# Patient Record
Sex: MALE | Race: WHITE | NOT HISPANIC OR LATINO | Employment: OTHER | URBAN - METROPOLITAN AREA
[De-identification: names, ages, dates, MRNs, and addresses within clinical notes are randomized per-mention and may not be internally consistent; named-entity substitution may affect disease eponyms.]

---

## 2017-01-21 DIAGNOSIS — E11.9 TYPE 2 DIABETES MELLITUS WITHOUT COMPLICATION: ICD-10-CM

## 2017-01-23 RX ORDER — METFORMIN HYDROCHLORIDE 500 MG/1
TABLET, EXTENDED RELEASE ORAL
Qty: 120 TABLET | Refills: 10 | Status: SHIPPED | OUTPATIENT
Start: 2017-01-23 | End: 2017-05-16

## 2017-01-23 RX ORDER — PRAVASTATIN SODIUM 40 MG/1
TABLET ORAL
Qty: 30 TABLET | Refills: 8 | Status: SHIPPED | OUTPATIENT
Start: 2017-01-23 | End: 2018-02-17 | Stop reason: SDUPTHER

## 2017-04-02 RX ORDER — LISINOPRIL AND HYDROCHLOROTHIAZIDE 20; 25 MG/1; MG/1
TABLET ORAL
Qty: 30 TABLET | Refills: 10 | Status: SHIPPED | OUTPATIENT
Start: 2017-04-02 | End: 2017-08-18 | Stop reason: SDUPTHER

## 2017-05-04 ENCOUNTER — TELEPHONE (OUTPATIENT)
Dept: FAMILY MEDICINE | Facility: CLINIC | Age: 67
End: 2017-05-04

## 2017-05-04 RX ORDER — SITAGLIPTIN 100 MG/1
TABLET, FILM COATED ORAL
Qty: 30 TABLET | Refills: 10 | Status: SHIPPED | OUTPATIENT
Start: 2017-05-04 | End: 2018-06-10 | Stop reason: SDUPTHER

## 2017-05-16 ENCOUNTER — OFFICE VISIT (OUTPATIENT)
Dept: FAMILY MEDICINE | Facility: CLINIC | Age: 67
End: 2017-05-16
Payer: MEDICARE

## 2017-05-16 VITALS
SYSTOLIC BLOOD PRESSURE: 143 MMHG | BODY MASS INDEX: 41.44 KG/M2 | TEMPERATURE: 98 F | RESPIRATION RATE: 16 BRPM | HEIGHT: 67 IN | HEART RATE: 113 BPM | WEIGHT: 264 LBS | DIASTOLIC BLOOD PRESSURE: 73 MMHG

## 2017-05-16 DIAGNOSIS — R29.898 RIGHT LEG WEAKNESS: ICD-10-CM

## 2017-05-16 DIAGNOSIS — R29.818 NEUROGENIC CLAUDICATION: ICD-10-CM

## 2017-05-16 DIAGNOSIS — M79.604 RIGHT LEG PAIN: Primary | ICD-10-CM

## 2017-05-16 PROCEDURE — 3078F DIAST BP <80 MM HG: CPT | Mod: S$GLB,,, | Performed by: FAMILY MEDICINE

## 2017-05-16 PROCEDURE — 99999 PR PBB SHADOW E&M-EST. PATIENT-LVL III: CPT | Mod: PBBFAC,,, | Performed by: FAMILY MEDICINE

## 2017-05-16 PROCEDURE — 1125F AMNT PAIN NOTED PAIN PRSNT: CPT | Mod: S$GLB,,, | Performed by: FAMILY MEDICINE

## 2017-05-16 PROCEDURE — 99499 UNLISTED E&M SERVICE: CPT | Mod: S$GLB,,, | Performed by: FAMILY MEDICINE

## 2017-05-16 PROCEDURE — 99214 OFFICE O/P EST MOD 30 MIN: CPT | Mod: S$GLB,,, | Performed by: FAMILY MEDICINE

## 2017-05-16 PROCEDURE — 1159F MED LIST DOCD IN RCRD: CPT | Mod: S$GLB,,, | Performed by: FAMILY MEDICINE

## 2017-05-16 PROCEDURE — 3077F SYST BP >= 140 MM HG: CPT | Mod: S$GLB,,, | Performed by: FAMILY MEDICINE

## 2017-05-16 RX ORDER — GABAPENTIN 300 MG/1
300 CAPSULE ORAL NIGHTLY
Qty: 30 CAPSULE | Refills: 1 | Status: SHIPPED | OUTPATIENT
Start: 2017-05-16 | End: 2018-05-26 | Stop reason: SDUPTHER

## 2017-05-16 RX ORDER — CYCLOBENZAPRINE HCL 5 MG
5 TABLET ORAL 3 TIMES DAILY PRN
Qty: 30 TABLET | Refills: 1 | Status: SHIPPED | OUTPATIENT
Start: 2017-05-16 | End: 2017-05-26

## 2017-05-16 NOTE — PROGRESS NOTES
(Portions of this note were dictated using voice recognition software and may contain dictation related errors in spelling/grammar/syntax not found on text review)    CC:   Chief Complaint   Patient presents with    Back Pain    Hip Pain     right       HPI: 67 y.o. male chronic history below, last visit over a ago.  Presents with back pain and hip pain.  Reviewed last medical record entry, had endorsed some neuropathy symptoms in his leg which seem related to neurogenic claudication, worse on standing and better on leaning over.      Neurogenic claudication issues are fairly stable over the last several years.  However, he noticed that 4 days ago at night he started noticing right lower back pain.  He woke up the next day and the right leg was completely numb for couple of hours with the numbness got better throughout the day and he has been okay as long as he has been sitting in a chair.  However, at bedtime he gets recurrence of significant right low back pain and burning sensation in his.X and hip area.  He has been taking naproxen twice a day for the last few days and has noticed some slight improvement but not much.  2 nights ago he took a Percocet from his daughter has not taken anything else since.  He does have diabetes, started on Januvia at last visit last year in addition to his Amaryl 4 mg twice a day and metformin 1000 g twice a day.  He is overdue for lab work but will return for this.  He states that his sugars have been around 110-125 on average but this morning they are 165 as he had a candy bar last night and he was up all night because he could not sleep.  He denies bowel or bladder dysfunction.  Denies any numbness in the groin although does state he gets his neurogenic claudication symptoms, he will get numbness starting from his feet and going up the entire leg bilaterally including the pelvic area        Past Medical History:   Diagnosis Date    Allergy     DDD (degenerative disc  disease), lumbar     Hyperlipidemia     Hypertension     Nicotine abuse     Obesity     Scrotal mass     Type II diabetes mellitus with renal manifestations, uncontrolled     microalbuminuria    Type II or unspecified type diabetes mellitus without mention of complication, not stated as uncontrolled     Ulcer        Past Surgical History:   Procedure Laterality Date    CARPAL TUNNEL RELEASE      KNEE SURGERY Left     removal of cartilage with no implant    ULNAR NERVE TRANSPOSITION         Family History   Problem Relation Age of Onset    Cancer Mother     Cataracts Mother     Cancer Father     Diabetes Paternal Grandmother     Cancer Paternal Grandfather        Social History     Social History    Marital status:      Spouse name: N/A    Number of children: N/A    Years of education: N/A     Occupational History    Not on file.     Social History Main Topics    Smoking status: Current Every Day Smoker     Packs/day: 0.50     Years: 45.00     Types: Cigarettes    Smokeless tobacco: Never Used    Alcohol use 2.5 oz/week     5 drink(s) per week      Comment: beers weekly    Drug use: Not on file    Sexual activity: Not on file     Other Topics Concern    Not on file     Social History Narrative     Lab Results   Component Value Date    WBC 10.07 11/18/2015    HGB 14.6 11/18/2015    HCT 43.0 11/18/2015     11/18/2015    CHOL 140 11/18/2015    TRIG 225 (H) 11/18/2015    HDL 40 11/18/2015    ALT 42 03/30/2016    AST 34 03/30/2016     03/30/2016    K 4.2 03/30/2016     03/30/2016    CREATININE 1.2 03/30/2016    BUN 20 03/30/2016    CO2 31 (H) 03/30/2016    TSH 2.071 09/05/2014    PSA 2.8 03/30/2016    HGBA1C 8.6 (H) 03/30/2016    LDLCALC 55.0 (L) 11/18/2015     (H) 03/30/2016     Prior imaging on file includes lumbar MRI from June 2013 showing multilevel degenerative lumbar spondylosis worse at L4-L5 with  severe spinal cord narrowing related to diffuse disc bulge  and severe bilateral facet arthropathy      ROS:  GENERAL: No fever, chills, fatigability or weight loss.  SKIN: No rashes, no itching.  HEAD: No headaches.  EYES: No visual changes  EARS: No ear pain or changes in hearing.  NOSE: No congestion or rhinorrhea.  MOUTH & THROAT: No hoarseness, change in voice, or sore throat.  NODES: Denies swollen glands.  CHEST: Denies HASTINGS, cyanosis, wheezing, cough and sputum production.  CARDIOVASCULAR: Denies chest pain, PND, orthopnea.  ABDOMEN: No nausea, vomiting, or changes in bowel function.  URINARY: No flank pain, dysuria or hematuria.  PERIPHERAL VASCULAR: No claudication or cyanosis.  MUSCULOSKELETAL:Above.  NEUROLOGIC:Above.Also notices sensation of right leg weakness and difficulty with walking    Vital signs reviewed  PE:   APPEARANCE: Well nourished, well developed, in no acute distress.    HEAD: Normocephalic, atraumatic.  EYES:   Conjunctivae noninjected.  MSK/NEURO: Mild midline lumbar tenderness lower lumbar spine.  No paralumbar tenderness.  No sacroiliac tenderness.  Negative straight leg raise bilaterally.  Absent patellar and ankle reflexes bilaterally.  Strength testing demonstrates 5/5 hip flexor and quadriceps strength bilaterally.  4/5 hamstring strength bilaterally.  5/5 foot dorsi flexor/plantar flexor strength bilaterally    IMPRESSION    1. Right leg pain    2. Right leg weakness    3. Neurogenic claudication          PLAN  Orders Placed This Encounter   Procedures    MRI Lumbar Spine Without Contrast     Continue naproxen 500 twice a day for the next week  Add Flexeril 5 mg 3 times a day as needed in case of muscle strain pathology  Start gabapentin 300 mg at night for the next 3-4 weeks for acute neurogenic pathology  Update MRI of the lumbar spine without contrast given his history of neurogenic claudication and continued baseline symptoms of numbness of bilateral extremities with progressive walking, better with leaning over    He will return in  the next month or 2 for recheck of his chronic health issues including diabetes

## 2017-05-16 NOTE — MR AVS SNAPSHOT
[address]  [place] [address]  Phone: [phone]  Fax: [phone]                  Francisco Coppola   2017 8:40 AM   Office Visit    Description:  Male : 1950   Provider:  Laureano Hebert MD   Department:  [facility]           Reason for Visit     Back Pain     Hip Pain           Diagnoses this Visit        Comments    Right leg pain    -  Primary     Right leg weakness         Neurogenic claudication                To Do List           Future Appointments        Provider Department Dept Phone    2017 6:00 AM [facility] MRI[facility] [phone]      Goals (5 Years of Data)     None       These Medications        Disp Refills Start End    gabapentin (NEURONTIN) 300 MG capsule 30 capsule 1 2017     Take 1 capsule (300 mg total) by mouth every evening. - Oral    Pharmacy: [facility] [address] [facility] Ph #: [phone]       cyclobenzaprine (FLEXERIL) 5 MG tablet 30 tablet 1 2017    Take 1 tablet (5 mg total) by mouth 3 (three) times daily as needed for Muscle spasms. - Oral    Pharmacy: [facility] - [place] [id number] [facility] Ph #: [phone]         [facility]     [facility] Nurse Care Line -  Assistance  Unless otherwise directed by your provider, please contact [facility], our nurse care line that is available for  assistance.     Registered nurses in the [facility] provide: appointment scheduling, clinical advisement, health education, and other advisory services.  Call: [phone] (toll free)               Medications           Message regarding Medications     Verify the changes and/or additions to your medication regime listed below are the same as discussed with your clinician today.  If any of these changes or additions are incorrect, please notify your healthcare provider.        START  taking these NEW medications        Refills    gabapentin (NEURONTIN) 300 MG capsule 1    Sig: Take 1 capsule (300 mg total) by mouth every evening.    Class: Normal    Route: Oral    cyclobenzaprine (FLEXERIL) 5 MG tablet 1    Sig: Take 1 tablet (5 mg total) by mouth 3 (three) times daily as needed for Muscle spasms.    Class: Normal    Route: Oral      STOP taking these medications     furosemide (LASIX) 40 MG tablet Take 1 tablet (40 mg total) by mouth once daily.    polyethylene glycol (GLYCOLAX) 17 gram/dose powder Take 17 g by mouth once daily.    sulfamethoxazole-trimethoprim 800-160mg (BACTRIM DS) 800-160 mg Tab Take 1 tablet by mouth 2 (two) times daily.    metformin (GLUCOPHAGE-XR) 500 MG 24 hr tablet TAKE 2 TABLETS (1,000 MG TOTAL) BY MOUTH 2 (TWO) TIMES DAILY WITH MEAL S.           Verify that the below list of medications is an accurate representation of the medications you are currently taking.  If none reported, the list may be blank. If incorrect, please contact your healthcare provider. Carry this list with you in case of emergency.           Current Medications     blood sugar diagnostic Strp 1 each by Misc.(Non-Drug; Combo Route) route 2 (two) times daily.    glimepiride (AMARYL) 4 MG tablet TAKE 1 TABLET (4 MG TOTAL) BY MOUTH 2 (TWO) TIMES DAILY  WITH MEALS.    JANUVIA 100 mg Tab TAKE 1 TABLET (100 MG TOTAL)  BY MOUTH ONCE DAILY.    lancets (ONETOUCH ULTRASOFT LANCETS) Misc 1 Units by Misc.(Non-Drug; Combo Route) route 2 (two) times daily.    lisinopril-hydrochlorothiazide (PRINZIDE,ZESTORETIC) 20-25 mg Tab TAKE ONE TABLET BY MOUTH EVERY DAY    mupirocin (BACTROBAN) 2 % ointment Apply topically 3 (three) times daily.    naproxen (NAPROSYN) 250 MG tablet Take 500 mg by mouth 2 (two) times daily with meals.    pravastatin (PRAVACHOL) 40 MG tablet TAKE 1 TABLET (40 MG TOTAL) BY MOUTH NIGHTLY.    cyclobenzaprine (FLEXERIL) 5 MG tablet Take 1 tablet (5 mg total) by mouth 3 (three) times daily as  "needed for Muscle spasms.    gabapentin (NEURONTIN) 300 MG capsule Take 1 capsule (300 mg total) by mouth every evening.           Clinical Reference Information           Your Vitals Were     BP Pulse Temp Resp Height Weight    143/73 113 97.9 °F (36.6 °C) (Oral) 16 5' 7" (1.702 m) 119.7 kg (264 lb)    BMI                41.35 kg/m2          Blood Pressure          Most Recent Value    BP  (!)  143/73      Allergies as of 5/16/2017     Augmentin [Amoxicillin-pot Clavulanate]    Tramadol      Immunizations Administered on Date of Encounter - 5/16/2017     None      Orders Placed During Today's Visit     Future Labs/Procedures Expected by Expires    MRI Lumbar Spine Without Contrast  5/16/2017 5/16/2018      Smoking Cessation     If you would like to quit smoking:   You may be eligible for free services if you are a [place] resident and started smoking cigarettes before September 1, 1988.  Call the Smoking Cessation Trust ([facility]) toll free at (819) 734-3798 or (159) 195-1009.   Call 1-800-QUIT-NOW if you do not meet the above criteria.   Contact us via email: [email or web address]   View our website for more information: [email or web address]        Language Assistance Services     ATTENTION: Language assistance services are available, free of charge. Please call [phone].      ATENCIÓN: Si habla español, tiene a lewis disposición servicios gratuitos de asistencia lingüística. Llame al [phone].     CHÚ Ý: N?u b?n nói Ti?ng Vi?t, có các d?ch v? h? tr? ngôn ng? mi?n phí dành cho b?n. G?i s? [phone].         [facility] complies with applicable Federal civil rights laws and does not discriminate on the basis of race, color, national origin, age, disability, or sex.        "

## 2017-05-16 NOTE — PROGRESS NOTES
Patient, Francisco Coppola (MRN #[id number]), presented with a recorded BMI of 41.35 kg/m^2 consistent with the definition of morbid obesity (ICD-10 E66.01). The patient's morbid obesity was monitored, evaluated, addressed and/or treated. This addendum to the medical record is made on 05/16/2017.

## 2017-05-19 ENCOUNTER — PATIENT MESSAGE (OUTPATIENT)
Dept: FAMILY MEDICINE | Facility: CLINIC | Age: 67
End: 2017-05-19

## 2017-05-19 DIAGNOSIS — E11.9 TYPE 2 DIABETES MELLITUS WITHOUT COMPLICATION: ICD-10-CM

## 2017-05-22 ENCOUNTER — PATIENT MESSAGE (OUTPATIENT)
Dept: FAMILY MEDICINE | Facility: CLINIC | Age: 67
End: 2017-05-22

## 2017-05-24 ENCOUNTER — TELEPHONE (OUTPATIENT)
Dept: FAMILY MEDICINE | Facility: CLINIC | Age: 67
End: 2017-05-24

## 2017-05-24 ENCOUNTER — HOSPITAL ENCOUNTER (OUTPATIENT)
Dept: RADIOLOGY | Facility: HOSPITAL | Age: 67
Discharge: HOME | End: 2017-05-24
Attending: FAMILY MEDICINE
Payer: MEDICARE

## 2017-05-24 DIAGNOSIS — M79.604 RIGHT LEG PAIN: ICD-10-CM

## 2017-05-24 DIAGNOSIS — R29.898 RIGHT LEG WEAKNESS: ICD-10-CM

## 2017-05-24 DIAGNOSIS — R29.818 NEUROGENIC CLAUDICATION: ICD-10-CM

## 2017-05-24 NOTE — TELEPHONE ENCOUNTER
----- Message from Kimi Sims sent at 5/24/2017  3:56 PM CDT -----  Contact: Self [phone]  Patient is calling to talk to nurse concerning he wanted orders for a stand up MRI or Open MRI. Please advice

## 2017-05-26 ENCOUNTER — TELEPHONE (OUTPATIENT)
Dept: FAMILY MEDICINE | Facility: CLINIC | Age: 67
End: 2017-05-26

## 2017-05-26 DIAGNOSIS — R29.818 NEUROGENIC CLAUDICATION: Primary | ICD-10-CM

## 2017-05-26 NOTE — TELEPHONE ENCOUNTER
BYRON-  Pt called stating that he could not do his MRI.  He stated that after laying there for 5 mins he had to get up bc of severe back pain.

## 2017-05-26 NOTE — TELEPHONE ENCOUNTER
----- Message from Sybil Dugan sent at 5/26/2017  4:09 PM CDT -----  Contact: self/[phone]  Patient is calling to discuss results from recent test.  Please advise.

## 2017-05-29 RX ORDER — HYDROCODONE BITARTRATE AND ACETAMINOPHEN 5; 325 MG/1; MG/1
1 TABLET ORAL 2 TIMES DAILY PRN
Qty: 16 TABLET | Refills: 0 | Status: SHIPPED | OUTPATIENT
Start: 2017-05-29 | End: 2017-06-06 | Stop reason: SDUPTHER

## 2017-05-29 NOTE — TELEPHONE ENCOUNTER
Have reordered MRI.  Given a prescription of hydrocodone that he can take prior to the procedure for severe pain so he can sit through the procedure

## 2017-05-30 RX ORDER — GLIMEPIRIDE 4 MG/1
TABLET ORAL
Qty: 60 TABLET | Refills: 5 | Status: SHIPPED | OUTPATIENT
Start: 2017-05-30 | End: 2018-01-02 | Stop reason: SDUPTHER

## 2017-05-30 NOTE — TELEPHONE ENCOUNTER
----- Message from Kalani Richardson sent at 5/30/2017  9:14 AM CDT -----  Contact: [phone]/ self   Pt wants to know if he needs to  his NORCO or if it was sent to [facility] . Please advise

## 2017-06-03 ENCOUNTER — HOSPITAL ENCOUNTER (EMERGENCY)
Facility: HOSPITAL | Age: 67
Discharge: HOME | End: 2017-06-03
Attending: EMERGENCY MEDICINE
Payer: MEDICARE

## 2017-06-03 VITALS
DIASTOLIC BLOOD PRESSURE: 81 MMHG | SYSTOLIC BLOOD PRESSURE: 142 MMHG | HEART RATE: 95 BPM | OXYGEN SATURATION: 95 % | HEIGHT: 67 IN | BODY MASS INDEX: 41.59 KG/M2 | RESPIRATION RATE: 18 BRPM | TEMPERATURE: 98 F | WEIGHT: 265 LBS

## 2017-06-03 DIAGNOSIS — R60.0 PEDAL EDEMA: Primary | ICD-10-CM

## 2017-06-03 DIAGNOSIS — L03.119 CELLULITIS OF LOWER EXTREMITY, UNSPECIFIED LATERALITY: ICD-10-CM

## 2017-06-03 DIAGNOSIS — R60.9 SWELLING: ICD-10-CM

## 2017-06-03 PROCEDURE — 82962 GLUCOSE BLOOD TEST: CPT

## 2017-06-03 PROCEDURE — 99284 EMERGENCY DEPT VISIT MOD MDM: CPT | Mod: 25

## 2017-06-03 RX ORDER — FUROSEMIDE 40 MG/1
40 TABLET ORAL DAILY
Qty: 6 TABLET | Refills: 0 | Status: SHIPPED | OUTPATIENT
Start: 2017-06-03 | End: 2017-06-14 | Stop reason: SDUPTHER

## 2017-06-03 RX ORDER — SULFAMETHOXAZOLE AND TRIMETHOPRIM 800; 160 MG/1; MG/1
2 TABLET ORAL 2 TIMES DAILY
Qty: 40 TABLET | Refills: 0 | Status: SHIPPED | OUTPATIENT
Start: 2017-06-03 | End: 2017-06-14

## 2017-06-03 NOTE — ED PROVIDER NOTES
Encounter Date: 6/3/2017       History     Chief Complaint   Patient presents with    Leg Swelling     bilateral lower leg swelling x 2 days     Review of patient's allergies indicates:   Allergen Reactions    Augmentin [amoxicillin-pot clavulanate] Hives and Rash    Tramadol Nausea Only     Francisco Coppola, a 67 y.o. male, complains of swelling of the lower legs with oozing a clear liquid.  He states he's had a chronic problem with the left leg and for the past 2 weeks with the right leg.  He denies any fever or chills.  He has a history of diabetes but says his glucose has been fairly well controlled.  He denies any deep calf tenderness but complains of tightness around the ankles particularly of the right ankle.  He has been treating the lesions with mupirocin ointment prescribed by his physician for the left lower extremity.   Pain location: Bilateral foot and ankle pain  Pain Severity: Mild-to-moderate    Pain timing: Chronic problem with the left lower extremity increased over the past 2 weeks with right  Pain character: Tight sensation    Associated with or Modified by: (see above)              Past Medical History:   Diagnosis Date    Allergy     DDD (degenerative disc disease), lumbar     Hyperlipidemia     Hypertension     Nicotine abuse     Obesity     Scrotal mass     Type II diabetes mellitus with renal manifestations, uncontrolled     microalbuminuria    Type II or unspecified type diabetes mellitus without mention of complication, not stated as uncontrolled     Ulcer      Past Surgical History:   Procedure Laterality Date    CARPAL TUNNEL RELEASE      KNEE SURGERY Left     removal of cartilage with no implant    ULNAR NERVE TRANSPOSITION       Family History   Problem Relation Age of Onset    Cancer Mother     Cataracts Mother     Cancer Father     Cancer Paternal Grandfather     Diabetes Paternal Grandmother      Social History   Substance Use Topics    Smoking status:  Current Every Day Smoker     Packs/day: 0.50     Years: 45.00     Types: Cigarettes    Smokeless tobacco: Never Used    Alcohol use 2.5 oz/week     5 Standard drinks or equivalent per week      Comment: beers weekly     Review of Systems   Constitutional: Negative for chills and fever.   Cardiovascular: Positive for leg swelling.   Musculoskeletal:        Swelling of both lower extremities with oozing from the skin bilaterally.   All other systems reviewed and are negative.      Physical Exam     Initial Vitals [06/03/17 1414]   BP Pulse Resp Temp SpO2   (!) 160/74 100 18 98.6 °F (37 °C) 96 %     Physical Exam    Nursing note and vitals reviewed.  Constitutional: He appears well-developed and well-nourished. No distress.   Musculoskeletal:   Right lower extremity: 2+ pretibial edema with mild erythema with edema ankle and  foot; no deep calf tenderness or popliteal tenderness.  Small vesicles over the pretibial area with mild erythema and serous fluid noted.  Negative Homans.    Left lower extremity: 2+ pretibial edema with mild erythema; edema ankle and foot; dorsalis pedis pulse intact;Small vesicles over the pretibial area with mild erythema and serous fluid noted.  Negative Homans.    Dorsalis pedis pulses intact.     Neurological: He is alert and oriented to person, place, and time. He has normal strength.   Skin: Skin is warm and dry.   See above lower extremity examination.   Psychiatric: He has a normal mood and affect. His behavior is normal. Thought content normal.         ED Course   Procedures  Labs Reviewed   POCT GLUCOSE MONITORING CONTINUOUS             Medical Decision Making:   Initial Assessment:   67 y.o. male, complains of swelling of the lower legs with oozing a clear liquid.  He states he's had a chronic problem with the left leg and for the past 2 weeks with the right leg.  He denies any fever or chills.  He has a history of diabetes but says his glucose has been fairly well controlled.  He  denies any deep calf tenderness but complains of tightness around the ankles particularly of the right ankle.  He has been treating the lesions with mupirocin ointment prescribed by his physician for the left lower extremity.   Pain location: Bilateral foot and ankle pain  PE: No acute distress; afebrile; bilateral dependent edema with early cellulitis and weeping of serous fluid.  Differential Diagnosis:   DVT, dependent edema chronic, cellulitis  Clinical Tests:   Medical Tests: Ordered and Reviewed  ED Management:  No evidence of DVT on venous ultrasound of both lower extremities.  The patient will be placed on Lasix for the next week and Bactrim.  He will continue to use mupirocin ointment on his legs and he will follow-up with his primary care physician this next week for further evaluation.                   ED Course     Clinical Impression:   The primary encounter diagnosis was Pedal edema. Diagnoses of Swelling and Cellulitis of lower extremity, unspecified laterality were also pertinent to this visit.          Ray Rocha Jr., MD  06/03/17 0577

## 2017-06-03 NOTE — ED NOTES
"Patient presents to ED secondary to leg swelling. Patient states left leg swelling is chronic but has had an acute onset of right leg swelling approx 10 days ago. Accompanied by tightness to right calf. Also noted to have "blisters" to bilateral legs. +3 pitting edema noted to BLE. PMH includes DM.  Pt denies cp, sob, URI symptoms, fever, chills, abd pain, n/v/d, and urinary symptoms. NAD noted. Will continue to monitor closely.     APPEARANCE: Alert, oriented and in no acute distress.  CARDIAC: Normal rate    PERIPHERAL VASCULAR: peripheral pulses present. Sluggish cap refill. Warm to touch.    RESPIRATORY:Normal rate and effort, breath sounds clear bilaterally throughout chest. Respirations are equal and unlabored no obvious signs of distress.  GASTRO: soft, bowel sounds normal, no tenderness, no abdominal distention. Obese.  MUSC: Full ROM. No bony tenderness or soft tissue tenderness. No obvious deformity. Ambulated to ED room with steady gait.  SKIN: Skin is warm and dry, normal skin turgor, mucous membranes moist. "few weeping blisters" noted to BLE. Scattered redness noted to BLE. Patient states redness to LLE is chronic.     "

## 2017-06-04 LAB — POCT GLUCOSE: 129 MG/DL (ref 70–110)

## 2017-06-06 ENCOUNTER — PATIENT MESSAGE (OUTPATIENT)
Dept: FAMILY MEDICINE | Facility: CLINIC | Age: 67
End: 2017-06-06

## 2017-06-06 RX ORDER — HYDROCODONE BITARTRATE AND ACETAMINOPHEN 5; 325 MG/1; MG/1
1 TABLET ORAL 2 TIMES DAILY PRN
Qty: 16 TABLET | Refills: 0 | Status: SHIPPED | OUTPATIENT
Start: 2017-06-06 | End: 2017-06-14

## 2017-06-10 RX ORDER — METFORMIN HYDROCHLORIDE 500 MG/1
TABLET ORAL
COMMUNITY
Start: 2017-03-06 | End: 2017-06-14

## 2017-06-10 RX ORDER — METFORMIN HYDROCHLORIDE 500 MG/1
TABLET ORAL
COMMUNITY
Start: 2017-04-17 | End: 2017-06-14

## 2017-06-12 ENCOUNTER — HOSPITAL ENCOUNTER (OUTPATIENT)
Dept: RADIOLOGY | Facility: HOSPITAL | Age: 67
Discharge: HOME | End: 2017-06-12
Attending: FAMILY MEDICINE
Payer: MEDICARE

## 2017-06-12 DIAGNOSIS — R29.818 NEUROGENIC CLAUDICATION: ICD-10-CM

## 2017-06-12 PROCEDURE — 72148 MRI LUMBAR SPINE W/O DYE: CPT | Mod: 26,,, | Performed by: RADIOLOGY

## 2017-06-12 PROCEDURE — 72148 MRI LUMBAR SPINE W/O DYE: CPT | Mod: TC

## 2017-06-13 ENCOUNTER — TELEPHONE (OUTPATIENT)
Dept: FAMILY MEDICINE | Facility: CLINIC | Age: 67
End: 2017-06-13

## 2017-06-13 DIAGNOSIS — M48.00 SPINAL STENOSIS, UNSPECIFIED SPINAL REGION: ICD-10-CM

## 2017-06-13 DIAGNOSIS — M54.16 RIGHT LUMBAR RADICULOPATHY: Primary | ICD-10-CM

## 2017-06-13 NOTE — TELEPHONE ENCOUNTER
Called patient and advised that MRI results showed significant disc herniation to the right which may be the cause of the pain and swelling in his right leg.  Let him know that Dr Hebert has put in an order for neurosurgery to see if surgical intervention is needed, if not, a referral for pain management can be submitted.  Also verified his appt with Dr Hebert tomorrow.  Patient verbalized understanding.

## 2017-06-13 NOTE — TELEPHONE ENCOUNTER
----- Message from Kelly Bangura sent at 6/13/2017  1:42 PM CDT -----  Contact: [phone]  Patient is returning your call

## 2017-06-13 NOTE — TELEPHONE ENCOUNTER
pls advise MRI does show significant disc herniation off to the right   compressing on the nerve root that is going to the right leg, likely causing a lot of the problems he has had with the right leg.  I am putting a consult initially to neurosurgery to see if it is necessary for him to have any surgical intervention for this.  If this can be delayed in any way, there may be referral to pain management for some injection treatments first.  I will let neurosurgery make that determination based on the review of his MRI test

## 2017-06-14 ENCOUNTER — OFFICE VISIT (OUTPATIENT)
Dept: FAMILY MEDICINE | Facility: CLINIC | Age: 67
End: 2017-06-14
Payer: MEDICARE

## 2017-06-14 VITALS
WEIGHT: 262.56 LBS | SYSTOLIC BLOOD PRESSURE: 142 MMHG | DIASTOLIC BLOOD PRESSURE: 76 MMHG | BODY MASS INDEX: 39.79 KG/M2 | OXYGEN SATURATION: 96 % | HEIGHT: 68 IN | HEART RATE: 113 BPM

## 2017-06-14 DIAGNOSIS — R60.0 PERIPHERAL EDEMA: ICD-10-CM

## 2017-06-14 DIAGNOSIS — M54.16 RIGHT LUMBAR RADICULOPATHY: ICD-10-CM

## 2017-06-14 DIAGNOSIS — I10 HTN (HYPERTENSION), BENIGN: ICD-10-CM

## 2017-06-14 DIAGNOSIS — L03.90 CELLULITIS, UNSPECIFIED CELLULITIS SITE: ICD-10-CM

## 2017-06-14 PROCEDURE — 99999 PR PBB SHADOW E&M-EST. PATIENT-LVL III: CPT | Mod: PBBFAC,,, | Performed by: FAMILY MEDICINE

## 2017-06-14 PROCEDURE — 1159F MED LIST DOCD IN RCRD: CPT | Mod: S$GLB,,, | Performed by: FAMILY MEDICINE

## 2017-06-14 PROCEDURE — 4010F ACE/ARB THERAPY RXD/TAKEN: CPT | Mod: S$GLB,,, | Performed by: FAMILY MEDICINE

## 2017-06-14 PROCEDURE — 1125F AMNT PAIN NOTED PAIN PRSNT: CPT | Mod: S$GLB,,, | Performed by: FAMILY MEDICINE

## 2017-06-14 PROCEDURE — 99215 OFFICE O/P EST HI 40 MIN: CPT | Mod: S$GLB,,, | Performed by: FAMILY MEDICINE

## 2017-06-14 PROCEDURE — 99499 UNLISTED E&M SERVICE: CPT | Mod: S$GLB,,, | Performed by: FAMILY MEDICINE

## 2017-06-14 RX ORDER — CYCLOBENZAPRINE HCL 5 MG
TABLET ORAL
COMMUNITY
Start: 2017-05-30 | End: 2017-06-29

## 2017-06-14 RX ORDER — METFORMIN HYDROCHLORIDE 500 MG/1
TABLET ORAL
COMMUNITY
Start: 2017-05-30 | End: 2018-05-10

## 2017-06-14 RX ORDER — HYDROCODONE BITARTRATE AND ACETAMINOPHEN 10; 325 MG/1; MG/1
1 TABLET ORAL EVERY 12 HOURS PRN
Qty: 30 TABLET | Refills: 0 | Status: SHIPPED | OUTPATIENT
Start: 2017-06-14 | End: 2017-06-26 | Stop reason: SDUPTHER

## 2017-06-14 RX ORDER — FUROSEMIDE 20 MG/1
20 TABLET ORAL DAILY
Qty: 30 TABLET | Refills: 11 | Status: SHIPPED | OUTPATIENT
Start: 2017-06-14 | End: 2017-08-18 | Stop reason: SDUPTHER

## 2017-06-14 NOTE — PROGRESS NOTES
(Portions of this note were dictated using voice recognition software and may contain dictation related errors in spelling/grammar/syntax not found on text review)    CC:   Chief Complaint   Patient presents with    Follow-up     leg swelling,back pain    Diabetic Foot Exam       HPI: 67 y.o. male medical history below.  Last visit was in May for right leg pain worsening as a result of neurogenic claudication which is chronic for him.  MRI showed significant right central/paracentral disc restriction at L5-S1 compressing descending S1 nerve root and contributing to severe spinal canal stenosis.  Persistent L4-L5 severe spinal canal stenosis.  Put in a consult for neurosurgery.    He went to the ER recently for left leg edema and weeping, concern for possible cellulitis.  Has been seen by nurse practitioner in April 2016 for similar symptoms.  Was given Bactrim in the ED and a prescription for Lasix.  His other chronic edema history does have a history of chronic peripheral edema.  Ultrasound of the legs was negative for DVT  \  Diabetes on metformin 1000 mg twice a day and Amaryl 4 mg twice a day plus Januvia 100 mg daily.  Has been lost to follow-up with respect to diabetes management in the past.  Compliant with therapy above.  Blood sugars usually in the  115-125 range in the morning.  Has not seen an ophthalmologist recently.  Does have some right foot numbness issues from the above concern.  No significant left foot numbness issues.    Here with his wife.  Left Leg swelling is improved with Lasix 40 mg daily but this was just a temporary prescription.  Leg is less red and painful given the Bactrim.  Still has some scabbing noted left leg which he had been putting some mupirocin ointment on.  No fevers or chills.  No chest pain or shortness of breath.  No nausea, vomiting, abdominal pain.          Past Medical History:   Diagnosis Date    Allergy     DDD (degenerative disc disease), lumbar      Hyperlipidemia     Hypertension     Nicotine abuse     Obesity     Scrotal mass     Type II diabetes mellitus with renal manifestations, uncontrolled     microalbuminuria    Type II or unspecified type diabetes mellitus without mention of complication, not stated as uncontrolled     Ulcer        Past Surgical History:   Procedure Laterality Date    CARPAL TUNNEL RELEASE      KNEE SURGERY Left     removal of cartilage with no implant    ULNAR NERVE TRANSPOSITION         Family History   Problem Relation Age of Onset    Cancer Mother     Cataracts Mother     Cancer Father     Cancer Paternal Grandfather     Diabetes Paternal Grandmother        Social History     Social History    Marital status:      Spouse name: N/A    Number of children: N/A    Years of education: N/A     Occupational History    Not on file.     Social History Main Topics    Smoking status: Current Every Day Smoker     Packs/day: 0.50     Years: 45.00     Types: Cigarettes    Smokeless tobacco: Never Used    Alcohol use 2.5 oz/week     5 Standard drinks or equivalent per week      Comment: beers weekly    Drug use: Unknown    Sexual activity: Not on file     Other Topics Concern    Not on file     Social History Narrative    No narrative on file     SCREENINGS (males >=65)    Immunizations:  Tetanus: 1/1/2014  Pneumovax: 9/19/2014     Prostate check 2015    Colonoscopy: due (addressed with pt prior visit and he had declined)    Lab Results   Component Value Date    WBC 10.07 11/18/2015    HGB 14.6 11/18/2015    HCT 43.0 11/18/2015     11/18/2015    CHOL 140 11/18/2015    TRIG 225 (H) 11/18/2015    HDL 40 11/18/2015    ALT 42 03/30/2016    AST 34 03/30/2016     03/30/2016    K 4.2 03/30/2016     03/30/2016    CREATININE 1.2 03/30/2016    BUN 20 03/30/2016    CO2 31 (H) 03/30/2016    TSH 2.071 09/05/2014    PSA 2.8 03/30/2016    HGBA1C 8.6 (H) 03/30/2016    LDLCALC 55.0 (L) 11/18/2015     (H)  03/30/2016       ROS:  GENERAL: No fever, chills, fatigability or weight loss.  SKIN: No rashes, no itching.  HEAD: No headaches.  EYES: No visual changes  EARS: No ear pain or changes in hearing.  NOSE: No congestion or rhinorrhea.  MOUTH & THROAT: No hoarseness, change in voice, or sore throat.  NODES: Denies swollen glands.  CHEST: Denies HASTINGS, cyanosis, wheezing, cough and sputum production.  CARDIOVASCULAR: Denies chest pain, PND, orthopnea.  ABDOMEN: No nausea, vomiting, or changes in bowel function.  URINARY: No flank pain, dysuria or hematuria.  PERIPHERAL VASCULAR: above.  MUSCULOSKELETAL: above  NEUROLOGIC: above    Vital signs reviewed  PE:   APPEARANCE: Well nourished, well developed, in no acute distress.    HEAD: Normocephalic, atraumatic.  EYES: PERRL. EOMI.   Conjunctivae noninjected.  EARS: TM's intact. Light reflex normal. No retraction or perforation  NOSE: Mucosa pink. Airway clear.  MOUTH & THROAT: No tonsillar enlargement. No pharyngeal erythema or exudate.   NECK: Supple with no cervical lymphadenopathy.    CHEST: Good inspiratory effort. Lungs clear to auscultation with no wheezes or crackles.  CARDIOVASCULAR: Normal S1, S2. No rubs, murmurs, or gallops.  ABDOMEN: Bowel sounds normal. Not distended. Soft. No tenderness or masses. No organomegaly.  EXTREMITIES: 2+ pitting edema bilateral lower extremities.  Left looks only slightly warmer than the right with some slight erythema and some scabbing noted anterior leg pretibial region.  There is some impetiginous change to some of these lesions.  DIABETIC FOOT EXAM: Protective Sensation (w/ 10 gram monofilament):  Right: Decreased  Left: Intact    Visual Inspection:  Onychomycosis -  Bilateral, tinea pedis bilateral    Pedal Pulses:   Right: Present  Left: Present    Posterior tibialis:   Right:Present  Left: Present          IMPRESSION  1. Uncontrolled type 2 diabetes mellitus with microalbuminuria, without long-term current use of insulin    2.  Cellulitis, unspecified cellulitis site    3. Peripheral edema    4. HTN (hypertension), benign    5. Right lumbar radiculopathy    \        PLAN  Reviewed emergency room documentation.  Reviewed his lower extremity ultrasound.  Reviewed his lab as last done in the chart as above.  Agree with decreasing Bactrim.  Use Bactroban ointment to the affected open lesions on the left leg 3 times a day secondary to some impetigo noted.  Also will place him on a maintenance Lasix dose 20 mg daily.  Emphasized use of compression stockings and he has some at home but has not been able to use them because of some difficulty getting them on.  Hopefully with decreased peripheral edema this might be possible.  He is inactive recently secondary to his right leg problems stemming from radiculopathy.  Has appointment coming up in the next couple weeks with neurosurgery to determine further recommendations and treatment plan    I have refilled his hydrocodone until he can get into neurosurgery, increase dosage to 10 mg since she finds that the 5 mg hydrocodone is not as effective at controlling his right leg pain    Hypertension: Slightly suboptimal with hopefully the addition of the maintenance Lasix in addition to his lisinopril HCT, this may normalize    Diabetes health maintenance:  -- advise regular and consistent glucose monitoring and medication compliance.  -- advise daily foot checks  -- advise yearly ophthalmologic exams  -- advise adequate dietary and exercise modification  -- advise regular dental visits  -- advise daily low-dose aspirin use if patient is not on other anticoagulants and there are no other contraindications.  -- Medication management: Continue Amaryl 4 mg twice a day, metformin 1000 mg twice a day, Januvia 100 mg daily.  Check labs below the next 2 weeks    Health maintenance: Have emphasized the importance of second pneumococcal vaccine with Prevnar, also discussed colonoscopy screening.  Aunt prostate  cancer screening  Given current symptomology today, we can address this further at following visit but have advised him to think about these so we can consider getting these updated next time      Return in 2 months or sooner as needed    Orders Placed This Encounter   Procedures    CBC auto differential    Comprehensive metabolic panel    Hemoglobin A1c    Microalbumin/creatinine urine ratio    TSH    Lipid panel

## 2017-06-24 ENCOUNTER — LAB VISIT (OUTPATIENT)
Dept: LAB | Facility: HOSPITAL | Age: 67
End: 2017-06-24
Attending: FAMILY MEDICINE
Payer: MEDICARE

## 2017-06-24 DIAGNOSIS — Z11.59 NEED FOR HEPATITIS C SCREENING TEST: ICD-10-CM

## 2017-06-24 LAB
ALBUMIN SERPL BCP-MCNC: 3.9 G/DL
ALP SERPL-CCNC: 63 U/L
ALT SERPL W/O P-5'-P-CCNC: 37 U/L
ANION GAP SERPL CALC-SCNC: 9 MMOL/L
AST SERPL-CCNC: 35 U/L
BASOPHILS # BLD AUTO: 0.01 K/UL
BASOPHILS NFR BLD: 0.1 %
BILIRUB SERPL-MCNC: 0.5 MG/DL
BUN SERPL-MCNC: 26 MG/DL
CALCIUM SERPL-MCNC: 10.1 MG/DL
CHLORIDE SERPL-SCNC: 100 MMOL/L
CHOLEST/HDLC SERPL: 4.1 {RATIO}
CO2 SERPL-SCNC: 28 MMOL/L
CREAT SERPL-MCNC: 1.3 MG/DL
DIFFERENTIAL METHOD: NORMAL
EOSINOPHIL # BLD AUTO: 0.1 K/UL
EOSINOPHIL NFR BLD: 1.2 %
ERYTHROCYTE [DISTWIDTH] IN BLOOD BY AUTOMATED COUNT: 12.9 %
EST. GFR  (AFRICAN AMERICAN): >60 ML/MIN/1.73 M^2
EST. GFR  (NON AFRICAN AMERICAN): 56 ML/MIN/1.73 M^2
GLUCOSE SERPL-MCNC: 167 MG/DL
HCT VFR BLD AUTO: 43.4 %
HDL/CHOLESTEROL RATIO: 24.7 %
HDLC SERPL-MCNC: 162 MG/DL
HDLC SERPL-MCNC: 40 MG/DL
HGB BLD-MCNC: 14.9 G/DL
LDLC SERPL CALC-MCNC: 80.6 MG/DL
LYMPHOCYTES # BLD AUTO: 3 K/UL
LYMPHOCYTES NFR BLD: 31.8 %
MCH RBC QN AUTO: 31 PG
MCHC RBC AUTO-ENTMCNC: 34.3 %
MCV RBC AUTO: 90 FL
MONOCYTES # BLD AUTO: 0.8 K/UL
MONOCYTES NFR BLD: 8.7 %
NEUTROPHILS # BLD AUTO: 5.5 K/UL
NEUTROPHILS NFR BLD: 57.9 %
NONHDLC SERPL-MCNC: 122 MG/DL
PLATELET # BLD AUTO: 243 K/UL
PMV BLD AUTO: 10.2 FL
POTASSIUM SERPL-SCNC: 4.4 MMOL/L
PROT SERPL-MCNC: 7.8 G/DL
RBC # BLD AUTO: 4.81 M/UL
SODIUM SERPL-SCNC: 137 MMOL/L
TRIGL SERPL-MCNC: 207 MG/DL
TSH SERPL DL<=0.005 MIU/L-ACNC: 2.57 UIU/ML
WBC # BLD AUTO: 9.56 K/UL

## 2017-06-24 PROCEDURE — 85025 COMPLETE CBC W/AUTO DIFF WBC: CPT

## 2017-06-24 PROCEDURE — 83036 HEMOGLOBIN GLYCOSYLATED A1C: CPT

## 2017-06-24 PROCEDURE — 80053 COMPREHEN METABOLIC PANEL: CPT

## 2017-06-24 PROCEDURE — 80061 LIPID PANEL: CPT

## 2017-06-24 PROCEDURE — 86803 HEPATITIS C AB TEST: CPT

## 2017-06-24 PROCEDURE — 36415 COLL VENOUS BLD VENIPUNCTURE: CPT

## 2017-06-24 PROCEDURE — 84443 ASSAY THYROID STIM HORMONE: CPT

## 2017-06-25 LAB
ESTIMATED AVG GLUCOSE: 174 MG/DL
HBA1C MFR BLD HPLC: 7.7 %

## 2017-06-26 ENCOUNTER — INITIAL CONSULT (OUTPATIENT)
Dept: NEUROSURGERY | Facility: CLINIC | Age: 67
End: 2017-06-26
Payer: MEDICARE

## 2017-06-26 VITALS
DIASTOLIC BLOOD PRESSURE: 85 MMHG | HEART RATE: 107 BPM | HEIGHT: 68 IN | BODY MASS INDEX: 39.75 KG/M2 | WEIGHT: 262.31 LBS | SYSTOLIC BLOOD PRESSURE: 157 MMHG

## 2017-06-26 DIAGNOSIS — M54.17 LUMBOSACRAL RADICULOPATHY AT S1: ICD-10-CM

## 2017-06-26 DIAGNOSIS — M51.16 LUMBAR DISC HERNIATION WITH RADICULOPATHY: Primary | ICD-10-CM

## 2017-06-26 DIAGNOSIS — M51.36 DEGENERATIVE DISC DISEASE, LUMBAR: ICD-10-CM

## 2017-06-26 DIAGNOSIS — M48.062 LUMBAR STENOSIS WITH NEUROGENIC CLAUDICATION: ICD-10-CM

## 2017-06-26 LAB — HCV AB SERPL QL IA: NEGATIVE

## 2017-06-26 PROCEDURE — 99999 PR PBB SHADOW E&M-EST. PATIENT-LVL IV: CPT | Mod: PBBFAC,,, | Performed by: PHYSICIAN ASSISTANT

## 2017-06-26 PROCEDURE — 99499 UNLISTED E&M SERVICE: CPT | Mod: S$GLB,,, | Performed by: PHYSICIAN ASSISTANT

## 2017-06-26 PROCEDURE — 1159F MED LIST DOCD IN RCRD: CPT | Mod: S$GLB,,, | Performed by: PHYSICIAN ASSISTANT

## 2017-06-26 PROCEDURE — 99204 OFFICE O/P NEW MOD 45 MIN: CPT | Mod: S$GLB,,, | Performed by: PHYSICIAN ASSISTANT

## 2017-06-26 PROCEDURE — 1125F AMNT PAIN NOTED PAIN PRSNT: CPT | Mod: S$GLB,,, | Performed by: PHYSICIAN ASSISTANT

## 2017-06-26 RX ORDER — HYDROCODONE BITARTRATE AND ACETAMINOPHEN 10; 325 MG/1; MG/1
1 TABLET ORAL EVERY 12 HOURS PRN
Qty: 60 TABLET | Refills: 0 | Status: SHIPPED | OUTPATIENT
Start: 2017-06-26 | End: 2017-07-24 | Stop reason: SDUPTHER

## 2017-06-26 NOTE — PROGRESS NOTES
[facility] - Neurosurgery  Clinic Consult     Consult Requested By: Dr. Hebert     Reason for Consult: right lumbar radiculopathy       SUBJECTIVE:     Chief Complaint: right leg pain     History of Present Illness:  Francisco Coppola is a 67 y.o. male with DM who presents complaining of 1 month history of right leg pain. He states he has a history of bilateral leg numbness with walking. He has to lean forward to relieve the numbness. This has been occurring for years. He notes about 1 month ago, he woke up with severe right leg pain. The pain is described as a shooting, electric pain. It radiates from the right buttock down the posterior aspect of the leg into the heel. It occurs frequently throughout the day. He is unable to lay down due to the pain. He is able to tolerate the pain when sitting. He has diabetic neuropathy, but notes his numbness in his right foot has worsened. He also reports burning pain in the right little toe at night. He takes gabapentin 600 mg BID and Norco 10 mg BID. He has had no recent PT or ESIs. He denies symptoms of sphincter dysfunction.       Low Back Pain Scale  R Low Back-Pain Score: 8  R Low Back-Pain Intensity: Pain killers give moderate relief from pain  R Low Back-Pain Score: It is painful to look after myself and I am slow and careful  Low Back-Lifting: I can lift heavy weights but it gives extra pain   Low Back-Walking: Pain prevents me walking more than .25 mile   Low Back-Sitting: I can sit in any chair as long as I like   Low Back-Standing: I cannot stand for longer than 30 mins without increasing pain   Low Back-Sleeping: Pain prevents me from sleeping at all.   Low Back-Social Life: Pain has no significant effect on my social life apart from limiting my more en   Low Back-Traveling: I have some pain when traveling but robin of my usual forms of travel make it any worse   Low Back-Changing Degree of Pain: My pain is gradually worsening         Review of patient's allergies  indicates:   Allergen Reactions    Augmentin [amoxicillin-pot clavulanate] Hives and Rash    Tramadol Nausea Only       Past Medical History:   Diagnosis Date    Allergy     DDD (degenerative disc disease), lumbar     Hyperlipidemia     Hypertension     Nicotine abuse     Obesity     Scrotal mass     Type II diabetes mellitus with renal manifestations, uncontrolled     microalbuminuria    Type II or unspecified type diabetes mellitus without mention of complication, not stated as uncontrolled     Ulcer      Past Surgical History:   Procedure Laterality Date    CARPAL TUNNEL RELEASE      KNEE SURGERY Left     removal of cartilage with no implant    ULNAR NERVE TRANSPOSITION       Family History   Problem Relation Age of Onset    Cancer Mother     Cataracts Mother     Cancer Father     Cancer Paternal Grandfather     Diabetes Paternal Grandmother      Social History   Substance Use Topics    Smoking status: Current Every Day Smoker     Packs/day: 0.50     Years: 45.00     Types: Cigarettes    Smokeless tobacco: Never Used    Alcohol use 2.5 oz/week     5 Standard drinks or equivalent per week      Comment: allen weekly        Review of Systems:  Constitutional: no fever, chills or night sweats. No changes in weight   Eyes: no visual changes   ENT: no nasal congestion or sore throat   Respiratory: no cough or shortness of breath   Cardiovascular: no chest pain or palpitations   Gastrointestinal: no nausea or vomiting   Genitourinary: no hematuria or dysuria   Integument/Breast: no rash or pruritis   Hematologic/Lymphatic: no easy bruising or lymphadenopathy   Musculoskeletal/Neurological: Positive for right leg pain. Positive for bilateral leg numbness with walking      OBJECTIVE:     Vital Signs (Most Recent):  Pulse: 107 (06/26/17 0958)  BP: (!) 157/85 (06/26/17 0958)    Physical Exam:  General: well developed, well nourished, no distress.   Neurologic: Alert and oriented. Thought content  appropriate.  GCS: Motor: 6/Verbal: 5/Eyes: 4 GCS Total: 15  Head: normocephalic, atraumatic  Eyes: pupils equal, round, reactive to light with accomodation, EOMI.  Neck: trachea midline, no JVD   Cardiovascular: LE edema  Pulmonary: normal respirations, no signs of respiratory distress  Abdomen: soft, non-distended  Sensory: decreased sensation bilateral feet, R>L  Skin: Skin is warm, dry and intact.    Motor Strength: Moves all extremities spontaneously with good tone.  No abnormal movements seen.     Strength  Deltoids Triceps Biceps Wrist Extension Wrist Flexion Hand  Interossei   Upper: R 5/5 5/5 5/5 5/5 5/5 5/5 5/5    L 5/5 5/5 5/5 5/5 5/5 5/5 5/5     Iliopsoas Quadriceps Knee  Flexion Tibialis  anterior Gastro- cnemius EHL    Lower: R 5/5 5/5 5/5 5/5 4/5 5/5     L 5/5 5/5 5/5 5/5 5/5 5/5      Gait: normal    Able to stand on left toes, unable to stand on right toes   Able to walk on heels     Lumbar Spine: full ROM. Pain with extension   SI Joint tenderness: Negative bilaterally     Diagnostic Results:  I have independently reviewed the following imaging and agree with findings    MRI Lumbar Spine 6/12/2017:  Findings:  Lumbar spine alignment demonstrates 1-2 mm of anterolisthesis of L4 on L5.  Vertebral body heights are well-maintained without evidence for fracture.  There is mild left-sided facet edema at L4-L5, likely degenerative in nature.  No marrow signal abnormality to suggest an infiltrative process.    Distal spinal cord demonstrates normal contour and signal intensity.  Conus terminates at the L1-L2 level.  Cauda equina is grossly unremarkable.    Visualized retroperitoneal organs demonstrate no significant abnormalities.  There is mild fatty infiltration of the posterior paraspinal musculature.    T12-L1: No spinal canal stenosis or neural foraminal narrowing.    L1-L2: There is a small circumferential disc bulge.  No spinal canal stenosis or neural foraminal narrowing.    L2-L3: There is mild  bilateral facet hypertrophy.  No spinal canal stenosis or neural foraminal narrowing.    L3-L4: There is a small circumferential disc bulge with a small posterior annular fissure.  There is mild bilateral facet hypertrophy and mild bilateral ligamentum flavum buckling.  Findings contribute to mild spinal canal stenosis.  No neural foraminal narrowing.    L4-L5: There is a moderate circumferential disc bulge, severe bilateral facet hypertrophy and severe bilateral ligamentum flavum buckling contributing to severe spinal canal stenosis and moderate right and mild left neural foraminal narrowing.    L5-S1: There has been interval development of a large central/right paracentral disc extrusion that effaces the right lateral recess.  There is mild left facet hypertrophy and mild bilateral ligamentum flavum buckling.  Findings contribute to severe spinal canal stenosis and mild right-sided neural foraminal narrowing.     Impression:  Interval development of a large central/right paracentral disc extrusion at L5-S1 that likely compresses the descending S1 nerve root and contributes to severe spinal canal stenosis.    Persistent severe spinal canal stenosis at L4-L5.           ASSESSMENT/PLAN:     68 yo male with DDD, L4-5 severe stenosis, chronic neurogenic claudication, L5-S1 disc herniation with right S1 radiculopathy.     -Refill Norco #[id number]  -Discussed with Dr. Root. He explained the natural history of the disease and all treatment options. He recommended MIS L4-5 laminectomy, medial facetectomy and foraminotomy, and MIS L5-S1 right laminectomy, medial facetectomy, foraminotomy and microdiscectomy.     They have discussed the risks of surgery including bleeding, infection, failure of surgery, CSF leak, nerve root injury, spinal cord injury, weakness, paralysis, peripheral neuropathy,  need for reoperation. Patient understands the risks and would like to proceed with surgery.      Dorota Alcantara PA-C  Neurosurgery

## 2017-06-26 NOTE — LETTER
June 26, 2017      Laureano Hebert MD  [address]  [address]  [facility] [address]           [facility] - Neurosurgery  [address], [address]  [facility] [address]  Phone: [phone]          Patient: Frnacisco Coppola   MR Number: [id number]   YOB: 1950   Date of Visit: 6/26/2017       Dear Dr. Laureano Hebert:    Thank you for referring Francisco Coppola to me for evaluation. Attached you will find relevant portions of my assessment and plan of care.    If you have questions, please do not hesitate to call me. I look forward to following Francisco Coppola along with you.    Sincerely,    Dorota Alcantara PA-C    Enclosure  CC:  No Recipients    If you would like to receive this communication electronically, please contact [email or web address] or [phone] to request more information on [employer] Link access.    For providers and/or their staff who would like to refer a patient to [facility], please contact us through our one-stop-shop provider referral line, [facility] , at [phone].    If you feel you have received this communication in error or would no longer like to receive these types of communications, please e-mail [email or web address]

## 2017-06-27 ENCOUNTER — TELEPHONE (OUTPATIENT)
Dept: NEUROSURGERY | Facility: CLINIC | Age: 67
End: 2017-06-27

## 2017-06-27 DIAGNOSIS — M48.062 LUMBAR STENOSIS WITH NEUROGENIC CLAUDICATION: ICD-10-CM

## 2017-06-27 DIAGNOSIS — M51.16 LUMBAR DISC HERNIATION WITH RADICULOPATHY: ICD-10-CM

## 2017-06-27 DIAGNOSIS — M48.061 LUMBAR STENOSIS: Primary | ICD-10-CM

## 2017-06-29 ENCOUNTER — OFFICE VISIT (OUTPATIENT)
Dept: FAMILY MEDICINE | Facility: CLINIC | Age: 67
End: 2017-06-29
Payer: MEDICARE

## 2017-06-29 VITALS
HEIGHT: 69 IN | OXYGEN SATURATION: 96 % | BODY MASS INDEX: 38.63 KG/M2 | HEART RATE: 104 BPM | DIASTOLIC BLOOD PRESSURE: 82 MMHG | WEIGHT: 260.81 LBS | SYSTOLIC BLOOD PRESSURE: 128 MMHG

## 2017-06-29 DIAGNOSIS — M48.00 SPINAL STENOSIS, UNSPECIFIED SPINAL REGION: ICD-10-CM

## 2017-06-29 DIAGNOSIS — I10 HTN (HYPERTENSION), BENIGN: ICD-10-CM

## 2017-06-29 DIAGNOSIS — M54.16 LUMBAR RADICULOPATHY: ICD-10-CM

## 2017-06-29 DIAGNOSIS — Z01.818 PREOP EXAMINATION: ICD-10-CM

## 2017-06-29 DIAGNOSIS — E11.29 TYPE 2 DIABETES MELLITUS WITH OTHER DIABETIC KIDNEY COMPLICATION: ICD-10-CM

## 2017-06-29 PROCEDURE — 1125F AMNT PAIN NOTED PAIN PRSNT: CPT | Mod: S$GLB,,, | Performed by: FAMILY MEDICINE

## 2017-06-29 PROCEDURE — 1159F MED LIST DOCD IN RCRD: CPT | Mod: S$GLB,,, | Performed by: FAMILY MEDICINE

## 2017-06-29 PROCEDURE — 99999 PR PBB SHADOW E&M-EST. PATIENT-LVL III: CPT | Mod: PBBFAC,,, | Performed by: FAMILY MEDICINE

## 2017-06-29 PROCEDURE — 99499 UNLISTED E&M SERVICE: CPT | Mod: S$GLB,,, | Performed by: FAMILY MEDICINE

## 2017-06-29 PROCEDURE — 3045F PR MOST RECENT HEMOGLOBIN A1C LEVEL 7.0-9.0%: CPT | Mod: S$GLB,,, | Performed by: FAMILY MEDICINE

## 2017-06-29 PROCEDURE — 99215 OFFICE O/P EST HI 40 MIN: CPT | Mod: S$GLB,,, | Performed by: FAMILY MEDICINE

## 2017-06-29 PROCEDURE — 4010F ACE/ARB THERAPY RXD/TAKEN: CPT | Mod: S$GLB,,, | Performed by: FAMILY MEDICINE

## 2017-06-29 NOTE — PROGRESS NOTES
(Portions of this note were dictated using voice recognition software and may contain dictation related errors in spelling/grammar/syntax not found on text review)    CC:   Chief Complaint   Patient presents with    Follow-up     pre-op clearance       HPI: 67 y.o. male here for preoperative clearance for MIS L4-5 laminectomy, medial facetectomy and foraminotomy, and MIS L5-S1 right laminectomy, medial facetectomy, foraminotomy and microdiscectomy.    .  Surgeon is Dr. Root.  He has a history of uncontrolled type 2 diabetes, hypertension, tobacco abuse, obesity with BMI of 39.08.  He has had significant neurogenic claudication pain, MRI showed significant right central/paracentral disc restriction at L5-S1 compressing descending S1 nerve root and contributing to severe spinal canal stenosis.  Persistent L4-L5 severe spinal canal stenosis.    Diabetes, uncontrolled.  On [id number] mrem twice a day, Amaryl 4 mrem twice a day, Januvia 100 mg daily.  Blood sugar this morning was in the 130s fasting.  His A1c is down a bit from to 7.7 from 8.6 last year.  Recent labs did show slight decrease in GFR.  Patient had been taking a lot of naproxen because of the leg pains, has been cutting down    Denies any shortness of breath, activity significant limited by pain but no chest pain or respiratory distress.    He has no pre-existing cardiac history.  He does smoke, has admitted not being ready to quit in the past    Past Medical History:   Diagnosis Date    Allergy     DDD (degenerative disc disease), lumbar     Hyperlipidemia     Hypertension     Nicotine abuse     Obesity     Scrotal mass     Type II diabetes mellitus with renal manifestations, uncontrolled     microalbuminuria    Type II or unspecified type diabetes mellitus without mention of complication, not stated as uncontrolled     Ulcer        Past Surgical History:   Procedure Laterality Date    CARPAL TUNNEL RELEASE      KNEE SURGERY Left      removal of cartilage with no implant    ULNAR NERVE TRANSPOSITION         Family History   Problem Relation Age of Onset    Cancer Mother     Cataracts Mother     Cancer Father     Cancer Paternal Grandfather     Diabetes Paternal Grandmother        Social History     Social History    Marital status:      Spouse name: N/A    Number of children: N/A    Years of education: N/A     Occupational History    Not on file.     Social History Main Topics    Smoking status: Current Every Day Smoker     Packs/day: 0.50     Years: 45.00     Types: Cigarettes    Smokeless tobacco: Never Used    Alcohol use 2.5 oz/week     5 Standard drinks or equivalent per week      Comment: beers weekly    Drug use: Unknown    Sexual activity: Not on file     Other Topics Concern    Not on file     Social History Narrative    No narrative on file       ROS:  GENERAL: No fever, chills, fatigability or weight loss.  SKIN: No rashes, no itching.  HEAD: No headaches.  EYES: No visual changes  EARS: No ear pain or changes in hearing.  NOSE: No congestion or rhinorrhea.  MOUTH & THROAT: No hoarseness, change in voice, or sore throat.  NODES: Denies swollen glands.  CHEST: Denies HASTINGS, cyanosis, wheezing, cough and sputum production.  CARDIOVASCULAR: Denies chest pain, PND, orthopnea.  ABDOMEN: No nausea, vomiting, or changes in bowel function.  URINARY: No flank pain, dysuria or hematuria.  PERIPHERAL VASCULAR: No claudication or cyanosis.  MUSCULOSKELETAL: Above.  NEUROLOGIC: Above.    Vital signs reviewed  PE:   APPEARANCE: Well nourished, well developed, in no acute distress.    HEAD: Normocephalic, atraumatic.  EYES: PERRL. EOMI.   Conjunctivae noninjected.  EARS: TM's intact. Light reflex normal. No retraction or perforation  NOSE: Mucosa pink. Airway clear.  MOUTH & THROAT: No tonsillar enlargement. No pharyngeal erythema or exudate.   NECK: Supple with no cervical lymphadenopathy.  No carotid bruits.  No  thyromegaly  CHEST: Good inspiratory effort. Lungs clear to auscultation with no wheezes or crackles.  CARDIOVASCULAR: Normal S1, S2. No rubs, murmurs, or gallops.  ABDOMEN: Bowel sounds normal. Not distended. Soft. No tenderness or masses. No organomegaly.  EXTREMITIES: 1+ pitting edema, improved overall bilaterally given regular Lasix dosing now      IMPRESSION  1. Type 2 diabetes, uncontrolled, with neuropathy    2. HTN (hypertension), benign    3. Lumbar radiculopathy    4. Preop examination    5. Type 2 diabetes mellitus with other diabetic kidney complication     6. Spinal stenosis, unspecified spinal region            PLAN  I reviewed medical note documentation from his neurosurgeon along with his primary medical note and labs as shown above.      Here for preoperative medical clearance.  Overall medically stable.  Diabetes is improving.  Revised cardiac index score is 0.4%, noted at very low risk for significant predictable perioperative cardiac complications.  He is advised to discontinue his aspirin 5 days before his procedure.  Continue all medications but withhold Amaryl whenever fasting.  I will send his surgeon and note is of clearance.  He he does present with a paper requiring some preop testing.  He will get this done on July 12 when he is back here in the office for an HRA visit    I did significantly  him on the importance of tobacco cessation with respect to surgical healing and management of his other chronic issues.  I do recommend that he follow through with tobacco cessation clinic on quitting smoking.    Also had elevated creatinine.  Did discuss potential contribution of his NSAID use.  He has been cutting down on this.  This will continue to be monitored.    We will reassess his diabetes status and other chronic health status including renal function upon follow-up visit    Orders Placed This Encounter   Procedures    X-Ray Chest PA And Lateral    CBC auto differential    APTT     Protime-INR    Basic metabolic panel    Urinalysis    SCHEDULED EKG 12-LEAD (to Muse)

## 2017-06-29 NOTE — PROGRESS NOTES
Patient, Francisco Coppola (MRN #[id number]), presented with a recorded BMI of 39.08 kg/m^2 and a documented comorbidity(s):  - Diabetes Mellitus Type 2  - Hypertension  to which the severe obesity is a contributing factor. This is consistent with the definition of severe obesity (BMI 35.0-35.9) with comorbidity (ICD-10 E66.01, Z68.35). The patient's severe obesity was monitored, evaluated, addressed and/or treated. This addendum to the medical record is made on 06/29/2017.

## 2017-06-29 NOTE — LETTER
June 29, 2017        Zi Root MD  [address]  [address]  [place] [address]             [facility]  [address]  [place] [address]  Phone: [phone]  Fax: [phone]   Patient: Francisco Coppola   MR Number: [id number]   YOB: 1950   Date of Visit: 6/29/2017       Dear Dr. Root:    Thank you for referring Francisco Coppola to me for evaluation.  He is medically optimized for his occurring procedure.  He will be doing his preop lab testing as requested in the next 2 weeks or so including EKG, chest x-ray, PT/PTT, CBC, BMP, UA, which should be available in Epic upon completion.            If you have questions, please do not hesitate to call me. I look forward to following Francisco along with you.    Sincerely,      Laureano Hebert MD           CC  No Recipients

## 2017-07-12 ENCOUNTER — HOSPITAL ENCOUNTER (OUTPATIENT)
Dept: RADIOLOGY | Facility: HOSPITAL | Age: 67
Discharge: HOME | End: 2017-07-12
Attending: FAMILY MEDICINE
Payer: MEDICARE

## 2017-07-12 ENCOUNTER — HOSPITAL ENCOUNTER (OUTPATIENT)
Dept: CARDIOLOGY | Facility: HOSPITAL | Age: 67
Discharge: HOME | End: 2017-07-12
Attending: FAMILY MEDICINE
Payer: MEDICARE

## 2017-07-12 ENCOUNTER — OFFICE VISIT (OUTPATIENT)
Dept: FAMILY MEDICINE | Facility: CLINIC | Age: 67
End: 2017-07-12
Payer: MEDICARE

## 2017-07-12 VITALS
HEART RATE: 98 BPM | DIASTOLIC BLOOD PRESSURE: 81 MMHG | OXYGEN SATURATION: 99 % | HEIGHT: 69 IN | SYSTOLIC BLOOD PRESSURE: 145 MMHG | BODY MASS INDEX: 38.2 KG/M2 | WEIGHT: 257.94 LBS

## 2017-07-12 DIAGNOSIS — E78.5 HYPERLIPIDEMIA ASSOCIATED WITH TYPE 2 DIABETES MELLITUS: ICD-10-CM

## 2017-07-12 DIAGNOSIS — Z01.818 PREOP EXAMINATION: ICD-10-CM

## 2017-07-12 DIAGNOSIS — M47.819 ARTHRITIS OF FACET JOINTS AT MULTIPLE VERTEBRAL LEVELS: ICD-10-CM

## 2017-07-12 DIAGNOSIS — Z13.5 SCREENING FOR GLAUCOMA: ICD-10-CM

## 2017-07-12 DIAGNOSIS — Z23 NEED FOR VACCINATION AGAINST STREPTOCOCCUS PNEUMONIAE: ICD-10-CM

## 2017-07-12 DIAGNOSIS — Z13.6 ENCOUNTER FOR ABDOMINAL AORTIC ANEURYSM SCREENING: ICD-10-CM

## 2017-07-12 DIAGNOSIS — I10 HTN (HYPERTENSION), BENIGN: ICD-10-CM

## 2017-07-12 DIAGNOSIS — J84.10 CALCIFIED GRANULOMA OF LUNG: ICD-10-CM

## 2017-07-12 DIAGNOSIS — M54.16 LUMBAR RADICULOPATHY: ICD-10-CM

## 2017-07-12 DIAGNOSIS — E11.59 HYPERTENSION ASSOCIATED WITH DIABETES: ICD-10-CM

## 2017-07-12 DIAGNOSIS — E11.69 HYPERLIPIDEMIA ASSOCIATED WITH TYPE 2 DIABETES MELLITUS: ICD-10-CM

## 2017-07-12 DIAGNOSIS — E66.01 SEVERE OBESITY (BMI 35.0-39.9) WITH COMORBIDITY: ICD-10-CM

## 2017-07-12 DIAGNOSIS — I15.2 HYPERTENSION ASSOCIATED WITH DIABETES: ICD-10-CM

## 2017-07-12 DIAGNOSIS — Z00.00 ENCOUNTER FOR PREVENTIVE HEALTH EXAMINATION: Primary | ICD-10-CM

## 2017-07-12 DIAGNOSIS — F17.200 TOBACCO DEPENDENCE: ICD-10-CM

## 2017-07-12 PROBLEM — J98.4 CALCIFIED GRANULOMA OF LUNG: Status: ACTIVE | Noted: 2017-07-12

## 2017-07-12 PROCEDURE — 71020 XR CHEST PA AND LATERAL: CPT | Mod: TC

## 2017-07-12 PROCEDURE — 93005 ELECTROCARDIOGRAM TRACING: CPT

## 2017-07-12 PROCEDURE — G0009 ADMIN PNEUMOCOCCAL VACCINE: HCPCS | Mod: S$GLB,,, | Performed by: NURSE PRACTITIONER

## 2017-07-12 PROCEDURE — 90670 PCV13 VACCINE IM: CPT | Mod: S$GLB,,, | Performed by: NURSE PRACTITIONER

## 2017-07-12 PROCEDURE — G0439 PPPS, SUBSEQ VISIT: HCPCS | Mod: S$GLB,,, | Performed by: NURSE PRACTITIONER

## 2017-07-12 PROCEDURE — 99999 PR PBB SHADOW E&M-EST. PATIENT-LVL V: CPT | Mod: PBBFAC,,, | Performed by: NURSE PRACTITIONER

## 2017-07-12 PROCEDURE — 99499 UNLISTED E&M SERVICE: CPT | Mod: S$GLB,,, | Performed by: NURSE PRACTITIONER

## 2017-07-12 PROCEDURE — 71020 XR CHEST PA AND LATERAL: CPT | Mod: 26,,, | Performed by: RADIOLOGY

## 2017-07-12 PROCEDURE — 93010 ELECTROCARDIOGRAM REPORT: CPT | Mod: ,,, | Performed by: INTERNAL MEDICINE

## 2017-07-12 NOTE — PROGRESS NOTES
"Francisco Coppola presented for a  Medicare AWV and comprehensive Health Risk Assessment today. The following components were reviewed and updated:    · Medical history  · Family History  · Social history  · Allergies and Current Medications  · Health Risk Assessment  · Health Maintenance  · Care Team     ** See Completed Assessments for Annual Wellness Visit within the encounter summary.**       The following assessments were completed:  · Living Situation  · CAGE  · Depression Screening  · Timed Get Up and Go  · Whisper Test  · Cognitive Function Screening  · Nutrition Screening  · ADL Screening  · PAQ Screening    Vitals:    07/12/17 1500   BP: (!) 145/81   Pulse: 98   SpO2: 99%   Weight: 117 kg (257 lb 15 oz)   Height: 5' 8.5" (1.74 m)     Body mass index is 38.65 kg/m².     Physical Exam   Constitutional: He is oriented to person, place, and time. He appears well-developed. No distress.   obese   HENT:   Head: Normocephalic and atraumatic.   Eyes: EOM are normal. Pupils are equal, round, and reactive to light.   Neck: Neck supple. No JVD present. No tracheal deviation present.   Cardiovascular: Normal rate, regular rhythm, normal heart sounds and intact distal pulses.    No murmur heard.  Pulmonary/Chest: Effort normal and breath sounds normal. No respiratory distress. He has no wheezes. He has no rales.   Abdominal: Soft. Bowel sounds are normal. He exhibits no distension and no mass. There is no tenderness.   Musculoskeletal: Normal range of motion. He exhibits no edema or tenderness.   Neurological: He is alert and oriented to person, place, and time. Gait abnormal. Coordination normal.   Skin: Skin is warm and dry. No erythema. No pallor.   Psychiatric: He has a normal mood and affect. His behavior is normal. Judgment and thought content normal. Cognition and memory are normal. He expresses no homicidal and no suicidal ideation.   Nursing note and vitals reviewed.        Diagnoses and health risks " identified today and associated recommendations/orders:    1. Encounter for preventive health examination    2. Type 2 diabetes, uncontrolled, with neuropathy  Chronic; uncontrolled on medication.  Last HgA1c was 7.7.  Followed by PCP.    3. Hypertension associated with diabetes  Chronic; stable on medication.  Patient forgot to take BP meds today after labwork.  Followed by PCP.    4. Hyperlipidemia associated with type 2 diabetes mellitus  Chronic; stable on medication.  Followed by PCP.    5. Arthritis of facet joints at multiple vertebral levels  Chronic; stable on medication.  As seen on imaging dated 01/09/13.  Followed by PCP.    6. Calcified granuloma of lung  Chronic; stable.  As seen on imaging dated 07/12/17.  Followed by PCP.    7. Severe obesity (BMI 35.0-39.9) with comorbidity  Chronic, stable. Therapeutic lifestyle changes discussed. Followed by PCP.    8. Tobacco dependence  - Ambulatory referral to Smoking Cessation Program    9. Encounter for abdominal aortic aneurysm screening  -  AAA Screening; Future    10. Screening for glaucoma  - Ambulatory referral to Optometry    11. Need for vaccination against Streptococcus pneumoniae  Prevnar 13 vaccination administered today in clinic.  - PNEUMOCOCCAL CONJUGATE VACCINE 13-VALENT LESS THAN 6YO & GREATER THAN 49YO IM      Provided Francisco with a 5-10 year written screening schedule and personal prevention plan. Recommendations were developed using the USPSTF age appropriate recommendations. Education, counseling, and referrals were provided as needed. After Visit Summary printed and given to patient which includes a list of additional screenings\tests needed.    Return in 5 weeks (on 8/14/2017) for follow-up with PCP, HRA visit in 1 year.    Virgie Castillo NP

## 2017-07-12 NOTE — Clinical Note
Primary Care Providers: Laureano Hebert MD, MD (General)  Your patient was seen today for a HRA visit. Gap(s) in care (HEDIS gaps) have been identified during this visit that require additional testing and possible follow up.  Orders Placed This Encounter     US AAA Screening         Standing Status: Future         Standing Expiration Date: 7/12/2018         Order Specific Question: May the Radiologist modify the order per protocol to meet the clinical needs of the patient?         Answer: Yes     PNEUMOCOCCAL CONJUGATE VACCINE 13-VALENT LESS THAN 4YO & GREATER THAN 49YO IM     Ambulatory referral to Optometry         Referral Priority:Routine         Referral Type:Vision (Optometry)         Referral Reason:Specialty Services Required         Requested Specialty:Optometry         Number of Visits Requested:1     Ambulatory referral to Smoking Cessation Program         Referral Priority:Routine         Referral Type:Consultation         Referral Reason:Specialty Services Required

## 2017-07-12 NOTE — PATIENT INSTRUCTIONS
Counseling and Referral of Other Preventative  (Italic type indicates deductible and co-insurance are waived)    Patient Name: Francisco Coppola  Today's Date: 7/12/2017      SERVICE LIMITATIONS RECOMMENDATION    Vaccines    · Pneumococcal (once after 65)    · Influenza (annually)    · Hepatitis B (if medium/high risk)    · Prevnar 13      Hepatitis B medium/high risk factors:       - End-stage renal disease       - Hemophiliacs who received Factor VII or         IX concentrates       - Clients of institutions for the mentally             retarded       - Persons who live in the same house as          a HepB carrier       - Homosexual men       - Illicit injectable drug abusers     Pneumococcal: Done, no repeat necessary     Influenza: N/A     Hepatitis B: N/A     Prevnar 13: Scheduled - see appointments    Prostate cancer screening (annually to age 75)     Prostate specific antigen (PSA) Shared decision making with Provider. Sometimes a co-pay may be required if the patient decides to have this test. The USPSTF no longer recommends prostate cancer screening routinely in medicine: every 1 year    Colorectal cancer screening (to age 75)    · Fecal occult blood test (annual)  · Flexible sigmoidoscopy (5y)  · Screening colonoscopy (10y)  · Barium enema   Recommended to patient, declined    Diabetes self-management training (no USPSTF recommendations)  Requires referral by treating physician for patient with diabetes or renal disease. 10 hours of initial DSMT sessions of no less than 30 minutes each in a continuous 12-month period. 2 hours of follow-up DSMT in subsequent years.  Done this year, repeat every year    Glaucoma screening (no USPSTF recommendation)  Diabetes mellitus, family history   , age 50 or over    American, age 65 or over  Scheduled, see appointments    Medical nutrition therapy for diabetes or renal disease (no recommended schedule)  Requires referral by treating physician  for patient with diabetes or renal disease or kidney transplant within the past 3 years.  Can be provided in same year as diabetes self-management training (DSMT), and CMS recommends medical nutrition therapy take place after DSMT. Up to 3 hours for initial year and 2 hours in subsequent years.  N/A    Cardiovascular screening blood tests (every 5 years)  · Fasting lipid panel  Order as a panel if possible  Done this year, repeat every year    Diabetes screening tests (at least every 3 years, Medicare covers annually or at 6-month intervals for prediabetic patients)  · Fasting blood sugar (FBS) or glucose tolerance test (GTT)  Patient must be diagnosed with one of the following:       - Hypertension       - Dyslipidemia       - Obesity (BMI 30kg/m2)       - Previous elevated impaired FBS or GTT       ... or any two of the following:       - Overweight (BMI 25 but <30)       - Family history of diabetes       - Age 65 or older       - History of gestational diabetes or birth of baby weighing more than 9 pounds  Done this year, repeat every year    Abdominal aortic aneurysm screening (once)  · Sonogram   Limited to patients who meet one of the following criteria:       - Men who are 65-75 years old and have smoked more than 100 cigarette in their lifetime       - Anyone with a family history of abdominal aortic aneurysm       - Anyone recommended for screening by the USPSTF  Scheduled, see appointments    HIV screening (annually for increased risk patients)  · HIV-1 and HIV-2 by EIA, or KANCHAN, rapid antibody test or oral mucosa transudate  Patients must be at increased risk for HIV infection per USPSTF guidelines or pregnant. Tests covered annually for patient at increased risk or as requested by the patient. Pregnant patients may receive up to 3 tests during pregnancy.  Risks discussed, screening is not recommended    Smoking cessation counseling (up to 8 sessions per year)  Patients must be asymptomatic of  tobacco-related conditions to receive as a preventative service.  Referred to Tobacco Cessation Program.    Subsequent annual wellness visit  At least 12 months since last AWV  Return in one year     The following information is provided to all patients.  This information is to help you find resources for any of the problems found today that may be affecting your health:                Living healthy guide: www.[facility].[place].[facility]      Understanding Diabetes: www.diabetes.org      Eating healthy: www.cdc.gov/healthyweight      CDC home safety checklist: www.cdc.gov/steadi/patient.html      Agency on Aging: www.goea.[place].[facility]      Alcoholics anonymous (AA): www.aa.org      Physical Activity: www.kash.nih.gov/pf5igov      Tobacco use: www.quitwithusla.org

## 2017-07-14 ENCOUNTER — PATIENT MESSAGE (OUTPATIENT)
Dept: FAMILY MEDICINE | Facility: CLINIC | Age: 67
End: 2017-07-14

## 2017-07-17 ENCOUNTER — TELEPHONE (OUTPATIENT)
Dept: FAMILY MEDICINE | Facility: CLINIC | Age: 67
End: 2017-07-17

## 2017-07-17 NOTE — TELEPHONE ENCOUNTER
I reviewed patient's preop work.  Labs and x-rays are overall unremarkable.  EKG was abnormal, not sure if it is just lead placement thing.  I would like to repeat EKG.  Can we get him back in the office to repeat an EKG and if it still abnormal and need to talk to him about any other symptoms that may be concerning for heart issues that might need to be addressed prior to having an elective surgical procedure.

## 2017-07-18 ENCOUNTER — CLINICAL SUPPORT (OUTPATIENT)
Dept: FAMILY MEDICINE | Facility: CLINIC | Age: 67
End: 2017-07-18
Payer: MEDICARE

## 2017-07-18 DIAGNOSIS — R94.31 ABNORMAL EKG: Primary | ICD-10-CM

## 2017-07-18 PROCEDURE — 93010 ELECTROCARDIOGRAM REPORT: CPT | Mod: S$GLB,,, | Performed by: INTERNAL MEDICINE

## 2017-07-18 PROCEDURE — 93005 ELECTROCARDIOGRAM TRACING: CPT | Mod: S$GLB,,, | Performed by: FAMILY MEDICINE

## 2017-07-18 NOTE — TELEPHONE ENCOUNTER
Addendum 7/18/17: Repeat EKG in the office.  There is significant artifact.  There were some lead placement issue secondary to his body habitus.  Patient also states that when he had a scheduled EKG there were some reportedly lead placement issues as well.  The repeat EKG shows mild sinus tachycardia with a rate of 102.  Normal axis.  No acute ST or T-wave changes noted.  At his recent preop visit, he had denied any specific cardiovascular complaints.  Based on his recent visit and labs and studies, he is medically stable to undergo his upcoming spinal surgery.  I will forward this message to Dr. Root

## 2017-07-24 ENCOUNTER — TELEPHONE (OUTPATIENT)
Dept: NEUROSURGERY | Facility: CLINIC | Age: 67
End: 2017-07-24

## 2017-07-24 RX ORDER — HYDROCODONE BITARTRATE AND ACETAMINOPHEN 10; 325 MG/1; MG/1
1 TABLET ORAL EVERY 8 HOURS PRN
Qty: 60 TABLET | Refills: 0 | Status: ON HOLD | OUTPATIENT
Start: 2017-07-24 | End: 2017-08-03

## 2017-07-24 NOTE — TELEPHONE ENCOUNTER
-  --- Message from Merary Christian sent at 7/21/2017  1:48 PM CDT -----  Contact: [phone]/self  Patient requesting to speak with you regarding getting a new prescription written for hydrocodone-acetaminophen 10-325mg (NORCO)  mg Tab . Please call patient when it is ready for pickup. Please advise

## 2017-07-25 ENCOUNTER — ANESTHESIA EVENT (OUTPATIENT)
Dept: SURGERY | Facility: HOSPITAL | Age: 67
End: 2017-07-25
Payer: MEDICARE

## 2017-07-25 ENCOUNTER — TELEPHONE (OUTPATIENT)
Dept: FAMILY MEDICINE | Facility: CLINIC | Age: 67
End: 2017-07-25

## 2017-07-25 ENCOUNTER — HOSPITAL ENCOUNTER (OUTPATIENT)
Dept: PREADMISSION TESTING | Facility: HOSPITAL | Age: 67
Discharge: HOME | End: 2017-07-25
Attending: NEUROLOGICAL SURGERY
Payer: MEDICARE

## 2017-07-25 VITALS
RESPIRATION RATE: 17 BRPM | TEMPERATURE: 98 F | DIASTOLIC BLOOD PRESSURE: 59 MMHG | SYSTOLIC BLOOD PRESSURE: 115 MMHG | BODY MASS INDEX: 38.5 KG/M2 | WEIGHT: 256.94 LBS | HEART RATE: 99 BPM | OXYGEN SATURATION: 96 %

## 2017-07-25 RX ORDER — SODIUM CHLORIDE, SODIUM LACTATE, POTASSIUM CHLORIDE, CALCIUM CHLORIDE 600; 310; 30; 20 MG/100ML; MG/100ML; MG/100ML; MG/100ML
INJECTION, SOLUTION INTRAVENOUS CONTINUOUS
Status: CANCELLED | OUTPATIENT
Start: 2017-07-25

## 2017-07-25 RX ORDER — LIDOCAINE HYDROCHLORIDE 10 MG/ML
1 INJECTION, SOLUTION EPIDURAL; INFILTRATION; INTRACAUDAL; PERINEURAL ONCE
Status: CANCELLED | OUTPATIENT
Start: 2017-07-25 | End: 2017-07-25

## 2017-07-25 NOTE — ANESTHESIA PREPROCEDURE EVALUATION
07/25/2017  Francisco Coppola is a 67 y.o., male is scheduled for left L4-5 laminectomy, medial facetectomy, foraminotomy with Right L5-S1 laminectomy, medial facetectomy and microdiscectomy (under GETA on 8/02/2017.     Completed PCP H&P and clearance with risk stratification pending in [facility] on 7/25/2017.    Review of patient's allergies indicates:   Allergen Reactions    Augmentin [amoxicillin-pot clavulanate] Hives and Rash    Tramadol Nausea Only         Past Medical History:   Diagnosis Date    Allergy     DDD (degenerative disc disease), lumbar     Hyperlipidemia     Hypertension     Nicotine abuse     Obesity     Scrotal mass     Type II diabetes mellitus with renal manifestations, uncontrolled     microalbuminuria    Type II or unspecified type diabetes mellitus without mention of complication, not stated as uncontrolled     Ulcer      Patient Active Problem List   Diagnosis    Spinal stenosis    Type 2 diabetes, uncontrolled, with neuropathy    HTN (hypertension), benign    Hyperlipidemia    Severe obesity (BMI 35.0-39.9) with comorbidity    Scrotal mass    Hydrocele, right    Condyloma acuminatum of scrotum    Arthritis of facet joints at multiple vertebral levels    Hyperlipidemia associated with type 2 diabetes mellitus    Hypertension associated with diabetes    Calcified granuloma of lung    Tobacco dependence       Past Surgical History:   Procedure Laterality Date    CARPAL TUNNEL RELEASE      CONDYLOMA EXCISION/FULGURATION Bilateral 2014    scrotal    KNEE SURGERY Left     removal of cartilage with no implant    ULNAR NERVE TRANSPOSITION           Anesthesia Evaluation    I have reviewed the Patient Summary Reports.    I have reviewed the Nursing Notes.   I have reviewed the Medications.     Review of Systems  Anesthesia Hx:  No problems with previous  Anesthesia  History of prior surgery of interest to airway management or planning: (left elbow release and carpal tunnel repair) Previous anesthesia: General, MAC Denies Family Hx of Anesthesia complications.   Denies Personal Hx of Anesthesia complications.   Social:  Smoker, Social Alcohol Use  Patient's occupation is retired . Tobacco Use: Active smoker of cigarette for 45 years, 1 pack per day, Smoking Cessation discussion. Alcohol Use: Pt consumes 5 beers a week,    Hematology/Oncology:  Hematology Normal        EENT/Dental:EENT/Dental Normal   Cardiovascular:   Exercise tolerance: good Hypertension, well controlled Denies Dysrhythmias.   Denies Angina. hyperlipidemia        Pulmonary:   COPD: per chart but pt denies. Denies Shortness of breath.    Renal/:   Chronic Renal Disease, CRI    Hepatic/GI:   GERD, well controlled    Musculoskeletal:   Arthritis   Spine Disorders: lumbar Degenerative disease and Disc disease    Neurological:  Neurology Normal    Endocrine:   Diabetes, poorly controlled, type 2  Diabetes, Type 2 Diabetes for 8 years , Complications include Diabetic Neuropathy, peripheral sensory neuropathy , controlled by oral hypoglycemics. Typical AM glucose range:   , most recent HgA1c value was 7.7 on 6/2017.      Wt Readings from Last 3 Encounters:   07/25/17 116.6 kg (256 lb 15.1 oz)   07/12/17 117 kg (257 lb 15 oz)   06/29/17 118.3 kg (260 lb 12.9 oz)     Temp Readings from Last 3 Encounters:   07/25/17 36.6 °C (97.9 °F) (Oral)   06/03/17 36.5 °C (97.7 °F) (Oral)   05/16/17 36.6 °C (97.9 °F) (Oral)     BP Readings from Last 3 Encounters:   07/25/17 (!) 115/59   07/12/17 (!) 145/81   06/29/17 128/82     Pulse Readings from Last 3 Encounters:   07/25/17 99   07/12/17 98   06/29/17 104     Lab Results   Component Value Date    WBC 10.48 07/12/2017    HGB 14.1 07/12/2017    HCT 41.0 07/12/2017    MCV 90 07/12/2017     07/12/2017       Chemistry        Component Value Date/Time      07/12/2017 0811    K 4.0 07/12/2017 0811     07/12/2017 0811    CO2 25 07/12/2017 0811    BUN 19 07/12/2017 0811    CREATININE 1.2 07/12/2017 0811     07/12/2017 0811        Component Value Date/Time    CALCIUM 9.7 07/12/2017 0811    ALKPHOS 63 06/24/2017 0830    AST 35 06/24/2017 0830    ALT 37 06/24/2017 0830    BILITOT 0.5 06/24/2017 0830    ESTGFRAFRICA >60 07/12/2017 0811    EGFRNONAA >60 07/12/2017 0811            Physical Exam  General:  Obesity    Airway/Jaw/Neck:  Airway Findings: Mouth Opening: Normal Tongue: Normal  General Airway Assessment: Adult  Mallampati: II  Improves to II with phonation.  TM Distance: Normal, at least 6 cm  Jaw/Neck Findings:  Neck ROM: Extension Painful, Extension Decreased, Mild  Neck Findings:  Short Neck     Eyes/Ears/Nose:  EYES/EARS/NOSE FINDINGS: Normal   Dental:  Dental Findings: In tact, Periodontal disease, Mild   Chest/Lungs:  Chest/Lungs Clear    Heart/Vascular:  Heart Findings: Normal Heart murmur: negative Vascular Findings:  Edema Locations: BLE  Edema: +1 or +2     Abdomen:  Abdomen Findings: Normal      Mental Status:  Mental Status Findings: Normal        EKG 7/12/2017  Normal sinus rhythm  Early transition, increased R/S ratio in V1, consider posterior infarct  Abnormal ECG  When compared with ECG of 12-SEP-2014 08:42,  No significant change was found  Confirmed by Curtis GOOD, Alex FUNG ([id number]) on 7/12/2017 12:52:24 PM    Anesthesia Plan  Type of Anesthesia, risks & benefits discussed:  Anesthesia Type:  general  Patient's Preference: as indicated.  Intra-op Monitoring Plan:   Intra-op Monitoring Plan Comments:   Post Op Pain Control Plan:   Post Op Pain Control Plan Comments:   Induction:   IV  Beta Blocker:         Informed Consent: Patient understands risks and agrees with Anesthesia plan.  Questions answered.   ASA Score: 3     Day of Surgery Review of History & Physical: I have interviewed and examined the patient. I have reviewed the patient's  H&P dated:  There are no significant changes.  H&P update referred to the provider.  H&P completed by Anesthesiologist.   Anesthesia Plan Notes: Anesthesia consent will be obtained prior to procedure on 8/02/2017.    Completed PCP H&P and clearance with risk stratification pending in [facility] on 7/25/2017.            Ready For Surgery From Anesthesia Perspective.

## 2017-07-25 NOTE — TELEPHONE ENCOUNTER
----- Message from Mihaela Pugh sent at 7/25/2017  3:32 PM CDT -----  Contact: [facility](pre admit)/ [phone]  [facility] would like to speak with you about getting clearance for the patient to have surgery. Please advise.

## 2017-07-25 NOTE — DISCHARGE INSTRUCTIONS
Your surgery is scheduled for 0700.    Please report to Outpatient Surgery Intake Office on the 2nd FLOOR at 0530 a.m.          INSTRUCTIONS IMPORTANT!!!  ¨ Do not eat or drink after 12 midnight-including water. OK to brush teeth, no   gum, candy or mints!    ¨ Take only these medicines with a small swallow of water-morning of surgery. Lisinopril - hctz        ____  Proceed to [facility] on *** for additional blood test.        ____  No powder, lotions or creams to surgical area.  ____  Please remove all jewelry, including piercings and leave at home.  ____  No money or valuables needed. Please leave at home.  ____  Please bring any documents given by your doctor.  ____  If going home the same day, arrange for a ride home. You will not be able to             drive if Anesthesia was used.  ____  Wear loose fitting clothing. Allow for dressings, bandages.  ____  Stop Aspirin, Ibuprofen, Motrin and Aleve at least 3-5 days before surgery, unless otherwise instructed by your doctor, or the nurse.   You MAY use Tylenol/acetaminophen until day of surgery.  ____  Wash the surgical area with Hibiclens the night before surgery, and again the             morning of surgery.  Be sure to rinse hibiclens off completely (if instructed by   nurse).  ____  If you take diabetic medication, do not take am of surgery unless instructed by Doctor.  ____  Call MD for temperature above 101 degrees.  ____ Stop taking any Fish Oil supplement or any Vitamins that contain Vitamin E at least 5 days prior to surgery.  ____ Do Not wear your contact lenses the day of your procedure.  You may wear your glasses.        I have read or had read and explained to me, and understand the above information.  Additional comments or instructions:  For additional questions call [phone]     Take a Hibiclens shower twice a day for 3 days prior to surgery, including the morning of surgery.   Gargle with Listerine twice a day for 3 days prior to  surgery, including the morning of surgery.      Pre-Op Bathing Instructions    Before surgery, you can play an important role in your own health.    Because skin is not sterile, we need to be sure that your skin is as free of germs as possible. By following the instructions below, you can reduce the number of germs on your skin before surgery.    IMPORTANT: You will need to shower with a special soap called Hibiclens*, available at any pharmacy.  If you are allergic to Chlorhexidine (the antiseptic in Hibiclens), use an antibacterial soap such as Dial Soap for your preoperative shower.  You will shower with Hibiclens both the night before your surgery and the morning of your surgery.  Do not use Hibiclens on the head, face or genitals to avoid injury to those areas.    STEP #1: THE NIGHT BEFORE YOUR SURGERY     1. Do not shave the area of your body where your surgery will be performed.  2. Shower and wash your hair and body as usual with your normal soap and shampoo.  3. Rinse your hair and body thoroughly after you shower to remove all soap residue.  4. With your hand, apply one packet of Hibiclens soap to the surgical site.   5. Wash the site gently for five (5) minutes. Do not scrub your skin too hard.   6. Do not wash with your regular soap after Hibiclens is used.  7. Rinse your body thoroughly.  8. Pat yourself dry with a clean, soft towel.  9. Do not use lotion, cream, or powder.  10. Wear clean clothes.    STEP #2: THE MORNING OF YOUR SURGERY     1. Repeat Step #1.    * Not to be used by people allergic to Chlorhexidine.          Laminectomy  When to call your healthcare provider  Once at home, call your provider if you have any of the symptoms below:  · Unusual redness, heat, or drainage at the incision site  · Increasing pain, numbness, or weakness in your leg  · Fever over 100.4°F (38°C)  Vertebrae are the bones that make up the spine. Laminectomy is a surgery that removes the part of the vertebra called  the lamina. This takes pressure off nerves in the low back and helps reduce symptoms. A similar surgery is called a laminotomy. For a laminotomy, only part of the lamina is removed.     The entire lamina is removed from the affected vertebra.   Before your surgery  Be sure to follow all of your doctors instructions on preparing for surgery.  · You should stop eating or drinking 10 hours before surgery.  · If you take a daily medicine, ask if you should still take it the morning of surgery.  · If you take any blood-thinning medicines, such as aspirin, discuss them with your doctor at least a week before surgery.  · At the hospital, your temperature, pulse, breathing, and blood pressure will be checked.  · An IV or intravenous line may be started to provide fluids and medications needed during surgery.  During your surgery  · Once in the operating room, you will be given anesthesia.  · After you are asleep, an incision is made near the center of your low back. The incision may be 2 to 6 inches long, depending on how many vertebrae are involved.  · During a laminectomy, the lamina, or bone that forms the back of the spinal canal, is removed from the affected vertebra. The opening created may be enough to take pressure off the nerve. If needed, your doctor can also remove any bone spurs or disk matter pressing on the nerve. After laminectomy, the opening in the spine is protected by the thick back muscles.  · Once the nerve is free of pressure, the incision is closed with stitches or surgical staples.  After your surgery  After surgery, youll be sent to the PACU or postanesthesia care unit. When you are fully awake, youll be moved to your room. The nurses will give you medicines to ease your pain. You may have a small tube called a catheter in your bladder. Soon, healthcare providers will help you get up and moving. Youll also be shown how to keep your lungs clear.  When to call your healthcare provider  Once at  home, call your provider if you have any of the symptoms below:  · Unusual redness, heat, or drainage at the incision site  · Increasing pain, numbness, or weakness in your leg  · Fever over 100.4°F (38°C)   Date Last Reviewed: 2/26/2016 © 2000-2016 [employer]. [address] [place] [address]. All rights reserved. This information is not intended as a substitute for professional medical care. Always follow your healthcare professional's instructions.        Anesthesia: General Anesthesia  Youre due to have surgery. During surgery, youll be given medication called anesthesia. (It is also called anesthetic.) This will keep you comfortable and pain-free. Your anesthesia provider will use general anesthesia. This sheet tells you more about it.  What is general anesthesia?     You are watched continuously during your procedure by the anesthesia provider   General anesthesia puts you into a state like deep sleep. It goes into the bloodstream (IV anesthetics), into the lungs (gas anesthetics), or both. You feel nothing during the procedure. You will not remember it. During the procedure, the anesthesia provider monitors you continuously. He or she checks your heart rate and rhythm, blood pressure, breathing, and blood oxygen.  · IV Anesthetics. IV anesthetics are given through an IV line in your arm. Theyre often given first. This is so you are asleep before a gas anesthetic is started. Some kinds of IV anesthetics relieve pain. Others relax you. Your doctor will decide which kind is best in your case.  · Gas Anesthetics. Gas anesthetics are breathed into the lungs. They are often used to keep you asleep. They can be given through a facemask or a tube placed in your larynx or trachea (breathing tube).  ¨ If you have a facemask, your anesthesia provider will most likely place it over your nose and mouth while youre still awake. Youll breathe oxygen through the mask as your IV anesthetic is  started. Gas anesthetic may be added through the mask.  ¨ If you have a tube in the larynx or trachea, it will be inserted into your throat after youre asleep.  Anesthesia tools and medications  You will likely have:  · IV anesthetics. These are put into an IV line into your bloodstream.  · Gas anesthetics. You breathe these anesthetics into your lungs, where they pass into your bloodstream.  · Pulse oximeter. This is a small clip that is attached to the end of your finger. This measures your blood oxygen level.  · Electrocardiography leads (electrodes). These are small sticky pads that are placed on your chest. They record your heart rate and rhythm.  · Blood pressure cuff. This reads your blood pressure.  Risks and possible complications  General anesthesia has some risks. These include:  · Breathing problems  · Nausea and vomiting  · Sore throat or hoarseness (usually temporary)  · Allergic reaction to the anesthetic  · Irregular heartbeat (rare)  · Cardiac arrest (rare)   Anesthesia safety  · Follow all instructions you are given for how long not to eat or drink before your procedure.  · Be sure your doctor knows what medications and drugs you take. This includes over-the-counter medications, herbs, supplements, alcohol or other drugs. You will be asked when those were last taken.  · Have an adult family member or friend drive you home after the procedure.  · For the first 24 hours after your surgery:  ¨ Do not drive or use heavy equipment.  ¨ Have a trusted family member or spouse make important decisions or sign documents.  ¨ Avoid alcohol.  ¨ Have a responsible adult stay with you. He or she can watch for problems and help keep you safe.  Date Last Reviewed: 10/16/2014  © 6198-6512 [employer]. [address] [place] [address], [place] [address]. All rights reserved. This information is not intended as a substitute for professional medical care. Always follow your healthcare professional's  instructions.

## 2017-07-25 NOTE — TELEPHONE ENCOUNTER
Spoke with Prateek with pre admit. Patient is scheduled to have surgery. He recently seen you on 06/29. He is requesting to have clearance. Please advise.

## 2017-08-02 ENCOUNTER — SURGERY (OUTPATIENT)
Age: 67
End: 2017-08-02

## 2017-08-02 ENCOUNTER — ANESTHESIA (OUTPATIENT)
Dept: SURGERY | Facility: HOSPITAL | Age: 67
End: 2017-08-02
Payer: MEDICARE

## 2017-08-02 ENCOUNTER — HOSPITAL ENCOUNTER (OUTPATIENT)
Facility: HOSPITAL | Age: 67
Discharge: HOME | End: 2017-08-03
Attending: NEUROLOGICAL SURGERY | Admitting: NEUROLOGICAL SURGERY
Payer: MEDICARE

## 2017-08-02 DIAGNOSIS — M51.36 DDD (DEGENERATIVE DISC DISEASE), LUMBAR: ICD-10-CM

## 2017-08-02 DIAGNOSIS — M48.061 SPINAL STENOSIS OF LUMBAR REGION WITH RADICULOPATHY: ICD-10-CM

## 2017-08-02 DIAGNOSIS — M48.062 LUMBAR STENOSIS WITH NEUROGENIC CLAUDICATION: Primary | ICD-10-CM

## 2017-08-02 DIAGNOSIS — M48.061 SPINAL STENOSIS OF LUMBAR REGION: ICD-10-CM

## 2017-08-02 DIAGNOSIS — M54.16 SPINAL STENOSIS OF LUMBAR REGION WITH RADICULOPATHY: ICD-10-CM

## 2017-08-02 LAB
POCT GLUCOSE: 109 MG/DL (ref 70–110)
POCT GLUCOSE: 361 MG/DL (ref 70–110)
POCT GLUCOSE: 423 MG/DL (ref 70–110)
POCT GLUCOSE: 443 MG/DL (ref 70–110)

## 2017-08-02 PROCEDURE — 25000003 PHARM REV CODE 250: Performed by: NEUROLOGICAL SURGERY

## 2017-08-02 PROCEDURE — 25000003 PHARM REV CODE 250: Performed by: NURSE ANESTHETIST, CERTIFIED REGISTERED

## 2017-08-02 PROCEDURE — 27201423 OPTIME MED/SURG SUP & DEVICES STERILE SUPPLY: Performed by: NEUROLOGICAL SURGERY

## 2017-08-02 PROCEDURE — 36000710: Performed by: NEUROLOGICAL SURGERY

## 2017-08-02 PROCEDURE — 25000003 PHARM REV CODE 250: Performed by: PHYSICIAN ASSISTANT

## 2017-08-02 PROCEDURE — 71000039 HC RECOVERY, EACH ADD'L HOUR: Performed by: NEUROLOGICAL SURGERY

## 2017-08-02 PROCEDURE — 63047 LAM FACETEC & FORAMOT LUMBAR: CPT | Mod: AS,,, | Performed by: PHYSICIAN ASSISTANT

## 2017-08-02 PROCEDURE — 94760 N-INVAS EAR/PLS OXIMETRY 1: CPT

## 2017-08-02 PROCEDURE — 63047 LAM FACETEC & FORAMOT LUMBAR: CPT | Mod: ,,, | Performed by: NEUROLOGICAL SURGERY

## 2017-08-02 PROCEDURE — 63048 LAM FACETEC &FORAMOT EA ADDL: CPT | Mod: AS,,, | Performed by: PHYSICIAN ASSISTANT

## 2017-08-02 PROCEDURE — 63600175 PHARM REV CODE 636 W HCPCS: Performed by: PHYSICIAN ASSISTANT

## 2017-08-02 PROCEDURE — 37000008 HC ANESTHESIA 1ST 15 MINUTES: Performed by: NEUROLOGICAL SURGERY

## 2017-08-02 PROCEDURE — 63048 LAM FACETEC &FORAMOT EA ADDL: CPT | Mod: ,,, | Performed by: NEUROLOGICAL SURGERY

## 2017-08-02 PROCEDURE — 63600175 PHARM REV CODE 636 W HCPCS: Performed by: NEUROLOGICAL SURGERY

## 2017-08-02 PROCEDURE — 63600175 PHARM REV CODE 636 W HCPCS: Performed by: NURSE ANESTHETIST, CERTIFIED REGISTERED

## 2017-08-02 PROCEDURE — 71000033 HC RECOVERY, INTIAL HOUR: Performed by: NEUROLOGICAL SURGERY

## 2017-08-02 PROCEDURE — 36000711: Performed by: NEUROLOGICAL SURGERY

## 2017-08-02 PROCEDURE — 37000009 HC ANESTHESIA EA ADD 15 MINS: Performed by: NEUROLOGICAL SURGERY

## 2017-08-02 PROCEDURE — 25000003 PHARM REV CODE 250: Performed by: NURSE PRACTITIONER

## 2017-08-02 RX ORDER — SODIUM CHLORIDE 0.9 % (FLUSH) 0.9 %
3 SYRINGE (ML) INJECTION
Status: DISCONTINUED | OUTPATIENT
Start: 2017-08-02 | End: 2017-08-02

## 2017-08-02 RX ORDER — HYDROMORPHONE HYDROCHLORIDE 2 MG/ML
INJECTION, SOLUTION INTRAMUSCULAR; INTRAVENOUS; SUBCUTANEOUS
Status: DISCONTINUED | OUTPATIENT
Start: 2017-08-02 | End: 2017-08-02

## 2017-08-02 RX ORDER — CELECOXIB 100 MG/1
200 CAPSULE ORAL
Status: COMPLETED | OUTPATIENT
Start: 2017-08-02 | End: 2017-08-02

## 2017-08-02 RX ORDER — FENTANYL CITRATE 50 UG/ML
25 INJECTION, SOLUTION INTRAMUSCULAR; INTRAVENOUS EVERY 10 MIN PRN
Status: DISCONTINUED | OUTPATIENT
Start: 2017-08-02 | End: 2017-08-02 | Stop reason: SDUPTHER

## 2017-08-02 RX ORDER — ONDANSETRON 2 MG/ML
4 INJECTION INTRAMUSCULAR; INTRAVENOUS DAILY PRN
Status: DISCONTINUED | OUTPATIENT
Start: 2017-08-02 | End: 2017-08-02 | Stop reason: SDUPTHER

## 2017-08-02 RX ORDER — OXYCODONE HCL 10 MG/1
10 TABLET, FILM COATED, EXTENDED RELEASE ORAL
Status: DISCONTINUED | OUTPATIENT
Start: 2017-08-02 | End: 2017-08-02

## 2017-08-02 RX ORDER — INSULIN ASPART 100 [IU]/ML
0-5 INJECTION, SOLUTION INTRAVENOUS; SUBCUTANEOUS
Status: DISCONTINUED | OUTPATIENT
Start: 2017-08-02 | End: 2017-08-03 | Stop reason: HOSPADM

## 2017-08-02 RX ORDER — LISINOPRIL AND HYDROCHLOROTHIAZIDE 20; 25 MG/1; MG/1
1 TABLET ORAL DAILY
Status: DISCONTINUED | OUTPATIENT
Start: 2017-08-03 | End: 2017-08-03 | Stop reason: HOSPADM

## 2017-08-02 RX ORDER — DIPHENHYDRAMINE HYDROCHLORIDE 50 MG/ML
25 INJECTION INTRAMUSCULAR; INTRAVENOUS EVERY 6 HOURS PRN
Status: DISCONTINUED | OUTPATIENT
Start: 2017-08-02 | End: 2017-08-02

## 2017-08-02 RX ORDER — LIDOCAINE HYDROCHLORIDE 10 MG/ML
1 INJECTION, SOLUTION EPIDURAL; INFILTRATION; INTRACAUDAL; PERINEURAL ONCE
Status: DISCONTINUED | OUTPATIENT
Start: 2017-08-02 | End: 2017-08-02

## 2017-08-02 RX ORDER — CYCLOBENZAPRINE HCL 10 MG
10 TABLET ORAL
Status: COMPLETED | OUTPATIENT
Start: 2017-08-02 | End: 2017-08-02

## 2017-08-02 RX ORDER — FENTANYL CITRATE 50 UG/ML
INJECTION, SOLUTION INTRAMUSCULAR; INTRAVENOUS
Status: DISCONTINUED | OUTPATIENT
Start: 2017-08-02 | End: 2017-08-02

## 2017-08-02 RX ORDER — SUCCINYLCHOLINE CHLORIDE 20 MG/ML
INJECTION INTRAMUSCULAR; INTRAVENOUS
Status: DISCONTINUED | OUTPATIENT
Start: 2017-08-02 | End: 2017-08-02

## 2017-08-02 RX ORDER — PRAVASTATIN SODIUM 20 MG/1
40 TABLET ORAL NIGHTLY
Status: DISCONTINUED | OUTPATIENT
Start: 2017-08-02 | End: 2017-08-03 | Stop reason: HOSPADM

## 2017-08-02 RX ORDER — PHENYLEPHRINE HYDROCHLORIDE 10 MG/ML
INJECTION INTRAVENOUS
Status: DISCONTINUED | OUTPATIENT
Start: 2017-08-02 | End: 2017-08-02

## 2017-08-02 RX ORDER — IBUPROFEN 200 MG
16 TABLET ORAL
Status: DISCONTINUED | OUTPATIENT
Start: 2017-08-02 | End: 2017-08-03 | Stop reason: HOSPADM

## 2017-08-02 RX ORDER — OXYCODONE HYDROCHLORIDE 5 MG/1
5 TABLET ORAL EVERY 4 HOURS PRN
Status: DISCONTINUED | OUTPATIENT
Start: 2017-08-02 | End: 2017-08-03 | Stop reason: HOSPADM

## 2017-08-02 RX ORDER — ACETAMINOPHEN 500 MG
1000 TABLET ORAL EVERY 8 HOURS
Status: DISCONTINUED | OUTPATIENT
Start: 2017-08-02 | End: 2017-08-03 | Stop reason: HOSPADM

## 2017-08-02 RX ORDER — GLUCAGON 1 MG
1 KIT INJECTION
Status: DISCONTINUED | OUTPATIENT
Start: 2017-08-02 | End: 2017-08-03 | Stop reason: HOSPADM

## 2017-08-02 RX ORDER — SODIUM CHLORIDE 9 MG/ML
INJECTION, SOLUTION INTRAVENOUS CONTINUOUS PRN
Status: DISCONTINUED | OUTPATIENT
Start: 2017-08-02 | End: 2017-08-02

## 2017-08-02 RX ORDER — GABAPENTIN 300 MG/1
300 CAPSULE ORAL 3 TIMES DAILY
Status: DISCONTINUED | OUTPATIENT
Start: 2017-08-02 | End: 2017-08-03 | Stop reason: HOSPADM

## 2017-08-02 RX ORDER — MIDAZOLAM HYDROCHLORIDE 1 MG/ML
INJECTION, SOLUTION INTRAMUSCULAR; INTRAVENOUS
Status: DISCONTINUED | OUTPATIENT
Start: 2017-08-02 | End: 2017-08-02

## 2017-08-02 RX ORDER — IBUPROFEN 200 MG
24 TABLET ORAL
Status: DISCONTINUED | OUTPATIENT
Start: 2017-08-02 | End: 2017-08-03 | Stop reason: HOSPADM

## 2017-08-02 RX ORDER — VANCOMYCIN HYDROCHLORIDE 1 G/20ML
INJECTION, POWDER, LYOPHILIZED, FOR SOLUTION INTRAVENOUS
Status: DISCONTINUED | OUTPATIENT
Start: 2017-08-02 | End: 2017-08-02 | Stop reason: HOSPADM

## 2017-08-02 RX ORDER — KETOROLAC TROMETHAMINE 30 MG/ML
INJECTION, SOLUTION INTRAMUSCULAR; INTRAVENOUS
Status: DISCONTINUED | OUTPATIENT
Start: 2017-08-02 | End: 2017-08-02

## 2017-08-02 RX ORDER — ONDANSETRON 2 MG/ML
4 INJECTION INTRAMUSCULAR; INTRAVENOUS DAILY PRN
Status: DISCONTINUED | OUTPATIENT
Start: 2017-08-02 | End: 2017-08-02

## 2017-08-02 RX ORDER — FENTANYL CITRATE 50 UG/ML
25 INJECTION, SOLUTION INTRAMUSCULAR; INTRAVENOUS EVERY 10 MIN PRN
Status: DISCONTINUED | OUTPATIENT
Start: 2017-08-02 | End: 2017-08-03 | Stop reason: HOSPADM

## 2017-08-02 RX ORDER — HEPARIN SODIUM 5000 [USP'U]/ML
5000 INJECTION, SOLUTION INTRAVENOUS; SUBCUTANEOUS EVERY 12 HOURS
Status: DISCONTINUED | OUTPATIENT
Start: 2017-08-02 | End: 2017-08-03 | Stop reason: HOSPADM

## 2017-08-02 RX ORDER — PREGABALIN 75 MG/1
75 CAPSULE ORAL
Status: COMPLETED | OUTPATIENT
Start: 2017-08-02 | End: 2017-08-02

## 2017-08-02 RX ORDER — DEXAMETHASONE SODIUM PHOSPHATE 4 MG/ML
INJECTION, SOLUTION INTRA-ARTICULAR; INTRALESIONAL; INTRAMUSCULAR; INTRAVENOUS; SOFT TISSUE
Status: DISCONTINUED | OUTPATIENT
Start: 2017-08-02 | End: 2017-08-02

## 2017-08-02 RX ORDER — AMOXICILLIN 250 MG
2 CAPSULE ORAL NIGHTLY PRN
Status: DISCONTINUED | OUTPATIENT
Start: 2017-08-02 | End: 2017-08-03 | Stop reason: HOSPADM

## 2017-08-02 RX ORDER — ACETAMINOPHEN 10 MG/ML
1000 INJECTION, SOLUTION INTRAVENOUS
Status: COMPLETED | OUTPATIENT
Start: 2017-08-02 | End: 2017-08-02

## 2017-08-02 RX ORDER — SODIUM CHLORIDE, SODIUM LACTATE, POTASSIUM CHLORIDE, CALCIUM CHLORIDE 600; 310; 30; 20 MG/100ML; MG/100ML; MG/100ML; MG/100ML
INJECTION, SOLUTION INTRAVENOUS CONTINUOUS
Status: DISCONTINUED | OUTPATIENT
Start: 2017-08-02 | End: 2017-08-02

## 2017-08-02 RX ORDER — MUPIROCIN 20 MG/G
1 OINTMENT TOPICAL
Status: COMPLETED | OUTPATIENT
Start: 2017-08-02 | End: 2017-08-02

## 2017-08-02 RX ORDER — ONDANSETRON 2 MG/ML
INJECTION INTRAMUSCULAR; INTRAVENOUS
Status: DISCONTINUED | OUTPATIENT
Start: 2017-08-02 | End: 2017-08-02

## 2017-08-02 RX ORDER — BACITRACIN 50000 [IU]/1
INJECTION, POWDER, FOR SOLUTION INTRAMUSCULAR
Status: DISCONTINUED | OUTPATIENT
Start: 2017-08-02 | End: 2017-08-02 | Stop reason: HOSPADM

## 2017-08-02 RX ORDER — ONDANSETRON 8 MG/1
8 TABLET, ORALLY DISINTEGRATING ORAL EVERY 6 HOURS PRN
Status: DISCONTINUED | OUTPATIENT
Start: 2017-08-02 | End: 2017-08-03 | Stop reason: HOSPADM

## 2017-08-02 RX ORDER — CYCLOBENZAPRINE HCL 10 MG
10 TABLET ORAL 3 TIMES DAILY
Status: DISCONTINUED | OUTPATIENT
Start: 2017-08-02 | End: 2017-08-03 | Stop reason: HOSPADM

## 2017-08-02 RX ORDER — BUPIVACAINE HCL/EPINEPHRINE 0.25-.0005
VIAL (ML) INJECTION
Status: DISCONTINUED | OUTPATIENT
Start: 2017-08-02 | End: 2017-08-02 | Stop reason: HOSPADM

## 2017-08-02 RX ORDER — CELECOXIB 100 MG/1
200 CAPSULE ORAL 2 TIMES DAILY
Status: DISCONTINUED | OUTPATIENT
Start: 2017-08-02 | End: 2017-08-03 | Stop reason: HOSPADM

## 2017-08-02 RX ORDER — ROCURONIUM BROMIDE 10 MG/ML
INJECTION, SOLUTION INTRAVENOUS
Status: DISCONTINUED | OUTPATIENT
Start: 2017-08-02 | End: 2017-08-02

## 2017-08-02 RX ORDER — SODIUM CHLORIDE 0.9 % (FLUSH) 0.9 %
3 SYRINGE (ML) INJECTION
Status: DISCONTINUED | OUTPATIENT
Start: 2017-08-02 | End: 2017-08-03 | Stop reason: HOSPADM

## 2017-08-02 RX ORDER — SODIUM CHLORIDE AND POTASSIUM CHLORIDE 150; 900 MG/100ML; MG/100ML
INJECTION, SOLUTION INTRAVENOUS CONTINUOUS
Status: DISCONTINUED | OUTPATIENT
Start: 2017-08-02 | End: 2017-08-03 | Stop reason: HOSPADM

## 2017-08-02 RX ORDER — LIDOCAINE HCL/PF 100 MG/5ML
SYRINGE (ML) INTRAVENOUS
Status: DISCONTINUED | OUTPATIENT
Start: 2017-08-02 | End: 2017-08-02

## 2017-08-02 RX ORDER — FUROSEMIDE 20 MG/1
20 TABLET ORAL DAILY
Status: DISCONTINUED | OUTPATIENT
Start: 2017-08-03 | End: 2017-08-03 | Stop reason: HOSPADM

## 2017-08-02 RX ORDER — MAG HYDROX/ALUMINUM HYD/SIMETH 200-200-20
30 SUSPENSION, ORAL (FINAL DOSE FORM) ORAL EVERY 4 HOURS PRN
Status: DISCONTINUED | OUTPATIENT
Start: 2017-08-02 | End: 2017-08-03 | Stop reason: HOSPADM

## 2017-08-02 RX ORDER — PROPOFOL 10 MG/ML
VIAL (ML) INTRAVENOUS
Status: DISCONTINUED | OUTPATIENT
Start: 2017-08-02 | End: 2017-08-02

## 2017-08-02 RX ADMIN — FENTANYL CITRATE 200 MCG: 50 INJECTION, SOLUTION INTRAMUSCULAR; INTRAVENOUS at 07:08

## 2017-08-02 RX ADMIN — PHENYLEPHRINE HYDROCHLORIDE 200 MCG: 10 INJECTION INTRAVENOUS at 07:08

## 2017-08-02 RX ADMIN — PRAVASTATIN SODIUM 40 MG: 20 TABLET ORAL at 10:08

## 2017-08-02 RX ADMIN — CYCLOBENZAPRINE HYDROCHLORIDE 10 MG: 10 TABLET, FILM COATED ORAL at 03:08

## 2017-08-02 RX ADMIN — EPHEDRINE SULFATE 10 MG: 50 INJECTION, SOLUTION INTRAMUSCULAR; INTRAVENOUS; SUBCUTANEOUS at 07:08

## 2017-08-02 RX ADMIN — VANCOMYCIN HYDROCHLORIDE 1 G: 1 INJECTION, POWDER, LYOPHILIZED, FOR SOLUTION INTRAVENOUS at 08:08

## 2017-08-02 RX ADMIN — CYCLOBENZAPRINE HYDROCHLORIDE 10 MG: 10 TABLET, FILM COATED ORAL at 10:08

## 2017-08-02 RX ADMIN — INSULIN ASPART 5 UNITS: 100 INJECTION, SOLUTION INTRAVENOUS; SUBCUTANEOUS at 08:08

## 2017-08-02 RX ADMIN — PROPOFOL 150 MG: 10 INJECTION, EMULSION INTRAVENOUS at 07:08

## 2017-08-02 RX ADMIN — SODIUM CHLORIDE AND POTASSIUM CHLORIDE: 9; 1.49 INJECTION, SOLUTION INTRAVENOUS at 03:08

## 2017-08-02 RX ADMIN — PREGABALIN 75 MG: 75 CAPSULE ORAL at 06:08

## 2017-08-02 RX ADMIN — HEPARIN SODIUM 5000 UNITS: 5000 INJECTION, SOLUTION INTRAVENOUS; SUBCUTANEOUS at 10:08

## 2017-08-02 RX ADMIN — SUCCINYLCHOLINE CHLORIDE 120 MG: 20 INJECTION, SOLUTION INTRAMUSCULAR; INTRAVENOUS at 07:08

## 2017-08-02 RX ADMIN — KETOROLAC TROMETHAMINE 30 MG: 30 INJECTION, SOLUTION INTRAMUSCULAR; INTRAVENOUS at 09:08

## 2017-08-02 RX ADMIN — CELECOXIB 200 MG: 100 CAPSULE ORAL at 06:08

## 2017-08-02 RX ADMIN — BACITRACIN 50000 UNITS: 5000 INJECTION, POWDER, FOR SOLUTION INTRAMUSCULAR at 08:08

## 2017-08-02 RX ADMIN — Medication 1000 MG: at 07:08

## 2017-08-02 RX ADMIN — SODIUM CHLORIDE: 0.9 INJECTION, SOLUTION INTRAVENOUS at 07:08

## 2017-08-02 RX ADMIN — Medication 1 EACH: at 08:08

## 2017-08-02 RX ADMIN — DEXAMETHASONE SODIUM PHOSPHATE 8 MG: 4 INJECTION, SOLUTION INTRAMUSCULAR; INTRAVENOUS at 07:08

## 2017-08-02 RX ADMIN — GABAPENTIN 300 MG: 300 CAPSULE ORAL at 10:08

## 2017-08-02 RX ADMIN — SODIUM CHLORIDE, SODIUM LACTATE, POTASSIUM CHLORIDE, AND CALCIUM CHLORIDE: .6; .31; .03; .02 INJECTION, SOLUTION INTRAVENOUS at 06:08

## 2017-08-02 RX ADMIN — CELECOXIB 200 MG: 100 CAPSULE ORAL at 10:08

## 2017-08-02 RX ADMIN — MIDAZOLAM 2 MG: 1 INJECTION INTRAMUSCULAR; INTRAVENOUS at 07:08

## 2017-08-02 RX ADMIN — CELECOXIB 200 MG: 100 CAPSULE ORAL at 03:08

## 2017-08-02 RX ADMIN — ROCURONIUM BROMIDE 5 MG: 10 INJECTION, SOLUTION INTRAVENOUS at 07:08

## 2017-08-02 RX ADMIN — INSULIN ASPART 5 UNITS: 100 INJECTION, SOLUTION INTRAVENOUS; SUBCUTANEOUS at 05:08

## 2017-08-02 RX ADMIN — FENTANYL CITRATE 50 MCG: 50 INJECTION, SOLUTION INTRAMUSCULAR; INTRAVENOUS at 08:08

## 2017-08-02 RX ADMIN — ACETAMINOPHEN 1000 MG: 500 TABLET ORAL at 03:08

## 2017-08-02 RX ADMIN — BUPIVACAINE HYDROCHLORIDE AND EPINEPHRINE 20 ML: 2.5; 5 INJECTION, SOLUTION INFILTRATION; PERINEURAL at 08:08

## 2017-08-02 RX ADMIN — LIDOCAINE HYDROCHLORIDE 100 MG: 20 INJECTION, SOLUTION INTRAVENOUS at 07:08

## 2017-08-02 RX ADMIN — HYDROMORPHONE HYDROCHLORIDE 0.4 MG: 2 INJECTION INTRAMUSCULAR; INTRAVENOUS; SUBCUTANEOUS at 09:08

## 2017-08-02 RX ADMIN — ONDANSETRON 8 MG: 2 INJECTION, SOLUTION INTRAMUSCULAR; INTRAVENOUS at 09:08

## 2017-08-02 RX ADMIN — GABAPENTIN 300 MG: 300 CAPSULE ORAL at 03:08

## 2017-08-02 RX ADMIN — THROMBIN, TOPICAL (BOVINE) 1 EACH: KIT at 08:08

## 2017-08-02 RX ADMIN — ACETAMINOPHEN 1000 MG: 500 TABLET ORAL at 10:08

## 2017-08-02 RX ADMIN — CYCLOBENZAPRINE HYDROCHLORIDE 10 MG: 10 TABLET, FILM COATED ORAL at 06:08

## 2017-08-02 RX ADMIN — ACETAMINOPHEN 1000 MG: 10 INJECTION, SOLUTION INTRAVENOUS at 06:08

## 2017-08-02 RX ADMIN — MUPIROCIN 1 G: 20 OINTMENT TOPICAL at 06:08

## 2017-08-02 NOTE — ANESTHESIA POSTPROCEDURE EVALUATION
"Anesthesia Post Evaluation    Patient: Francisco Coppola    Procedure(s) Performed: Procedure(s) (LRB):  Left L4-5 laminectomy, medial facetectomy, foraminotomy   (Left)  Right L5-S1 laminectomy, medial facetectomy and microdiscectomy (Right)    Final Anesthesia Type: general  Patient location during evaluation: PACU  Patient participation: Yes- Able to Participate  Level of consciousness: awake and alert  Post-procedure vital signs: reviewed and stable  Pain management: adequate  Airway patency: patent  PONV status at discharge: No PONV  Anesthetic complications: no      Cardiovascular status: hemodynamically stable and blood pressure returned to baseline  Respiratory status: room air, unassisted and spontaneous ventilation  Follow-up not needed.        Visit Vitals  /68   Pulse 110   Temp 36.6 °C (97.8 °F) (Oral)   Resp 18   Ht 5' 8" (1.727 m)   Wt 116.6 kg (256 lb 15.1 oz)   SpO2 95%   BMI 39.07 kg/m²       Pain/Naomi Score: Pain Assessment Performed: Yes (8/2/2017 12:55 PM)  Presence of Pain: denies (8/2/2017 12:55 PM)  Pain Rating Prior to Med Admin: 0 (8/2/2017  6:20 AM)  Pain Rating Post Med Admin: 0 (8/2/2017  6:05 AM)  Naomi Score: 9 (8/2/2017 12:55 PM)      "

## 2017-08-02 NOTE — TRANSFER OF CARE
"Anesthesia Transfer of Care Note    Patient: Francisco Coppola    Procedure(s) Performed: Procedure(s) (LRB):  Left L4-5 laminectomy, medial facetectomy, foraminotomy   (Left)  Right L5-S1 laminectomy, medial facetectomy and microdiscectomy (Right)    Patient location: PACU    Anesthesia Type: general    Transport from OR: Transported from OR on 6-10 L/min O2 by face mask with adequate spontaneous ventilation    Post pain: adequate analgesia    Post assessment: no apparent anesthetic complications and tolerated procedure well    Post vital signs: stable    Level of consciousness: sedated and responds to stimulation    Nausea/Vomiting: no nausea/vomiting    Complications: none    Transfer of care protocol was followed      Last vitals:   Visit Vitals  BP (!) 143/65 (BP Location: Right arm, Patient Position: Lying, BP Method: Automatic)   Pulse 92   Temp 36.7 °C (98.1 °F) (Oral)   Resp 20   Ht 5' 8" (1.727 m)   Wt 116.6 kg (256 lb 15.1 oz)   SpO2 96%   BMI 39.07 kg/m²     "

## 2017-08-02 NOTE — OP NOTE
DATE OF PROCEDURE: 08/02/2017      PREOPERATIVE DIAGNOSES:  1. L4-5 spondylolisthesis with severe spinal stenosis  2. L5-S1 posterolateral disc herniation on the right side  3. Severe right leg pain, L5 and S1 radiculopathy  4. Neurogenic claudication      POSTOPERATIVE DIAGNOSES:  1. L4-5 spondylolisthesis with severe spinal stenosis  2. L5-S1 posterolateral disc herniation on the right side  3. Severe right leg pain, L5 and S1 radiculopathy  4. Neurogenic claudication      NAME OF PROCEDURE:  1. Left posterior minimally invasive access to the L4-5 level and right minimally invasive access to the L5-S1 level.  2. Left L4-5 bilateral laminectomy, medial facetectomy and foraminotomy  3. Right L5-S1 laminotomy and microdiscectomy  4. Fluoroscopy and microscopic assistance      PRIMARY SURGEON: Zi Root M.D.      ASSISTANT SURGEON: KYREE Locke was the first assistant for this procedure as there were no qualified residents available to assist.        INDICATION OF THE PROCEDURE: This is a 67-year-old male with worsening right leg pain and difficulty walking.  Risk, benefits, alternatives, and limitation were discussed with the patient. The risks included the nerve root injury that can  cause paralysis, cerebral spinal fluid leak, wound infection, and blood loss. The patient wanted to proceed and the consent was obtained.      DESCRIPTION OF PROCEDURE: The patient was intubated under general anesthesia    and was placed prone on the [place] table. Using fluoroscopy, 1 x 18 mm incision  was planned at L4-5, 15 mm left to the midline. All pressure points were    carefully padded. The patient was prepped and draped in a typical sterile    fashion and the incision was made with a #15 blade. Subcutaneous tissue    hemostasis was completed and the thoracolumbar fascia was opened with a #15 blade. We then inserted the serial dilators and a final 18 mm x 7 cm tubular retractor was docked at the  junction of the inferior lamina and the inferior    facet of L4 and the retractor was fixed on the table and then using a    microscope, the multifidus muscle was subperiosteally dissected using the    monopolar. We then exposed the base of the spinous process, the lamina, and the  medial facets. Using the high speed marisela, we drilled the base of the spinous    process, the ipsilateral and contralateral lamina, as well as the ipsilateral and contralateral one-fourth of the medial facet. The yellow ligament insertion was exposed and the ligament was resected using Kerrison ipsilaterally. The bilateral  L5 nerve roots in the lateral recesse was decompressed with Kerrison by removing    the hypertrophic yellow ligament and one-fourth of the superior L5 facet bilaterally. We complete a contralateral foraminotomy.      After the decompression, the dura reexpanded and hemostasis was    completed by using gelfoam powder soaked in thrombin in the epidural space. We copiously irrigated with physiological  solution and hemostasis was completed with gel foam powder.       Using fluoroscopy, 1 x 18 mm incision  was planned at L5-S1, 15 mm right to the midline. All pressure points were    carefully padded. The patient was prepped and draped in a typical sterile    fashion and the incision was made with a #15 blade. Subcutaneous tissue    hemostasis was completed and the thoracolumbar fascia was opened with a #15 blade. We then inserted the serial dilators and a final 18 mm x 7 cm tubular retractor was docked at the junction of the inferior lamina and the inferior    facet of L5 and the retractor was fixed on the table and then using a    microscope, the multifidus muscle was subperiosteally dissected using the    monopolar. We then exposed the base of the spinous process, the lamina, and the  medial facets. Using the high speed marisela, we drilled the base of the spinous    process, the ipsilateral lamina, as well as the ipsilateral  one-fourth of the medial facet. The yellow ligament insertion was exposed and the ligament was resected using Kerrison ipsilaterally. The  right S1 nerve root in the lateral recesse was decompressed with Kerrison by removing    the hypertrophic yellow ligament and one-fourth of the superior S1 facet.     We then retracted the right S1 nerve root to expose to disc space. We saw a extruded disc herniation pushing against the disc annulus. A soft extruded disc was removed in large pieces. All the free fragments of degenerated discs were removed to complete the decompression. The disc space was then irrigated with physiologic fluid under pressure the remove the residual small disc fragments.      After the decompression, the dura reexpanded and hemostasis was    completed by using gelfoam powder soaked in thrombin in the epidural space. We copiously irrigated with physiological  solution and hemostasis was completed with gel foam powder.       The thoracolumbar fascia was closed with 0 Vicryl. The wound's subcutaneous layer were closed with inverted 2-0 Vicryl and the skin was closed with a 4-0 subcuticular    Monocryl. The wounds were dressed with Steri-Strips and the patient was    transferred to the Recovery Room under the care of the anesthesiologist. Blood    loss was 30 mL. Final count was completed and nothing was missing. There was    no complication.

## 2017-08-02 NOTE — PLAN OF CARE
Pt in bed without complaints, wife at bedside. Pt prepared for surgery and discussed plan of care with pt and and wife, time given for questions and they voiced understanding.

## 2017-08-02 NOTE — H&P
Consult Requested By: Dr. Hebert      Reason for Consult: right lumbar radiculopathy         SUBJECTIVE:      Chief Complaint: right leg pain      History of Present Illness:  Francisco Coppola is a 67 y.o. male with DM who presents complaining of 1 month history of right leg pain. He states he has a history of bilateral leg numbness with walking. He has to lean forward to relieve the numbness. This has been occurring for years. He notes about 1 month ago, he woke up with severe right leg pain. The pain is described as a shooting, electric pain. It radiates from the right buttock down the posterior aspect of the leg into the heel. It occurs frequently throughout the day. He is unable to lay down due to the pain. He is able to tolerate the pain when sitting. He has diabetic neuropathy, but notes his numbness in his right foot has worsened. He also reports burning pain in the right little toe at night. He takes gabapentin 600 mg BID and Norco 10 mg BID. He has had no recent PT or ESIs. He denies symptoms of sphincter dysfunction.         Low Back Pain Scale  R Low Back-Pain Score: 8  R Low Back-Pain Intensity: Pain killers give moderate relief from pain  R Low Back-Pain Score: It is painful to look after myself and I am slow and careful  Low Back-Lifting: I can lift heavy weights but it gives extra pain   Low Back-Walking: Pain prevents me walking more than .25 mile   Low Back-Sitting: I can sit in any chair as long as I like   Low Back-Standing: I cannot stand for longer than 30 mins without increasing pain   Low Back-Sleeping: Pain prevents me from sleeping at all.   Low Back-Social Life: Pain has no significant effect on my social life apart from limiting my more en   Low Back-Traveling: I have some pain when traveling but robin of my usual forms of travel make it any worse   Low Back-Changing Degree of Pain: My pain is gradually worsening                Review of patient's allergies indicates:   Allergen Reactions     Augmentin [amoxicillin-pot clavulanate] Hives and Rash    Tramadol Nausea Only              Past Medical History:   Diagnosis Date    Allergy      DDD (degenerative disc disease), lumbar      Hyperlipidemia      Hypertension      Nicotine abuse      Obesity      Scrotal mass      Type II diabetes mellitus with renal manifestations, uncontrolled       microalbuminuria    Type II or unspecified type diabetes mellitus without mention of complication, not stated as uncontrolled      Ulcer              Past Surgical History:   Procedure Laterality Date    CARPAL TUNNEL RELEASE        KNEE SURGERY Left       removal of cartilage with no implant    ULNAR NERVE TRANSPOSITION                Family History   Problem Relation Age of Onset    Cancer Mother      Cataracts Mother      Cancer Father      Cancer Paternal Grandfather      Diabetes Paternal Grandmother                Social History   Substance Use Topics    Smoking status: Current Every Day Smoker       Packs/day: 0.50       Years: 45.00       Types: Cigarettes    Smokeless tobacco: Never Used    Alcohol use 2.5 oz/week        5 Standard drinks or equivalent per week          Comment: allen weekly         Review of Systems:  Constitutional: no fever, chills or night sweats. No changes in weight   Eyes: no visual changes   ENT: no nasal congestion or sore throat   Respiratory: no cough or shortness of breath   Cardiovascular: no chest pain or palpitations   Gastrointestinal: no nausea or vomiting   Genitourinary: no hematuria or dysuria   Integument/Breast: no rash or pruritis   Hematologic/Lymphatic: no easy bruising or lymphadenopathy   Musculoskeletal/Neurological: Positive for right leg pain. Positive for bilateral leg numbness with walking        OBJECTIVE:      Vital Signs (Most Recent):  Pulse: 107 (06/26/17 0958)  BP: (!) 157/85 (06/26/17 0958)     Physical Exam:  General: well developed, well nourished, no distress.   Neurologic:  Alert and oriented. Thought content appropriate.  GCS: Motor: 6/Verbal: 5/Eyes: 4 GCS Total: 15  Head: normocephalic, atraumatic  Eyes: pupils equal, round, reactive to light with accomodation, EOMI.  Neck: trachea midline, no JVD   Cardiovascular: LE edema  Pulmonary: normal respirations, no signs of respiratory distress  Abdomen: soft, non-distended  Sensory: decreased sensation bilateral feet, R>L  Skin: Skin is warm, dry and intact.     Motor Strength: Moves all extremities spontaneously with good tone.  No abnormal movements seen.      Strength   Deltoids Triceps Biceps Wrist Extension Wrist Flexion Hand  Interossei   Upper: R 5/5 5/5 5/5 5/5 5/5 5/5 5/5     L 5/5 5/5 5/5 5/5 5/5 5/5 5/5       Iliopsoas Quadriceps Knee  Flexion Tibialis  anterior Gastro- cnemius EHL     Lower: R 5/5 5/5 5/5 5/5 4/5 5/5       L 5/5 5/5 5/5 5/5 5/5 5/5        Gait: normal                                      Able to stand on left toes, unable to stand on right toes   Able to walk on heels      Lumbar Spine: full ROM. Pain with extension   SI Joint tenderness: Negative bilaterally                       Diagnostic Results:  I have independently reviewed the following imaging and agree with findings     MRI Lumbar Spine 6/12/2017:  Findings:  Lumbar spine alignment demonstrates 1-2 mm of anterolisthesis of L4 on L5.  Vertebral body heights are well-maintained without evidence for fracture.  There is mild left-sided facet edema at L4-L5, likely degenerative in nature.  No marrow signal abnormality to suggest an infiltrative process.    Distal spinal cord demonstrates normal contour and signal intensity.  Conus terminates at the L1-L2 level.  Cauda equina is grossly unremarkable.    Visualized retroperitoneal organs demonstrate no significant abnormalities.  There is mild fatty infiltration of the posterior paraspinal musculature.    T12-L1: No spinal canal stenosis or neural foraminal narrowing.    L1-L2: There is a small  circumferential disc bulge.  No spinal canal stenosis or neural foraminal narrowing.    L2-L3: There is mild bilateral facet hypertrophy.  No spinal canal stenosis or neural foraminal narrowing.    L3-L4: There is a small circumferential disc bulge with a small posterior annular fissure.  There is mild bilateral facet hypertrophy and mild bilateral ligamentum flavum buckling.  Findings contribute to mild spinal canal stenosis.  No neural foraminal narrowing.    L4-L5: There is a moderate circumferential disc bulge, severe bilateral facet hypertrophy and severe bilateral ligamentum flavum buckling contributing to severe spinal canal stenosis and moderate right and mild left neural foraminal narrowing.    L5-S1: There has been interval development of a large central/right paracentral disc extrusion that effaces the right lateral recess.  There is mild left facet hypertrophy and mild bilateral ligamentum flavum buckling.  Findings contribute to severe spinal canal stenosis and mild right-sided neural foraminal narrowing.   Impression:  Interval development of a large central/right paracentral disc extrusion at L5-S1 that likely compresses the descending S1 nerve root and contributes to severe spinal canal stenosis.    Persistent severe spinal canal stenosis at L4-L5.             ASSESSMENT/PLAN:      66 yo male with DDD, L4-5 severe stenosis, chronic neurogenic claudication, L5-S1 disc herniation with right S1 radiculopathy.      -Refill Norco #60  -Discussed with Dr. Root. He explained the natural history of the disease and all treatment options. He recommended MIS L4-5 laminectomy, medial facetectomy and foraminotomy, and MIS L5-S1 right laminectomy, medial facetectomy, foraminotomy and microdiscectomy.      They have discussed the risks of surgery including bleeding, infection, failure of surgery, CSF leak, nerve root injury, spinal cord injury, weakness, paralysis, peripheral neuropathy,  need for reoperation.  Patient understands the risks and would like to proceed with surgery.      Zi Root  Neurosurgery

## 2017-08-03 VITALS
RESPIRATION RATE: 20 BRPM | TEMPERATURE: 99 F | HEART RATE: 87 BPM | WEIGHT: 256.94 LBS | OXYGEN SATURATION: 97 % | DIASTOLIC BLOOD PRESSURE: 74 MMHG | BODY MASS INDEX: 38.94 KG/M2 | HEIGHT: 68 IN | SYSTOLIC BLOOD PRESSURE: 122 MMHG

## 2017-08-03 LAB — POCT GLUCOSE: 260 MG/DL (ref 70–110)

## 2017-08-03 PROCEDURE — 25000003 PHARM REV CODE 250: Performed by: PHYSICIAN ASSISTANT

## 2017-08-03 PROCEDURE — 63600175 PHARM REV CODE 636 W HCPCS: Performed by: PHYSICIAN ASSISTANT

## 2017-08-03 PROCEDURE — 94761 N-INVAS EAR/PLS OXIMETRY MLT: CPT

## 2017-08-03 RX ORDER — CYCLOBENZAPRINE HCL 10 MG
10 TABLET ORAL 3 TIMES DAILY PRN
Qty: 60 TABLET | Refills: 0 | Status: SHIPPED | OUTPATIENT
Start: 2017-08-03 | End: 2017-08-03

## 2017-08-03 RX ORDER — HYDROCODONE BITARTRATE AND ACETAMINOPHEN 10; 325 MG/1; MG/1
1 TABLET ORAL EVERY 8 HOURS PRN
Qty: 60 TABLET | Refills: 0 | Status: SHIPPED | OUTPATIENT
Start: 2017-08-03 | End: 2017-08-03

## 2017-08-03 RX ORDER — HYDROCODONE BITARTRATE AND ACETAMINOPHEN 10; 325 MG/1; MG/1
1 TABLET ORAL EVERY 8 HOURS PRN
Qty: 60 TABLET | Refills: 0 | Status: SHIPPED | OUTPATIENT
Start: 2017-08-03 | End: 2017-10-09 | Stop reason: DRUGHIGH

## 2017-08-03 RX ORDER — CYCLOBENZAPRINE HCL 10 MG
10 TABLET ORAL 3 TIMES DAILY PRN
Qty: 60 TABLET | Refills: 0 | Status: SHIPPED | OUTPATIENT
Start: 2017-08-03 | End: 2017-08-14

## 2017-08-03 RX ADMIN — FUROSEMIDE 20 MG: 20 TABLET ORAL at 08:08

## 2017-08-03 RX ADMIN — SODIUM CHLORIDE AND POTASSIUM CHLORIDE: 9; 1.49 INJECTION, SOLUTION INTRAVENOUS at 12:08

## 2017-08-03 RX ADMIN — GABAPENTIN 300 MG: 300 CAPSULE ORAL at 05:08

## 2017-08-03 RX ADMIN — INSULIN ASPART 3 UNITS: 100 INJECTION, SOLUTION INTRAVENOUS; SUBCUTANEOUS at 06:08

## 2017-08-03 RX ADMIN — CELECOXIB 200 MG: 100 CAPSULE ORAL at 08:08

## 2017-08-03 RX ADMIN — CYCLOBENZAPRINE HYDROCHLORIDE 10 MG: 10 TABLET, FILM COATED ORAL at 05:08

## 2017-08-03 NOTE — DISCHARGE SUMMARY
[facility]  Neurosurgery  Discharge Summary      Patient Name: Francisco Coppola  MRN: [id number]  Admission Date: 8/2/2017  Hospital Length of Stay: 0 days  Discharge Date and Time: 8/3/2017  9:31 AM  Attending Physician: Zi Root MD   Discharging Provider: Dorota Alcantara PA-C  Primary Care Provider: Laureano Hebert MD     HPI: Francisco Coppola is a 67 y.o. male with worsening right leg pain and difficulty walking.  Risk, benefits, alternatives, and limitation were discussed with the patient. The risks included the nerve root injury that can  cause paralysis, cerebral spinal fluid leak, wound infection, and blood loss. The patient wanted to proceed and the consent was obtained.    Procedure(s) (LRB):  Left L4-5 laminectomy, medial facetectomy, foraminotomy   (Left)  Right L5-S1 laminectomy, medial facetectomy and microdiscectomy (Right)     DATE OF PROCEDURE: 08/02/2017      PREOPERATIVE DIAGNOSES:  1. L4-5 spondylolisthesis with severe spinal stenosis  2. L5-S1 posterolateral disc herniation on the right side  3. Severe right leg pain, L5 and S1 radiculopathy  4. Neurogenic claudication      POSTOPERATIVE DIAGNOSES:  1. L4-5 spondylolisthesis with severe spinal stenosis  2. L5-S1 posterolateral disc herniation on the right side  3. Severe right leg pain, L5 and S1 radiculopathy  4. Neurogenic claudication      NAME OF PROCEDURE:  1. Left posterior minimally invasive access to the L4-5 level and right minimally invasive access to the L5-S1 level.  2. Left L4-5 bilateral laminectomy, medial facetectomy and foraminotomy  3. Right L5-S1 laminotomy and microdiscectomy  4. Fluoroscopy and microscopic assistance        Hospital Course:   Francisco Coppola presented to [facility] on 8/2/2017 for the above stated procedure. he tolerated the procedure well and there were no intra-operative complications. he recovered in PACU and was then transferred to the floor, where his pain was controlled. On  8/3/2017, he was discharged home with pain medication and follow up appointments. Regular diet. Activity as tolerated. At the time of discharge, vital signs were stable, patient was afebrile and neurologically stable. Discharge instructions were given verbally/written to the patient and his family and all of their questions were answered. Patient and family voiced understanding. They were encouraged to call the clinic with any questions they might have prior to the follow up appointments.    Consults: None     Significant Diagnostic Studies: None     Pending Diagnostic Studies:     None        Final Active Diagnoses:    Diagnosis Date Noted POA    PRINCIPAL PROBLEM:  Lumbar stenosis with neurogenic claudication [M48.06] 08/02/2017 Yes      Problems Resolved During this Admission:    Diagnosis Date Noted Date Resolved POA      Discharged Condition: good    Disposition: Home or Self Care    Follow Up:  Follow-up Information     Dorota Alcantara PA-C On 8/16/2017.    Specialty:  Neurosurgery  Contact information:  [address] [id number] [phone]                 Patient Instructions:     Diet general     Activity as tolerated     Shower on day dressing removed (No bath)     Call MD for:  temperature >100.4     Call MD for:  persistent nausea and vomiting or diarrhea     Call MD for:  severe uncontrolled pain     Call MD for:  redness, tenderness, or signs of infection (pain, swelling, redness, odor or green/yellow discharge around incision site)     Call MD for:  difficulty breathing or increased cough     Call MD for:  severe persistent headache     Call MD for:  worsening rash     Call MD for:  persistent dizziness, light-headedness, or visual disturbances     Call MD for:  increased confusion or weakness     No dressing needed       Medications:  Reconciled Home Medications:   Discharge Medication List as of 8/3/2017  8:41 AM      CONTINUE these medications which have CHANGED     Details   cyclobenzaprine (FLEXERIL) 10 MG tablet Take 1 tablet (10 mg total) by mouth 3 (three) times daily as needed for Muscle spasms., Starting Thu 8/3/2017, Until Sun 8/13/2017, Print      hydrocodone-acetaminophen 10-325mg (NORCO)  mg Tab Take 1 tablet by mouth every 8 (eight) hours as needed for Pain., Starting Thu 8/3/2017, Print         CONTINUE these medications which have NOT CHANGED    Details   blood sugar diagnostic Strp 1 each by Misc.(Non-Drug; Combo Route) route 2 (two) times daily., Starting 3/29/2016, Until Discontinued, Normal      furosemide (LASIX) 20 MG tablet Take 1 tablet (20 mg total) by mouth once daily., Starting Wed 6/14/2017, Print      gabapentin (NEURONTIN) 300 MG capsule Take 1 capsule (300 mg total) by mouth every evening., Starting 5/16/2017, Until Discontinued, Normal      glimepiride (AMARYL) 4 MG tablet TAKE 1 TABLET (4 MG TOTAL) BY MOUTH 2 (TWO) TIMES DAILY   WITH MEALS., Normal      JANUVIA 100 mg Tab TAKE 1 TABLET (100 MG TOTAL)  BY MOUTH ONCE DAILY., Normal      lancets (ONETOUCH ULTRASOFT LANCETS) Misc 1 Units by Misc.(Non-Drug; Combo Route) route 2 (two) times daily., Starting 3/29/2016, Until Discontinued, Normal      lisinopril-hydrochlorothiazide (PRINZIDE,ZESTORETIC) 20-25 mg Tab TAKE ONE TABLET BY MOUTH EVERY DAY, Normal      metformin (GLUCOPHAGE) 500 MG tablet Starting Tue 5/30/2017, Historical Med      pravastatin (PRAVACHOL) 40 MG tablet TAKE 1 TABLET (40 MG TOTAL) BY MOUTH NIGHTLY., Normal         STOP taking these medications       naproxen (NAPROSYN) 250 MG tablet Comments:   Reason for Stopping:               Dorota Alcantara PA-C  Neurosurgery [facility]

## 2017-08-03 NOTE — NURSING
Dr. Root notified of patient complaint of numbness to Left hand.  Dr. Root states he is not concerned at the moment, that it is probably due to his positioning during surgery, and it should clear up in a few hours.  Will continue to monitor.

## 2017-08-03 NOTE — PLAN OF CARE
Problem: Patient Care Overview  Goal: Plan of Care Review  SpO2   96% on  1 lpm NC. No apparent distress noted. Will continue to monitor.

## 2017-08-03 NOTE — NURSING
Patient states left numbness to fingers is resolving. Will continue to monitor pt. Throughout shift.

## 2017-08-03 NOTE — PROGRESS NOTES
Patient discharged per MD orders. Patient and family verbalized understanding of discharge instructions. PIV discontinued per MD orders. Catheter tip intact and pressure dressing applied. Patient wheeled down by transport to main lobby

## 2017-08-03 NOTE — DISCHARGE INSTRUCTIONS
Patient Information  -No driving while taking narcotic pain medications  -Do not take any OTC products containing acetaminophen at the same time as you take your narcotic pain medication. Medications that may contain acetaminophen include but are not limited to: Excedrin and other headache medications, arthritis medications, cold and sinus medications, etc. Please review the list of active ingredients on any OTC medication prior to taking it.  -Do not take any Aspirin or Aspirin containing products for 2 weeks  -Do not take any Aleeve, Naprosyn, Naproxen, Ibuprofen, Advil or any other NSAID for 6 weeks.   -Do not consume any alcoholic beverages until released by your Neurosurgeon  -Do not perform any excessive bending over or leaning forward as this is a fall hazard.  -Do not perform any heavy lifting or lifting more than 10 lbs from the ground level as this is a fall hazard.    Contact the Neurosurgery Office immediately if:  -If you begin to notice any neurologic changes such as:           -Sudden onset of lethargy or sleepiness           -Sudden confusion, trouble speaking, or understanding            -Sudden trouble seeing in one or both eyes            -Sudden trouble walking, dizziness, loss of coordination            -Sudden severe headache with no known cause            -Sudden onset of numbness or weakness     Wound Care:  Remove bandage on the 2nd day after surgery, then keep your incision open to air. There are white tape strips called steri strips under your bandage. These will fall off on their own. Do not remove them. You may shower on Day 2. Have the stream of water hit you opposite from the incision. Do not scrub the incision. Pat the incision dry after your shower. You cannot take a bath/swim/submerge the incision until 8 weeks after surgery.    Call your doctor or go to the Emergency Room for any signs of infection including: increased redness, drainage, pain or fever (temperature greater than or  equal to 101.4).       Miscellaneous:  -Follow up with Neurosurgery in 2 weeks for wound check  -Remain active with walking and other light activities daily.  No heavy lifting, no extreme bending or twisting.  Limit upright sitting to 15 minutes per hour.           Neurosurgery Office:   [address], [address]  [place] [address]  Telephone [phone]

## 2017-08-03 NOTE — PLAN OF CARE
"Problem: Patient Care Overview  Goal: Plan of Care Review  Patient aaox4, post-op day 1.  Complains of little to no pain at this time, however he does report some numbness to the Left hand.  States he can feel touch but it is with a "numb" sensation.  Oncoming nurse, Aurea Wang RN will contact [facility] Rosa for Dr. Root.  Otherwise patient is eating well and seems comfortable.  Will continue to monitor.      "

## 2017-08-03 NOTE — PROGRESS NOTES
[facility]  Neurosurgery  Progress Note    Subjective:     History of Present Illness: This is a 67-year-old male with worsening right leg pain and difficulty walking.  Risk, benefits, alternatives, and limitation were discussed with the patient. The risks included the nerve root injury that can  cause paralysis, cerebral spinal fluid leak, wound infection, and blood loss. The patient wanted to proceed and the consent was obtained.    Post-Op Info:  Procedure(s) (LRB):  Left L4-5 laminectomy, medial facetectomy, foraminotomy   (Left)  Right L5-S1 laminectomy, medial facetectomy and microdiscectomy (Right)   1 Day Post-Op      Interval History: POD#1 s/p L4-5 lami and L5-S1 lami and microdiscectomy. NAEON. Patient reports resolution of his leg pain and notes he now has feeling in the soles of his feet. He is requesting to be discharged. He has ambulated in his room without complication. Tolerating diet and voiding appropriately.     Medications:  Continuous Infusions:   0/9% NACL & POTASSIUM CHLORIDE 20 MEQ/L 100 mL/hr at 08/03/17 0050     Scheduled Meds:   acetaminophen  1,000 mg Oral Q8H    celecoxib  200 mg Oral BID    cyclobenzaprine  10 mg Oral TID    furosemide  20 mg Oral Daily    gabapentin  300 mg Oral TID    heparin (porcine)  5,000 Units Subcutaneous Q12H    lisinopril-hydrochlorothiazide  1 tablet Oral Daily    pravastatin  40 mg Oral QHS     PRN Meds:aluminum-magnesium hydroxide-simethicone, dextrose 50%, dextrose 50%, fentaNYL, glucagon (human recombinant), glucose, glucose, insulin aspart, ondansetron, oxycodone, senna-docusate 8.6-50 mg, sodium chloride 0.9%     Review of Systems   Musculoskeletal: Negative for back pain.        Negative for leg pain      Objective:     Weight: 116.6 kg (256 lb 15.1 oz)  Body mass index is 39.07 kg/m².  Vital Signs (Most Recent):  Temp: 97.6 °F (36.4 °C) (08/03/17 0450)  Pulse: 89 (08/03/17 0450)  Resp: 20 (08/03/17 0450)  BP: (!) 140/84  (08/03/17 0450)  SpO2: 96 % (08/03/17 0324) Vital Signs (24h Range):  Temp:  [97.2 °F (36.2 °C)-97.8 °F (36.6 °C)] 97.6 °F (36.4 °C)  Pulse:  [] 89  Resp:  [14-23] 20  SpO2:  [90 %-98 %] 96 %  BP: (103-172)/(53-85) 140/84                           Physical Exam:    Constitutional: He appears well-developed and well-nourished. No distress.     Eyes: EOM are normal.     Psych/Behavior: He is alert. He is oriented to person, place, and time.     Musculoskeletal:   Moving all extremities equally with good tone and strength.      Wound: steri strips intact. Clean, dry, intact    Significant Labs:  No results for input(s): GLU, NA, K, CL, CO2, BUN, CREATININE, CALCIUM, MG in the last 48 hours.  No results for input(s): WBC, HGB, HCT, PLT in the last 48 hours.  No results for input(s): LABPT, INR, APTT in the last 48 hours.  Microbiology Results (last 7 days)     ** No results found for the last 168 hours. **          Significant Diagnostics:  No imaging     Assessment/Plan:     Active Diagnoses:    Diagnosis Date Noted POA    Lumbar stenosis with neurogenic claudication [M48.06] 08/02/2017 Yes      Problems Resolved During this Admission:    Diagnosis Date Noted Date Resolved POA       -Neurologically intact  -Pain controlled  -Discharge home today     Dorota Alcantara PA-C  Neurosurgery  [facility]

## 2017-08-03 NOTE — PLAN OF CARE
Pt to d/c today with  Family, no HH or DME needs.      Future Appointments  Date Time Provider Department Center   8/14/2017 10:20 AM Laureano Hebert MD [facility] FAM MED [place] [facility]   8/14/2017 11:15 AM [facility] US1 [facility] USOUNDO [place] [facility]   8/16/2017 3:00 PM Dorota Alcantara PA-C [facility] NEUROSU [place] [facility]   8/21/2017 3:30 PM Tejas Mayberry OD [facility] OPTOMTY [place]   9/11/2017 11:00 AM Dorota Alcantara PA-C [facility] NEUROSU [place] [facility]           08/03/17 1058   Final Note   Assessment Type Discharge Planning Assessment   Discharge Disposition Home   Discharge planning education complete? Yes   What phone number can be called within the next 1-3 days to see how you are doing after discharge? [phone]   Hospital Follow Up  Appt(s) scheduled? Yes   Discharge plans and expectations educations in teach back method with documentation complete? Yes   Offered [facility] Delivery? Yes   Discharge/Hospital Encounter Summary to ([facility]) PCP Yes   Referral to Outpatient Case Management complete? n/a   Referral to / orders for Home Health Complete? n/a   30 day supply of medicines given at discharge, if documented non-compliance / non-adherence? n/a   Any social issues identified prior to discharge? n/a   Did you assess the readiness or willingness of the family or caregiver to support self management of care? Yes   Right Care Referral Info   Post Acute Recommendation No Care

## 2017-08-04 NOTE — PT/OT/SLP PROGRESS
Physical Therapy      Francisco Martinsuresh  MRN: [id number]    Patient not seen today secondary to pt was d/c'd home prior to answering consult.    Milagros Crawford, PT

## 2017-08-14 ENCOUNTER — HOSPITAL ENCOUNTER (OUTPATIENT)
Dept: RADIOLOGY | Facility: HOSPITAL | Age: 67
Discharge: HOME | End: 2017-08-14
Attending: NURSE PRACTITIONER
Payer: MEDICARE

## 2017-08-14 ENCOUNTER — OFFICE VISIT (OUTPATIENT)
Dept: FAMILY MEDICINE | Facility: CLINIC | Age: 67
End: 2017-08-14
Payer: MEDICARE

## 2017-08-14 VITALS
DIASTOLIC BLOOD PRESSURE: 72 MMHG | HEIGHT: 69 IN | OXYGEN SATURATION: 96 % | WEIGHT: 254 LBS | HEART RATE: 107 BPM | BODY MASS INDEX: 37.62 KG/M2 | SYSTOLIC BLOOD PRESSURE: 112 MMHG

## 2017-08-14 DIAGNOSIS — E78.5 HYPERLIPIDEMIA ASSOCIATED WITH TYPE 2 DIABETES MELLITUS: ICD-10-CM

## 2017-08-14 DIAGNOSIS — E11.69 HYPERLIPIDEMIA ASSOCIATED WITH TYPE 2 DIABETES MELLITUS: ICD-10-CM

## 2017-08-14 DIAGNOSIS — E11.59 HYPERTENSION ASSOCIATED WITH DIABETES: ICD-10-CM

## 2017-08-14 DIAGNOSIS — I15.2 HYPERTENSION ASSOCIATED WITH DIABETES: ICD-10-CM

## 2017-08-14 DIAGNOSIS — F17.200 TOBACCO DEPENDENCE: ICD-10-CM

## 2017-08-14 DIAGNOSIS — Z13.6 ENCOUNTER FOR ABDOMINAL AORTIC ANEURYSM SCREENING: ICD-10-CM

## 2017-08-14 PROCEDURE — 99499 UNLISTED E&M SERVICE: CPT | Mod: S$GLB,,, | Performed by: FAMILY MEDICINE

## 2017-08-14 PROCEDURE — 99999 PR PBB SHADOW E&M-EST. PATIENT-LVL III: CPT | Mod: PBBFAC,,, | Performed by: FAMILY MEDICINE

## 2017-08-14 PROCEDURE — 4010F ACE/ARB THERAPY RXD/TAKEN: CPT | Mod: S$GLB,,, | Performed by: FAMILY MEDICINE

## 2017-08-14 PROCEDURE — 99214 OFFICE O/P EST MOD 30 MIN: CPT | Mod: S$GLB,,, | Performed by: FAMILY MEDICINE

## 2017-08-14 PROCEDURE — 1159F MED LIST DOCD IN RCRD: CPT | Mod: S$GLB,,, | Performed by: FAMILY MEDICINE

## 2017-08-14 PROCEDURE — 1125F AMNT PAIN NOTED PAIN PRSNT: CPT | Mod: S$GLB,,, | Performed by: FAMILY MEDICINE

## 2017-08-14 PROCEDURE — 3008F BODY MASS INDEX DOCD: CPT | Mod: S$GLB,,, | Performed by: FAMILY MEDICINE

## 2017-08-14 PROCEDURE — 3045F PR MOST RECENT HEMOGLOBIN A1C LEVEL 7.0-9.0%: CPT | Mod: S$GLB,,, | Performed by: FAMILY MEDICINE

## 2017-08-14 PROCEDURE — 3074F SYST BP LT 130 MM HG: CPT | Mod: S$GLB,,, | Performed by: FAMILY MEDICINE

## 2017-08-14 PROCEDURE — 3078F DIAST BP <80 MM HG: CPT | Mod: S$GLB,,, | Performed by: FAMILY MEDICINE

## 2017-08-14 NOTE — PROGRESS NOTES
(Portions of this note were dictated using voice recognition software and may contain dictation related errors in spelling/grammar/syntax not found on text review)    CC:   Chief Complaint   Patient presents with    Follow-up       HPI: 67 y.o. male     Diabetes on metformin 1000 mg twice a day, Amaryl 4 mg twice a day, Januvia 100 mg daily.  A1c had decreased to 7.7 from 8.6 prior    Did have GFR decreased at last evaluation.  Had been taking a lot of naproxen because of his spinal stenosis concerns    I had him last for preoperative clearance prior to surgery for his spinal stenosis.  Surgery was performed 8/2/17.  Has appointment with neurosurgery on 8/16/17 for follow-up postop.  Overall doing well.  Pains are doing much better.  On hydrocodone and Flexeril.  Does wake up in the morning with his hands really stiff.  Has been off naproxen as above but was wondering what he can take for this.    Hypertension on lisinopril HCT 20/25 daily    He is additionally on pravastatin 40 mg daily    Past Medical History:   Diagnosis Date    Allergy     DDD (degenerative disc disease), lumbar     Hyperlipidemia     Hypertension     Nicotine abuse     Obesity     Scrotal mass     Type II diabetes mellitus with renal manifestations, uncontrolled     microalbuminuria    Type II or unspecified type diabetes mellitus without mention of complication, not stated as uncontrolled     Ulcer        Past Surgical History:   Procedure Laterality Date    CARPAL TUNNEL RELEASE      CONDYLOMA EXCISION/FULGURATION Bilateral 2014    scrotal    KNEE SURGERY Left     removal of cartilage with no implant    ULNAR NERVE TRANSPOSITION         Family History   Problem Relation Age of Onset    Cancer Mother      Liver    Cataracts Mother     Liver cancer Mother     Arthritis Mother     Cancer Father      Lung met Brain    Lung cancer Father     Brain cancer Father     Cancer Paternal Grandfather     Diabetes Paternal  Grandmother     Cancer Sister     No Known Problems Brother     No Known Problems Daughter        Social History     Social History    Marital status:      Spouse name: N/A    Number of children: N/A    Years of education: N/A     Occupational History    Not on file.     Social History Main Topics    Smoking status: Current Every Day Smoker     Packs/day: 1.00     Years: 55.00     Types: Cigarettes    Smokeless tobacco: Never Used    Alcohol use 2.5 oz/week     5 Standard drinks or equivalent per week      Comment: beers and whiskey weekly    Drug use: No    Sexual activity: Yes     Partners: Female     Other Topics Concern    Not on file     Social History Narrative    No narrative on file     SCREENINGS (males >=65)    Immunizations:  Tetanus: 1/1/2014  Pneumovax: 9/19/2014   Prevnar 7/12/17    Prostate check 2015    Colonoscopy: due (addressed with pt prior visit and he had declined)    She has a screening aortic aneurysm ultrasound scheduled     Lab Results   Component Value Date    WBC 10.48 07/12/2017    HGB 14.1 07/12/2017    HCT 41.0 07/12/2017     07/12/2017    CHOL 162 06/24/2017    TRIG 207 (H) 06/24/2017    HDL 40 06/24/2017    ALT 37 06/24/2017    AST 35 06/24/2017     07/12/2017    K 4.0 07/12/2017     07/12/2017    CREATININE 1.2 07/12/2017    BUN 19 07/12/2017    CO2 25 07/12/2017    TSH 2.569 06/24/2017    PSA 2.8 03/30/2016    INR 1.0 07/12/2017    HGBA1C 7.7 (H) 06/24/2017    LDLCALC 80.6 06/24/2017     07/12/2017     '    ROS:  GENERAL: No fever, chills, fatigability or weight loss.  SKIN: No rashes, no itching.  HEAD: No headaches.  EYES: No visual changes  EARS: No ear pain or changes in hearing.  NOSE: No congestion or rhinorrhea.  MOUTH & THROAT: No hoarseness, change in voice, or sore throat.  NODES: Denies swollen glands.  CHEST: Denies HASTINGS, cyanosis, wheezing, cough and sputum production.  CARDIOVASCULAR: Denies chest pain, PND,  orthopnea.  ABDOMEN: No nausea, vomiting, or changes in bowel function.  URINARY: No flank pain, dysuria or hematuria.  PERIPHERAL VASCULAR: No claudication or cyanosis.  MUSCULOSKELETAL: Above.  NEUROLOGIC: Improved functioning.    Vital signs reviewed  PE:   APPEARANCE: Well nourished, well developed, in no acute distress.    HEAD: Normocephalic, atraumatic.  EYES:  .   Conjunctivae noninjected.  NECK: Supple with no cervical lymphadenopathy.  No carotid bruits.  No thyromegaly  CHEST: Good inspiratory effort. Lungs clear to auscultation with no wheezes or crackles.  CARDIOVASCULAR: Normal S1, S2. No rubs, murmurs, or gallops.  ABDOMEN: Bowel sounds normal. Not distended. Soft. No tenderness or masses. No organomegaly.  EXTREMITIES: Trace pitting edema bilateral lower extremities      IMPRESSION    Encounter Diagnoses   Name Primary?    Type 2 diabetes, uncontrolled, with neuropathy Yes    Hypertension associated with diabetes     Hyperlipidemia associated with type 2 diabetes mellitus     Tobacco dependence          PLAN  Diabetes health maintenance:  -- advise regular and consistent glucose monitoring and medication compliance.  -- advise daily foot checks  -- advise yearly ophthalmologic exams  -- advise adequate dietary and exercise modification  -- advise regular dental visits  -- advise daily low-dose aspirin use if patient is not on other anticoagulants and there are no other contraindications.  -- Medication management: Continue current therapy as above.  Will reassess in 3-4 months.  Hopefully with increased physical activity his glycemic control improved    Hypertension: Stable.  Continue current therapy    Hyperlipidemia: Continue current therapy    He will follow up with neurosurgery this week for postop.  Can resume aspirin when cleared from neurosurgery.    For his hand pains, can take 2 regular strength Tylenol in the morning only.  This should not put him over the except will limit with his  current hydrocodone administration.  Would avoid taking more Tylenol than this however.  I would advise a Jarales from NSAIDs right now just until further documentation that his creatinine is stabilized    Follow-up in the next 3-4 months

## 2017-08-14 NOTE — PATIENT INSTRUCTIONS
You can take 2 regular strength tylenol in the morning if your hands hurt. Don't take any more often than this right now since you are already on the hydrocodone that contains tylenol.

## 2017-08-15 ENCOUNTER — HOSPITAL ENCOUNTER (OUTPATIENT)
Dept: RADIOLOGY | Facility: HOSPITAL | Age: 67
Discharge: HOME | End: 2017-08-15
Attending: NURSE PRACTITIONER
Payer: MEDICARE

## 2017-08-15 PROCEDURE — 76706 US ABDL AORTA SCREEN AAA: CPT | Mod: 26,,, | Performed by: RADIOLOGY

## 2017-08-15 PROCEDURE — 76706 US ABDL AORTA SCREEN AAA: CPT | Mod: TC

## 2017-08-16 ENCOUNTER — OFFICE VISIT (OUTPATIENT)
Dept: NEUROSURGERY | Facility: CLINIC | Age: 67
End: 2017-08-16
Payer: MEDICARE

## 2017-08-16 VITALS
BODY MASS INDEX: 37.91 KG/M2 | HEART RATE: 107 BPM | SYSTOLIC BLOOD PRESSURE: 112 MMHG | DIASTOLIC BLOOD PRESSURE: 65 MMHG | WEIGHT: 253 LBS

## 2017-08-16 DIAGNOSIS — Z98.890 S/P LAMINECTOMY: Primary | ICD-10-CM

## 2017-08-16 DIAGNOSIS — Z98.890 S/P DISCECTOMY: ICD-10-CM

## 2017-08-16 PROCEDURE — 99024 POSTOP FOLLOW-UP VISIT: CPT | Mod: S$GLB,,, | Performed by: PHYSICIAN ASSISTANT

## 2017-08-16 PROCEDURE — 99999 PR PBB SHADOW E&M-EST. PATIENT-LVL III: CPT | Mod: PBBFAC,,, | Performed by: PHYSICIAN ASSISTANT

## 2017-08-16 NOTE — PROGRESS NOTES
[facility] - Neurosurgery  Progress Note      SUBJECTIVE:     Chief Complaint/Reason for Visit: 2 week post op follow up     History of Present Illness:  Francisco Coppola is a 67 y.o. male who is 2 weeks status post L4-5 laminectomy, L5-S1 laminectomy and microdiscectomy for treatment of worsening right leg pain and difficulty walking.  He reports complete resolution on his right leg pain and the burning pain in his right little toe. He states his knees and hips have been bothering him, so he hasn't been walking too much and can't tell if the numbness when walking has improved. He can now lay flat on his back without pain. Today, he is walking with a cane. He states he did a lot of walking [place] [facility] [place] and it aggravated his knees and hips. He has been compliant with his activity restrictions.     Low Back Pain Scale  R Low Back-Pain Score: 4  R Low Back-Pain Intensity: Pain killers give moderate relief from pain  R Low Back-Pain Score: I can look after myself normally but it causes extra pain  Low Back-Lifting: I can only lift very light weights   Low Back-Walking: I can only walk using a cane or crutches   Low Back-Sitting: I can sit in any chair as long as I like   Low Back-Standing: I cannot stand for longer than 30 mins without increasing pain   Low Back-Sleeping: I have no pain in bed   Low Back-Social Life: Pain has restricted my social life and I do not go out very often   Low Back-Traveling: I have no pain when traveling   Low Back-Changing Degree of Pain: My pain fluctuates but is definitely getting better       Review of patient's allergies indicates:   Allergen Reactions    Augmentin [amoxicillin-pot clavulanate] Hives and Rash    Tramadol Nausea Only       OBJECTIVE:     Vital Signs (Most Recent):  Vitals:    08/16/17 1506   BP: 112/65   Pulse: 107         Physical Exam:  General: well developed, well nourished, no distress  Neurologic: Alert and oriented. Thought content  appropriate.   GCS: Motor: 6/Verbal: 5/Eyes: 4 GCS Total: 15   Mental Status: Awake, Alert, Oriented x3   Cranial nerves: face symmetric, tongue midline, pupils equal, round, reactive to light, EOMI.   Motor Strength: moves all extremities with good strength and tone   Sensation: response to light touch throughout  No gait disturbances   Incision is clean, dry and intact with no signs of erythema, swelling or purulent drainage. Dissolvable sutures are intact. All skin edges are completely approximated.       Diagnostic Results:  No imaging     ASSESSMENT/PLAN:     67 y.o. male 2 weeks s/p L4-5 laminectomy, L5-S1 laminectomy and microdiscectomy with resolution of right leg pain     - Follow up in 4 weeks.  - Keep incision open to air.  - Can shower and get incision wet, just pat dry and no vigorous scrubbing. Do not submerge incision for another 6 weeks.   - No lifting more than 10 lbs or excessive bending/twisting.   - Encouraged patient to call if they have any questions or concerns prior to next follow up appt.      Dorota Alcantara PA-C  Neurosurgery

## 2017-08-18 RX ORDER — LISINOPRIL AND HYDROCHLOROTHIAZIDE 20; 25 MG/1; MG/1
TABLET ORAL
Qty: 30 TABLET | Refills: 11 | Status: SHIPPED | OUTPATIENT
Start: 2017-08-18 | End: 2019-08-07 | Stop reason: SDUPTHER

## 2017-08-18 RX ORDER — FUROSEMIDE 20 MG/1
TABLET ORAL
Qty: 30 TABLET | Refills: 11 | Status: SHIPPED | OUTPATIENT
Start: 2017-08-18 | End: 2018-08-31 | Stop reason: SDUPTHER

## 2017-08-22 DIAGNOSIS — Z12.11 COLON CANCER SCREENING: ICD-10-CM

## 2017-09-11 ENCOUNTER — OFFICE VISIT (OUTPATIENT)
Dept: NEUROSURGERY | Facility: CLINIC | Age: 67
End: 2017-09-11
Payer: MEDICARE

## 2017-09-11 VITALS
BODY MASS INDEX: 36.99 KG/M2 | SYSTOLIC BLOOD PRESSURE: 123 MMHG | DIASTOLIC BLOOD PRESSURE: 79 MMHG | WEIGHT: 246.88 LBS | HEART RATE: 110 BPM

## 2017-09-11 DIAGNOSIS — Z98.890 S/P LUMBAR MICRODISCECTOMY: ICD-10-CM

## 2017-09-11 DIAGNOSIS — Z98.890 S/P LUMBAR LAMINECTOMY: Primary | ICD-10-CM

## 2017-09-11 PROCEDURE — 99999 PR PBB SHADOW E&M-EST. PATIENT-LVL III: CPT | Mod: PBBFAC,,, | Performed by: PHYSICIAN ASSISTANT

## 2017-09-11 PROCEDURE — 99024 POSTOP FOLLOW-UP VISIT: CPT | Mod: S$GLB,,, | Performed by: PHYSICIAN ASSISTANT

## 2017-09-11 NOTE — PROGRESS NOTES
[facility] - [facility]surgery  Progress Note      SUBJECTIVE:     Chief Complaint/Reason for Visit: surgery follow up     History of Present Illness:  Francisco Coppola is a 67 y.o. male who is 6 weeks s/p L4-5 laminectomy, L5-S1 laminectomy and microdiscectomy for treatment of worsening right leg pain and difficulty walking. At his 2 week post op appointment, he reported complete resolution of right leg pain and burning pain in the right little toe. Patient is doing well since his last appointment. He continues to be free from leg pain.     Low Back Pain Scale  R Low Back-Pain Score: 0  R Low Back-Pain Intensity: I can tolerate the pain I have without having to use pain killers  R Low Back-Pain Score: I can look after myself normally without causing extra pain  Low Back-Lifting: I can only lift very light weights   Low Back-Walking: Pain does not prevent me from walking any distance   Low Back-Sitting: I can sit in any chair as long as I like   Low Back-Standing: I cannot stand for longer than 30 mins without increasing pain   Low Back-Sleeping: I have pain in bed but it does not prevent me from sleeping well   Low Back-Social Life: My social life is normal but it increases the degree of pain   Low Back-Traveling: I have no pain when traveling   Low Back-Changing Degree of Pain: My pain seems to be getting better but improvement is slow         Review of patient's allergies indicates:   Allergen Reactions    Augmentin [amoxicillin-pot clavulanate] Hives and Rash    Tramadol Nausea Only       Past Medical History:   Diagnosis Date    Allergy     DDD (degenerative disc disease), lumbar     Hyperlipidemia     Hypertension     Nicotine abuse     Obesity     Scrotal mass     Type II diabetes mellitus with renal manifestations, uncontrolled     microalbuminuria    Type II or unspecified type diabetes mellitus without mention of complication, not stated as uncontrolled     Ulcer      Past Surgical History:    Procedure Laterality Date    CARPAL TUNNEL RELEASE      CONDYLOMA EXCISION/FULGURATION Bilateral 2014    scrotal    KNEE SURGERY Left     removal of cartilage with no implant    ULNAR NERVE TRANSPOSITION       Family History   Problem Relation Age of Onset    Cancer Mother      Liver    Cataracts Mother     Liver cancer Mother     Arthritis Mother     Cancer Father      Lung met Brain    Lung cancer Father     Brain cancer Father     Cancer Paternal Grandfather     Diabetes Paternal Grandmother     Cancer Sister     No Known Problems Brother     No Known Problems Daughter      Social History   Substance Use Topics    Smoking status: Current Every Day Smoker     Packs/day: 1.00     Years: 55.00     Types: Cigarettes    Smokeless tobacco: Never Used    Alcohol use 2.5 oz/week     5 Standard drinks or equivalent per week      Comment: beers and whiskey weekly          OBJECTIVE:     Vital Signs (Most Recent):  Pulse: 110 (09/11/17 1101)  BP: 123/79 (09/11/17 1101)    Physical Exam:  General: well developed, well nourished, no distress.   Neurologic: Alert and oriented. Thought content appropriate.  Head: normocephalic, atraumatic  Eyes: EOMI.  Neck: trachea midline, no JVD   Sensory: intact to light touch throughout  Skin: Skin is warm, dry and intact.    Motor Strength: Moves all extremities spontaneously with good tone. No abnormal movements seen.    Iliopsoas Quadriceps Knee  Flexion Tibialis  anterior Gastro- cnemius   R 5/5 5/5 5/5 5/5 5/5   L 5/5 5/5 5/5 5/5 5/5     Gait: normal    Wound: healing well      Diagnostic Results:  N/A    ASSESSMENT/PLAN:     67 y.o. male 6 weeks s/p L4-5 laminectomy, L5-S1 laminectomy and microdiscectomy with resolution of right leg pain     - Follow up in 6 weeks with Dr. Root  - No lifting more than 10 lbs or excessive bending/twisting.   - Encouraged patient to call if they have any questions or concerns prior to next follow up appt.    Dorota Alcantara  ARAVIND  Neurosurgery

## 2017-10-09 ENCOUNTER — OFFICE VISIT (OUTPATIENT)
Dept: NEUROSURGERY | Facility: CLINIC | Age: 67
End: 2017-10-09
Payer: MEDICARE

## 2017-10-09 VITALS
DIASTOLIC BLOOD PRESSURE: 64 MMHG | WEIGHT: 251.31 LBS | BODY MASS INDEX: 37.65 KG/M2 | HEART RATE: 101 BPM | SYSTOLIC BLOOD PRESSURE: 112 MMHG

## 2017-10-09 DIAGNOSIS — M54.59 MECHANICAL LOW BACK PAIN: ICD-10-CM

## 2017-10-09 DIAGNOSIS — Z98.890 S/P LAMINECTOMY: Primary | ICD-10-CM

## 2017-10-09 PROCEDURE — 99999 PR PBB SHADOW E&M-EST. PATIENT-LVL III: CPT | Mod: PBBFAC,,, | Performed by: PHYSICIAN ASSISTANT

## 2017-10-09 PROCEDURE — 99024 POSTOP FOLLOW-UP VISIT: CPT | Mod: S$GLB,,, | Performed by: PHYSICIAN ASSISTANT

## 2017-10-09 RX ORDER — CELECOXIB 200 MG/1
200 CAPSULE ORAL 2 TIMES DAILY
Qty: 60 CAPSULE | Refills: 1 | Status: SHIPPED | OUTPATIENT
Start: 2017-10-09 | End: 2018-05-10

## 2017-10-09 RX ORDER — HYDROCODONE BITARTRATE AND ACETAMINOPHEN 5; 325 MG/1; MG/1
1 TABLET ORAL EVERY 8 HOURS PRN
Qty: 30 TABLET | Refills: 0 | Status: SHIPPED | OUTPATIENT
Start: 2017-10-09 | End: 2018-05-10

## 2017-10-09 RX ORDER — CYCLOBENZAPRINE HCL 5 MG
5 TABLET ORAL 3 TIMES DAILY PRN
Qty: 30 TABLET | Refills: 0 | Status: SHIPPED | OUTPATIENT
Start: 2017-10-09 | End: 2017-10-19

## 2017-10-09 NOTE — PROGRESS NOTES
[facility] - [facility]surgery  Progress Note      SUBJECTIVE:     Chief Complaint/Reason for Visit: surgery follow up     History of Present Illness:  Francisco Coppola is a 67 y.o. male who is approximately 2 months s/p L4-5 laminectomy, L5-S1 laminectomy and microdiscectomy for treatment of worsening right leg pain and difficulty walking. Patient has been free from his right leg pain. He states his walking has improved and he is now more active. Since increasing his activities around home like vacuuming and mopping, he has been experiencing a low back ache after these activities. The pain does not radiate. He is not interested in physical therapy at this time.     Low Back Pain Scale  R Low Back-Pain Score: 1  R Low Back-Pain Intensity: Pain killers give complete relief from pain  R Low Back-Pain Score: I can look after myself normally without causing extra pain  Low Back-Lifting: I can lift heavy weights but it gives extra pain   Low Back-Walking: Pain prevents me walking more than .25 mile   Low Back-Sitting: I can sit in any chair as long as I like   Low Back-Standing: I cannot stand for longer than 1 hour without increasing pain   Low Back-Sleeping: I have pain in bed but it does not prevent me from sleeping well   Low Back-Social Life: Pain has no significant effect on my social life apart from limiting my more en   Low Back-Traveling: I have some pain when traveling but robin of my usual forms of travel make it any worse   Low Back-Changing Degree of Pain: My pain fluctuates but is definitely getting better             Review of patient's allergies indicates:   Allergen Reactions    Augmentin [amoxicillin-pot clavulanate] Hives and Rash    Tramadol Nausea Only       Past Medical History:   Diagnosis Date    Allergy     DDD (degenerative disc disease), lumbar     Hyperlipidemia     Hypertension     Nicotine abuse     Obesity     Scrotal mass     Type II diabetes mellitus with renal manifestations,  uncontrolled     microalbuminuria    Type II or unspecified type diabetes mellitus without mention of complication, not stated as uncontrolled     Ulcer      Past Surgical History:   Procedure Laterality Date    CARPAL TUNNEL RELEASE      CONDYLOMA EXCISION/FULGURATION Bilateral 2014    scrotal    KNEE SURGERY Left     removal of cartilage with no implant    ULNAR NERVE TRANSPOSITION       Family History   Problem Relation Age of Onset    Cancer Mother      Liver    Cataracts Mother     Liver cancer Mother     Arthritis Mother     Cancer Father      Lung met Brain    Lung cancer Father     Brain cancer Father     Cancer Paternal Grandfather     Diabetes Paternal Grandmother     Cancer Sister     No Known Problems Brother     No Known Problems Daughter      Social History   Substance Use Topics    Smoking status: Current Every Day Smoker     Packs/day: 1.00     Years: 55.00     Types: Cigarettes    Smokeless tobacco: Never Used    Alcohol use 2.5 oz/week     5 Standard drinks or equivalent per week      Comment: beers and whiskey weekly          OBJECTIVE:     Vital Signs (Most Recent):  Pulse: 101 (10/09/17 1107)  BP: 112/64 (10/09/17 1107)    Physical Exam:  General: well developed, well nourished, no distress.   Neurologic: Alert and oriented. Thought content appropriate.  Head: normocephalic, atraumatic  Eyes: EOMI.  Neck: trachea midline, no JVD   Cardiovascular: no LE edema  Pulmonary: normal respirations, no signs of respiratory distress  Skin: Skin is warm, dry and intact.  Motor Strength: Moves all extremities spontaneously with good tone.  5/5 BLE. No abnormal movements seen.   Gait: normal    Wound has healed     Diagnostic Results:  N/A    ASSESSMENT/PLAN:     67 y.o. male 2 months s/p L4-5 laminectomy, L5-S1 laminectomy and microdiscectomy with resolution of right leg pain. Low back pain without radiation with increased activity     - Follow up in 4-6 weeks with Dr. Root  - Lumbar  brace as needed during physical activity  - He is not interested in physical therapy   - Discussed exercise and weight loss  - Refill Norco 5/325, Flexerl 5, start Celebrex 200 mg BID   - Encouraged patient to call if they have any questions or concerns prior to next follow up appt.       Dorota Alcantara PA-C  Neurosurgery

## 2018-01-02 RX ORDER — GLIMEPIRIDE 4 MG/1
TABLET ORAL
Qty: 60 TABLET | Refills: 3 | Status: SHIPPED | OUTPATIENT
Start: 2018-01-02 | End: 2018-05-26 | Stop reason: SDUPTHER

## 2018-01-08 DIAGNOSIS — E11.9 TYPE 2 DIABETES MELLITUS WITHOUT COMPLICATION: ICD-10-CM

## 2018-02-18 RX ORDER — PRAVASTATIN SODIUM 40 MG/1
TABLET ORAL
Qty: 30 TABLET | Refills: 8 | Status: SHIPPED | OUTPATIENT
Start: 2018-02-18 | End: 2018-12-09 | Stop reason: SDUPTHER

## 2018-03-12 RX ORDER — METFORMIN HYDROCHLORIDE 500 MG/1
TABLET, EXTENDED RELEASE ORAL
Qty: 120 TABLET | Refills: 9 | Status: SHIPPED | OUTPATIENT
Start: 2018-03-12 | End: 2018-05-10 | Stop reason: SDUPTHER

## 2018-04-12 ENCOUNTER — PES CALL (OUTPATIENT)
Dept: ADMINISTRATIVE | Facility: CLINIC | Age: 68
End: 2018-04-12

## 2018-04-13 DIAGNOSIS — Z13.5 DIABETIC RETINOPATHY SCREENING: ICD-10-CM

## 2018-05-10 ENCOUNTER — OFFICE VISIT (OUTPATIENT)
Dept: FAMILY MEDICINE | Facility: CLINIC | Age: 68
End: 2018-05-10
Payer: MEDICARE

## 2018-05-10 VITALS
BODY MASS INDEX: 37.55 KG/M2 | TEMPERATURE: 98 F | SYSTOLIC BLOOD PRESSURE: 112 MMHG | OXYGEN SATURATION: 97 % | DIASTOLIC BLOOD PRESSURE: 74 MMHG | WEIGHT: 253.5 LBS | HEART RATE: 94 BPM | HEIGHT: 69 IN

## 2018-05-10 DIAGNOSIS — I15.2 HYPERTENSION ASSOCIATED WITH DIABETES: ICD-10-CM

## 2018-05-10 DIAGNOSIS — E11.22 TYPE 2 DIABETES MELLITUS WITH STAGE 3 CHRONIC KIDNEY DISEASE, WITHOUT LONG-TERM CURRENT USE OF INSULIN: ICD-10-CM

## 2018-05-10 DIAGNOSIS — E66.01 SEVERE OBESITY (BMI 35.0-39.9) WITH COMORBIDITY: ICD-10-CM

## 2018-05-10 DIAGNOSIS — M47.819 ARTHRITIS OF FACET JOINTS AT MULTIPLE VERTEBRAL LEVELS: ICD-10-CM

## 2018-05-10 DIAGNOSIS — F17.200 TOBACCO DEPENDENCE: ICD-10-CM

## 2018-05-10 DIAGNOSIS — E11.42 DIABETIC POLYNEUROPATHY ASSOCIATED WITH TYPE 2 DIABETES MELLITUS: ICD-10-CM

## 2018-05-10 DIAGNOSIS — Z12.5 PROSTATE CANCER SCREENING: ICD-10-CM

## 2018-05-10 DIAGNOSIS — E11.69 HYPERLIPIDEMIA ASSOCIATED WITH TYPE 2 DIABETES MELLITUS: ICD-10-CM

## 2018-05-10 DIAGNOSIS — E78.5 HYPERLIPIDEMIA ASSOCIATED WITH TYPE 2 DIABETES MELLITUS: ICD-10-CM

## 2018-05-10 DIAGNOSIS — Z12.11 COLON CANCER SCREENING: ICD-10-CM

## 2018-05-10 DIAGNOSIS — E11.59 HYPERTENSION ASSOCIATED WITH DIABETES: ICD-10-CM

## 2018-05-10 DIAGNOSIS — J84.10 CALCIFIED GRANULOMA OF LUNG: ICD-10-CM

## 2018-05-10 DIAGNOSIS — N18.30 TYPE 2 DIABETES MELLITUS WITH STAGE 3 CHRONIC KIDNEY DISEASE, WITHOUT LONG-TERM CURRENT USE OF INSULIN: ICD-10-CM

## 2018-05-10 DIAGNOSIS — Z00.00 ENCOUNTER FOR PREVENTIVE HEALTH EXAMINATION: Primary | ICD-10-CM

## 2018-05-10 PROCEDURE — 99499 UNLISTED E&M SERVICE: CPT | Mod: S$GLB,,, | Performed by: NURSE PRACTITIONER

## 2018-05-10 PROCEDURE — 3074F SYST BP LT 130 MM HG: CPT | Mod: CPTII,S$GLB,, | Performed by: NURSE PRACTITIONER

## 2018-05-10 PROCEDURE — 3045F PR MOST RECENT HEMOGLOBIN A1C LEVEL 7.0-9.0%: CPT | Mod: CPTII,S$GLB,, | Performed by: NURSE PRACTITIONER

## 2018-05-10 PROCEDURE — 99999 PR PBB SHADOW E&M-EST. PATIENT-LVL V: CPT | Mod: PBBFAC,,, | Performed by: NURSE PRACTITIONER

## 2018-05-10 PROCEDURE — 3078F DIAST BP <80 MM HG: CPT | Mod: CPTII,S$GLB,, | Performed by: NURSE PRACTITIONER

## 2018-05-10 PROCEDURE — G0439 PPPS, SUBSEQ VISIT: HCPCS | Mod: S$GLB,,, | Performed by: NURSE PRACTITIONER

## 2018-05-10 RX ORDER — METFORMIN HYDROCHLORIDE 500 MG/1
1000 TABLET, EXTENDED RELEASE ORAL 2 TIMES DAILY WITH MEALS
COMMUNITY
End: 2019-03-11 | Stop reason: SDUPTHER

## 2018-05-10 RX ORDER — ASPIRIN 81 MG/1
81 TABLET ORAL DAILY
COMMUNITY

## 2018-05-10 NOTE — Clinical Note
Primary Care Providers: Laureano Hebert MD, MD (General)  Your patient was seen today for a HRA visit. Gap(s) in care (HEDIS gaps) have been identified during this visit that require additional testing and possible follow up.  Orders Placed This Encounter     Fecal Immunochemical Test (iFOBT)         Standing Status: Future         Standing Expiration Date: 7/9/2019     CBC auto differential         Standing Status: Future         Standing Expiration Date: 7/9/2019     Comprehensive metabolic panel         Standing Status: Future         Standing Expiration Date: 7/9/2019     Lipid panel         Standing Status: Future         Standing Expiration Date: 7/9/2019     PSA, Screening         Standing Status: Future         Standing Expiration Date: 7/9/2019     TSH         Standing Status: Future         Standing Expiration Date: 7/9/2019     Ambulatory referral to Smoking Cessation Program         Referral Priority:Routine         Referral Type:Consultation         Referral Reason:

## 2018-05-10 NOTE — PATIENT INSTRUCTIONS
Counseling and Referral of Other Preventative  (Italic type indicates deductible and co-insurance are waived)    Patient Name: Francisco Coppola  Today's Date: 5/11/2018    Health Maintenance       Date Due Completion Date    Colonoscopy 02/12/1999 ---    Eye Exam 08/16/2014 8/16/2013    Hemoglobin A1c 12/24/2017 6/24/2017    Foot Exam 06/14/2018 6/14/2017    Lipid Panel 06/24/2018 6/24/2017    Influenza Vaccine 08/01/2018 11/23/2015    Low Dose Statin 05/10/2019 5/10/2018    TETANUS VACCINE 01/01/2024 1/1/2014        Orders Placed This Encounter   Procedures    Fecal Immunochemical Test (iFOBT)    CBC auto differential    Comprehensive metabolic panel    Lipid panel    PSA, Screening    TSH    Ambulatory referral to Smoking Cessation Program    Ambulatory referral to Optometry     The following information is provided to all patients.  This information is to help you find resources for any of the problems found today that may be affecting your health:                Living healthy guide: www.[facility].[place].[facility]      Understanding Diabetes: www.diabetes.org      Eating healthy: www.cdc.gov/healthyweight      CDC home safety checklist: www.cdc.gov/steadi/patient.html      Agency on Aging: www.goea.[place].[facility]      Alcoholics anonymous (AA): www.aa.org      Physical Activity: www.kash.nih.gov/fs2xmlo      Tobacco use: www.quitwithusla.org

## 2018-05-11 PROBLEM — E11.42 DIABETIC POLYNEUROPATHY ASSOCIATED WITH TYPE 2 DIABETES MELLITUS: Status: ACTIVE | Noted: 2018-05-11

## 2018-05-11 NOTE — PROGRESS NOTES
"Francisco Coppola presented for a  Medicare AWV and comprehensive Health Risk Assessment today. The following components were reviewed and updated:    · Medical history  · Family History  · Social history  · Allergies and Current Medications  · Health Risk Assessment  · Health Maintenance  · Care Team     ** See Completed Assessments for Annual Wellness Visit within the encounter summary.**       The following assessments were completed:  · Living Situation  · CAGE  · Depression Screening  · Timed Get Up and Go  · Whisper Test  · Cognitive Function Screening  · Nutrition Screening  · ADL Screening  · PAQ Screening    Vitals:    05/10/18 1409   BP: 112/74   BP Location: Right arm   Patient Position: Sitting   Pulse: 94   Temp: 98 °F (36.7 °C)   TempSrc: Oral   SpO2: 97%   Weight: 115 kg (253 lb 8.5 oz)   Height: 5' 8.5" (1.74 m)     Body mass index is 37.99 kg/m².     Physical Exam   Constitutional: He is oriented to person, place, and time. He appears well-developed. No distress.   obese   HENT:   Head: Normocephalic and atraumatic.   Eyes: EOM are normal. Pupils are equal, round, and reactive to light.   Neck: Neck supple. No JVD present. No tracheal deviation present.   Cardiovascular: Normal rate, regular rhythm, normal heart sounds and intact distal pulses.    No murmur heard.  Pulmonary/Chest: Effort normal and breath sounds normal. No respiratory distress. He has no wheezes. He has no rales.   Abdominal: Soft. Bowel sounds are normal. He exhibits no distension and no mass. There is no tenderness.   Musculoskeletal: Normal range of motion. He exhibits no edema or tenderness.   Neurological: He is alert and oriented to person, place, and time. Coordination normal.   Skin: Skin is warm and dry. No erythema. No pallor.   Psychiatric: He has a normal mood and affect. His behavior is normal. Judgment and thought content normal. Cognition and memory are normal. He expresses no homicidal and no suicidal ideation. "   Nursing note and vitals reviewed.        Diagnoses and health risks identified today and associated recommendations/orders:    1. Encounter for preventive health examination    2. Type 2 diabetes mellitus with stage 3 chronic kidney disease, without long-term current use of insulin  Chronic; stable on medication.  Followed by PCP.  - Ambulatory referral to Optometry    3. Diabetic polyneuropathy associated with type 2 diabetes mellitus  Chronic; stable on medication.  Followed by PCP.    4. Hypertension associated with diabetes  Chronic; stable on medication.  Followed by PCP.  - CBC auto differential; Future  - Comprehensive metabolic panel; Future  - TSH; Future    5. Hyperlipidemia associated with type 2 diabetes mellitus  Chronic; stable on medication.  Followed by PCP.  - Lipid panel; Future    6. Calcified granuloma of lung  Chronic; stable.  Followed by PCP.    7. Arthritis of facet joints at multiple vertebral levels  Chronic; stable.  Followed by PCP.    8. Severe obesity (BMI 35.0-39.9) with comorbidity  Chronic, stable. Therapeutic lifestyle changes discussed. Followed by PCP.    9. Tobacco dependence  - Ambulatory referral to Smoking Cessation Program    10. Colon cancer screening  - Fecal Immunochemical Test (iFOBT); Future    11. Prostate cancer screening  - PSA, Screening; Future      Provided Francisco with a 5-10 year written screening schedule and personal prevention plan. Recommendations were developed using the USPSTF age appropriate recommendations. Education, counseling, and referrals were provided as needed. After Visit Summary printed and given to patient which includes a list of additional screenings\tests needed.    Follow-up in 3 months (on 8/21/2018) for annual visit with PCP, Annual Wellness Visit in 1 year.    Virgie Castillo NP

## 2018-05-23 ENCOUNTER — OFFICE VISIT (OUTPATIENT)
Dept: OPTOMETRY | Facility: CLINIC | Age: 68
End: 2018-05-23
Payer: COMMERCIAL

## 2018-05-23 DIAGNOSIS — E11.9 TYPE 2 DIABETES MELLITUS WITHOUT RETINOPATHY: Primary | ICD-10-CM

## 2018-05-23 DIAGNOSIS — Z13.5 GLAUCOMA SCREENING: ICD-10-CM

## 2018-05-23 DIAGNOSIS — H25.13 NUCLEAR SCLEROSIS, BILATERAL: ICD-10-CM

## 2018-05-23 DIAGNOSIS — H52.4 PRESBYOPIA: ICD-10-CM

## 2018-05-23 PROCEDURE — 99999 PR PBB SHADOW E&M-EST. PATIENT-LVL II: CPT | Mod: PBBFAC,,, | Performed by: OPTOMETRIST

## 2018-05-23 PROCEDURE — 92004 COMPRE OPH EXAM NEW PT 1/>: CPT | Mod: S$GLB,,, | Performed by: OPTOMETRIST

## 2018-05-23 PROCEDURE — 99499 UNLISTED E&M SERVICE: CPT | Mod: S$GLB,,, | Performed by: OPTOMETRIST

## 2018-05-23 PROCEDURE — 92015 DETERMINE REFRACTIVE STATE: CPT | Mod: S$GLB,,, | Performed by: OPTOMETRIST

## 2018-05-23 NOTE — PROGRESS NOTES
HPI     DLS: 8/16/2013 with Dr. Reyez  Pt states does not need glasses for far distance or television. Pt states   he only wear glasses when reading. States he use to need glasses all the   time. Occasional floaters   Denies flashes of light   spex 4 yrs old  No gtts     DM   CAT OU     Did not check BS this morning   Hemoglobin A1C       Date                     Value               Ref Range             Status                06/24/2017               7.7 (H)             4.0 - 5.6 %           Final                 03/30/2016               8.6 (H)             4.5 - 6.2 %           Final                 11/18/2015               7.5 (H)             4.5 - 6.2 %           Final            ----------      Last edited by Harpreet Powell, OD on 5/23/2018  2:24 PM. (History)        ROS     Positive for: Endocrine (DM)    Negative for: Constitutional, Gastrointestinal, Neurological, Skin,   Genitourinary, Musculoskeletal, HENT, Cardiovascular, Eyes, Respiratory,   Psychiatric, Allergic/Imm, Heme/Lymph    Last edited by Harpreet Powell, OD on 5/23/2018  2:24 PM. (History)        Assessment /Plan     For exam results, see Encounter Report.    Type 2 diabetes mellitus without retinopathy    Nuclear sclerosis, bilateral    Glaucoma screening    Presbyopia      1. Cat OU--pt wishes new Rx  2. DM- WITHOUT RETINOPATHY.  Advised yearly DFE    PLAN:    rtc 1 yr

## 2018-05-23 NOTE — LETTER
May 23, 2018      Virgie Castillo NP  [address] [place] [address]           [facility]  [address] [facility]  [place] [address]  Phone: [phone]  Fax: [phone]          Patient: Francisco Coppola   MR Number: [id number]   YOB: 1950   Date of Visit: 5/23/2018       Dear Virgie Castillo:    Thank you for referring Francisco Coppola to me for evaluation. Attached you will find relevant portions of my assessment and plan of care.    If you have questions, please do not hesitate to call me. I look forward to following Francisco Coppola along with you.    Sincerely,    Harpreet Powell, OD    Enclosure  CC:  No Recipients    If you would like to receive this communication electronically, please contact [email or web address] or [phone] to request more information on [facility] Link access.    For providers and/or their staff who would like to refer a patient to [facility], please contact us through our one-stop-shop provider referral line, Kale Wang, at [phone].    If you feel you have received this communication in error or would no longer like to receive these types of communications, please e-mail [email or web address]

## 2018-05-28 RX ORDER — GABAPENTIN 300 MG/1
300 CAPSULE ORAL NIGHTLY
Qty: 30 CAPSULE | Refills: 11 | Status: SHIPPED | OUTPATIENT
Start: 2018-05-28 | End: 2019-06-24 | Stop reason: SDUPTHER

## 2018-05-28 RX ORDER — GLIMEPIRIDE 4 MG/1
TABLET ORAL
Qty: 60 TABLET | Refills: 11 | Status: SHIPPED | OUTPATIENT
Start: 2018-05-28 | End: 2019-06-26 | Stop reason: SDUPTHER

## 2018-06-10 RX ORDER — SITAGLIPTIN 100 MG/1
TABLET, FILM COATED ORAL
Qty: 30 TABLET | Refills: 9 | Status: SHIPPED | OUTPATIENT
Start: 2018-06-10 | End: 2018-08-21

## 2018-08-16 ENCOUNTER — LAB VISIT (OUTPATIENT)
Dept: LAB | Facility: HOSPITAL | Age: 68
End: 2018-08-16
Attending: FAMILY MEDICINE
Payer: MEDICARE

## 2018-08-16 DIAGNOSIS — E11.69 HYPERLIPIDEMIA ASSOCIATED WITH TYPE 2 DIABETES MELLITUS: ICD-10-CM

## 2018-08-16 DIAGNOSIS — E78.5 HYPERLIPIDEMIA ASSOCIATED WITH TYPE 2 DIABETES MELLITUS: ICD-10-CM

## 2018-08-16 DIAGNOSIS — E11.59 HYPERTENSION ASSOCIATED WITH DIABETES: ICD-10-CM

## 2018-08-16 DIAGNOSIS — Z12.5 PROSTATE CANCER SCREENING: ICD-10-CM

## 2018-08-16 DIAGNOSIS — E11.9 TYPE 2 DIABETES MELLITUS WITHOUT COMPLICATION: ICD-10-CM

## 2018-08-16 DIAGNOSIS — I15.2 HYPERTENSION ASSOCIATED WITH DIABETES: ICD-10-CM

## 2018-08-16 LAB
ALBUMIN SERPL BCP-MCNC: 3.6 G/DL
ALP SERPL-CCNC: 72 U/L
ALT SERPL W/O P-5'-P-CCNC: 50 U/L
ANION GAP SERPL CALC-SCNC: 10 MMOL/L
AST SERPL-CCNC: 47 U/L
BASOPHILS # BLD AUTO: 0.01 K/UL
BASOPHILS NFR BLD: 0.1 %
BILIRUB SERPL-MCNC: 0.7 MG/DL
BUN SERPL-MCNC: 26 MG/DL
CALCIUM SERPL-MCNC: 10.2 MG/DL
CHLORIDE SERPL-SCNC: 97 MMOL/L
CHOLEST SERPL-MCNC: 156 MG/DL
CHOLEST/HDLC SERPL: 4.2 {RATIO}
CO2 SERPL-SCNC: 30 MMOL/L
COMPLEXED PSA SERPL-MCNC: 4 NG/ML
CREAT SERPL-MCNC: 1.4 MG/DL
DIFFERENTIAL METHOD: NORMAL
EOSINOPHIL # BLD AUTO: 0.1 K/UL
EOSINOPHIL NFR BLD: 0.7 %
ERYTHROCYTE [DISTWIDTH] IN BLOOD BY AUTOMATED COUNT: 13.4 %
EST. GFR  (AFRICAN AMERICAN): 59 ML/MIN/1.73 M^2
EST. GFR  (NON AFRICAN AMERICAN): 51 ML/MIN/1.73 M^2
ESTIMATED AVG GLUCOSE: 243 MG/DL
GLUCOSE SERPL-MCNC: 263 MG/DL
HBA1C MFR BLD HPLC: 10.1 %
HCT VFR BLD AUTO: 45.5 %
HDLC SERPL-MCNC: 37 MG/DL
HDLC SERPL: 23.7 %
HGB BLD-MCNC: 15.1 G/DL
LDLC SERPL CALC-MCNC: 71 MG/DL
LYMPHOCYTES # BLD AUTO: 2.7 K/UL
LYMPHOCYTES NFR BLD: 28.4 %
MCH RBC QN AUTO: 30.9 PG
MCHC RBC AUTO-ENTMCNC: 33.2 G/DL
MCV RBC AUTO: 93 FL
MONOCYTES # BLD AUTO: 0.9 K/UL
MONOCYTES NFR BLD: 8.9 %
NEUTROPHILS # BLD AUTO: 5.9 K/UL
NEUTROPHILS NFR BLD: 61.9 %
NONHDLC SERPL-MCNC: 119 MG/DL
PLATELET # BLD AUTO: 249 K/UL
PMV BLD AUTO: 11.6 FL
POTASSIUM SERPL-SCNC: 4.4 MMOL/L
PROT SERPL-MCNC: 7.4 G/DL
RBC # BLD AUTO: 4.88 M/UL
SODIUM SERPL-SCNC: 137 MMOL/L
TRIGL SERPL-MCNC: 240 MG/DL
TSH SERPL DL<=0.005 MIU/L-ACNC: 1.71 UIU/ML
WBC # BLD AUTO: 9.61 K/UL

## 2018-08-16 PROCEDURE — 85025 COMPLETE CBC W/AUTO DIFF WBC: CPT

## 2018-08-16 PROCEDURE — 83036 HEMOGLOBIN GLYCOSYLATED A1C: CPT

## 2018-08-16 PROCEDURE — 84153 ASSAY OF PSA TOTAL: CPT

## 2018-08-16 PROCEDURE — 80053 COMPREHEN METABOLIC PANEL: CPT

## 2018-08-16 PROCEDURE — 84443 ASSAY THYROID STIM HORMONE: CPT

## 2018-08-16 PROCEDURE — 36415 COLL VENOUS BLD VENIPUNCTURE: CPT

## 2018-08-16 PROCEDURE — 80061 LIPID PANEL: CPT

## 2018-08-21 ENCOUNTER — OFFICE VISIT (OUTPATIENT)
Dept: FAMILY MEDICINE | Facility: CLINIC | Age: 68
End: 2018-08-21
Payer: MEDICARE

## 2018-08-21 VITALS
HEIGHT: 69 IN | HEART RATE: 112 BPM | DIASTOLIC BLOOD PRESSURE: 77 MMHG | SYSTOLIC BLOOD PRESSURE: 129 MMHG | OXYGEN SATURATION: 97 % | TEMPERATURE: 98 F | WEIGHT: 253.31 LBS | BODY MASS INDEX: 37.52 KG/M2

## 2018-08-21 DIAGNOSIS — E11.69 HYPERLIPIDEMIA ASSOCIATED WITH TYPE 2 DIABETES MELLITUS: ICD-10-CM

## 2018-08-21 DIAGNOSIS — N18.30 TYPE 2 DIABETES MELLITUS WITH STAGE 3 CHRONIC KIDNEY DISEASE, WITHOUT LONG-TERM CURRENT USE OF INSULIN: Primary | ICD-10-CM

## 2018-08-21 DIAGNOSIS — N18.30 CKD (CHRONIC KIDNEY DISEASE), STAGE III: ICD-10-CM

## 2018-08-21 DIAGNOSIS — R25.2 LEG CRAMPS: ICD-10-CM

## 2018-08-21 DIAGNOSIS — M54.2 CERVICALGIA: ICD-10-CM

## 2018-08-21 DIAGNOSIS — E11.42 DIABETIC POLYNEUROPATHY ASSOCIATED WITH TYPE 2 DIABETES MELLITUS: ICD-10-CM

## 2018-08-21 DIAGNOSIS — E11.22 TYPE 2 DIABETES MELLITUS WITH STAGE 3 CHRONIC KIDNEY DISEASE, WITHOUT LONG-TERM CURRENT USE OF INSULIN: Primary | ICD-10-CM

## 2018-08-21 DIAGNOSIS — E66.01 SEVERE OBESITY (BMI 35.0-39.9) WITH COMORBIDITY: ICD-10-CM

## 2018-08-21 DIAGNOSIS — E11.59 HYPERTENSION ASSOCIATED WITH DIABETES: ICD-10-CM

## 2018-08-21 DIAGNOSIS — E78.5 HYPERLIPIDEMIA ASSOCIATED WITH TYPE 2 DIABETES MELLITUS: ICD-10-CM

## 2018-08-21 DIAGNOSIS — I15.2 HYPERTENSION ASSOCIATED WITH DIABETES: ICD-10-CM

## 2018-08-21 PROCEDURE — 99999 PR PBB SHADOW E&M-EST. PATIENT-LVL III: CPT | Mod: PBBFAC,,, | Performed by: FAMILY MEDICINE

## 2018-08-21 PROCEDURE — 3078F DIAST BP <80 MM HG: CPT | Mod: CPTII,S$GLB,, | Performed by: FAMILY MEDICINE

## 2018-08-21 PROCEDURE — 99499 UNLISTED E&M SERVICE: CPT | Mod: S$GLB,,, | Performed by: FAMILY MEDICINE

## 2018-08-21 PROCEDURE — 99215 OFFICE O/P EST HI 40 MIN: CPT | Mod: S$GLB,,, | Performed by: FAMILY MEDICINE

## 2018-08-21 PROCEDURE — 3046F HEMOGLOBIN A1C LEVEL >9.0%: CPT | Mod: CPTII,S$GLB,, | Performed by: FAMILY MEDICINE

## 2018-08-21 PROCEDURE — 3074F SYST BP LT 130 MM HG: CPT | Mod: CPTII,S$GLB,, | Performed by: FAMILY MEDICINE

## 2018-08-21 RX ORDER — PEN NEEDLE, DIABETIC 30 GX3/16"
NEEDLE, DISPOSABLE MISCELLANEOUS
Qty: 100 EACH | Refills: 11 | Status: SHIPPED | OUTPATIENT
Start: 2018-08-21 | End: 2018-12-10 | Stop reason: SDUPTHER

## 2018-08-21 NOTE — PATIENT INSTRUCTIONS
Nutrition: How to Make Healthier Food Choices     Nutrition: How to Make Healthier Food Choices     Why is healthy eating important?  When combined with exercise, a healthy diet can help you lose weight, lower your cholesterol level and improve the way your body functions on a daily basis.  The U.S. Department of Agricultures (USDA) Food Guide Pyramid divides food into 6 basic food groups, consisting of 1) grains, 2) fruits, 3) vegetables, 4) meats and beans, 5) dairy and 6) fats.  The USDA recommends an adult daily diet include the following:  3 ounces of whole grains, and 6 ounces of grains total   2 cups of fruit   2 1/2 cups of vegetables   3 cups fat-free or low-fat dairy   The following are some ways to make healthier food choices and to get the recommended amounts of whole grains, fruits, vegetables and dairy.    Grains  Whole-grain breads are low in fat; they're also high in fiber and complex carbohydrates, which help you feel jackson longer and prevent overeating. Choose breads whose first ingredient says whole in front of the grain, for example, whole wheat flour or whole white flour; enriched or other types of flour have the important fiber and nutrients removed. Choose whole grain breads for sandwiches and as additions to meals.  Avoid rich bakery foods such as donuts, sweet rolls and muffins. These foods can contain more than 50% fat calories. Snacks such as marshall food cake and gingersnap cookies can satisfy your sweet tooth without adding fat to your diet.  Hot and cold cereals are usually low in fat. But instant cereals with cream may contain high-fat oils or butterfat. Granola cereals may also contain high-fat oils and extra sugars. Look for low-sugar options for both instant and granola cereals.  Avoid fried snacks such as potato chips and tortilla chips. Try the low-fat or baked versions instead.  Instead of this: Try this:   Croissants, biscuits, white breads and rolls Low-fat whole grain  "breads and rolls (wheat, rye and pumpernickel)   Doughnuts, pastries and scones English muffins and small whole grain bagels   Fried tortillas Soft tortillas (corn or whole wheat)   Sugar cereals and regular granola Oatmeal, low-fat granola and whole-grain cereal   Snack crackers Crackers (animal, fady, rye, soda, saltine, oyster)   Potato or corn chips and buttered popcorn Pretzels (unsalted) and popcorn (unbuttered)   White pasta Whole-wheat pasta   White rice Brown rice   Fried rice, or pasta and rice mixes that contain high-fat sauces Rice or pasta (without egg yolk) with vegetable sauces   All-purpose white flour 100% whole-wheat flour     Fruits and Vegetables  Fruits and vegetables are naturally low in fat. They add flavor and variety to your diet. They also contain fiber, vitamins and minerals.  Margarine, butter, mayonnaise and sour cream add fat to vegetables and fruits. Try using nonfat or low-fat versions of these foods. You can also use nonfat or low-fat yogurt or herbs as seasonings instead.  Instead of this: Try this:   Fried vegetables or vegetables served with cream, cheese or butter sauces All vegetables raw, steamed, broiled, baked or tossed with a very small amount of olive oil and salt and pepper   Coconut Fruit (fresh)   French fries, hash browns and potato   chips Baked white or sweet potatoes     Meat, Poultry and Fish  Beef, Pork, Veal and Lamb  Baking, broiling and roasting are the healthiest ways to prepare meat. Lean cuts can be pan-broiled or stir-fried. Use either a nonstick pan or nonstick spray coating instead of butter or margarine.  Trim outside fat before cooking. Trim any inside, separable fat before eating. Select low-fat, lean cuts of meat. Lean beef and veal cuts have the word "loin" or "round" in their names. Lean pork cuts have the word "loin" or "leg" in their names.  Use herbs, spices, fresh vegetables and nonfat marinades to season meat. Avoid high-fat sauces and " "gravies.  Poultry  Baking, broiling and roasting are the healthiest ways to prepare poultry. Skinless poultry can be pan-broiled or stir-fried. Use either a nonstick pan or nonstick spray coating instead of butter or margarine.  Remove skin and visible fat before cooking. Chicken breasts are a good choice because they are low in fat and high in protein. Use domestic goose and duck only once in a while because both are high in fat.  Fish  Poaching, steaming, baking and broiling are the healthiest ways to prepare fish. Fresh fish should have a clear color, a moist look, a clean smell and firm, springy flesh. If good-quality fresh fish isn't available, buy frozen fish.  Most seafood is high in healthy polyunsaturated fat. Omega-3 fatty acids are also found in some fatty fish, such as salmon and cold-water trout. They may help lower the risk of heart disease in some people.  Cross-over Foods  Dry beans, peas and lentils offer protein and fiber without the cholesterol and fat of meats. Once in a while, try substituting beans for meat in a favorite recipe, such as lasagna or chili.  TVP, or textured vegetable protein, is widely available in many foods. Vegetarian "hot dogs," "hamburger" and "chicken nuggets" are low-fat, cholesterol-free alternatives to meat.  Instead of this: Try this:   Regular or breaded fish sticks or cakes, fish canned in oil, seafood prepared with butter or served in high-fat sauce Fish (fresh, frozen, canned in water), low-fat fish sticks or cakes and shellfish (such as shrimp)   Prime and marbled cuts Select-grade lean beef (round, sirloin and loin)   Pork spare ribs and clark Lean pork (tenderloin and loin chop) and turkey clark   Regular ground beef Lean or extra-lean ground beef, ground chicken and turkey breast   Lunch meats such as pepperoni, salami, bologna and liverwurst Lean lunch meats such as turkey, chicken and ham   Regular hot dogs or sausage Fat-free hot dogs and turkey dogs "     Dairy  Choose skim milk or low-fat buttermilk. Substitute evaporated skim milk for cream in recipes for soups, sauces and coffee.  Try low-fat cheeses. Skim ricotta can replace cream cheese on a bagel or in a vegetable dip. Use part-skim cheeses in recipes. Use 1% cottage cheese for salads and cooking. String cheese is a low-fat, high-calcium snack option.  Plain nonfat yogurt can replace sour cream in many recipes. (To maintain texture, stir 1 tablespoon of cornstarch into each cup of yogurt that you use in cooking.) Try mixing frozen nonfat or low-fat yogurt with fruit for dessert.  Skim sherbet is an alternative to ice cream. Soft-serve and regular ice creams are also lower in fat than premium styles.  Instead of this: Try this:   Whole or 2% milk Non-fat or 1% milk   Evaporated milk Evaporated non-fat milk   Regular buttermilk Buttermilk made from non-fat (or 1%) milk   Yogurt made with whole milk Nonfat or low-fat yogurt   Regular cheese (examples: American, blue, Brie, cheddar, López and Parmesan) Low-fat cheese with less than 3 grams of fat per serving (example: natural cheese, processed cheese and nondairy cheese such as soy cheese)   Regular cottage cheese Low-fat, nonfat, and dry-curd cottage cheese with less than 2% fat   Regular cream cheese Low-fat cream cheese (no more than 3 grams of fat per ounce)   Regular ice cream Sorbet, sherbet and nonfat or low-fat ice cream (no more than 3 grams of fat per 1/2 cup serving)     Fats, Oils and Sweets  Eating too many high-fat foods not only adds excess calories (which can lead to obesity and weight gain), but can increase your risk factor for several diseases. Heart disease, diabetes, certain types of cancer and osteoarthritis have all been linked to diets too high in fat. If you consume too much saturated and trans fats, you are more likely to develop high cholesterol and coronary artery disease.  Sugar-sweetened drinks, such as fruit juice, fruit drinks,  regular soft drinks, sports drinks, energy drinks, sweetened or flavored milk and sweetened iced tea can add lots of sugar and calories to your diet. But staying hydrated is important for good health. Substitute water, zero-calorie flavored water, non-fat or reduced-fat milk, unsweetened tea or diet soda for sweetened drinks. Talk with your family doctor or a dietitian if you have questions about your diet or healthy eating for your family.  Instead of this: Try this:   Cookies Fig bars, gingersnaps and molasses cookies   Shortening, butter or margarine Olive, soybean and canola oils   Regular mayonnaise Nonfat or light mayonnaise   Regular salad dressing Nonfat or light salad dressing   Using fat (including butter) to grease pan Nonstick cooking spray           Calf Strain Rehabilitation Exercises    You can begin gently stretching your calf muscle using the towel stretch right away. Make sure you only get a gentle pull and not a sharp pain while you are doing this stretch.     Towel stretch: Sit on a hard surface with one leg stretched out in front of you. Loop a towel around your toes and the ball of your foot and pull the towel toward your body keeping your knee straight. Hold this position for 15 to 30 seconds then relax. Repeat 3 times.     After you can do the towel stretch easily, you can start the standing calf stretch.   Standing calf stretch: Facing a wall, put your hands against the wall at about eye level. Keep one leg back with the heel on the floor, and the other leg forward. Turn your back foot slightly inward (as if you were pigeon-toed) as you slowly lean into the wall until you feel a stretch in the back of your calf. Hold for 15 to 30 seconds. Repeat 3 times and then switch the position of your legs and repeat the exercise 3 times. Do this exercise several times each day.     After a couple days of stretching, you can begin strengthening your calf and lower leg muscles using elastic tubing as  "described in the next exercise.   Resisted ankle plantar flexion: Sit with your leg outstretched and loop the middle section of the tubing around the ball of your foot. Hold the ends of the tubing in both hands. Gently press the ball of your foot down and point your toes, stretching the tubing. Return to the starting position. Do 3 sets of 10.     You may do the last 4 exercises when you can stand on your toes without pain.   Heel raise: Balance yourself while standing behind a chair or counter. Using the chair to help you, raise your body up onto your toes and hold for 5 seconds. Then slowly lower yourself down without holding onto the chair. Hold onto the chair or counter if you need to. When this exercise becomes less painful, try lowering on one leg only. Repeat 10 times. Do 3 sets of 10.   You can challenge yourself by standing only on your injured leg and lifting your heel off the ground.   Single leg balance: Stand without any support and attempt to balance on one leg. Begin with your eyes open and then try to perform the exercise with your eyes closed. Hold the single-leg position for 30 seconds. Repeat 3 times. When you have mastered this, try doing this exercise standing on a pillow.   Nose touch: Stand on one leg facing a wall. Stand 4 inches from the wall. Keep your body and leg straight. Slowly lean forward, trying to touch your nose to the wall. Make sure you do not bend forward at your waist. Do 3 sets or 10.   Wall jump: Face a wall and place a piece of masking tape about 2 feet above your head. Jump up with your arms above your head and try to touch the piece of tape. Make sure you do a "spring" type of motion and do not land hard onto your feet. Progress to taking off and landing on one foot. Do 3 sets of 10.   Another good exercise is hopping. You can start at one end of the room and try to hop as high as you can across the room on one foot. Jumping rope is also a good exercise.   Written by Sarah " Suellen, MS, PT, and Tiffany Bright, PT, DHSc, OCS, for [employer].   Published by [employer].  © 2009 [employer] and/or its affiliates. All Rights Reserved.         Shoulder Girdle Stretch    To start, sit in a chair with your feet flat on the floor. Your weight should be slightly forward so that youre balanced evenly on your buttocks. Relax your shoulders and keep your head level. Using a chair with arms may help you keep your balance:  · Place 1 hand on the outside elbow of the other arm.  · Pull the arm across your body. Hold for 30 to 60 seconds. Repeat once.  · Switch sides.  For your safety, check with your healthcare provider before starting an exercise program.   Date Last Reviewed: 8/16/2015  © 8693-1337 [employer]. [address] [place] [address]. All rights reserved. This information is not intended as a substitute for professional medical care. Always follow your healthcare professional's instructions.        Shoulder Clock Exercise    To start, stand tall with your ears, shoulders, and hips in line. Your feet should be slightly apart, positioned just under your hips. Focus your eyes directly in front of you.  this position for a few seconds before starting your exercise. This helps increase your awareness of proper posture.  · Imagine that your right shoulder is the center of a clock. With the outer point of your shoulder, roll it around to slowly trace the outer edge of the clock.  · Move clockwise first, then counterclockwise.  · Repeat 3 to 5 times. Switch shoulders.   Date Last Reviewed: 10/2/2015  © 1714-0056 [employer]. [address] [place] [address]. All rights reserved. This information is not intended as a substitute for professional medical care. Always follow your healthcare professional's instructions.        Shoulder Squeeze Exercise    To start, sit in a chair with your feet flat on the floor. Shift your weight slightly forward  to avoid rounding your back. Relax. Keep your ears, shoulders, and hips aligned:  · Raise your arms to shoulder height, elbows bent and palms forward.  · Move your arms back, squeezing your shoulder blades together.  · Hold for 10 seconds. Return to starting position.   · Repeat 5 times.   For your safety, check with your healthcare provider before starting an exercise program.   Date Last Reviewed: 8/16/2015  © 8445-3598 [employer]. [address] [place] [address]. All rights reserved. This information is not intended as a substitute for professional medical care. Always follow your healthcare professional's instructions.        Shoulder Shrug Exercise    To start, sit in a chair with your feet flat on the floor. Shift your weight slightly forward to avoid rounding your back. Relax. Keep your ears, shoulders, and hips aligned:  · Raise both of your shoulders as high as you can, as if you were trying to touch them to your ears. Keep your head and neck still and relaxed.  · Hold for a count of 10. Release.  · Repeat 5 times.  For your safety, check with your healthcare provider before starting an exercise program.   Date Last Reviewed: 8/16/2015 © 2000-2017 [employer]. [address] [place] [address]. All rights reserved. This information is not intended as a substitute for professional medical care. Always follow your healthcare professional's instructions.        Neck Clock (Flexibility)    1. Lie on your back on the floor, with your knees bent and your feet flat on the floor. Place a rolled-up towel or neck roll under your neck.  2. Close your eyes and imagine a clock face. With your nose, slowly trace the outer edge of the clock in a clockwise direction. Move your neck smoothly. Dont force your head or neck.  3. Repeat 5 times, or as instructed.  4. Then switch to a counterclockwise direction, and repeat the exercise 5 times, or as instructed.     Challenge  "yourself  You can also do this exercise while sitting at your work desk. Sit up straight with your back supported firmly against your chair. You can do the exercise several times throughout your day.   Date Last Reviewed: 3/10/2016  © 4048-3359 [employer]. [address] [place] [address]. All rights reserved. This information is not intended as a substitute for professional medical care. Always follow your healthcare professional's instructions.        Neck Exercises: Neck Flex  To start, sit in a chair with your feet flat on the floor. Your weight should be slightly forward so that youre balanced evenly on your buttocks. Relax your shoulders and keep your head level. Avoid arching your back or rounding your shoulders. Using a chair with arms may help you keep your balance:  · Rest the back of your left hand against your lower back. Place your right palm on the top of your head.  · Gently pull your head forward and down until you feel a stretch in the muscles in the back of your neck. Dont force the motion.  · Hold for 20 seconds, then return to starting position. Switch arms.    Date Last Reviewed: 10/2/2015  © 0590-1046 [employer]. [address] [place] [address]. All rights reserved. This information is not intended as a substitute for professional medical care. Always follow your healthcare professional's instructions.        Over the counter pain therapies (creams/patch):    1. Lidocaine patch    2. aspercreme    3. "2 old goats"    4. "blue emu"    5. salonpas     7. biofreeze      "

## 2018-08-21 NOTE — PROGRESS NOTES
(Portions of this note were dictated using voice recognition software and may contain dictation related errors in spelling/grammar/syntax not found on text review)    CC:   Chief Complaint   Patient presents with    Annual Exam       HPI: 68 y.o. male last visit 1 year ago    Diabetes on metformin 1000 mg twice a day, Amaryl 4 mg twice a day, Januvia 100 mg daily.  A1c had decreased to 7.7 from 8.6 prior but now shot back up to 10.1.  Complicating factor include CKD 3, GFR recently at 51.  Has remarked on taking lots of NSAIDs on prior evaluations secondary to back pain from spinal stenosis, had surgical intervention done 2017.  Compliant with therapy.  Does admit that his diet is probably not great.  His daughter has moved in with them with her 3 kids, and is sometimes can be difficult to eat healthfully.  Not much exercise     Hypertension on lisinopril HCT 20/25 daily     HLD on pravastatin 40 mg daily     Mildly elevated LFTs on recent check as below    Problems with neck pain on left side, has been there for over a year.  No direct trauma.  Hurts more when he tries to sleep at night.  Does not do any topical or systemic therapies at this time.  No radiating down the arm or any paresthesias associated    Problems with leg cramps at nighttime.  As above, not very physically active.  Drinks plenty of water    Problems with ED.  Was wondering what there is available to treat this.    Past Medical History:   Diagnosis Date    Allergy     DDD (degenerative disc disease), lumbar     Hyperlipidemia     Hypertension     Nicotine abuse     Obesity     Scrotal mass     Type II diabetes mellitus with renal manifestations, uncontrolled     microalbuminuria    Type II or unspecified type diabetes mellitus without mention of complication, not stated as uncontrolled     Ulcer        Past Surgical History:   Procedure Laterality Date    CARPAL TUNNEL RELEASE      CONDYLOMA EXCISION/FULGURATION Bilateral 2014     scrotal    KNEE SURGERY Left     removal of cartilage with no implant    ULNAR NERVE TRANSPOSITION         Family History   Problem Relation Age of Onset    Cancer Mother         Liver    Cataracts Mother     Liver cancer Mother     Arthritis Mother     Cancer Father         Lung met Brain    Lung cancer Father     Brain cancer Father     Cancer Paternal Grandfather     Diabetes Paternal Grandmother     Cancer Sister     No Known Problems Brother     No Known Problems Daughter     Amblyopia Neg Hx     Blindness Neg Hx     Glaucoma Neg Hx     Macular degeneration Neg Hx     Retinal detachment Neg Hx     Strabismus Neg Hx        Social History     Socioeconomic History    Marital status:      Spouse name: Not on file    Number of children: Not on file    Years of education: Not on file    Highest education level: Not on file   Social Needs    Financial resource strain: Not on file    Food insecurity - worry: Not on file    Food insecurity - inability: Not on file    Transportation needs - medical: Not on file    Transportation needs - non-medical: Not on file   Occupational History    Not on file   Tobacco Use    Smoking status: Current Every Day Smoker     Packs/day: 1.00     Years: 55.00     Pack years: 55.00     Types: Cigarettes    Smokeless tobacco: Never Used   Substance and Sexual Activity    Alcohol use: Yes     Alcohol/week: 4.8 oz     Types: 2 Cans of beer, 1 Shots of liquor, 5 Standard drinks or equivalent per week    Drug use: No    Sexual activity: Yes     Partners: Female   Other Topics Concern    Not on file   Social History Narrative    Not on file       Lab Results   Component Value Date    WBC 9.61 08/16/2018    HGB 15.1 08/16/2018    HCT 45.5 08/16/2018     08/16/2018    CHOL 156 08/16/2018    TRIG 240 (H) 08/16/2018    HDL 37 (L) 08/16/2018    ALT 50 (H) 08/16/2018    AST 47 (H) 08/16/2018     08/16/2018    K 4.4 08/16/2018    CL 97  08/16/2018    CREATININE 1.4 08/16/2018    CALCIUM 10.2 08/16/2018    BUN 26 (H) 08/16/2018    CO2 30 (H) 08/16/2018    TSH 1.706 08/16/2018    PSA 4.0 08/16/2018    INR 1.0 07/12/2017    HGBA1C 10.1 (H) 08/16/2018    LDLCALC 71.0 08/16/2018     (H) 08/16/2018       Hemoglobin A1C   Date Value Ref Range Status   08/16/2018 10.1 (H) 4.0 - 5.6 % Final     Comment:     ADA Screening Guidelines:  5.7-6.4%  Consistent with prediabetes  >or=6.5%  Consistent with diabetes  High levels of fetal hemoglobin interfere with the HbA1C  assay. Heterozygous hemoglobin variants (HbS, HgC, etc)do  not significantly interfere with this assay.   However, presence of multiple variants may affect accuracy.     06/24/2017 7.7 (H) 4.0 - 5.6 % Final     Comment:     According to ADA guidelines, hemoglobin A1c <7.0% represents  optimal control in non-pregnant diabetic patients. Different  metrics may apply to specific patient populations.   Standards of Medical Care in Diabetes-2016.  For the purpose of screening for the presence of diabetes:  <5.7%     Consistent with the absence of diabetes  5.7-6.4%  Consistent with increasing risk for diabetes   (prediabetes)  >or=6.5%  Consistent with diabetes  Currently, no consensus exists for use of hemoglobin A1c  for diagnosis of diabetes for children.  This Hemoglobin A1c assay has significant interference with fetal   hemoglobin   (HbF). The results are invalid for patients with abnormal amounts of   HbF,   including those with known Hereditary Persistence   of Fetal Hemoglobin. Heterozygous hemoglobin variants (HbAS, HbAC,   HbAD, HbAE, HbA2) do not significantly interfere with this assay;   however, presence of multiple variants in a sample may impact the %   interference.     03/30/2016 8.6 (H) 4.5 - 6.2 % Final           ROS:  GENERAL: No fever, chills, fatigability or weight loss.  SKIN: No rashes, no itching.  HEAD: No headaches.  EYES: No visual changes  EARS: No ear pain or  changes in hearing.  NOSE: No congestion or rhinorrhea.  MOUTH & THROAT: No hoarseness, change in voice, or sore throat.  NODES: Denies swollen glands.  CHEST: Denies HASTINGS, cyanosis, wheezing, cough and sputum production.  CARDIOVASCULAR: Denies chest pain, PND, orthopnea.  ABDOMEN: No nausea, vomiting, or changes in bowel function.  URINARY: No flank pain, dysuria or hematuria.  PERIPHERAL VASCULAR: No claudication or cyanosis.  MUSCULOSKELETAL:  Above.  NEUROLOGIC:  Does get numbness in his feet    Vital signs reviewed  PE:   APPEARANCE: Well nourished, well developed, in no acute distress.    HEAD: Normocephalic, atraumatic.  EYES: PERRL. EOMI.   Conjunctivae noninjected.  EARS: TM's intact. Light reflex normal. No retraction or perforation  NOSE: Mucosa pink. Airway clear.  MOUTH & THROAT: No tonsillar enlargement. No pharyngeal erythema or exudate.   NECK: Supple with no cervical lymphadenopathy.  No carotid bruits.  No thyromegaly    CHEST: Good inspiratory effort. Lungs clear to auscultation with no wheezes or crackles.  CARDIOVASCULAR: Normal S1, S2. No rubs, murmurs, or gallops.  ABDOMEN: Bowel sounds normal. Not distended. Soft. No tenderness or masses. No organomegaly.  EXTREMITIES:  1+ pitting edema bilateral lower extremities.  Does have a healing ulceration anterior pretibial region of the left, likely venous stasis ulcer.  Patient states that he will get this recurrently.  DIABETIC FOOT EXAM: Protective Sensation (w/ 10 gram monofilament):  Right: Decreased  Left: Decreased    Visual Inspection:  Does have some slight bleeding right middle toenail from cutting it over grass of [place].  Bilateral onychomycosis    Pedal Pulses:   Right: Present  Left: Present    Posterior tibialis:   Right:Present  Left: Present              IMPRESSION  1. Type 2 diabetes mellitus with stage 3 chronic kidney disease, without long-term current use of insulin    2. Diabetic polyneuropathy associated with type 2 diabetes  mellitus    3. Hypertension associated with diabetes    4. Hyperlipidemia associated with type 2 diabetes mellitus    5. Severe obesity (BMI 35.0-39.9) with comorbidity    6. CKD (chronic kidney disease), stage III    7. Leg cramps    8. Cervicalgia            PLAN  Diabetes health maintenance:  -- advise regular and consistent glucose monitoring and medication compliance.  -- advise daily foot checks  -- advise yearly ophthalmologic exams  -- advise adequate dietary and exercise modification  -- advise regular dental visits  -- advise daily low-dose aspirin use if patient is not on other anticoagulants and there are no other contraindications.  -- Medication management:  Continue metformin 1000 mg b.i.d. extended release.  Continue glimepiride 4 mg b.i.d..  Stop Januvia.  Start Victoza 0.6 mg daily.  May need to increase in 1 month depending on glycemic control    Chronic kidney disease stage 3:  And emphasize proper glycemic control and blood pressure control    Blood pressure stable at this time.  Continue current therapy    Neuropathy:  On gabapentin, emphasize proper glycemic control    Leg cramps:  Pre bedtime stretching exercises provided.  Adequate hydration advised    Neck pain, relating to left trapezius distribution.  Advise neck exercises, provided.  Topical pain therapies also written out.  Avoid NSAIDs secondary to renal function    E.d.:  Discussed trial of sildenafil.  He states that he will call back when he is ready to try this.  Emphasize proper glycemic control    Counseled about proper diet and exercise regimen      45 min visit >50% counseling        SCREENINGS (males >=65)     Immunizations:  Tetanus: 1/1/2014  Pneumovax: 9/19/2014   Prevnar 7/12/17     Prostate:  PSA normal as above     Colonoscopy: due (addressed with pt prior visit and he had declined).  Fit kit ordered.  Patient has not yet completed this.    AAA screen 2017, normal

## 2018-08-30 ENCOUNTER — PATIENT MESSAGE (OUTPATIENT)
Dept: FAMILY MEDICINE | Facility: CLINIC | Age: 68
End: 2018-08-30

## 2018-08-31 RX ORDER — LISINOPRIL AND HYDROCHLOROTHIAZIDE 20; 25 MG/1; MG/1
TABLET ORAL
Qty: 30 TABLET | Refills: 10 | Status: SHIPPED | OUTPATIENT
Start: 2018-08-31 | End: 2019-05-13 | Stop reason: SDUPTHER

## 2018-08-31 RX ORDER — FUROSEMIDE 20 MG/1
TABLET ORAL
Qty: 30 TABLET | Refills: 10 | Status: SHIPPED | OUTPATIENT
Start: 2018-08-31 | End: 2019-08-07 | Stop reason: SDUPTHER

## 2018-09-04 RX ORDER — SILDENAFIL CITRATE 20 MG/1
TABLET ORAL
Qty: 30 TABLET | Refills: 11 | Status: SHIPPED | OUTPATIENT
Start: 2018-09-04 | End: 2019-11-11 | Stop reason: SDUPTHER

## 2018-09-04 NOTE — TELEPHONE ENCOUNTER
Ok I sent the generic sildenafil to the [facility] in [place] on the 1st floor of our building.   Laureano Hebert MD

## 2018-11-20 ENCOUNTER — OFFICE VISIT (OUTPATIENT)
Dept: FAMILY MEDICINE | Facility: CLINIC | Age: 68
End: 2018-11-20
Payer: MEDICARE

## 2018-11-20 VITALS
WEIGHT: 265.88 LBS | BODY MASS INDEX: 39.38 KG/M2 | DIASTOLIC BLOOD PRESSURE: 76 MMHG | OXYGEN SATURATION: 98 % | TEMPERATURE: 99 F | SYSTOLIC BLOOD PRESSURE: 130 MMHG | HEART RATE: 95 BPM | HEIGHT: 69 IN

## 2018-11-20 DIAGNOSIS — E11.42 DIABETIC POLYNEUROPATHY ASSOCIATED WITH TYPE 2 DIABETES MELLITUS: ICD-10-CM

## 2018-11-20 DIAGNOSIS — I15.2 HYPERTENSION ASSOCIATED WITH DIABETES: ICD-10-CM

## 2018-11-20 DIAGNOSIS — E11.69 HYPERLIPIDEMIA ASSOCIATED WITH TYPE 2 DIABETES MELLITUS: ICD-10-CM

## 2018-11-20 DIAGNOSIS — R05.9 COUGH: ICD-10-CM

## 2018-11-20 DIAGNOSIS — E66.01 SEVERE OBESITY (BMI 35.0-39.9) WITH COMORBIDITY: ICD-10-CM

## 2018-11-20 DIAGNOSIS — F17.200 TOBACCO DEPENDENCE: ICD-10-CM

## 2018-11-20 DIAGNOSIS — E11.22 TYPE 2 DIABETES MELLITUS WITH STAGE 3 CHRONIC KIDNEY DISEASE, WITHOUT LONG-TERM CURRENT USE OF INSULIN: Primary | ICD-10-CM

## 2018-11-20 DIAGNOSIS — E11.59 HYPERTENSION ASSOCIATED WITH DIABETES: ICD-10-CM

## 2018-11-20 DIAGNOSIS — E78.5 HYPERLIPIDEMIA ASSOCIATED WITH TYPE 2 DIABETES MELLITUS: ICD-10-CM

## 2018-11-20 DIAGNOSIS — N18.30 TYPE 2 DIABETES MELLITUS WITH STAGE 3 CHRONIC KIDNEY DISEASE, WITHOUT LONG-TERM CURRENT USE OF INSULIN: Primary | ICD-10-CM

## 2018-11-20 PROCEDURE — 3075F SYST BP GE 130 - 139MM HG: CPT | Mod: CPTII,HCNC,S$GLB, | Performed by: FAMILY MEDICINE

## 2018-11-20 PROCEDURE — 99214 OFFICE O/P EST MOD 30 MIN: CPT | Mod: 25,HCNC,S$GLB, | Performed by: FAMILY MEDICINE

## 2018-11-20 PROCEDURE — 3078F DIAST BP <80 MM HG: CPT | Mod: CPTII,HCNC,S$GLB, | Performed by: FAMILY MEDICINE

## 2018-11-20 PROCEDURE — 99999 PR PBB SHADOW E&M-EST. PATIENT-LVL IV: CPT | Mod: PBBFAC,HCNC,, | Performed by: FAMILY MEDICINE

## 2018-11-20 PROCEDURE — G0008 ADMIN INFLUENZA VIRUS VAC: HCPCS | Mod: 59,HCNC,S$GLB, | Performed by: FAMILY MEDICINE

## 2018-11-20 PROCEDURE — 1101F PT FALLS ASSESS-DOCD LE1/YR: CPT | Mod: CPTII,HCNC,S$GLB, | Performed by: FAMILY MEDICINE

## 2018-11-20 PROCEDURE — 99499 UNLISTED E&M SERVICE: CPT | Mod: S$GLB,,, | Performed by: FAMILY MEDICINE

## 2018-11-20 PROCEDURE — 90662 IIV NO PRSV INCREASED AG IM: CPT | Mod: HCNC,S$GLB,, | Performed by: FAMILY MEDICINE

## 2018-11-20 PROCEDURE — 3046F HEMOGLOBIN A1C LEVEL >9.0%: CPT | Mod: CPTII,HCNC,S$GLB, | Performed by: FAMILY MEDICINE

## 2018-11-20 NOTE — PROGRESS NOTES
(Portions of this note were dictated using voice recognition software and may contain dictation related errors in spelling/grammar/syntax not found on text review)    CC:   Chief Complaint   Patient presents with    Follow-up     3 month; flu shot.       HPI: 68 y.o. male     Diabetes on metformin 1000 mg twice a day, Amaryl 4 mg twice a day, Victoza 0.6 mg started last visit secondary to significantly elevated A1c.  Complicating factor include CKD 3, GFR recently at 51.  Has remarked on taking lots of NSAIDs on prior evaluations secondary to back pain from spinal stenosis, had surgical intervention done 2017.  Compliant with therapy.   has admitted to dietary indiscretion and lack of significant exercise.  Has gained significant amount of weight.  Did quit smoking any feels like weight probably worsened after this as well.  States that his blood sugar control has not been very adequate.  Was out of his Victoza for couple of days and his blood sugars were in the 300 range.  On the medication is blood sugars were in the 190 range     Hypertension on lisinopril HCT 20/25 daily.  Also on furosemide 20 mg daily.  Had been out of his medications for a week or so because of difficulties getting his medications transferred to new pharmacy.  Has been back on for last couple of days.  Recheck blood pressure is improved at 130/76.  He does report bilateral lower extremity edema     HLD on pravastatin 40 mg daily     Mildly elevated LFTs on recent check as below     Cough and congestion for the last 3 weeks or so.  Initially started with some sore throat, fever, but this has resolved.  Gets some productive cough, nasal congestion and stuffiness.  No headache.  No shortness of breath or chest pain at this time.  Just quit smoking a couple of weeks ago       Past Medical History:   Diagnosis Date    Allergy     DDD (degenerative disc disease), lumbar     Hyperlipidemia     Hypertension     Nicotine abuse     Obesity      Scrotal mass     Type II diabetes mellitus with renal manifestations, uncontrolled     microalbuminuria    Type II or unspecified type diabetes mellitus without mention of complication, not stated as uncontrolled     Ulcer        Past Surgical History:   Procedure Laterality Date    CARPAL TUNNEL RELEASE      CONDYLOMA EXCISION/FULGURATION Bilateral 2014    scrotal    KNEE SURGERY Left     removal of cartilage with no implant    Left L4-5 laminectomy, medial facetectomy, foraminotomy   Left 8/2/2017    [facility]    LUMBAR LAMINECTOMY  2017    L4-5    REMOVAL-CONDYLOMA scrotal excision of genital warts N/A 9/25/2014    Performed [facility] at [facility] OR 2ND [facility]LR    Right L5-S1 laminectomy, medial facetectomy and microdiscectomy Right 8/2/2017    [facility] at [facility]    ULNAR NERVE TRANSPOSITION         Family History   Problem Relation Age of Onset    Cancer Mother         Liver    Cataracts Mother     Liver cancer Mother     Arthritis Mother     Cancer Father         Lung met Brain    Lung cancer Father     Brain cancer Father     Cancer Paternal Grandfather     Diabetes Paternal Grandmother     Cancer Sister     No Known Problems Brother     No Known Problems Daughter     Amblyopia Neg Hx     Blindness Neg Hx     Glaucoma Neg Hx     Macular degeneration Neg Hx     Retinal detachment Neg Hx     Strabismus Neg Hx        Social History     Socioeconomic History    Marital status:      Spouse name: Not on file    Number of children: Not on file    Years of education: Not on file    Highest education level: Not on file   Social Needs    Financial resource strain: Not on file    Food insecurity - worry: Not on file    Food insecurity - inability: Not on file    Transportation needs - medical: Not on file    Transportation needs - non-medical: Not on file   Occupational History    Not on file   Tobacco Use     Smoking status: Former Smoker     Packs/day: 1.00     Years: 55.00     Pack years: 55.00     Types: Cigarettes    Smokeless tobacco: Former User     Quit date: 11/6/2018   Substance and Sexual Activity    Alcohol use: Yes     Alcohol/week: 4.8 oz     Types: 2 Cans of beer, 1 Shots of liquor, 5 Standard drinks or equivalent per week    Drug use: No    Sexual activity: Yes     Partners: Female   Other Topics Concern    Not on file   Social History Narrative    Not on file     Lab Results   Component Value Date    WBC 9.61 08/16/2018    HGB 15.1 08/16/2018    HCT 45.5 08/16/2018     08/16/2018    CHOL 156 08/16/2018    TRIG 240 (H) 08/16/2018    HDL 37 (L) 08/16/2018    ALT 50 (H) 08/16/2018    AST 47 (H) 08/16/2018     08/16/2018    K 4.4 08/16/2018    CL 97 08/16/2018    CREATININE 1.4 08/16/2018    CALCIUM 10.2 08/16/2018    BUN 26 (H) 08/16/2018    CO2 30 (H) 08/16/2018    TSH 1.706 08/16/2018    PSA 4.0 08/16/2018    INR 1.0 07/12/2017    HGBA1C 10.1 (H) 08/16/2018    LDLCALC 71.0 08/16/2018     (H) 08/16/2018       Hemoglobin A1C   Date Value Ref Range Status   08/16/2018 10.1 (H) 4.0 - 5.6 % Final     Comment:     ADA Screening Guidelines:  5.7-6.4%  Consistent with prediabetes  >or=6.5%  Consistent with diabetes  High levels of fetal hemoglobin interfere with the HbA1C  assay. Heterozygous hemoglobin variants (HbS, HgC, etc)do  not significantly interfere with this assay.   However, presence of multiple variants may affect accuracy.     06/24/2017 7.7 (H) 4.0 - 5.6 % Final     Comment:     According to ADA guidelines, hemoglobin A1c <7.0% represents  optimal control in non-pregnant diabetic patients. Different  metrics may apply to specific patient populations.   Standards of Medical Care in Diabetes-2016.  For the purpose of screening for the presence of diabetes:  <5.7%     Consistent with the absence of diabetes  5.7-6.4%  Consistent with increasing risk for diabetes    (prediabetes)  >or=6.5%  Consistent with diabetes  Currently, no consensus exists for use of hemoglobin A1c  for diagnosis of diabetes for children.  This Hemoglobin A1c assay has significant interference with fetal   hemoglobin   (HbF). The results are invalid for patients with abnormal amounts of   HbF,   including those with known Hereditary Persistence   of Fetal Hemoglobin. Heterozygous hemoglobin variants (HbAS, HbAC,   HbAD, HbAE, HbA2) do not significantly interfere with this assay;   however, presence of multiple variants in a sample may impact the %   interference.     03/30/2016 8.6 (H) 4.5 - 6.2 % Final       ALT   Date Value Ref Range Status   08/16/2018 50 (H) 10 - 44 U/L Final   06/24/2017 37 10 - 44 U/L Final   03/30/2016 42 10 - 44 U/L Final   11/18/2015 37 10 - 44 U/L Final   01/12/2015 30 10 - 44 U/L Final   09/05/2014 28 10 - 44 U/L Final   04/24/2013 44 10 - 44 U/L Final   01/07/2013 52 (H) 10 - 44 U/L Final   06/15/2011 30 10 - 44 U/L Final   04/26/2011 24 10 - 44 U/L Final     AST   Date Value Ref Range Status   08/16/2018 47 (H) 10 - 40 U/L Final   06/24/2017 35 10 - 40 U/L Final   03/30/2016 34 10 - 40 U/L Final   11/18/2015 28 10 - 40 U/L Final   01/12/2015 29 10 - 40 U/L Final   09/05/2014 32 10 - 40 U/L Final   04/24/2013 42 (H) 10 - 40 U/L Final   01/07/2013 39 10 - 40 U/L Final   06/15/2011 26 10 - 40 U/L Final   04/26/2011 23 10 - 40 U/L Final           ROS:  GENERAL:  Significant weight gain.  SKIN: No rashes, no itching.  HEAD: No headaches.  EYES: No visual changes  EARS: No ear pain or changes in hearing.  NOSE:  Above.  MOUTH & THROAT: No hoarseness, change in voice, or sore throat.  NODES: Denies swollen glands.  CHEST:  Above  CARDIOVASCULAR: Denies chest pain, PND, orthopnea.  ABDOMEN: No nausea, vomiting, or changes in bowel function.  URINARY: No flank pain, dysuria or hematuria.  PERIPHERAL VASCULAR:  Above  MUSCULOSKELETAL:  Knee pain  NEUROLOGIC: No weakness or  numbness.    Vital signs reviewed  PE:   APPEARANCE: Well nourished, well developed, in no acute distress.    HEAD: Normocephalic, atraumatic.  EYES: PERRL. EOMI.   Conjunctivae noninjected.  EARS: TM's intact. Light reflex normal. No retraction or perforation  NOSE: Mucosa pink. Airway clear.  MOUTH & THROAT: No tonsillar enlargement. No pharyngeal erythema or exudate.   NECK: Supple with no cervical lymphadenopathy.  No carotid bruits.  No thyromegaly  CHEST: Good inspiratory effort. Lungs clear to auscultation with no wheezes or crackles.  CARDIOVASCULAR: Normal S1, S2. No rubs, murmurs, or gallops.  ABDOMEN: Bowel sounds normal. Not distended. Soft. No tenderness or masses. No organomegaly.  EXTREMITIES:  2+ edema bilateral lower extremities      IMPRESSION  1. Type 2 diabetes mellitus with stage 3 chronic kidney disease, without long-term current use of insulin    2. Hypertension associated with diabetes    3. Severe obesity (BMI 35.0-39.9) with comorbidity    4. Diabetic polyneuropathy associated with type 2 diabetes mellitus    5. Hyperlipidemia associated with type 2 diabetes mellitus    6. Tobacco dependence    7. Cough            PLAN  Diabetes health maintenance:  -- advise regular and consistent glucose monitoring and medication compliance.  -- advise daily foot checks  -- advise yearly ophthalmologic exams  -- advise adequate dietary and exercise modification  -- advise regular dental visits  -- advise daily low-dose aspirin use if patient is not on other anticoagulants and there are no other contraindications.  -- Medication management:  Continue metformin 1000 b.i.d., glimepiride 4 mg b.i.d., increase Victoza to 1.2 mg daily over the next couple weeks and then up to 1.8 mg daily.  Check labs.  Will follow back up in 3 months.  If still no improvement even with medication changes, consider stopping glimepiride, starting basal insulin, continue metformin and Victoza      Hypertension:  Continue  lisinopril HCT 20/25+ furosemide 20 mg daily.  Encouraged healthy diet, regular exercise    Hyperlipidemia:  Continue pravastatin.  Check labs    Encourage dietary and exercise modification    Cough:  Trial of Mucinex DM plus Claritin or Zyrtec.  Exam relatively unremarkable.  If symptoms are worsening, consider chest x-ray and PFTs given his chronic tobacco history.  Encourage maintenance of tobacco cessation    Orders Placed This Encounter   Procedures    Influenza - High Dose (65+) (PF) (IM)    Comprehensive metabolic panel    Hemoglobin A1c    Lipid panel         SCREENINGS (males >=65)     Immunizations:  Tetanus: 1/1/2014  Pneumovax: 9/19/2014   Prevnar 7/12/17  Flu: today     Prostate:  PSA normal as above     Colonoscopy: due (addressed with pt prior visit and he had declined).  Fit kit ordered.  Patient has not yet completed this.     AAA screen 2017, normal

## 2018-12-09 RX ORDER — PRAVASTATIN SODIUM 40 MG/1
TABLET ORAL
Qty: 90 TABLET | Refills: 0 | Status: SHIPPED | OUTPATIENT
Start: 2018-12-09 | End: 2019-01-14 | Stop reason: SDUPTHER

## 2018-12-10 RX ORDER — PEN NEEDLE, DIABETIC 30 GX3/16"
NEEDLE, DISPOSABLE MISCELLANEOUS
Qty: 100 EACH | Refills: 11 | Status: SHIPPED | OUTPATIENT
Start: 2018-12-10 | End: 2020-02-11

## 2019-01-04 DIAGNOSIS — I15.2 HYPERTENSION ASSOCIATED WITH DIABETES: ICD-10-CM

## 2019-01-04 DIAGNOSIS — E11.59 HYPERTENSION ASSOCIATED WITH DIABETES: ICD-10-CM

## 2019-01-14 RX ORDER — PRAVASTATIN SODIUM 40 MG/1
TABLET ORAL
Qty: 30 TABLET | Refills: 11 | Status: SHIPPED | OUTPATIENT
Start: 2019-01-14 | End: 2020-01-21

## 2019-03-11 RX ORDER — METFORMIN HYDROCHLORIDE 500 MG/1
TABLET, EXTENDED RELEASE ORAL
Qty: 360 TABLET | Refills: 0 | Status: SHIPPED | OUTPATIENT
Start: 2019-03-11 | End: 2019-05-13

## 2019-03-11 RX ORDER — METFORMIN HYDROCHLORIDE 500 MG/1
TABLET, EXTENDED RELEASE ORAL
Qty: 120 TABLET | Refills: 0 | Status: SHIPPED | OUTPATIENT
Start: 2019-03-11 | End: 2019-03-11 | Stop reason: SDUPTHER

## 2019-04-09 RX ORDER — METFORMIN HYDROCHLORIDE 500 MG/1
TABLET, EXTENDED RELEASE ORAL
Qty: 120 TABLET | Refills: 0 | Status: SHIPPED | OUTPATIENT
Start: 2019-04-09 | End: 2019-05-13 | Stop reason: SDUPTHER

## 2019-05-13 ENCOUNTER — HOSPITAL ENCOUNTER (OUTPATIENT)
Dept: RADIOLOGY | Facility: HOSPITAL | Age: 69
Discharge: HOME | End: 2019-05-13
Attending: FAMILY MEDICINE
Payer: MEDICARE

## 2019-05-13 ENCOUNTER — PATIENT MESSAGE (OUTPATIENT)
Dept: FAMILY MEDICINE | Facility: CLINIC | Age: 69
End: 2019-05-13

## 2019-05-13 ENCOUNTER — OFFICE VISIT (OUTPATIENT)
Dept: FAMILY MEDICINE | Facility: CLINIC | Age: 69
End: 2019-05-13
Payer: MEDICARE

## 2019-05-13 VITALS
TEMPERATURE: 98 F | HEART RATE: 112 BPM | WEIGHT: 254.44 LBS | OXYGEN SATURATION: 95 % | SYSTOLIC BLOOD PRESSURE: 120 MMHG | BODY MASS INDEX: 37.68 KG/M2 | HEIGHT: 69 IN | DIASTOLIC BLOOD PRESSURE: 82 MMHG

## 2019-05-13 DIAGNOSIS — N18.30 TYPE 2 DIABETES MELLITUS WITH STAGE 3 CHRONIC KIDNEY DISEASE, WITHOUT LONG-TERM CURRENT USE OF INSULIN: ICD-10-CM

## 2019-05-13 DIAGNOSIS — E11.22 TYPE 2 DIABETES MELLITUS WITH STAGE 3 CHRONIC KIDNEY DISEASE, WITHOUT LONG-TERM CURRENT USE OF INSULIN: ICD-10-CM

## 2019-05-13 DIAGNOSIS — M25.562 PAIN IN BOTH KNEES, UNSPECIFIED CHRONICITY: Primary | ICD-10-CM

## 2019-05-13 DIAGNOSIS — M25.562 PAIN IN BOTH KNEES, UNSPECIFIED CHRONICITY: ICD-10-CM

## 2019-05-13 DIAGNOSIS — R25.2 LEG CRAMPS: ICD-10-CM

## 2019-05-13 DIAGNOSIS — I15.2 HYPERTENSION ASSOCIATED WITH DIABETES: ICD-10-CM

## 2019-05-13 DIAGNOSIS — E11.59 HYPERTENSION ASSOCIATED WITH DIABETES: ICD-10-CM

## 2019-05-13 DIAGNOSIS — M25.561 PAIN IN BOTH KNEES, UNSPECIFIED CHRONICITY: ICD-10-CM

## 2019-05-13 DIAGNOSIS — M25.561 PAIN IN BOTH KNEES, UNSPECIFIED CHRONICITY: Primary | ICD-10-CM

## 2019-05-13 PROCEDURE — 73562 XR KNEE 3 VIEW BILATERAL: ICD-10-PCS | Mod: 26,50,HCNC, | Performed by: RADIOLOGY

## 2019-05-13 PROCEDURE — 73562 X-RAY EXAM OF KNEE 3: CPT | Mod: 50,TC,HCNC,FY

## 2019-05-13 PROCEDURE — 99214 PR OFFICE/OUTPT VISIT, EST, LEVL IV, 30-39 MIN: ICD-10-PCS | Mod: HCNC,S$GLB,, | Performed by: FAMILY MEDICINE

## 2019-05-13 PROCEDURE — 3074F SYST BP LT 130 MM HG: CPT | Mod: HCNC,CPTII,S$GLB, | Performed by: FAMILY MEDICINE

## 2019-05-13 PROCEDURE — 99999 PR PBB SHADOW E&M-EST. PATIENT-LVL IV: ICD-10-PCS | Mod: PBBFAC,HCNC,, | Performed by: FAMILY MEDICINE

## 2019-05-13 PROCEDURE — 99499 UNLISTED E&M SERVICE: CPT | Mod: HCNC,S$GLB,, | Performed by: FAMILY MEDICINE

## 2019-05-13 PROCEDURE — 1101F PR PT FALLS ASSESS DOC 0-1 FALLS W/OUT INJ PAST YR: ICD-10-PCS | Mod: HCNC,CPTII,S$GLB, | Performed by: FAMILY MEDICINE

## 2019-05-13 PROCEDURE — 99999 PR PBB SHADOW E&M-EST. PATIENT-LVL IV: CPT | Mod: PBBFAC,HCNC,, | Performed by: FAMILY MEDICINE

## 2019-05-13 PROCEDURE — 1101F PT FALLS ASSESS-DOCD LE1/YR: CPT | Mod: HCNC,CPTII,S$GLB, | Performed by: FAMILY MEDICINE

## 2019-05-13 PROCEDURE — 3079F DIAST BP 80-89 MM HG: CPT | Mod: HCNC,CPTII,S$GLB, | Performed by: FAMILY MEDICINE

## 2019-05-13 PROCEDURE — 3046F HEMOGLOBIN A1C LEVEL >9.0%: CPT | Mod: HCNC,CPTII,S$GLB, | Performed by: FAMILY MEDICINE

## 2019-05-13 PROCEDURE — 3079F PR MOST RECENT DIASTOLIC BLOOD PRESSURE 80-89 MM HG: ICD-10-PCS | Mod: HCNC,CPTII,S$GLB, | Performed by: FAMILY MEDICINE

## 2019-05-13 PROCEDURE — 3046F PR MOST RECENT HEMOGLOBIN A1C LEVEL > 9.0%: ICD-10-PCS | Mod: HCNC,CPTII,S$GLB, | Performed by: FAMILY MEDICINE

## 2019-05-13 PROCEDURE — 3074F PR MOST RECENT SYSTOLIC BLOOD PRESSURE < 130 MM HG: ICD-10-PCS | Mod: HCNC,CPTII,S$GLB, | Performed by: FAMILY MEDICINE

## 2019-05-13 PROCEDURE — 73562 X-RAY EXAM OF KNEE 3: CPT | Mod: 26,50,HCNC, | Performed by: RADIOLOGY

## 2019-05-13 PROCEDURE — 99214 OFFICE O/P EST MOD 30 MIN: CPT | Mod: HCNC,S$GLB,, | Performed by: FAMILY MEDICINE

## 2019-05-13 PROCEDURE — 99499 RISK ADDL DX/OHS AUDIT: ICD-10-PCS | Mod: HCNC,S$GLB,, | Performed by: FAMILY MEDICINE

## 2019-05-13 RX ORDER — METFORMIN HYDROCHLORIDE 500 MG/1
TABLET, EXTENDED RELEASE ORAL
Qty: 120 TABLET | Refills: 0 | OUTPATIENT
Start: 2019-05-13

## 2019-05-13 RX ORDER — DICLOFENAC SODIUM 10 MG/G
2 GEL TOPICAL 3 TIMES DAILY PRN
Qty: 100 G | Refills: 3 | Status: SHIPPED | OUTPATIENT
Start: 2019-05-13 | End: 2022-04-06 | Stop reason: SDUPTHER

## 2019-05-13 RX ORDER — METFORMIN HYDROCHLORIDE 500 MG/1
TABLET, EXTENDED RELEASE ORAL
Qty: 120 TABLET | Refills: 11 | Status: SHIPPED | OUTPATIENT
Start: 2019-05-13 | End: 2020-05-25
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
## 2019-05-13 NOTE — TELEPHONE ENCOUNTER
Dear Francisco Coppola    Your recent knee x-ray shows severe arthritis changes of both knees.  This likely is responsible for a lot of your pain not only in the right side but the left side as well more recently.  Let us try the diclofenac gel as prescribed.  We could consider round physical therapy formally (let me know if you want to do this and I can put in the referral).  If this is a progressive issue unknown of the above is helping, we may need to have you see Orthopedics.    Laureano Hebert MD

## 2019-05-13 NOTE — PROGRESS NOTES
Patient, Francisco Coppola (MRN #[id number]), presented with a recorded BMI of 37.57 kg/m^2 and a documented comorbidity(s):  - Diabetes Mellitus Type 2  - Hypertension  to which the severe obesity is a contributing factor. This is consistent with the definition of severe obesity (BMI 35.0-39.9) with comorbidity (ICD-10 E66.01, Z68.35). The patient's severe obesity was monitored, evaluated, addressed and/or treated. This addendum to the medical record is made on 05/13/2019.

## 2019-06-24 RX ORDER — GABAPENTIN 300 MG/1
CAPSULE ORAL
Qty: 30 CAPSULE | Refills: 0 | Status: SHIPPED | OUTPATIENT
Start: 2019-06-24 | End: 2019-07-23 | Stop reason: SDUPTHER

## 2019-06-26 RX ORDER — GLIMEPIRIDE 4 MG/1
TABLET ORAL
Qty: 60 TABLET | Refills: 11 | Status: SHIPPED | OUTPATIENT
Start: 2019-06-26 | End: 2020-07-06 | Stop reason: SDUPTHER

## 2019-06-28 DIAGNOSIS — E11.9 TYPE 2 DIABETES MELLITUS WITHOUT COMPLICATION, UNSPECIFIED WHETHER LONG TERM INSULIN USE: ICD-10-CM

## 2019-07-12 DIAGNOSIS — Z12.11 COLON CANCER SCREENING: ICD-10-CM

## 2019-07-23 RX ORDER — GABAPENTIN 300 MG/1
CAPSULE ORAL
Qty: 30 CAPSULE | Refills: 11 | Status: SHIPPED | OUTPATIENT
Start: 2019-07-23 | End: 2020-05-19 | Stop reason: SDUPTHER

## 2019-07-25 ENCOUNTER — OFFICE VISIT (OUTPATIENT)
Dept: FAMILY MEDICINE | Facility: CLINIC | Age: 69
End: 2019-07-25
Payer: MEDICARE

## 2019-07-25 VITALS
HEIGHT: 69 IN | WEIGHT: 250.25 LBS | TEMPERATURE: 98 F | SYSTOLIC BLOOD PRESSURE: 120 MMHG | DIASTOLIC BLOOD PRESSURE: 82 MMHG | BODY MASS INDEX: 37.06 KG/M2 | HEART RATE: 103 BPM | OXYGEN SATURATION: 95 %

## 2019-07-25 DIAGNOSIS — E11.69 HYPERLIPIDEMIA ASSOCIATED WITH TYPE 2 DIABETES MELLITUS: ICD-10-CM

## 2019-07-25 DIAGNOSIS — M25.562 ACUTE PAIN OF BOTH KNEES: ICD-10-CM

## 2019-07-25 DIAGNOSIS — M47.819 ARTHRITIS OF FACET JOINTS AT MULTIPLE VERTEBRAL LEVELS: ICD-10-CM

## 2019-07-25 DIAGNOSIS — J84.10 CALCIFIED GRANULOMA OF LUNG: ICD-10-CM

## 2019-07-25 DIAGNOSIS — E78.5 HYPERLIPIDEMIA ASSOCIATED WITH TYPE 2 DIABETES MELLITUS: ICD-10-CM

## 2019-07-25 DIAGNOSIS — E11.42 DIABETIC POLYNEUROPATHY ASSOCIATED WITH TYPE 2 DIABETES MELLITUS: ICD-10-CM

## 2019-07-25 DIAGNOSIS — E11.22 TYPE 2 DIABETES MELLITUS WITH STAGE 3 CHRONIC KIDNEY DISEASE, WITHOUT LONG-TERM CURRENT USE OF INSULIN: ICD-10-CM

## 2019-07-25 DIAGNOSIS — E66.01 SEVERE OBESITY (BMI 35.0-39.9) WITH COMORBIDITY: ICD-10-CM

## 2019-07-25 DIAGNOSIS — M25.561 ACUTE PAIN OF BOTH KNEES: ICD-10-CM

## 2019-07-25 DIAGNOSIS — N18.30 TYPE 2 DIABETES MELLITUS WITH STAGE 3 CHRONIC KIDNEY DISEASE, WITHOUT LONG-TERM CURRENT USE OF INSULIN: ICD-10-CM

## 2019-07-25 DIAGNOSIS — Z00.00 ENCOUNTER FOR PREVENTIVE HEALTH EXAMINATION: Primary | ICD-10-CM

## 2019-07-25 DIAGNOSIS — I15.2 HYPERTENSION ASSOCIATED WITH DIABETES: ICD-10-CM

## 2019-07-25 DIAGNOSIS — E11.59 HYPERTENSION ASSOCIATED WITH DIABETES: ICD-10-CM

## 2019-07-25 PROCEDURE — 3079F PR MOST RECENT DIASTOLIC BLOOD PRESSURE 80-89 MM HG: ICD-10-PCS | Mod: HCNC,CPTII,S$GLB, | Performed by: NURSE PRACTITIONER

## 2019-07-25 PROCEDURE — 3074F PR MOST RECENT SYSTOLIC BLOOD PRESSURE < 130 MM HG: ICD-10-PCS | Mod: HCNC,CPTII,S$GLB, | Performed by: NURSE PRACTITIONER

## 2019-07-25 PROCEDURE — 3079F DIAST BP 80-89 MM HG: CPT | Mod: HCNC,CPTII,S$GLB, | Performed by: NURSE PRACTITIONER

## 2019-07-25 PROCEDURE — 3074F SYST BP LT 130 MM HG: CPT | Mod: HCNC,CPTII,S$GLB, | Performed by: NURSE PRACTITIONER

## 2019-07-25 PROCEDURE — 99999 PR PBB SHADOW E&M-EST. PATIENT-LVL IV: ICD-10-PCS | Mod: PBBFAC,HCNC,, | Performed by: NURSE PRACTITIONER

## 2019-07-25 PROCEDURE — 3046F PR MOST RECENT HEMOGLOBIN A1C LEVEL > 9.0%: ICD-10-PCS | Mod: HCNC,CPTII,S$GLB, | Performed by: NURSE PRACTITIONER

## 2019-07-25 PROCEDURE — G0439 PPPS, SUBSEQ VISIT: HCPCS | Mod: HCNC,S$GLB,, | Performed by: NURSE PRACTITIONER

## 2019-07-25 PROCEDURE — 99999 PR PBB SHADOW E&M-EST. PATIENT-LVL IV: CPT | Mod: PBBFAC,HCNC,, | Performed by: NURSE PRACTITIONER

## 2019-07-25 PROCEDURE — G0439 PR MEDICARE ANNUAL WELLNESS SUBSEQUENT VISIT: ICD-10-PCS | Mod: HCNC,S$GLB,, | Performed by: NURSE PRACTITIONER

## 2019-07-25 PROCEDURE — 3046F HEMOGLOBIN A1C LEVEL >9.0%: CPT | Mod: HCNC,CPTII,S$GLB, | Performed by: NURSE PRACTITIONER

## 2019-07-25 NOTE — PATIENT INSTRUCTIONS
Counseling and Referral of Other Preventative  (Italic type indicates deductible and co-insurance are waived)    Patient Name: Francisco Coppola  Today's Date: 7/25/2019    Health Maintenance       Date Due Completion Date    Fecal Occult Blood Test (FOBT)/FitKit 1950 ---    Shingles Vaccine (1 of 2) 02/12/1999 ---    Hemoglobin A1c 11/16/2018 8/16/2018    Eye Exam 05/23/2019 5/23/2018    Foot Exam 08/21/2019 8/21/2018    Influenza Vaccine 08/01/2019 11/20/2018    Lipid Panel 08/16/2019 8/16/2018    Low Dose Statin 07/25/2020 7/25/2019    TETANUS VACCINE 01/01/2024 1/1/2014        No orders of the defined types were placed in this encounter.    The following information is provided to all patients.  This information is to help you find resources for any of the problems found today that may be affecting your health:                Living healthy guide: www.[facility].[place].[facility]      Understanding Diabetes: www.diabetes.org      Eating healthy: www.cdc.gov/healthyweight      CDC home safety checklist: www.cdc.gov/steadi/patient.html      Agency on Aging: www.goea.[place].[facility]      Alcoholics anonymous (AA): www.aa.org      Physical Activity: www.kash.nih.gov/qz2fbol      Tobacco use: www.quitwithusla.org

## 2019-07-25 NOTE — PROGRESS NOTES
"Francisco Coppola presented for a  Medicare AWV and comprehensive Health Risk Assessment today. The following components were reviewed and updated:    · Medical history  · Family History  · Social history  · Allergies and Current Medications  · Health Risk Assessment  · Health Maintenance  · Care Team     ** See Completed Assessments for Annual Wellness Visit within the encounter summary.**       The following assessments were completed:  · Living Situation  · CAGE  · Depression Screening  · Timed Get Up and Go  · Whisper Test  · Cognitive Function Screening  · Nutrition Screening  · ADL Screening  · PAQ Screening    Vitals:    07/25/19 1607   BP: 120/82   Pulse: 103   Temp: 98 °F (36.7 °C)   SpO2: 95%   Weight: 113.5 kg (250 lb 3.6 oz)   Height: 5' 9" (1.753 m)     Body mass index is 36.95 kg/m².  Physical Exam      Diagnoses and health risks identified today and associated recommendations/orders:    1. Encounter for preventive health examination    2. Hypertension associated with diabetes  - Chronic, stable, continue current medication therapy  - Followed by PCP    3. Hyperlipidemia associated with type 2 diabetes mellitus  - Chronic, stable, continue current medication therapy  - Followed by PCP    4. Type 2 diabetes mellitus with stage 3 chronic kidney disease, without long-term current use of insulin  - Patient stopped Victoza due to cost of medication; patient has follow up with PCP next week to discuss tx options  - Uncontrolled, continue current medication therapy  - Followed by PCP    5. Diabetic polyneuropathy associated with type 2 diabetes mellitus  - Chronic, stable, continue current medication therapy  - Followed by PCP    6. Calcified granuloma of lung  - Chronic, stable  - Followed by PCP    7. Arthritis of facet joints at multiple vertebral levels  - Chronic, stable, continue current medication therapy  - Followed by PCP    8. Severe obesity (BMI 35.0-39.9) with comorbidity  - Discussed healthy " diet and exercise    9. Acute pain of both knees  -  Avoid strenuous activity. May use cold compresses intermittently for pain. Tylenol or ibuprofen as needed for pain.      Provided Francisco with a 5-10 year written screening schedule and personal prevention plan. Recommendations were developed using the USPSTF age appropriate recommendations. Education, counseling, and referrals were provided as needed. After Visit Summary printed and given to patient which includes a list of additional screenings\tests needed.    Follow up for PCP visit next week and Annual Medicare Wellness visit in 1 year.    CASTRO Del Valle

## 2019-07-29 ENCOUNTER — OFFICE VISIT (OUTPATIENT)
Dept: FAMILY MEDICINE | Facility: CLINIC | Age: 69
End: 2019-07-29
Payer: MEDICARE

## 2019-07-29 VITALS
DIASTOLIC BLOOD PRESSURE: 76 MMHG | BODY MASS INDEX: 36.93 KG/M2 | HEART RATE: 113 BPM | HEIGHT: 69 IN | SYSTOLIC BLOOD PRESSURE: 134 MMHG | TEMPERATURE: 99 F | WEIGHT: 249.31 LBS | OXYGEN SATURATION: 95 %

## 2019-07-29 DIAGNOSIS — E11.59 HYPERTENSION ASSOCIATED WITH DIABETES: ICD-10-CM

## 2019-07-29 DIAGNOSIS — E78.5 HYPERLIPIDEMIA ASSOCIATED WITH TYPE 2 DIABETES MELLITUS: ICD-10-CM

## 2019-07-29 DIAGNOSIS — M17.0 PRIMARY OSTEOARTHRITIS OF BOTH KNEES: Primary | ICD-10-CM

## 2019-07-29 DIAGNOSIS — N18.30 TYPE 2 DIABETES MELLITUS WITH STAGE 3 CHRONIC KIDNEY DISEASE, WITHOUT LONG-TERM CURRENT USE OF INSULIN: ICD-10-CM

## 2019-07-29 DIAGNOSIS — I15.2 HYPERTENSION ASSOCIATED WITH DIABETES: ICD-10-CM

## 2019-07-29 DIAGNOSIS — E11.22 TYPE 2 DIABETES MELLITUS WITH STAGE 3 CHRONIC KIDNEY DISEASE, WITHOUT LONG-TERM CURRENT USE OF INSULIN: ICD-10-CM

## 2019-07-29 DIAGNOSIS — E66.01 SEVERE OBESITY (BMI 35.0-39.9) WITH COMORBIDITY: ICD-10-CM

## 2019-07-29 DIAGNOSIS — E11.69 HYPERLIPIDEMIA ASSOCIATED WITH TYPE 2 DIABETES MELLITUS: ICD-10-CM

## 2019-07-29 PROCEDURE — 1101F PR PT FALLS ASSESS DOC 0-1 FALLS W/OUT INJ PAST YR: ICD-10-PCS | Mod: HCNC,CPTII,S$GLB, | Performed by: FAMILY MEDICINE

## 2019-07-29 PROCEDURE — 3046F HEMOGLOBIN A1C LEVEL >9.0%: CPT | Mod: HCNC,CPTII,S$GLB, | Performed by: FAMILY MEDICINE

## 2019-07-29 PROCEDURE — 3078F DIAST BP <80 MM HG: CPT | Mod: HCNC,CPTII,S$GLB, | Performed by: FAMILY MEDICINE

## 2019-07-29 PROCEDURE — 1101F PT FALLS ASSESS-DOCD LE1/YR: CPT | Mod: HCNC,CPTII,S$GLB, | Performed by: FAMILY MEDICINE

## 2019-07-29 PROCEDURE — 3075F SYST BP GE 130 - 139MM HG: CPT | Mod: HCNC,CPTII,S$GLB, | Performed by: FAMILY MEDICINE

## 2019-07-29 PROCEDURE — 3075F PR MOST RECENT SYSTOLIC BLOOD PRESS GE 130-139MM HG: ICD-10-PCS | Mod: HCNC,CPTII,S$GLB, | Performed by: FAMILY MEDICINE

## 2019-07-29 PROCEDURE — 3046F PR MOST RECENT HEMOGLOBIN A1C LEVEL > 9.0%: ICD-10-PCS | Mod: HCNC,CPTII,S$GLB, | Performed by: FAMILY MEDICINE

## 2019-07-29 PROCEDURE — 99214 PR OFFICE/OUTPT VISIT, EST, LEVL IV, 30-39 MIN: ICD-10-PCS | Mod: HCNC,S$GLB,, | Performed by: FAMILY MEDICINE

## 2019-07-29 PROCEDURE — 99999 PR PBB SHADOW E&M-EST. PATIENT-LVL V: CPT | Mod: PBBFAC,HCNC,, | Performed by: FAMILY MEDICINE

## 2019-07-29 PROCEDURE — 99999 PR PBB SHADOW E&M-EST. PATIENT-LVL V: ICD-10-PCS | Mod: PBBFAC,HCNC,, | Performed by: FAMILY MEDICINE

## 2019-07-29 PROCEDURE — 3078F PR MOST RECENT DIASTOLIC BLOOD PRESSURE < 80 MM HG: ICD-10-PCS | Mod: HCNC,CPTII,S$GLB, | Performed by: FAMILY MEDICINE

## 2019-07-29 PROCEDURE — 99214 OFFICE O/P EST MOD 30 MIN: CPT | Mod: HCNC,S$GLB,, | Performed by: FAMILY MEDICINE
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
## 2019-08-07 RX ORDER — FUROSEMIDE 20 MG/1
20 TABLET ORAL DAILY
Qty: 30 TABLET | Refills: 10 | Status: SHIPPED | OUTPATIENT
Start: 2019-08-07 | End: 2020-07-16 | Stop reason: SDUPTHER

## 2019-08-07 RX ORDER — LISINOPRIL AND HYDROCHLOROTHIAZIDE 20; 25 MG/1; MG/1
1 TABLET ORAL DAILY
Qty: 30 TABLET | Refills: 11 | Status: SHIPPED | OUTPATIENT
Start: 2019-08-07 | End: 2020-08-09 | Stop reason: SDUPTHER

## 2019-08-16 ENCOUNTER — CLINICAL SUPPORT (OUTPATIENT)
Dept: REHABILITATION | Facility: HOSPITAL | Age: 69
End: 2019-08-16
Attending: FAMILY MEDICINE
Payer: MEDICARE

## 2019-08-16 DIAGNOSIS — R53.1 WEAKNESS: ICD-10-CM

## 2019-08-16 DIAGNOSIS — R26.2 DIFFICULTY WALKING: ICD-10-CM

## 2019-08-16 DIAGNOSIS — G89.29 CHRONIC PAIN OF BOTH KNEES: ICD-10-CM

## 2019-08-16 DIAGNOSIS — M25.561 CHRONIC PAIN OF BOTH KNEES: ICD-10-CM

## 2019-08-16 DIAGNOSIS — M25.562 CHRONIC PAIN OF BOTH KNEES: ICD-10-CM

## 2019-08-16 PROCEDURE — 97110 THERAPEUTIC EXERCISES: CPT | Mod: HCNC,PN

## 2019-08-16 PROCEDURE — G8979 MOBILITY GOAL STATUS: HCPCS | Mod: CK,HCNC,PN

## 2019-08-16 PROCEDURE — G8978 MOBILITY CURRENT STATUS: HCPCS | Mod: CL,HCNC,PN

## 2019-08-16 PROCEDURE — 97162 PT EVAL MOD COMPLEX 30 MIN: CPT | Mod: HCNC,PN

## 2019-08-19 NOTE — PLAN OF CARE
[facility]  Physical Therapy Initial Evaluation    Name: Francisco Coppola  Clinic Number: [id number]    Therapy Diagnosis:   Encounter Diagnoses   Name Primary?    Chronic pain of both knees     Difficulty walking     Weakness      Physician: Laureano Hebert MD    Physician Orders: PT Eval and Treat   Medical Diagnosis: M17.0 (ICD-10-CM) - Primary osteoarthritis of both knees  Date of Surgery: NA    Evaluation Date: 8/16/2019  Authorization Period Expiration: 12/31/2019  Plan of Care Certification Period: 10/31/2019  Visit # / Visits authorized: 1/ 50    Time In: 0900  Time Out: 1000  Total Billable Time: 60 minutes    Precautions: Standard, DM, HTN    Subjective   Date of onset: Chronic  History of current condition - Francisco reports to PT with chronic knee pain, R>L. Pain has been spiking as of late. Notes that he occasionally feels unstable standing on the RLE. Pt has sensory impairments in the toes secondary to DM dx.       Past Medical History:   Diagnosis Date    Allergy     DDD (degenerative disc disease), lumbar     Hyperlipidemia     Hypertension     Nicotine abuse     Obesity     Scrotal mass     Type II diabetes mellitus with renal manifestations, uncontrolled     microalbuminuria    Type II or unspecified type diabetes mellitus without mention of complication, not stated as uncontrolled     Ulcer      Francisco Coppola  has a past surgical history that includes Knee surgery (Left); Carpal tunnel release; Ulnar nerve transposition; Condyloma excision/fulguration (Bilateral, 2014); and Lumbar laminectomy (2017).    Francisco has a current medication list which includes the following prescription(s): aspirin, blood sugar diagnostic, diclofenac sodium, furosemide, gabapentin, glimepiride, lancets, lisinopril-hydrochlorothiazide, metformin, pen needle, diabetic, pravastatin, and sildenafil.    Review of patient's allergies indicates:   Allergen Reactions     Augmentin [amoxicillin-pot clavulanate] Hives and Rash    Tramadol Nausea Only        Imaging, X Ray: Right knee: Severe joint space narrowing of the medial compartment and moderate joint space narrowing of the lateral patellar compartment.  Moderate osteophyte formation.  No evidence of fracture or dislocation.  Vascular calcifications noted.  Small joint effusion.    Left knee: Severe joint space narrowing of the medial compartment and moderate joint space narrowing of the lateral patellar compartment.  Moderate osteophyte formation.  No evidence of acute fracture or dislocation.  Small joint effusion.    Prior Therapy: NA  Social History: Lives at home with wife. No stairs   Occupation: NA  Prior Level of Function: Independent  Current Level of Function: Pain with home and community ambulation    Pain:  Current 5/10, worst 8/10, best 3/10   Location: bilateral knee   Description: Aching  Aggravating Factors: Standing, Bending, Walking and Getting out of bed/chair  Easing Factors: relaxation    Pts goals: Maximize mobility with minimal pain    Objective     Observation: Enters without AD. Difficulty rising from a seated position    Posture: Flexed knee pattern with low foot clearance      Gait: Antalgic    Range of Motion: AROM (PROM):    Knee Left Right   Extension 18 (15) degrees 18 (15) degrees   Flexion 110 (115) degrees 110 (110) degrees       Strength:  Hip Left Right   Flexion 4/5 4/5   Abduction 3+/5 4-/5   Extension 4-/5 4-/5     Knee Left Right   Extension 4/5 4-/5   Flexion 4/5 4-/5     Special Tests:    Knee Left Right   MCL/LCL Stress Negative Negative   Bridge Positive Positive   SLS < 3 sec < 3 sec     Joint Mobility: Poor joint spacing in bilateral knees with noted crepitus during extension motion    Palpation: (+) TTP:  - JL bilateral  - medial portion of the (R) knee > (L)    Flexibility:   - [place]: < 90 deg B  - Hamstring < 50 deg B  - Hip Ext < 0 deg B            TREATMENT   Treatment Time In:  0940  Treatment Time Out: 1000  Total Treatment time separate from Evaluation time:20    Francisco received therapeutic exercises to develop strength, ROM and flexibility for 20 minutes including:  - HEP review  - Squats (to be performed in pool  - Standing hip abd  - Supine HSS  - LAQ    Home Exercises and Patient Education Provided    Education provided re: HEP, Dx, POC    Written Home Exercises Provided: .  Exercises were reviewed and Francisco was able to demonstrate them prior to the end of the session.   Pt received a written copy of exercises to perform at home. Francisco demonstrated good  understanding of the education provided.     See EMR under patient instructions for exercises given.   Assessment   Francisco is a 69 y.o. male referred to outpatient Physical Therapy with a medical diagnosis of bilateral knee pain. Pt presents with s/sx consistent with degenerative changes and general deconditioning. Primary impairments at this time include strength, ROM, flexibility, gait, balance, and pain that limits pt mobilty in the home and community. PT has discussed the benefits of the pt utilizing his pool access in addition to therapeutic intervention in order to improve activity tolerance and stimulate joint motion. He is a good candidate for skilled PT in order to maximize functional mobility and decrease pain with daily activity.    Pt prognosis is Good.   Pt will benefit from skilled outpatient Physical Therapy to address the deficits stated above and in the chart below, provide pt/family education, and to maximize pt's level of independence.     Plan of care discussed with patient: Yes  Pt's spiritual, cultural and educational needs considered and patient is agreeable to the plan of care and goals as stated below:     Anticipated Barriers for therapy: Age, DJD, DM    Medical Necessity is demonstrated by the following  History  Co-morbidities and personal factors that may impact the plan of care Co-morbidities:    See Above    Personal Factors:   age     moderate   Examination  Body Structures and Functions, activity limitations and participation restrictions that may impact the plan of care Body Regions:   lower extremities    Body Systems:    gross symmetry  ROM  strength  balance  gait  transfers  edema    Participation Restrictions:   Walking, Squatting, Kneeling, Stairs, Prolonged standing    Activity limitations:   Learning and applying knowledge  no deficits    Mobility  lifting and carrying objects  walking    Self care  washing oneself (bathing, drying, washing hands)  dressing    Domestic Life  doing house work (cleaning house, washing dishes, laundry)    Life Areas  no deficits    Community and Social Life  no deficits         moderate   Clinical Presentation evolving clinical presentation with changing clinical characteristics moderate   Decision Making/ Complexity Score: moderate     Goals:    Short Term Goals (6 Weeks):  - Pt will perform > 5 repetitions during 30 sec sit<>stand test indicating improved transfer tolerance  - Pt will increase BLE strength to >4-/5 for improved tolerance to standing ADLs  - Decrease gross Pain by 50% with ambulation in the home.   - Pt independent with HEP to improve tolerance to exercise progressions.     Long Term Goals (8 Weeks):  - Pt will increase BLE strength to >4/5 for improved tolerance to community ambulation  - Decrease gross Pain by 75% for improved tolerance to standing ADL's   - Pt will report improvement in overall functional abilities, evidenced by improved score on  FOTO to 50% limitation or better.         Plan   Certification Period/Plan of care expiration: 8/16/2019 to 10/31/2019.    Outpatient Physical Therapy 2 times weekly for 8 weeks to include the following interventions: Manual Therapy, Moist Heat/ Ice, Neuromuscular Re-ed, Patient Education, Therapeutic Activites and Therapeutic Exercise.     Rodney Shafer, PT. DPT, OCS

## 2019-08-21 ENCOUNTER — CLINICAL SUPPORT (OUTPATIENT)
Dept: REHABILITATION | Facility: HOSPITAL | Age: 69
End: 2019-08-21
Attending: FAMILY MEDICINE
Payer: MEDICARE

## 2019-08-21 DIAGNOSIS — R26.2 DIFFICULTY WALKING: ICD-10-CM

## 2019-08-21 DIAGNOSIS — M25.561 CHRONIC PAIN OF BOTH KNEES: ICD-10-CM

## 2019-08-21 DIAGNOSIS — G89.29 CHRONIC PAIN OF BOTH KNEES: ICD-10-CM

## 2019-08-21 DIAGNOSIS — R53.1 WEAKNESS: ICD-10-CM

## 2019-08-21 DIAGNOSIS — M25.562 CHRONIC PAIN OF BOTH KNEES: ICD-10-CM

## 2019-08-21 PROCEDURE — 97140 MANUAL THERAPY 1/> REGIONS: CPT | Mod: HCNC,PN

## 2019-08-21 PROCEDURE — 97110 THERAPEUTIC EXERCISES: CPT | Mod: HCNC,PN

## 2019-08-21 NOTE — PROGRESS NOTES
Physical Therapy Daily Treatment Note     Name: Francisco ORONA [facility] Number: [id number]    Therapy Diagnosis:   Encounter Diagnoses   Name Primary?    Chronic pain of both knees     Difficulty walking     Weakness      Physician: Laureano Hebert MD    Visit Date: 8/21/2019  Physician Orders: PT Eval and Treat   Medical Diagnosis: M17.0 (ICD-10-CM) - Primary osteoarthritis of both knees  Date of Surgery: NA     Evaluation Date: 8/16/2019  Authorization Period Expiration: 12/31/2019  Plan of Care Certification Period: 10/31/2019  Visit # / Visits authorized: 2/ 50  Time In: 0800  Time Out: 0850  Total Billable Time: 45 minutes    Precautions: Standard and Diabetes    Subjective      Pt reports: he was compliant with home exercise program given last session.   Response to previous treatment:Good  Functional change: Less knee pain    Pain: 3/10  Location: bilateral knee      Objective     Francisco received therapeutic exercises to develop strength, endurance, ROM and flexibility for 35 minutes including:  - Nu step x 5 min  - Slantboard stertc x 20  - Supine clamshells YTB x 30  - Supine hip abduction YTB x 20  - HSS 2 min bilaterally  - Knee ext machine 15# 2x20  - SLS 3x30 sec on foam  - Resisted side step in ll bars with YTB x 5 laps  - Seated scap retraction      Francisco received the following manual therapy techniques: Joint mobilizations, Myofacial release and Soft tissue Mobilization were applied to the: knees  for 10 minutes, including:  - Patellar mobilization with flex/ext  - Adductor bundle release        Home Exercises Provided and Patient Education Provided     Education provided:   - Postural corrections to be performed every hour. Decrease sitting time to 20 minutes    Assessment     Good tolerance to today's activites with good compliance. Appropriate to progress as tolerated.  Francisco is progressing well towards his goals.   Pt prognosis is Good.     Pt will  continue to benefit from skilled outpatient physical therapy to address the deficits listed in the problem list box on initial evaluation, provide pt/family education and to maximize pt's level of independence in the home and community environment.     Pt's spiritual, cultural and educational needs considered and pt agreeable to plan of care and goals.    Goals: Short Term Goals (6 Weeks):  - Pt will perform > 5 repetitions during 30 sec sit<>stand test indicating improved transfer tolerance  - Pt will increase BLE strength to >4-/5 for improved tolerance to standing ADLs  - Decrease gross Pain by 50% with ambulation in the home.   - Pt independent with HEP to improve tolerance to exercise progressions.      Long Term Goals (8 Weeks):  - Pt will increase BLE strength to >4/5 for improved tolerance to community ambulation  - Decrease gross Pain by 75% for improved tolerance to standing ADL's   - Pt will report improvement in overall functional abilities, evidenced by improved score on  FOTO to 50% limitation or better.     Plan     Cont. POC    Rodney Shafer, PT

## 2019-08-23 ENCOUNTER — CLINICAL SUPPORT (OUTPATIENT)
Dept: REHABILITATION | Facility: HOSPITAL | Age: 69
End: 2019-08-23
Attending: FAMILY MEDICINE
Payer: MEDICARE

## 2019-08-23 DIAGNOSIS — R53.1 WEAKNESS: ICD-10-CM

## 2019-08-23 DIAGNOSIS — R26.2 DIFFICULTY WALKING: ICD-10-CM

## 2019-08-23 DIAGNOSIS — M25.562 CHRONIC PAIN OF BOTH KNEES: ICD-10-CM

## 2019-08-23 DIAGNOSIS — M25.561 CHRONIC PAIN OF BOTH KNEES: ICD-10-CM

## 2019-08-23 DIAGNOSIS — G89.29 CHRONIC PAIN OF BOTH KNEES: ICD-10-CM

## 2019-08-23 PROCEDURE — 97140 MANUAL THERAPY 1/> REGIONS: CPT | Mod: HCNC,PN

## 2019-08-23 PROCEDURE — 97110 THERAPEUTIC EXERCISES: CPT | Mod: HCNC,PN

## 2019-08-23 NOTE — PROGRESS NOTES
Physical Therapy Daily Treatment Note     Name: Francisco ORONA [facility] Number: [id number]    Therapy Diagnosis:   Encounter Diagnoses   Name Primary?    Chronic pain of both knees     Difficulty walking     Weakness      Physician: Laureano Hebert MD    Visit Date: 2019  Physician Orders: PT Eval and Treat   Medical Diagnosis: M17.0 (ICD-10-CM) - Primary osteoarthritis of both knees  Date of Surgery: NA     Evaluation Date: 2019  Authorization Period Expiration: 2019  Plan of Care Certification Period: 10/31/2019  Visit # / Visits authorized: 3/ 50  Time In: 0800  Time Out: 0850  Total Billable Time: 30 minutes    Precautions: Standard and Diabetes    Subjective      Pt reports that his muscles were sore after the last session and he didn't do his HEP yesterday. Notes that the legs fel better today and he was able to actually sleep through the night   Response to previous treatment:Good  Functional change: Less knee pain    Pain: 310  Location: bilateral knee      Objective     Francisco received therapeutic exercises to develop strength, endurance, ROM and flexibility for 35 minutes includin on 1 x 15 min  - Nu step x 7 min  - Slantboard stertc x 20  - Supine clamshells GTB x 30  - Supine hip abduction YTB x 20  - HSS 2 min bilaterally  - Knee ext machine 15# 2x20  - SLS 3x30 sec on foam  - Resisted side step in ll bars with YTB x 5 laps  - Seated scap retraction      Francisco received the following manual therapy techniques: Joint mobilizations, Myofacial release and Soft tissue Mobilization were applied to the: knees  for 10 minutes, including:  - Patellar mobilization with flex/ext  - Adductor bundle release        Home Exercises Provided and Patient Education Provided     Education provided:   - Postural corrections to be performed every hour. Decrease sitting time to 20 minutes    Assessment     Pt tolerated tx well and demonstrated good compliance.  Soreness appeared consistent with DOMS.  Francisco is progressing well towards his goals.   Pt prognosis is Good.     Pt will continue to benefit from skilled outpatient physical therapy to address the deficits listed in the problem list box on initial evaluation, provide pt/family education and to maximize pt's level of independence in the home and community environment.     Pt's spiritual, cultural and educational needs considered and pt agreeable to plan of care and goals.    Goals: Short Term Goals (6 Weeks):  - Pt will perform > 5 repetitions during 30 sec sit<>stand test indicating improved transfer tolerance  - Pt will increase BLE strength to >4-/5 for improved tolerance to standing ADLs  - Decrease gross Pain by 50% with ambulation in the home.   - Pt independent with HEP to improve tolerance to exercise progressions.      Long Term Goals (8 Weeks):  - Pt will increase BLE strength to >4/5 for improved tolerance to community ambulation  - Decrease gross Pain by 75% for improved tolerance to standing ADL's   - Pt will report improvement in overall functional abilities, evidenced by improved score on  FOTO to 50% limitation or better.     Plan     Cont. POC    Rodney Shafer, PT

## 2019-08-29 ENCOUNTER — CLINICAL SUPPORT (OUTPATIENT)
Dept: REHABILITATION | Facility: HOSPITAL | Age: 69
End: 2019-08-29
Attending: FAMILY MEDICINE
Payer: MEDICARE

## 2019-08-29 DIAGNOSIS — G89.29 CHRONIC PAIN OF BOTH KNEES: ICD-10-CM

## 2019-08-29 DIAGNOSIS — M25.561 CHRONIC PAIN OF BOTH KNEES: ICD-10-CM

## 2019-08-29 DIAGNOSIS — R26.2 DIFFICULTY WALKING: ICD-10-CM

## 2019-08-29 DIAGNOSIS — R53.1 WEAKNESS: ICD-10-CM

## 2019-08-29 DIAGNOSIS — M25.562 CHRONIC PAIN OF BOTH KNEES: ICD-10-CM

## 2019-08-29 PROCEDURE — 97110 THERAPEUTIC EXERCISES: CPT | Mod: HCNC,PN

## 2019-08-29 PROCEDURE — 97140 MANUAL THERAPY 1/> REGIONS: CPT | Mod: HCNC,PN

## 2019-08-29 NOTE — PROGRESS NOTES
"                            Physical Therapy Daily Treatment Note     Name: Francisco ORONA [facility] Number: [id number]    Therapy Diagnosis:   Encounter Diagnoses   Name Primary?    Chronic pain of both knees     Difficulty walking     Weakness      Physician: Laureano Hebert MD    Visit Date: 8/29/2019  Physician Orders: PT Eval and Treat   Medical Diagnosis: M17.0 (ICD-10-CM) - Primary osteoarthritis of both knees  Date of Surgery: NA     Evaluation Date: 8/16/2019  Authorization Period Expiration: 12/31/2019  Plan of Care Certification Period: 10/31/2019  Visit # / Visits authorized: 4/ 50  Time In: 1300  Time Out: 1355  Total Billable Time: 5 minutes    Precautions: Standard and Diabetes    Subjective      Pt reports that he experienced increased pain all weekend following last Friday's treatment.  Response to previous treatment:Increased pain  Functional change: none     Pain: 3/10  Location: bilateral knee      Objective     Francisco received therapeutic exercises to develop strength, endurance, ROM and flexibility for 40 minutes including:   - Nu step x 7 min  - Slantboard stertc x 20  - Supine clamshells GTB x 30  - Supine hip abduction YTB x 20  - Supine: straight leg raise 2 x 10 B  - HSS 3 x 30:" bilaterally  - Knee ext machine 20# 2x20  - SLS 3x30 sec on foam  - Resisted side step in ll bars with YTB x 5 laps   - Seated scap retraction  - Calf stretch 3 x 30" B       Francisco received the following manual therapy techniques: Joint mobilizations, Myofacial release and Soft tissue Mobilization were applied to the: knees  for 15 minutes, including:  - Patellar mobilization with flex/ext  - Adductor bundle release  - IT band MFR ironing  - STM hamstring and gastrocnemii  attachments        Home Exercises Provided and Patient Education Provided     Education provided:   - Continue HEP including stretches and postural corrections    Assessment     Pt tolerated tx well. Poor tolerance to last visit. "   Francisco is progressing well towards his goals.   Pt prognosis is Good.     Pt will continue to benefit from skilled outpatient physical therapy to address the deficits listed in the problem list box on initial evaluation, provide pt/family education and to maximize pt's level of independence in the home and community environment.     Pt's spiritual, cultural and educational needs considered and pt agreeable to plan of care and goals.    Goals: Short Term Goals (6 Weeks):  - Pt will perform > 5 repetitions during 30 sec sit<>stand test indicating improved transfer tolerance  - Pt will increase BLE strength to >4-/5 for improved tolerance to standing ADLs  - Decrease gross Pain by 50% with ambulation in the home.   - Pt independent with HEP to improve tolerance to exercise progressions.      Long Term Goals (8 Weeks):  - Pt will increase BLE strength to >4/5 for improved tolerance to community ambulation  - Decrease gross Pain by 75% for improved tolerance to standing ADL's   - Pt will report improvement in overall functional abilities, evidenced by improved score on  FOTO to 50% limitation or better.     Plan     Cont. POC    Brandon Lucero, PTA

## 2019-09-04 ENCOUNTER — CLINICAL SUPPORT (OUTPATIENT)
Dept: REHABILITATION | Facility: HOSPITAL | Age: 69
End: 2019-09-04
Attending: FAMILY MEDICINE
Payer: MEDICARE

## 2019-09-04 DIAGNOSIS — M25.561 CHRONIC PAIN OF BOTH KNEES: ICD-10-CM

## 2019-09-04 DIAGNOSIS — M25.562 CHRONIC PAIN OF BOTH KNEES: ICD-10-CM

## 2019-09-04 DIAGNOSIS — G89.29 CHRONIC PAIN OF BOTH KNEES: ICD-10-CM

## 2019-09-04 DIAGNOSIS — R53.1 WEAKNESS: ICD-10-CM

## 2019-09-04 DIAGNOSIS — R26.2 DIFFICULTY WALKING: ICD-10-CM

## 2019-09-04 PROCEDURE — 97140 MANUAL THERAPY 1/> REGIONS: CPT | Mod: HCNC,PN

## 2019-09-04 PROCEDURE — 97110 THERAPEUTIC EXERCISES: CPT | Mod: HCNC,PN

## 2019-09-04 NOTE — PROGRESS NOTES
Physical Therapy Daily Treatment Note     Name: Francisco ORONA [facility] Number: [id number]    Therapy Diagnosis:   Encounter Diagnoses   Name Primary?    Chronic pain of both knees     Difficulty walking     Weakness      Physician: Laureano Hebert MD    Visit Date: 9/4/2019  Physician Orders: PT Eval and Treat   Medical Diagnosis: M17.0 (ICD-10-CM) - Primary osteoarthritis of both knees  Date of Surgery: NA     Evaluation Date: 8/16/2019  Authorization Period Expiration: 12/31/2019  Plan of Care Certification Period: 10/31/2019  Visit # / Visits authorized: 5/ 50  Time In: 1100  Time Out: 1200  Total Billable Time: 30 minutes (1 TE, 1 MT)    Precautions: Standard and Diabetes    Subjective      Pt reports that he did pretty well after the last tx session. Notes that he was sore over the weekend after dancing on Saturday at a birthday party. Primarily dealing with (R) knee stiffness today.  Response to previous treatment:Increased pain  Functional change: none     Pain: 3/10  Location: bilateral knee      Objective     Francisco received therapeutic exercises to develop strength, endurance, ROM and flexibility for 40 minutes including:   - Nu step x 7 min  - Slantboard stretch x 20  - Supine clamshells GTB x 30  - Supine hip abduction YTB x 20  - Supine: straight leg raise 2 x 10 B  - HSS 2'  Bilaterally long sitting  - Knee ext machine 20# 2x20  - SLS 3x30 sec on foam  - Resisted side step in ll bars with YTB x 5 laps   - Seated scap retraction  Supine hip flexor stretch x 2 min B      Francisco received the following manual therapy techniques: Joint mobilizations, Myofacial release and Soft tissue Mobilization were applied to the: knees  for 15 minutes, including:  - Patellar mobilization with flex/ext  - Adductor bundle release  - IT band MFR ironing  - STM hamstring and gastrocnemii  attachments        Home Exercises Provided and Patient Education Provided     Education  provided:   - Continue HEP including stretches and postural corrections    Assessment     Pt tolerated tx well throughout the session. Continue to progress as tolerated.   Francisco is progressing well towards his goals.   Pt prognosis is Good.     Pt will continue to benefit from skilled outpatient physical therapy to address the deficits listed in the problem list box on initial evaluation, provide pt/family education and to maximize pt's level of independence in the home and community environment.     Pt's spiritual, cultural and educational needs considered and pt agreeable to plan of care and goals.    Goals: Short Term Goals (6 Weeks):  - Pt will perform > 5 repetitions during 30 sec sit<>stand test indicating improved transfer tolerance  - Pt will increase BLE strength to >4-/5 for improved tolerance to standing ADLs  - Decrease gross Pain by 50% with ambulation in the home.   - Pt independent with HEP to improve tolerance to exercise progressions.      Long Term Goals (8 Weeks):  - Pt will increase BLE strength to >4/5 for improved tolerance to community ambulation  - Decrease gross Pain by 75% for improved tolerance to standing ADL's   - Pt will report improvement in overall functional abilities, evidenced by improved score on  FOTO to 50% limitation or better.     Plan     Cont. POC    Rodney Shafer, PT

## 2019-09-06 ENCOUNTER — CLINICAL SUPPORT (OUTPATIENT)
Dept: REHABILITATION | Facility: HOSPITAL | Age: 69
End: 2019-09-06
Attending: FAMILY MEDICINE
Payer: MEDICARE

## 2019-09-06 DIAGNOSIS — R26.2 DIFFICULTY WALKING: ICD-10-CM

## 2019-09-06 DIAGNOSIS — M25.561 CHRONIC PAIN OF BOTH KNEES: ICD-10-CM

## 2019-09-06 DIAGNOSIS — M25.562 CHRONIC PAIN OF BOTH KNEES: ICD-10-CM

## 2019-09-06 DIAGNOSIS — R53.1 WEAKNESS: ICD-10-CM

## 2019-09-06 DIAGNOSIS — G89.29 CHRONIC PAIN OF BOTH KNEES: ICD-10-CM

## 2019-09-06 PROCEDURE — 97140 MANUAL THERAPY 1/> REGIONS: CPT | Mod: HCNC,PN

## 2019-09-06 PROCEDURE — 97110 THERAPEUTIC EXERCISES: CPT | Mod: HCNC,PN

## 2019-09-06 NOTE — PROGRESS NOTES
Physical Therapy Daily Treatment Note     Name: Francisco ORONA [facility] Number: [id number]    Therapy Diagnosis:   Encounter Diagnoses   Name Primary?    Chronic pain of both knees     Difficulty walking     Weakness      Physician: Laureano Hebert MD    Visit Date: 9/6/2019  Physician Orders: PT Eval and Treat   Medical Diagnosis: M17.0 (ICD-10-CM) - Primary osteoarthritis of both knees  Date of Surgery: NA     Evaluation Date: 8/16/2019  Authorization Period Expiration: 12/31/2019  Plan of Care Certification Period: 10/31/2019  Visit # / Visits authorized: 6/ 50  FOTO: 6/5 (DONE AS INITIAL)  Gcode:  6/10    Visit Amt: 118.42  Total:    Time In: 1100  Time Out: 1200  Total Billable Time: 60 minutes (3 TE, 1 MT)    Precautions: Standard and Diabetes    Subjective      Pt reports  He forgot to rub his knees with the prescription pain cream before coming to therapy.  Primarily dealing with (R) knee stiffness today.  Response to previous treatment: No adverse effects.  Functional change: none     Pain: 3/10  Location: bilateral knee  Right greater than left    Objective     Francisco received therapeutic exercises to develop strength, endurance, ROM and flexibility for 50 minutes including:   - Nu step x 7 min L2  - Slantboard stretch x 20  - Supine clamshells GTB x 30  - Supine hip abduction RTB 2x10 B  - Supine: straight leg raise 3 x 10 B  - HSS 2'  Bilaterally long sitting  - Knee ext machine 20# 2x20  - SLS 3x30 sec on foam  - Resisted side step in ll bars with YTB x 5 laps    - Seated scap retraction 2x10  - Supine hip flexor stretch x 2 min B      Francisco received the following manual therapy techniques: Joint mobilizations, Myofacial release and Soft tissue Mobilization were applied to the: R knee  for 10 minutes, including:  - Patellar mobilization with flex/ext  - Adductor bundle release  - IT band MFR ironing  - STM hamstring and gastrocnemii  attachments    Home Exercises  "Provided and Patient Education Provided     Education provided:   - Continue HEP including stretches and postural corrections    Assessment     Pt with intermittent right knee pain with completion of therex.  Extensor lag noted B LE with hamstring stretch and SLR.. Complaint of increased right knee pain after session.  Rates "6/10"   Francisco is progressing well towards his goals.   Pt prognosis is Good.     Pt will continue to benefit from skilled outpatient physical therapy to address the deficits listed in the problem list box on initial evaluation, provide pt/family education and to maximize pt's level of independence in the home and community environment.     Pt's spiritual, cultural and educational needs considered and pt agreeable to plan of care and goals.    Goals: Short Term Goals (6 Weeks):  - Pt will perform > 5 repetitions during 30 sec sit<>stand test indicating improved transfer tolerance  (PROGRESSING, NOT MET)  - Pt will increase BLE strength to >4-/5 for improved tolerance to standing ADLs.   (PROGRESSING, NOT MET)  - Decrease gross Pain by 50% with ambulation in the home.  (PROGRESSING, NOT MET)  - Pt independent with HEP to improve tolerance to exercise progressions.  (PROGRESSING, NOT MET)    Long Term Goals (8 Weeks):  - Pt will increase BLE strength to >4/5 for improved tolerance to community ambulation  - Decrease gross Pain by 75% for improved tolerance to standing ADL's   (PROGRESSING, NOT MET)  - Pt will report improvement in overall functional abilities, evidenced by improved score on  FOTO to 50% limitation or better.  (PROGRESSING, NOT MET)      Plan     Cont. POC to progress towards established goals.    Gloria Foley PTA   "

## 2019-09-11 ENCOUNTER — CLINICAL SUPPORT (OUTPATIENT)
Dept: REHABILITATION | Facility: HOSPITAL | Age: 69
End: 2019-09-11
Attending: FAMILY MEDICINE
Payer: MEDICARE

## 2019-09-11 DIAGNOSIS — M25.562 CHRONIC PAIN OF BOTH KNEES: ICD-10-CM

## 2019-09-11 DIAGNOSIS — R53.1 WEAKNESS: ICD-10-CM

## 2019-09-11 DIAGNOSIS — G89.29 CHRONIC PAIN OF BOTH KNEES: ICD-10-CM

## 2019-09-11 DIAGNOSIS — R26.2 DIFFICULTY WALKING: ICD-10-CM

## 2019-09-11 DIAGNOSIS — M25.561 CHRONIC PAIN OF BOTH KNEES: ICD-10-CM

## 2019-09-11 PROCEDURE — 97110 THERAPEUTIC EXERCISES: CPT | Mod: HCNC,PN

## 2019-09-11 PROCEDURE — 97140 MANUAL THERAPY 1/> REGIONS: CPT | Mod: HCNC,PN

## 2019-09-11 NOTE — PROGRESS NOTES
"                            Physical Therapy Daily Treatment Note     Name: Francisco ORONA [facility] Number: [id number]    Therapy Diagnosis:   Encounter Diagnoses   Name Primary?    Chronic pain of both knees     Difficulty walking     Weakness      Physician: Laureano Hebert MD    Visit Date: 9/11/2019  Physician Orders: PT Eval and Treat   Medical Diagnosis: M17.0 (ICD-10-CM) - Primary osteoarthritis of both knees  Date of Surgery: NA     Evaluation Date: 8/16/2019  Authorization Period Expiration: 12/31/2019  Plan of Care Certification Period: 10/31/2019  Visit # / Visits authorized: 7/ 50  FOTO: 7/5 (DONE AS INITIAL)  Gcode:  7/10    Visit Amt:  57.78  Total:      179.06     Time In: 1300  Time Out: 1355  Total Billable Time: 30 minutes (1 TE, 1 MT)    Precautions: Standard and Diabetes    Subjective      Pt reports  He was sick all weekend "I really didn't want to come today I still feel weak., achy pain - tired"  Response to previous treatment: No adverse effects.  Functional change: none     Pain: 3/10  Location: bilateral knee  Right greater than left    Objective     Francisco received therapeutic exercises to develop strength, endurance, ROM and flexibility for 50 minutes including:   - Nu step x 7 min L2  - Slantboard stretch 3x30"  - Supine clamshells GTB x 30  - Supine hip abduction RTB 2x10 B  - Supine: straight leg raise 3 x 10 B  - HSS 2'  Bilaterally long sitting  - Knee ext machine 20# 2x20  - SLS 3x30 sec on foam  - Resisted side step in ll bars with YTB x 5 laps light touch B support   - Seated scap retraction 2x10  - Supine hip flexor stretch x 2 min B      Francisco received the following manual therapy techniques: Joint mobilizations, Myofacial release and Soft tissue Mobilization were applied to the: R knee  for 10 minutes, including:  - Patellar mobilization with flex/ext  - Adductor bundle release  - IT band MFR ironing  - STM hamstring and gastrocnemii  attachments    Home Exercises " Provided and Patient Education Provided     Education provided:   - Continue HEP including stretches and postural corrections    Assessment     Pt with intermittent right knee pain with completion of therex.  Extensor lag noted B LE with hamstring stretch and SLR.. Complaint of increased right knee pain after session, but able to complete entire program despite initial complaints of fatigue and not feeling well.   Francisco is progressing well towards his goals.   Pt prognosis is Good.     Pt will continue to benefit from skilled outpatient physical therapy to address the deficits listed in the problem list box on initial evaluation, provide pt/family education and to maximize pt's level of independence in the home and community environment.     Pt's spiritual, cultural and educational needs considered and pt agreeable to plan of care and goals.    Goals: Short Term Goals (6 Weeks):  - Pt will perform > 5 repetitions during 30 sec sit<>stand test indicating improved transfer tolerance  (PROGRESSING, NOT MET)  - Pt will increase BLE strength to >4-/5 for improved tolerance to standing ADLs.   (PROGRESSING, NOT MET)  - Decrease gross Pain by 50% with ambulation in the home.  (PROGRESSING, NOT MET)  - Pt independent with HEP to improve tolerance to exercise progressions.  (PROGRESSING, NOT MET)    Long Term Goals (8 Weeks):  - Pt will increase BLE strength to >4/5 for improved tolerance to community ambulation  - Decrease gross Pain by 75% for improved tolerance to standing ADL's   (PROGRESSING, NOT MET)  - Pt will report improvement in overall functional abilities, evidenced by improved score on  FOTO to 50% limitation or better.  (PROGRESSING, NOT MET)      Plan     Cont. POC to progress towards established goals.    Gloria Foley, PTA

## 2019-09-18 ENCOUNTER — CLINICAL SUPPORT (OUTPATIENT)
Dept: REHABILITATION | Facility: HOSPITAL | Age: 69
End: 2019-09-18
Attending: FAMILY MEDICINE
Payer: MEDICARE

## 2019-09-18 DIAGNOSIS — R26.2 DIFFICULTY WALKING: ICD-10-CM

## 2019-09-18 DIAGNOSIS — G89.29 CHRONIC PAIN OF BOTH KNEES: ICD-10-CM

## 2019-09-18 DIAGNOSIS — M25.561 CHRONIC PAIN OF BOTH KNEES: ICD-10-CM

## 2019-09-18 DIAGNOSIS — M25.562 CHRONIC PAIN OF BOTH KNEES: ICD-10-CM

## 2019-09-18 DIAGNOSIS — R53.1 WEAKNESS: ICD-10-CM

## 2019-09-18 PROCEDURE — 97110 THERAPEUTIC EXERCISES: CPT | Mod: HCNC,PN

## 2019-09-18 PROCEDURE — 97140 MANUAL THERAPY 1/> REGIONS: CPT | Mod: HCNC,PN

## 2019-09-18 NOTE — PROGRESS NOTES
"                            Physical Therapy Daily Treatment Note     Name: Francisco ORONA [facility] Number: [id number]    Therapy Diagnosis:   Encounter Diagnoses   Name Primary?    Chronic pain of both knees     Difficulty walking     Weakness      Physician: Laureano Hebert MD    Visit Date: 9/18/2019  Physician Orders: PT Eval and Treat   Medical Diagnosis: M17.0 (ICD-10-CM) - Primary osteoarthritis of both knees  Date of Surgery: NA     Evaluation Date: 8/16/2019  Authorization Period Expiration: 12/31/2019  Plan of Care Certification Period: 10/31/2019  Visit # / Visits authorized: 8/ 50  FOTO: 8/5 (DONE AS INITIAL)  Gcode:  8/10    Visit Amt:  88.10  Total:      267.16    Time In: 1300  Time Out: 1355  Total Billable Time: 45 minutes (2 TE, 1 MT)    Precautions: Standard and Diabetes    Subjective      Pt reports  " Im feeling better than the weekend" pt agreeable to PT session. Reports most of his R knee pain is located in medial/ lateral post knee.   Response to previous treatment: No adverse effects.  Functional change: none     Pain: 3/10  Location: bilateral knee  Right greater than left    Objective     Francisco received therapeutic exercises to develop strength, endurance, ROM and flexibility for under supervision x15 min, then 1;1 w/ PTA for 35 minutes including:   - Nu step x 7 min L2  - Slantboard stretch 3x30"  - Supine clamshells GTB x 30  - Supine hip abduction RTB 2x10 B  - Supine: straight leg raise 3 x 10 B  - HSS 2'  Bilaterally long sitting  - Knee ext machine 20# 2x20  - SLS 3x30 sec on foam  - Resisted side step in ll bars with YTB x 5 laps light touch B support   - Seated scap retraction 2x10  - Supine hip flexor stretch. (Not completed today)      Francisco received the following manual therapy techniques: Joint mobilizations, Myofacial release and Soft tissue Mobilization were applied to the: R knee  for 10 minutes, including:  - Patellar mobilization with flex/ext  - STM to " general medial/ lateral R Knee.   - IT band MFR ironing  - STM hamstring and gastrocnemii mm attachments    Home Exercises Provided and Patient Education Provided     Education provided:   - Continue HEP including stretches and postural corrections within tolerance.     Assessment     Pt reports most of his tenderness with palpation located in R posterior medial/ lateral knee during manual therapy. He was able to complete pT session with no adverse reactions.   Francisco is progressing well towards his goals.   Pt prognosis is Good.     Pt will continue to benefit from skilled outpatient physical therapy to address the deficits listed in the problem list box on initial evaluation, provide pt/family education and to maximize pt's level of independence in the home and community environment.     Pt's spiritual, cultural and educational needs considered and pt agreeable to plan of care and goals.    Goals: Short Term Goals (6 Weeks):  - Pt will perform > 5 repetitions during 30 sec sit<>stand test indicating improved transfer tolerance  (PROGRESSING, NOT MET)  - Pt will increase BLE strength to >4-/5 for improved tolerance to standing ADLs.   (PROGRESSING, NOT MET)  - Decrease gross Pain by 50% with ambulation in the home.  (PROGRESSING, NOT MET)  - Pt independent with HEP to improve tolerance to exercise progressions.  (PROGRESSING, NOT MET)    Long Term Goals (8 Weeks):  - Pt will increase BLE strength to >4/5 for improved tolerance to community ambulation  - Decrease gross Pain by 75% for improved tolerance to standing ADL's   (PROGRESSING, NOT MET)  - Pt will report improvement in overall functional abilities, evidenced by improved score on  FOTO to 50% limitation or better.  (PROGRESSING, NOT MET)      Plan     Cont. POC to progress towards established goals.    Cezar Carrillo, PTA

## 2019-09-20 ENCOUNTER — CLINICAL SUPPORT (OUTPATIENT)
Dept: REHABILITATION | Facility: HOSPITAL | Age: 69
End: 2019-09-20
Attending: FAMILY MEDICINE
Payer: MEDICARE

## 2019-09-20 DIAGNOSIS — R26.2 DIFFICULTY WALKING: ICD-10-CM

## 2019-09-20 DIAGNOSIS — M25.562 CHRONIC PAIN OF BOTH KNEES: ICD-10-CM

## 2019-09-20 DIAGNOSIS — R53.1 WEAKNESS: ICD-10-CM

## 2019-09-20 DIAGNOSIS — M25.561 CHRONIC PAIN OF BOTH KNEES: ICD-10-CM

## 2019-09-20 DIAGNOSIS — G89.29 CHRONIC PAIN OF BOTH KNEES: ICD-10-CM

## 2019-09-20 PROCEDURE — 97140 MANUAL THERAPY 1/> REGIONS: CPT | Mod: HCNC,PN

## 2019-09-20 PROCEDURE — 97110 THERAPEUTIC EXERCISES: CPT | Mod: HCNC,PN

## 2019-09-20 NOTE — PROGRESS NOTES
"                            Physical Therapy Daily Treatment Note     Name: Francisco ORONA [facility] Number: [id number]    Therapy Diagnosis:   Encounter Diagnoses   Name Primary?    Chronic pain of both knees     Difficulty walking     Weakness      Physician: Laureano Hebert MD    Visit Date: 9/20/2019  Physician Orders: PT Eval and Treat   Medical Diagnosis: M17.0 (ICD-10-CM) - Primary osteoarthritis of both knees  Date of Surgery: NA     Evaluation Date: 8/16/2019  Authorization Period Expiration: 12/31/2019  Plan of Care Certification Period: 10/31/2019  Visit # / Visits authorized: 9/ 50  FOTO: 9/10  Gcode:  9/10  Visit Amt: 115.43  Total: 382.59    Time In: 1000  Time Out: 1100  Total Billable Time: 55 minutes (3 TE, 1MT)  Precautions: Standard and Diabetes    Subjective      Pt reports: that his R knee is 'giving me more trouble today'.  Reports anterior knee pain on the right mostly when walking.   Response to previous treatment: No adverse effects.  Functional change: none  Pain: 5/10  Location: bilateral knee  Right greater than left    Objective     Francisco received therapeutic exercises to develop strength, endurance, ROM and flexibility with PT 1:1 including assessment total time 40  - Nu step     x 7 min L2  - Slantboard stretch    3x30"  - HSS stretch at stairs  5x10 seconds B  - Supine clamshells    GTB x 30  - Supine hip abduction   GTB 2x10 B  - Supine: straight leg raise   3 x 10 B  - HSS      2'  Bilaterally long sitting  - Knee ext machine    Not performed today  - SLS      3x30 sec on foam  - Resisted side step in ll bars with YTB x 5 laps light touch B support   - Seated scap retraction 2x10  - Supine hip flexor stretch. (Not completed today)      Francisco received the following manual therapy techniques: Joint mobilizations, Myofacial release and Soft tissue Mobilization were applied to the: R knee  for 15 minutes, including:  - Patellar mobilization with flex/ext R  - STM to general " medial/ lateral R Knee.   - IT band MFR ironing R  - STM hamstring and gastrocnemii mm attachments R    Home Exercises Provided and Patient Education Provided     Education provided:   - Continue HEP including stretches and postural corrections within tolerance.     Assessment   A: Tolerated session well with mild increase in R anterior knee pain with certain activities; return to baseline with rest.  B knee and hip flexion contractures.     Francisco is progressing well towards his goals.   Pt prognosis is Good.     Pt will continue to benefit from skilled outpatient physical therapy to address the deficits listed in the problem list box on initial evaluation, provide pt/family education and to maximize pt's level of independence in the home and community environment.     Pt's spiritual, cultural and educational needs considered and pt agreeable to plan of care and goals.    Goals: Short Term Goals (6 Weeks):  - Pt will perform > 5 repetitions during 30 sec sit<>stand test indicating improved transfer tolerance  (PROGRESSING, NOT MET)  - Pt will increase BLE strength to >4-/5 for improved tolerance to standing ADLs.   (PROGRESSING, NOT MET)  - Decrease gross Pain by 50% with ambulation in the home.  (PROGRESSING, NOT MET)  - Pt independent with HEP to improve tolerance to exercise progressions.  (PROGRESSING, NOT MET)    Long Term Goals (8 Weeks):  - Pt will increase BLE strength to >4/5 for improved tolerance to community ambulation  - Decrease gross Pain by 75% for improved tolerance to standing ADL's   (PROGRESSING, NOT MET)  - Pt will report improvement in overall functional abilities, evidenced by improved score on  FOTO to 50% limitation or better.  (PROGRESSING, NOT MET)      Plan     Cont. POC to progress towards established goals.    Mario Shafer, PT

## 2019-09-27 ENCOUNTER — CLINICAL SUPPORT (OUTPATIENT)
Dept: REHABILITATION | Facility: HOSPITAL | Age: 69
End: 2019-09-27
Attending: FAMILY MEDICINE
Payer: MEDICARE

## 2019-09-27 DIAGNOSIS — R26.2 DIFFICULTY WALKING: ICD-10-CM

## 2019-09-27 DIAGNOSIS — M25.562 CHRONIC PAIN OF BOTH KNEES: ICD-10-CM

## 2019-09-27 DIAGNOSIS — M25.561 CHRONIC PAIN OF BOTH KNEES: ICD-10-CM

## 2019-09-27 DIAGNOSIS — G89.29 CHRONIC PAIN OF BOTH KNEES: ICD-10-CM

## 2019-09-27 DIAGNOSIS — R53.1 WEAKNESS: ICD-10-CM

## 2019-09-27 PROCEDURE — 97110 THERAPEUTIC EXERCISES: CPT | Mod: HCNC,PN

## 2019-09-27 PROCEDURE — 97140 MANUAL THERAPY 1/> REGIONS: CPT | Mod: HCNC,PN

## 2019-09-27 NOTE — PROGRESS NOTES
"                            Physical Therapy Daily Treatment Note     Name: Francisco ORONA [facility] Number: [id number]    Therapy Diagnosis:   Encounter Diagnoses   Name Primary?    Chronic pain of both knees     Difficulty walking     Weakness      Physician: Laureano Hebert MD    Visit Date: 9/27/2019  Physician Orders: PT Eval and Treat   Medical Diagnosis: M17.0 (ICD-10-CM) - Primary osteoarthritis of both knees  Date of Surgery: NA     Evaluation Date: 8/16/2019  Authorization Period Expiration: 12/31/2019  Plan of Care Certification Period: 10/31/2019  Visit # / Visits authorized: 10/ 50  FOTO: 1/10 DONE  Gcode:  10/10    Visit Amt: 115.43  Total: 498.02    Time In: 1000  Time Out: 1100  Total Billable Time: 55 minutes (3 TE, 1MT)  Precautions: Standard and Diabetes    Subjective      Pt reports: " Im not feeling the best today, I feel very weak and under the weather."  Response to previous treatment: No adverse effects.  Functional change: none  Pain: 5/10  Location: bilateral knee  Right greater than left    Objective     Francisco received therapeutic exercises to develop strength, endurance, ROM and flexibility with PT 1:1 including assessment total time 40  - Nu step     x 7 min L2  - Slantboard stretch    3x30"  - HSS stretch at stairs  5x10 seconds B  - Supine clamshells    GTB x 30  - Supine hip abduction   GTB 2x10 B  - Supine: straight leg raise   3 x 10 B  - HSS      2'  Bilaterally long sitting  - Knee ext machine    Not performed today  - SLS      3x30 sec on foam  - Resisted side step in ll bars with YTB x 5 laps light touch B support   - Seated scap retraction 2x10  - Supine hip flexor stretch. (Not completed today)      Francisco received the following manual therapy techniques: Joint mobilizations, Myofacial release and Soft tissue Mobilization were applied to the: R knee  for 15 minutes, including:  - Patellar mobilization with flex/ext R  - STM to general medial/ lateral R Knee.   - IT " "band MFR ironing R  - STM hamstring and gastrocnemii mm attachments R  -IASTM Hawk's  to quadriceps/ IT band  Home Exercises Provided and Patient Education Provided     Education provided:   - Continue HEP including stretches and postural corrections within tolerance.     Assessment   Pt tolerated session with no reports of increased pain in R knee. He completed standing activities with limited tolerance to standing due to feeling "ill".     Francisco is progressing well towards his goals.   Pt prognosis is Good.     Pt will continue to benefit from skilled outpatient physical therapy to address the deficits listed in the problem list box on initial evaluation, provide pt/family education and to maximize pt's level of independence in the home and community environment.     Pt's spiritual, cultural and educational needs considered and pt agreeable to plan of care and goals.    Goals: Short Term Goals (6 Weeks):  - Pt will perform > 5 repetitions during 30 sec sit<>stand test indicating improved transfer tolerance  (PROGRESSING, NOT MET)  - Pt will increase BLE strength to >4-/5 for improved tolerance to standing ADLs.   (PROGRESSING, NOT MET)  - Decrease gross Pain by 50% with ambulation in the home.  (PROGRESSING, NOT MET)  - Pt independent with HEP to improve tolerance to exercise progressions.  (PROGRESSING, NOT MET)    Long Term Goals (8 Weeks):  - Pt will increase BLE strength to >4/5 for improved tolerance to community ambulation  - Decrease gross Pain by 75% for improved tolerance to standing ADL's   (PROGRESSING, NOT MET)  - Pt will report improvement in overall functional abilities, evidenced by improved score on  FOTO to 50% limitation or better.  (PROGRESSING, NOT MET)      Plan     Cont. POC to progress towards established goals.    Cezar Carrillo PTA   "

## 2019-10-02 ENCOUNTER — CLINICAL SUPPORT (OUTPATIENT)
Dept: REHABILITATION | Facility: HOSPITAL | Age: 69
End: 2019-10-02
Attending: FAMILY MEDICINE
Payer: MEDICARE

## 2019-10-02 DIAGNOSIS — M25.562 CHRONIC PAIN OF BOTH KNEES: ICD-10-CM

## 2019-10-02 DIAGNOSIS — R26.2 DIFFICULTY WALKING: ICD-10-CM

## 2019-10-02 DIAGNOSIS — G89.29 CHRONIC PAIN OF BOTH KNEES: ICD-10-CM

## 2019-10-02 DIAGNOSIS — M25.561 CHRONIC PAIN OF BOTH KNEES: ICD-10-CM

## 2019-10-02 DIAGNOSIS — R53.1 WEAKNESS: ICD-10-CM

## 2019-10-02 PROCEDURE — 97140 MANUAL THERAPY 1/> REGIONS: CPT | Mod: HCNC,PN

## 2019-10-02 PROCEDURE — 97110 THERAPEUTIC EXERCISES: CPT | Mod: HCNC,PN

## 2019-10-02 NOTE — PROGRESS NOTES
Physical Therapy Daily Treatment Note     Name: Francisco ORONA [facility] Number: [id number]    Therapy Diagnosis:   Encounter Diagnoses   Name Primary?    Chronic pain of both knees     Difficulty walking     Weakness      Physician: Laureano Hebert MD    Visit Date: 10/2/2019  Physician Orders: PT Eval and Treat   Medical Diagnosis: M17.0 (ICD-10-CM) - Primary osteoarthritis of both knees  Date of Surgery: NA     Evaluation Date: 8/16/2019  Authorization Period Expiration: 12/31/2019  Plan of Care Certification Period: 10/31/2019  Visit # / Visits authorized: 11/ 50  FOTO: 1/10 DONE  Gcode:  11;/10    Visit Amt: 54.78  Total: 552.80    Time In: 1000  Time Out: 1100  Total Billable Time: 55 minutes (1 TE, 1MT) 30 Min group  Precautions: Standard and Diabetes    Subjective      Pt reports feeling better this week than last. Slight improvement with regard to pain in the knees. Notes taht they do feel more stable. Continues to report tightness in the gastroc region.  Response to previous treatment: No adverse effects.  Functional change: none  Pain: 5/10  Location: bilateral knee  Right greater than left    Objective     Francisco received therapeutic exercises to develop strength, endurance, ROM and flexibility with PT 1:1 including assessment total time 40  - Nu step     x 7 min L2  - Eccentric gastroc lowering  X 20  - HSS stretch at stairs  5x10 seconds B  - Supine clamshells    GTB x 30  - Supine hip abduction   GTB 2x10 B  - Supine: straight leg raise   3 x 10 B  - HSS      2'  Bilaterally long sitting  - SLS      3x30 sec on foam  - Resisted side step in ll bars with YTB x 5 laps light touch B support   - Step up L1 x 20 B      Francisco received the following manual therapy techniques: Joint mobilizations, Myofacial release and Soft tissue Mobilization were applied to the: R knee  for 15 minutes, including:  - Patellar mobilization with flex/ext R  - STM to general medial/  per Mother "She was diagnosed with strep throat and has been on antibiotics but shes complaining of throat pain" Patient denies throat pain in triage. lateral R Knee.   - IT band MFR ironing R  - STM hamstring and gastrocnemii mm attachments R  -IASTM Hawk's  to quadriceps/ IT band: Not performed today  Home Exercises Provided and Patient Education Provided     Education provided:   - Continue HEP including stretches and postural corrections within tolerance.     Assessment   Pt was fatigued post tx session but was compliant with all activity. Difficulty with step up to a normal stair but adjusted well with decreased stair height. PT/pt discussed POC. Pt is plateuing at this point and is getting more comfortable with his exercise activity. Plan will be to tx pt for his remaining scheduled appointments and d/c at that time with a comprehensive HEP. Pt agrees to plan.    Francisco is progressing well towards his goals.   Pt prognosis is Good.     Pt will continue to benefit from skilled outpatient physical therapy to address the deficits listed in the problem list box on initial evaluation, provide pt/family education and to maximize pt's level of independence in the home and community environment.     Pt's spiritual, cultural and educational needs considered and pt agreeable to plan of care and goals.    Goals: Short Term Goals (6 Weeks):  - Pt will perform > 5 repetitions during 30 sec sit<>stand test indicating improved transfer tolerance  (PROGRESSING, NOT MET)  - Pt will increase BLE strength to >4-/5 for improved tolerance to standing ADLs.   (PROGRESSING, NOT MET)  - Decrease gross Pain by 50% with ambulation in the home.  (PROGRESSING, NOT MET)  - Pt independent with HEP to improve tolerance to exercise progressions.  (PROGRESSING, NOT MET)    Long Term Goals (8 Weeks):  - Pt will increase BLE strength to >4/5 for improved tolerance to community ambulation  - Decrease gross Pain by 75% for improved tolerance to standing ADL's   (PROGRESSING, NOT MET)  - Pt will report improvement in overall functional abilities, evidenced by improved score on  FOTO to 50%  limitation or better.  (PROGRESSING, NOT MET)      Plan     Cont. POC to progress towards established goals.    Rodney Shafer, PT

## 2019-10-04 ENCOUNTER — CLINICAL SUPPORT (OUTPATIENT)
Dept: REHABILITATION | Facility: HOSPITAL | Age: 69
End: 2019-10-04
Attending: FAMILY MEDICINE
Payer: MEDICARE

## 2019-10-04 DIAGNOSIS — M25.561 CHRONIC PAIN OF BOTH KNEES: ICD-10-CM

## 2019-10-04 DIAGNOSIS — G89.29 CHRONIC PAIN OF BOTH KNEES: ICD-10-CM

## 2019-10-04 DIAGNOSIS — R53.1 WEAKNESS: ICD-10-CM

## 2019-10-04 DIAGNOSIS — M25.562 CHRONIC PAIN OF BOTH KNEES: ICD-10-CM

## 2019-10-04 DIAGNOSIS — R26.2 DIFFICULTY WALKING: ICD-10-CM

## 2019-10-04 PROCEDURE — 97140 MANUAL THERAPY 1/> REGIONS: CPT | Mod: HCNC,PN

## 2019-10-04 PROCEDURE — 97110 THERAPEUTIC EXERCISES: CPT | Mod: HCNC,PN

## 2019-10-11 ENCOUNTER — CLINICAL SUPPORT (OUTPATIENT)
Dept: REHABILITATION | Facility: HOSPITAL | Age: 69
End: 2019-10-11
Attending: FAMILY MEDICINE
Payer: MEDICARE

## 2019-10-11 DIAGNOSIS — M25.561 CHRONIC PAIN OF BOTH KNEES: ICD-10-CM

## 2019-10-11 DIAGNOSIS — M25.562 CHRONIC PAIN OF BOTH KNEES: ICD-10-CM

## 2019-10-11 DIAGNOSIS — R53.1 WEAKNESS: ICD-10-CM

## 2019-10-11 DIAGNOSIS — G89.29 CHRONIC PAIN OF BOTH KNEES: ICD-10-CM

## 2019-10-11 DIAGNOSIS — R26.2 DIFFICULTY WALKING: ICD-10-CM

## 2019-10-11 PROCEDURE — 97110 THERAPEUTIC EXERCISES: CPT | Mod: HCNC,PN

## 2019-10-11 PROCEDURE — 97140 MANUAL THERAPY 1/> REGIONS: CPT | Mod: HCNC,PN

## 2019-10-11 NOTE — PROGRESS NOTES
Physical Therapy Daily Treatment Note     Name: Francisco ORONA [facility] Number: [id number]    Therapy Diagnosis:   Encounter Diagnoses   Name Primary?    Chronic pain of both knees     Difficulty walking     Weakness      Physician: Laureano Hebert MD    Visit Date: 10/11/2019    Physician Orders: PT Eval and Treat   Medical Diagnosis: M17.0 (ICD-10-CM) - Primary osteoarthritis of both knees  Date of Surgery: NA     Evaluation Date: 8/16/2019  Authorization Period Expiration: 12/31/2019  Plan of Care Certification Period: 10/31/2019  Visit # / Visits authorized: 13/ 50  FOTO: 3/10   PTA visit: 2/6  Gcode:  13/20    Visit Amt: 57.78  Total: 638.36     Time In: 1000  Time Out: 1055  Total Billable Time: 25 minutes (1 TE, 1MT)   Precautions: Standard and Diabetes    Subjective     Pt reports: he is feeling a little better.  Pain level increases with certain activities  He was compliant with home exercise program.  Response to previous treatment: no adverse reaction  Functional change: none    Pain: 3/10  Location: bilateral knee      Objective     Francisco received the following manual therapy techniques: Myofacial release, Soft tissue Mobilization and patellar mobs were applied to the: right knee for 10 minutes, including:    - Patellar mobilization with flex/ext R  - STM to general medial/ lateral R Knee.   - IT band MFR ironing R  - STM hamstring and gastrocnemii mm attachments R  -IASTM Hawk's  to quadriceps/ IT band: Not performed today    Francisco received therapeutic exercises to develop strength, ROM and flexibility for 45 minutes including:    - Nu step                                             x 7 min L2  - Eccentric gastroc lowering               X 20  - HSS stretch at stairs             5x10 seconds B  - Supine clamshells                             GTB x 30  - Supine hip abduction                        GTB 2x10 B  - Supine: straight leg raise                  3 x 10 B  - HSS  "                                                  3x30" B  - SLS                                                   3x30 sec on foam  - Resisted side step in ll bars with YTB x 5 laps light touch B support   - Step up L1 x 20 B    Home Exercises Provided and Patient Education Provided     Education provided:   - cont HEP regularly to maximize therapy benefits    Written Home Exercises Provided: Patient instructed to cont prior HEP.  Exercises were reviewed and Francisco was able to demonstrate them prior to the end of the session.  Francisco demonstrated good  understanding of the education provided.       Assessment     Pt tolerates therapy activities without difficulties or c/o increased pain  Francisco is progressing well towards his goals.   Pt prognosis is Excellent.     Pt will continue to benefit from skilled outpatient physical therapy to address the deficits listed in the problem list box on initial evaluation, provide pt/family education and to maximize pt's level of independence in the home and community environment.     Pt's spiritual, cultural and educational needs considered and pt agreeable to plan of care and goals.    Anticipated barriers to physical therapy: Age, DJD, DM    Goals:   Short Term Goals (6 Weeks):  - Pt will perform > 5 repetitions during 30 sec sit<>stand test indicating improved transfer tolerance  (PROGRESSING, NOT MET)  - Pt will increase BLE strength to >4-/5 for improved tolerance to standing ADLs.   (PROGRESSING, NOT MET)  - Decrease gross Pain by 50% with ambulation in the home.  (PROGRESSING, NOT MET)  - Pt independent with HEP to improve tolerance to exercise progressions.  (PROGRESSING, NOT MET)     Long Term Goals (8 Weeks):  - Pt will increase BLE strength to >4/5 for improved tolerance to community ambulation  - Decrease gross Pain by 75% for improved tolerance to standing ADL's   (PROGRESSING, NOT MET)  - Pt will report improvement in overall functional abilities, evidenced by " improved score on  FOTO to 50% limitation or better.  (PROGRESSING, NOT MET)    Plan     Cont POC to progress towards established goals    Meghan Barbosa, PTA

## 2019-11-04 ENCOUNTER — PATIENT OUTREACH (OUTPATIENT)
Dept: ADMINISTRATIVE | Facility: HOSPITAL | Age: 69
End: 2019-11-04

## 2019-11-04 ENCOUNTER — DOCUMENTATION ONLY (OUTPATIENT)
Dept: ADMINISTRATIVE | Facility: HOSPITAL | Age: 69
End: 2019-11-04

## 2019-11-07 ENCOUNTER — LAB VISIT (OUTPATIENT)
Dept: LAB | Facility: HOSPITAL | Age: 69
End: 2019-11-07
Attending: FAMILY MEDICINE
Payer: MEDICARE

## 2019-11-07 ENCOUNTER — TELEPHONE (OUTPATIENT)
Dept: FAMILY MEDICINE | Facility: CLINIC | Age: 69
End: 2019-11-07

## 2019-11-07 DIAGNOSIS — I15.2 HYPERTENSION ASSOCIATED WITH DIABETES: ICD-10-CM

## 2019-11-07 DIAGNOSIS — E11.69 HYPERLIPIDEMIA ASSOCIATED WITH TYPE 2 DIABETES MELLITUS: ICD-10-CM

## 2019-11-07 DIAGNOSIS — E66.01 SEVERE OBESITY (BMI 35.0-39.9) WITH COMORBIDITY: ICD-10-CM

## 2019-11-07 DIAGNOSIS — E78.5 HYPERLIPIDEMIA ASSOCIATED WITH TYPE 2 DIABETES MELLITUS: ICD-10-CM

## 2019-11-07 DIAGNOSIS — E11.59 HYPERTENSION ASSOCIATED WITH DIABETES: ICD-10-CM

## 2019-11-07 DIAGNOSIS — E11.42 DIABETIC POLYNEUROPATHY ASSOCIATED WITH TYPE 2 DIABETES MELLITUS: ICD-10-CM

## 2019-11-07 DIAGNOSIS — E11.22 TYPE 2 DIABETES MELLITUS WITH STAGE 3 CHRONIC KIDNEY DISEASE, WITHOUT LONG-TERM CURRENT USE OF INSULIN: ICD-10-CM

## 2019-11-07 DIAGNOSIS — N18.30 TYPE 2 DIABETES MELLITUS WITH STAGE 3 CHRONIC KIDNEY DISEASE, WITHOUT LONG-TERM CURRENT USE OF INSULIN: ICD-10-CM

## 2019-11-07 LAB
ALBUMIN SERPL BCP-MCNC: 3.7 G/DL (ref 3.5–5.2)
ALP SERPL-CCNC: 72 U/L (ref 55–135)
ALT SERPL W/O P-5'-P-CCNC: 34 U/L (ref 10–44)
ANION GAP SERPL CALC-SCNC: 10 MMOL/L (ref 8–16)
AST SERPL-CCNC: 33 U/L (ref 10–40)
BILIRUB SERPL-MCNC: 1 MG/DL (ref 0.1–1)
BUN SERPL-MCNC: 29 MG/DL (ref 8–23)
CALCIUM SERPL-MCNC: 10 MG/DL (ref 8.7–10.5)
CHLORIDE SERPL-SCNC: 99 MMOL/L (ref 95–110)
CHOLEST SERPL-MCNC: 157 MG/DL (ref 120–199)
CHOLEST/HDLC SERPL: 4.5 {RATIO} (ref 2–5)
CO2 SERPL-SCNC: 29 MMOL/L (ref 23–29)
CREAT SERPL-MCNC: 1.4 MG/DL (ref 0.5–1.4)
EST. GFR  (AFRICAN AMERICAN): 58 ML/MIN/1.73 M^2
EST. GFR  (NON AFRICAN AMERICAN): 51 ML/MIN/1.73 M^2
ESTIMATED AVG GLUCOSE: 324 MG/DL (ref 68–131)
GLUCOSE SERPL-MCNC: 350 MG/DL (ref 70–110)
HBA1C MFR BLD HPLC: 12.9 % (ref 4–5.6)
HDLC SERPL-MCNC: 35 MG/DL (ref 40–75)
HDLC SERPL: 22.3 % (ref 20–50)
LDLC SERPL CALC-MCNC: 80 MG/DL (ref 63–159)
NONHDLC SERPL-MCNC: 122 MG/DL
POTASSIUM SERPL-SCNC: 4.1 MMOL/L (ref 3.5–5.1)
PROT SERPL-MCNC: 7.3 G/DL (ref 6–8.4)
SODIUM SERPL-SCNC: 138 MMOL/L (ref 136–145)
TRIGL SERPL-MCNC: 210 MG/DL (ref 30–150)

## 2019-11-07 PROCEDURE — 36415 COLL VENOUS BLD VENIPUNCTURE: CPT | Mod: HCNC

## 2019-11-07 PROCEDURE — 83036 HEMOGLOBIN GLYCOSYLATED A1C: CPT | Mod: HCNC

## 2019-11-07 PROCEDURE — 80061 LIPID PANEL: CPT | Mod: HCNC

## 2019-11-07 PROCEDURE — 80053 COMPREHEN METABOLIC PANEL: CPT | Mod: HCNC

## 2019-11-07 NOTE — TELEPHONE ENCOUNTER
Patient advised that labs showed diabetes not controlled.  Patient scheduled for 11/11/2019 at 11:20 to review with Dr Hebert. Patient verbalized understanding.

## 2019-11-11 ENCOUNTER — OFFICE VISIT (OUTPATIENT)
Dept: FAMILY MEDICINE | Facility: CLINIC | Age: 69
End: 2019-11-11
Payer: MEDICARE

## 2019-11-11 VITALS
TEMPERATURE: 98 F | OXYGEN SATURATION: 94 % | SYSTOLIC BLOOD PRESSURE: 128 MMHG | BODY MASS INDEX: 36.18 KG/M2 | WEIGHT: 244.25 LBS | HEIGHT: 69 IN | DIASTOLIC BLOOD PRESSURE: 82 MMHG | HEART RATE: 107 BPM

## 2019-11-11 DIAGNOSIS — E11.69 HYPERLIPIDEMIA ASSOCIATED WITH TYPE 2 DIABETES MELLITUS: ICD-10-CM

## 2019-11-11 DIAGNOSIS — E78.5 HYPERLIPIDEMIA ASSOCIATED WITH TYPE 2 DIABETES MELLITUS: ICD-10-CM

## 2019-11-11 DIAGNOSIS — E11.22 TYPE 2 DIABETES MELLITUS WITH STAGE 3 CHRONIC KIDNEY DISEASE, WITHOUT LONG-TERM CURRENT USE OF INSULIN: ICD-10-CM

## 2019-11-11 DIAGNOSIS — E11.65 UNCONTROLLED TYPE 2 DIABETES MELLITUS WITH HYPERGLYCEMIA: Primary | ICD-10-CM

## 2019-11-11 DIAGNOSIS — E11.59 HYPERTENSION ASSOCIATED WITH DIABETES: ICD-10-CM

## 2019-11-11 DIAGNOSIS — I15.2 HYPERTENSION ASSOCIATED WITH DIABETES: ICD-10-CM

## 2019-11-11 DIAGNOSIS — N18.30 TYPE 2 DIABETES MELLITUS WITH STAGE 3 CHRONIC KIDNEY DISEASE, WITHOUT LONG-TERM CURRENT USE OF INSULIN: ICD-10-CM

## 2019-11-11 DIAGNOSIS — E11.42 DIABETIC POLYNEUROPATHY ASSOCIATED WITH TYPE 2 DIABETES MELLITUS: ICD-10-CM

## 2019-11-11 PROCEDURE — 3074F SYST BP LT 130 MM HG: CPT | Mod: HCNC,CPTII,S$GLB, | Performed by: FAMILY MEDICINE

## 2019-11-11 PROCEDURE — G0008 FLU VACCINE - HIGH DOSE (65+) PRESERVATIVE FREE IM: ICD-10-PCS | Mod: HCNC,S$GLB,, | Performed by: FAMILY MEDICINE

## 2019-11-11 PROCEDURE — 3079F PR MOST RECENT DIASTOLIC BLOOD PRESSURE 80-89 MM HG: ICD-10-PCS | Mod: HCNC,CPTII,S$GLB, | Performed by: FAMILY MEDICINE

## 2019-11-11 PROCEDURE — 99499 RISK ADDL DX/OHS AUDIT: ICD-10-PCS | Mod: HCNC,S$GLB,, | Performed by: FAMILY MEDICINE

## 2019-11-11 PROCEDURE — 99214 PR OFFICE/OUTPT VISIT, EST, LEVL IV, 30-39 MIN: ICD-10-PCS | Mod: 25,HCNC,S$GLB, | Performed by: FAMILY MEDICINE

## 2019-11-11 PROCEDURE — 90662 IIV NO PRSV INCREASED AG IM: CPT | Mod: HCNC,S$GLB,, | Performed by: FAMILY MEDICINE

## 2019-11-11 PROCEDURE — 99999 PR PBB SHADOW E&M-EST. PATIENT-LVL III: ICD-10-PCS | Mod: PBBFAC,HCNC,, | Performed by: FAMILY MEDICINE

## 2019-11-11 PROCEDURE — 3046F HEMOGLOBIN A1C LEVEL >9.0%: CPT | Mod: HCNC,CPTII,S$GLB, | Performed by: FAMILY MEDICINE

## 2019-11-11 PROCEDURE — 3079F DIAST BP 80-89 MM HG: CPT | Mod: HCNC,CPTII,S$GLB, | Performed by: FAMILY MEDICINE

## 2019-11-11 PROCEDURE — 90662 FLU VACCINE - HIGH DOSE (65+) PRESERVATIVE FREE IM: ICD-10-PCS | Mod: HCNC,S$GLB,, | Performed by: FAMILY MEDICINE

## 2019-11-11 PROCEDURE — 99214 OFFICE O/P EST MOD 30 MIN: CPT | Mod: 25,HCNC,S$GLB, | Performed by: FAMILY MEDICINE

## 2019-11-11 PROCEDURE — G0008 ADMIN INFLUENZA VIRUS VAC: HCPCS | Mod: HCNC,S$GLB,, | Performed by: FAMILY MEDICINE

## 2019-11-11 PROCEDURE — 1101F PT FALLS ASSESS-DOCD LE1/YR: CPT | Mod: HCNC,CPTII,S$GLB, | Performed by: FAMILY MEDICINE

## 2019-11-11 PROCEDURE — 3046F PR MOST RECENT HEMOGLOBIN A1C LEVEL > 9.0%: ICD-10-PCS | Mod: HCNC,CPTII,S$GLB, | Performed by: FAMILY MEDICINE

## 2019-11-11 PROCEDURE — 1101F PR PT FALLS ASSESS DOC 0-1 FALLS W/OUT INJ PAST YR: ICD-10-PCS | Mod: HCNC,CPTII,S$GLB, | Performed by: FAMILY MEDICINE

## 2019-11-11 PROCEDURE — 3074F PR MOST RECENT SYSTOLIC BLOOD PRESSURE < 130 MM HG: ICD-10-PCS | Mod: HCNC,CPTII,S$GLB, | Performed by: FAMILY MEDICINE

## 2019-11-11 PROCEDURE — 99999 PR PBB SHADOW E&M-EST. PATIENT-LVL III: CPT | Mod: PBBFAC,HCNC,, | Performed by: FAMILY MEDICINE

## 2019-11-11 PROCEDURE — 99499 UNLISTED E&M SERVICE: CPT | Mod: HCNC,S$GLB,, | Performed by: FAMILY MEDICINE

## 2019-11-11 RX ORDER — LANCETS
1 EACH MISCELLANEOUS 2 TIMES DAILY
Qty: 100 EACH | Refills: 11 | Status: SHIPPED | OUTPATIENT
Start: 2019-11-11 | End: 2019-11-22

## 2019-11-11 RX ORDER — SILDENAFIL CITRATE 20 MG/1
TABLET ORAL
Qty: 30 TABLET | Refills: 11 | Status: SHIPPED | OUTPATIENT
Start: 2019-11-11 | End: 2020-12-27 | Stop reason: SDUPTHER

## 2019-11-11 NOTE — PROGRESS NOTES
(Portions of this note were dictated using voice recognition software and may contain dictation related errors in spelling/grammar/syntax not found on text review)    CC:   Chief Complaint   Patient presents with    Follow-up       HPI: 69 y.o. male     Diabetes type 2, markedly uncontrolled, has stage 3 chronic kidney disease.  On metformin 2 g daily and glimepiride 4 mg b.i.d..  Tried on Victoza but could not afford so he got off this.  Had discussed potential Trulicity trial although only got his updated labs recently  Eye exam overdue plans to get next visit.  Foot exam: occ numbness.  Checks sugars but does not off hand what they are  Not much exercise    Hypertension on lisinopril HCT 20/25, furosemide 20 mg daily.  Blood pressure stable    Dyslipidemia on pravastatin 40 mg daily    Past Medical History:   Diagnosis Date    Allergy     DDD (degenerative disc disease), lumbar     Hyperlipidemia     Hypertension     Nicotine abuse     Obesity     Scrotal mass     Type II diabetes mellitus with renal manifestations, uncontrolled     microalbuminuria    Type II or unspecified type diabetes mellitus without mention of complication, not stated as uncontrolled     Ulcer        Past Surgical History:   Procedure Laterality Date    CARPAL TUNNEL RELEASE      CONDYLOMA EXCISION/FULGURATION Bilateral 2014    scrotal    KNEE SURGERY Left     removal of cartilage with no implant    LUMBAR LAMINECTOMY  2017    L4-5    ULNAR NERVE TRANSPOSITION         Family History   Problem Relation Age of Onset    Cancer Mother         Liver    Cataracts Mother     Liver cancer Mother     Arthritis Mother     Cancer Father         Lung met Brain    Lung cancer Father     Brain cancer Father     Cancer Paternal Grandfather     Diabetes Paternal Grandmother     Cancer Sister     No Known Problems Brother     No Known Problems Daughter     Amblyopia Neg Hx     Blindness Neg Hx     Glaucoma Neg Hx     Macular  degeneration Neg Hx     Retinal detachment Neg Hx     Strabismus Neg Hx        Social History     Tobacco Use    Smoking status: Former Smoker     Packs/day: 1.00     Years: 55.00     Pack years: 55.00     Types: Cigarettes    Smokeless tobacco: Former User     Quit date: 11/6/2018   Substance Use Topics    Alcohol use: Yes     Alcohol/week: 8.0 standard drinks     Types: 2 Cans of beer, 1 Shots of liquor, 5 Standard drinks or equivalent per week     Frequency: 2-4 times a month     Drinks per session: 3 or 4     Binge frequency: Less than monthly    Drug use: No       Lab Results   Component Value Date    WBC 9.61 08/16/2018    HGB 15.1 08/16/2018    HCT 45.5 08/16/2018    MCV 93 08/16/2018     08/16/2018    CHOL 157 11/07/2019    TRIG 210 (H) 11/07/2019    HDL 35 (L) 11/07/2019    ALT 34 11/07/2019    AST 33 11/07/2019    BILITOT 1.0 11/07/2019    ALKPHOS 72 11/07/2019     11/07/2019    K 4.1 11/07/2019    CL 99 11/07/2019    CREATININE 1.4 11/07/2019    ESTGFRAFRICA 58 (A) 11/07/2019    EGFRNONAA 51 (A) 11/07/2019    CALCIUM 10.0 11/07/2019    ALBUMIN 3.7 11/07/2019    BUN 29 (H) 11/07/2019    CO2 29 11/07/2019    TSH 1.706 08/16/2018    PSA 4.0 08/16/2018    INR 1.0 07/12/2017    HGBA1C 12.9 (H) 11/07/2019    MICALBCREAT 128.1 (H) 06/24/2017    LDLCALC 80.0 11/07/2019     (H) 11/07/2019         Hemoglobin A1C (%)   Date Value   11/07/2019 12.9 (H)   08/16/2018 10.1 (H)   06/24/2017 7.7 (H)   03/30/2016 8.6 (H)   11/18/2015 7.5 (H)     eGFR if non African American (mL/min/1.73 m^2)   Date Value   11/07/2019 51 (A)   08/16/2018 51 (A)   07/12/2017 >60   06/24/2017 56 (A)   03/30/2016 >60             ROS:  GENERAL: No fever, chills, fatigability or weight loss.  SKIN: No rashes, no itching.  HEAD: No headaches.  EYES: No visual changes  EARS: No ear pain or changes in hearing.  NOSE: No congestion or rhinorrhea.  MOUTH & THROAT: No hoarseness, change in voice, or sore throat.  NODES:  Denies swollen glands.  CHEST: Denies HASTINGS, cyanosis, wheezing, cough and sputum production.  CARDIOVASCULAR: Denies chest pain, PND, orthopnea.  ABDOMEN: No nausea, vomiting, or changes in bowel function.  URINARY: No flank pain, dysuria or hematuria.  PERIPHERAL VASCULAR: No claudication or cyanosis.  MUSCULOSKELETAL:  Chronic knee pain from osteoarthritis  NEUROLOGIC: No weakness or numbness.    Vital signs reviewed  PE:   APPEARANCE: Well nourished, well developed, in no acute distress.    HEAD: Normocephalic, atraumatic.  EYES: PERRL. EOMI.   Conjunctivae noninjected.  EARS: TM's intact. Light reflex normal. No retraction or perforation  NOSE: Mucosa pink. Airway clear.  MOUTH & THROAT: No tonsillar enlargement. No pharyngeal erythema or exudate.   NECK: Supple with no cervical lymphadenopathy.  No carotid bruits.  No thyromegaly  CHEST: Good inspiratory effort. Lungs clear to auscultation with no wheezes or crackles.  CARDIOVASCULAR: Normal S1, S2. No rubs, murmurs, or gallops.  ABDOMEN: Bowel sounds normal. Not distended. Soft. No tenderness or masses. No organomegaly.  EXTREMITIES: No edema, cyanosis, or clubbing.  DIABETIC FOOT EXAM: Protective Sensation (w/ 10 gram monofilament):  Right: Decreased  Left: Decreased    Visual Inspection:  Normal -  Bilateral and Onychomycosis -  Bilateral    Pedal Pulses:   Right: Diminshed  Left: Diminshed    Posterior tibialis:   Right:Diminshed  Left: Diminshed            IMPRESSION  1. Uncontrolled type 2 diabetes mellitus with hyperglycemia    2. Type 2 diabetes mellitus with stage 3 chronic kidney disease, without long-term current use of insulin    3. Diabetic polyneuropathy associated with type 2 diabetes mellitus    4. Hypertension associated with diabetes    5. Hyperlipidemia associated with type 2 diabetes mellitus            PLAN  Reviewed recent labs.  Continue metformin 2 g daily and glimepiride 4 mg b.i.d. at this time.  Will try adding Trulicity 1.5 mg weekly.   Will send prescription [facility] since it sounds like patient is in the Medicare gap and may need some financial assistance when it comes to getting this prescription.  Given cost issues, Discussed potential for NPH insulin OTC although not available and pen form, would need to administer b.i.d..  Patient would like to try the above Trulicity prescription at this time    Hypertension controlled    Dyslipidemia:  Continue pravastatin    Recheck in 3 months with labs prior  Orders Placed This Encounter   Procedures    Influenza - High Dose (65+) (PF) (IM)    CBC auto differential    Comprehensive metabolic panel    Lipid panel    Hemoglobin A1c               SCREENINGS (males >=65)     Immunizations:  Tetanus: 1/1/2014  Pneumovax: 9/19/2014  Prevnar 7/12/17  Flu:  Today     Prostate:  PSA normal as above     Colon cancer screening: due (addressed colonoscopy with pt prior visit and he had declined).  Fit kit ordered.  Patient has not yet completed this.     AAA screen 2017, normal

## 2019-11-22 ENCOUNTER — TELEPHONE (OUTPATIENT)
Dept: FAMILY MEDICINE | Facility: CLINIC | Age: 69
End: 2019-11-22

## 2019-11-22 RX ORDER — INSULIN PUMP SYRINGE, 3 ML
EACH MISCELLANEOUS
Qty: 100 EACH | Refills: 11 | Status: SHIPPED | OUTPATIENT
Start: 2019-11-22 | End: 2024-03-08 | Stop reason: SDUPTHER

## 2019-11-22 RX ORDER — LANCETS
EACH MISCELLANEOUS
Qty: 100 EACH | Refills: 11 | Status: SHIPPED | OUTPATIENT
Start: 2019-11-22 | End: 2023-02-02 | Stop reason: SDUPTHER

## 2019-11-22 NOTE — TELEPHONE ENCOUNTER
One Touch Ultra Blue test strips are not covered.  Can we submit prescription asking for insurance preferred.  Please advise.

## 2020-01-21 RX ORDER — PRAVASTATIN SODIUM 40 MG/1
TABLET ORAL
Qty: 30 TABLET | Refills: 11 | Status: SHIPPED | OUTPATIENT
Start: 2020-01-21 | End: 2020-08-28 | Stop reason: SDUPTHER

## 2020-01-28 ENCOUNTER — PATIENT OUTREACH (OUTPATIENT)
Dept: ADMINISTRATIVE | Facility: HOSPITAL | Age: 70
End: 2020-01-28

## 2020-02-11 ENCOUNTER — LAB VISIT (OUTPATIENT)
Dept: LAB | Facility: HOSPITAL | Age: 70
End: 2020-02-11
Attending: FAMILY MEDICINE
Payer: MEDICARE

## 2020-02-11 ENCOUNTER — OFFICE VISIT (OUTPATIENT)
Dept: FAMILY MEDICINE | Facility: CLINIC | Age: 70
End: 2020-02-11
Payer: MEDICARE

## 2020-02-11 VITALS
DIASTOLIC BLOOD PRESSURE: 84 MMHG | BODY MASS INDEX: 36.31 KG/M2 | SYSTOLIC BLOOD PRESSURE: 130 MMHG | TEMPERATURE: 98 F | HEIGHT: 69 IN | WEIGHT: 245.13 LBS | HEART RATE: 111 BPM | OXYGEN SATURATION: 97 %

## 2020-02-11 DIAGNOSIS — E11.69 HYPERLIPIDEMIA ASSOCIATED WITH TYPE 2 DIABETES MELLITUS: ICD-10-CM

## 2020-02-11 DIAGNOSIS — E11.65 UNCONTROLLED TYPE 2 DIABETES MELLITUS WITH HYPERGLYCEMIA: ICD-10-CM

## 2020-02-11 DIAGNOSIS — E78.5 HYPERLIPIDEMIA ASSOCIATED WITH TYPE 2 DIABETES MELLITUS: ICD-10-CM

## 2020-02-11 DIAGNOSIS — R80.9 PROTEINURIA, UNSPECIFIED TYPE: ICD-10-CM

## 2020-02-11 DIAGNOSIS — F17.200 TOBACCO DEPENDENCE: ICD-10-CM

## 2020-02-11 DIAGNOSIS — E11.42 DIABETIC POLYNEUROPATHY ASSOCIATED WITH TYPE 2 DIABETES MELLITUS: ICD-10-CM

## 2020-02-11 DIAGNOSIS — E11.65 UNCONTROLLED TYPE 2 DIABETES MELLITUS WITH HYPERGLYCEMIA: Primary | ICD-10-CM

## 2020-02-11 DIAGNOSIS — E11.59 HYPERTENSION ASSOCIATED WITH DIABETES: ICD-10-CM

## 2020-02-11 DIAGNOSIS — I15.2 HYPERTENSION ASSOCIATED WITH DIABETES: ICD-10-CM

## 2020-02-11 DIAGNOSIS — E66.01 SEVERE OBESITY (BMI 35.0-39.9) WITH COMORBIDITY: ICD-10-CM

## 2020-02-11 DIAGNOSIS — J84.10 CALCIFIED GRANULOMA OF LUNG: ICD-10-CM

## 2020-02-11 LAB
BACTERIA #/AREA URNS HPF: ABNORMAL /HPF
BILIRUB UR QL STRIP: NEGATIVE
CLARITY UR: CLEAR
COLOR UR: ABNORMAL
GLUCOSE UR QL STRIP: NEGATIVE
HGB UR QL STRIP: NEGATIVE
HYALINE CASTS #/AREA URNS LPF: 9 /LPF
KETONES UR QL STRIP: NEGATIVE
LEUKOCYTE ESTERASE UR QL STRIP: NEGATIVE
MICROSCOPIC COMMENT: ABNORMAL
NITRITE UR QL STRIP: NEGATIVE
PH UR STRIP: 5 [PH] (ref 5–8)
PROT UR QL STRIP: ABNORMAL
RBC #/AREA URNS HPF: 1 /HPF (ref 0–4)
SP GR UR STRIP: 1.02 (ref 1–1.03)
URN SPEC COLLECT METH UR: ABNORMAL
UROBILINOGEN UR STRIP-ACNC: NEGATIVE EU/DL
WBC #/AREA URNS HPF: 1 /HPF (ref 0–5)

## 2020-02-11 PROCEDURE — 3075F SYST BP GE 130 - 139MM HG: CPT | Mod: HCNC,CPTII,S$GLB, | Performed by: FAMILY MEDICINE

## 2020-02-11 PROCEDURE — 3075F PR MOST RECENT SYSTOLIC BLOOD PRESS GE 130-139MM HG: ICD-10-PCS | Mod: HCNC,CPTII,S$GLB, | Performed by: FAMILY MEDICINE

## 2020-02-11 PROCEDURE — 81000 URINALYSIS NONAUTO W/SCOPE: CPT | Mod: HCNC

## 2020-02-11 PROCEDURE — 99499 RISK ADDL DX/OHS AUDIT: ICD-10-PCS | Mod: HCNC,S$GLB,, | Performed by: FAMILY MEDICINE

## 2020-02-11 PROCEDURE — 1126F AMNT PAIN NOTED NONE PRSNT: CPT | Mod: HCNC,S$GLB,, | Performed by: FAMILY MEDICINE

## 2020-02-11 PROCEDURE — 99499 UNLISTED E&M SERVICE: CPT | Mod: HCNC,S$GLB,, | Performed by: FAMILY MEDICINE

## 2020-02-11 PROCEDURE — 3046F HEMOGLOBIN A1C LEVEL >9.0%: CPT | Mod: HCNC,CPTII,S$GLB, | Performed by: FAMILY MEDICINE

## 2020-02-11 PROCEDURE — 1101F PR PT FALLS ASSESS DOC 0-1 FALLS W/OUT INJ PAST YR: ICD-10-PCS | Mod: HCNC,CPTII,S$GLB, | Performed by: FAMILY MEDICINE

## 2020-02-11 PROCEDURE — 99214 PR OFFICE/OUTPT VISIT, EST, LEVL IV, 30-39 MIN: ICD-10-PCS | Mod: HCNC,S$GLB,, | Performed by: FAMILY MEDICINE

## 2020-02-11 PROCEDURE — 87086 URINE CULTURE/COLONY COUNT: CPT | Mod: HCNC

## 2020-02-11 PROCEDURE — 99214 OFFICE O/P EST MOD 30 MIN: CPT | Mod: HCNC,S$GLB,, | Performed by: FAMILY MEDICINE

## 2020-02-11 PROCEDURE — 3079F DIAST BP 80-89 MM HG: CPT | Mod: HCNC,CPTII,S$GLB, | Performed by: FAMILY MEDICINE

## 2020-02-11 PROCEDURE — 1159F MED LIST DOCD IN RCRD: CPT | Mod: HCNC,S$GLB,, | Performed by: FAMILY MEDICINE

## 2020-02-11 PROCEDURE — 99999 PR PBB SHADOW E&M-EST. PATIENT-LVL V: CPT | Mod: PBBFAC,HCNC,, | Performed by: FAMILY MEDICINE

## 2020-02-11 PROCEDURE — 1101F PT FALLS ASSESS-DOCD LE1/YR: CPT | Mod: HCNC,CPTII,S$GLB, | Performed by: FAMILY MEDICINE

## 2020-02-11 PROCEDURE — 3046F PR MOST RECENT HEMOGLOBIN A1C LEVEL > 9.0%: ICD-10-PCS | Mod: HCNC,CPTII,S$GLB, | Performed by: FAMILY MEDICINE

## 2020-02-11 PROCEDURE — 1159F PR MEDICATION LIST DOCUMENTED IN MEDICAL RECORD: ICD-10-PCS | Mod: HCNC,S$GLB,, | Performed by: FAMILY MEDICINE

## 2020-02-11 PROCEDURE — 99999 PR PBB SHADOW E&M-EST. PATIENT-LVL V: ICD-10-PCS | Mod: PBBFAC,HCNC,, | Performed by: FAMILY MEDICINE

## 2020-02-11 PROCEDURE — 3079F PR MOST RECENT DIASTOLIC BLOOD PRESSURE 80-89 MM HG: ICD-10-PCS | Mod: HCNC,CPTII,S$GLB, | Performed by: FAMILY MEDICINE

## 2020-02-11 PROCEDURE — 1126F PR PAIN SEVERITY QUANTIFIED, NO PAIN PRESENT: ICD-10-PCS | Mod: HCNC,S$GLB,, | Performed by: FAMILY MEDICINE

## 2020-02-11 NOTE — PROGRESS NOTES
(Portions of this note were dictated using voice recognition software and may contain dictation related errors in spelling/grammar/syntax not found on text review)    CC:   Chief Complaint   Patient presents with    Follow-up    Fatigue       HPI: 69 y.o. male     Diabetes type 2, markedly uncontrolled, has stage 3 chronic kidney disease.  A1c had markedly increased as below On metformin 2 g daily and glimepiride 4 mg b.i.d..  Tried on Victoza but could not afford so he got off this.  Last visit ordered Trulicity 1.5 mg weekly  Eye exam: optometry referral placed.   Foot exam: occ numbness.  Checks sugars periodically.  Gets readings from 160-220 but not really consistent with timing of readings.  Not much exercise because of knee osteoarthritis    Hypertension on lisinopril HCT 20/25, furosemide 20 mg daily.  Blood pressure stable    Dyslipidemia on pravastatin 40 mg daily    Does notice some chronic polyuria, he attributes this to Lasix.  No dysuria.  No fevers or chills.  For the last week however he has felt somewhat fatigued and has had some off and on diarrhea.  Not sure if this is infectious in nature.  Denies any blood in the stool.  Has not had any chronic diarrhea issues with metformin.  No nausea or vomiting.  Does not feel weak or dizzy       Answers for HPI/ROS submitted by the patient on 2/9/2020   Diabetes problem  Diabetes type: type 2  MedicAlert ID: No  CAD risks: dyslipidemia, family history, hypertension, obesity, sedentary lifestyle, stress, tobacco exposure, diabetes mellitus, male sex  Current treatments: diet, oral agent (triple therapy)  Treatment compliance: most of the time  Home blood tests: 3-4 x per week  Monitoring compliance: adequate  Blood glucose trend: increasing steadily  Weight trend: fluctuating minimally  Current diet: generally unhealthy  Meal planning: none, avoidance of concentrated sweets  Exercise: intermittently  Dietitian visit: No  Eye exam current: No  Sees  podiatrist: No    Past Medical History:   Diagnosis Date    Allergy     DDD (degenerative disc disease), lumbar     Hyperlipidemia     Hypertension     Nicotine abuse     Obesity     Scrotal mass     Type II diabetes mellitus with renal manifestations, uncontrolled     microalbuminuria    Type II or unspecified type diabetes mellitus without mention of complication, not stated as uncontrolled     Ulcer        Past Surgical History:   Procedure Laterality Date    CARPAL TUNNEL RELEASE      CONDYLOMA EXCISION/FULGURATION Bilateral 2014    scrotal    KNEE SURGERY Left     removal of cartilage with no implant    LUMBAR LAMINECTOMY  2017    L4-5    ULNAR NERVE TRANSPOSITION         Family History   Problem Relation Age of Onset    Cancer Mother         Liver    Cataracts Mother     Liver cancer Mother     Arthritis Mother     Cancer Father         Lung met Brain    Lung cancer Father     Brain cancer Father     Cancer Paternal Grandfather     Diabetes Paternal Grandmother     Cancer Sister     No Known Problems Brother     No Known Problems Daughter     Amblyopia Neg Hx     Blindness Neg Hx     Glaucoma Neg Hx     Macular degeneration Neg Hx     Retinal detachment Neg Hx     Strabismus Neg Hx        Social History     Tobacco Use    Smoking status: Current Some Day Smoker     Packs/day: 1.00     Years: 55.00     Pack years: 55.00     Types: Cigarettes    Smokeless tobacco: Former User     Quit date: 11/6/2018   Substance Use Topics    Alcohol use: Yes     Alcohol/week: 8.0 standard drinks     Types: 2 Cans of beer, 1 Shots of liquor, 5 Standard drinks or equivalent per week     Frequency: 2-4 times a month     Drinks per session: 3 or 4     Binge frequency: Less than monthly    Drug use: No       Lab Results   Component Value Date    WBC 9.61 08/16/2018    HGB 15.1 08/16/2018    HCT 45.5 08/16/2018    MCV 93 08/16/2018     08/16/2018    CHOL 157 11/07/2019    TRIG 210 (H)  11/07/2019    HDL 35 (L) 11/07/2019    ALT 34 11/07/2019    AST 33 11/07/2019    BILITOT 1.0 11/07/2019    ALKPHOS 72 11/07/2019     11/07/2019    K 4.1 11/07/2019    CL 99 11/07/2019    CREATININE 1.4 11/07/2019    ESTGFRAFRICA 58 (A) 11/07/2019    EGFRNONAA 51 (A) 11/07/2019    CALCIUM 10.0 11/07/2019    ALBUMIN 3.7 11/07/2019    BUN 29 (H) 11/07/2019    CO2 29 11/07/2019    TSH 1.706 08/16/2018    PSA 4.0 08/16/2018    INR 1.0 07/12/2017    HGBA1C 12.9 (H) 11/07/2019    MICALBCREAT 128.1 (H) 06/24/2017    LDLCALC 80.0 11/07/2019     (H) 11/07/2019         Hemoglobin A1C (%)   Date Value   11/07/2019 12.9 (H)   08/16/2018 10.1 (H)   06/24/2017 7.7 (H)   03/30/2016 8.6 (H)   11/18/2015 7.5 (H)     eGFR if non African American (mL/min/1.73 m^2)   Date Value   11/07/2019 51 (A)   08/16/2018 51 (A)   07/12/2017 >60   06/24/2017 56 (A)   03/30/2016 >60             ROS:  GENERAL: No fever, chills, fatigability or weight loss.  SKIN: No rashes, no itching.  HEAD: No headaches.  EYES: No visual changes  EARS: No ear pain or changes in hearing.  NOSE: No congestion or rhinorrhea.  MOUTH & THROAT: No hoarseness, change in voice, or sore throat.  NODES: Denies swollen glands.  CHEST: Denies HASTINGS, cyanosis, wheezing, cough and sputum production.  CARDIOVASCULAR: Denies chest pain, PND, orthopnea.  ABDOMEN:  Above  URINARY: No flank pain, dysuria or hematuria.  PERIPHERAL VASCULAR: No claudication or cyanosis.  MUSCULOSKELETAL:  Chronic knee pain from osteoarthritis  NEUROLOGIC:  DM neuropathy    Vital signs reviewed  PE:   APPEARANCE: Well nourished, well developed, in no acute distress.    HEAD: Normocephalic, atraumatic.  EYES: PERRL. EOMI.   Conjunctivae noninjected.  EARS: TM's intact. Light reflex normal. No retraction or perforation  NOSE: Mucosa pink. Airway clear.  MOUTH & THROAT: No tonsillar enlargement. No pharyngeal erythema or exudate.   NECK: Supple with no cervical lymphadenopathy.  No carotid  bruits.  No thyromegaly  CHEST: Good inspiratory effort. Lungs clear to auscultation with no wheezes or crackles.  CARDIOVASCULAR: Normal S1, S2. No rubs, murmurs, or gallops.  ABDOMEN: Bowel sounds normal. Not distended. Soft. No tenderness or masses. No organomegaly.  EXTREMITIES:  2+ pitting edema BLE  DIABETIC FOOT EXAM:  Up-to-date from November 2019.  He does have a slight area of ecchymosis noted left plantar surface close to the4/5 MTP area.  No skin breakdown noted. No foreign body noted.              IMPRESSION  1. Uncontrolled type 2 diabetes mellitus with hyperglycemia    2. Diabetic polyneuropathy associated with type 2 diabetes mellitus    3. Hypertension associated with diabetes    4. Hyperlipidemia associated with type 2 diabetes mellitus    5. Severe obesity (BMI 35.0-39.9) with comorbidity    6. Proteinuria, unspecified type     7. Calcified granuloma of lung    8. Tobacco dependence            PLAN  renew labs.  Continue metformin 2 g daily and glimepiride 4 mg b.i.d, Trulicity 1.5 mg weekly.    Have discussed prior potential need for insulin management in addition for persistently elevated blood sugars.  Counseling on fasting and 2 hr postprandial blood sugar checks.  He does feel that a lot of his hyperglycemic issues are diet related.  Have counseled him on improving his eating habits.  Also through his insurance should have free gym membership available.  Discussed importance of regular aerobic exercise.  If inhibited by significant knee osteoarthritis, can do low-impact aerobic activities like exercise bike or even hand bike to eliminate lower extremity action altogether.    Hypertension controlled    Dyslipidemia:  Continue pravastatin    Polyuria:  Check labs.    Diarrhea off and on this week.  Seems fairly acute in onset, could be related to viral syndrome although could consider medication effect and possible diabetic autonomic neuropathy in the differential as well.  Notify for any  worsening which may need separate GI/stool workup    Tobacco dependence:  Offered cessation program although patient was to try this himself      Orders Placed This Encounter   Procedures    Urine culture    Urinalysis    CBC auto differential    Comprehensive metabolic panel    Lipid panel    Hemoglobin A1c    TSH    Ambulatory referral/consult to Optometry               SCREENINGS (males >=65)     Immunizations:  Tetanus: 1/1/2014  Pneumovax: 9/19/2014  Prevnar 7/12/17  Flu:  utd  Prostate:  PSA normal 2018 as above.     Colon cancer screening: due (addressed colonoscopy with pt prior visit and he had declined).  Fit kit ordered.  Patient has not yet completed this.     AAA screen 2017, normal        I hereby acknowledge that I am relying upon documentation authored by a medical student working under my supervision and further I hereby attest that I have verified the student documentation or findings by personally re-performing the physical exam and medical decision making activities of the Evaluation and Management service to be billed.    Laureano Hebert MD

## 2020-02-12 LAB — BACTERIA UR CULT: NO GROWTH

## 2020-02-18 ENCOUNTER — PATIENT MESSAGE (OUTPATIENT)
Dept: FAMILY MEDICINE | Facility: CLINIC | Age: 70
End: 2020-02-18

## 2020-02-18 DIAGNOSIS — E11.65 UNCONTROLLED TYPE 2 DIABETES MELLITUS WITH HYPERGLYCEMIA: Primary | ICD-10-CM

## 2020-02-18 RX ORDER — INSULIN GLARGINE 100 [IU]/ML
10 INJECTION, SOLUTION SUBCUTANEOUS DAILY
Qty: 1 BOX | Refills: 11 | Status: SHIPPED | OUTPATIENT
Start: 2020-02-18 | End: 2022-03-18 | Stop reason: SDUPTHER

## 2020-02-18 RX ORDER — PEN NEEDLE, DIABETIC 30 GX3/16"
NEEDLE, DISPOSABLE MISCELLANEOUS
Qty: 100 EACH | Refills: 11 | Status: SHIPPED | OUTPATIENT
Start: 2020-02-18 | End: 2021-03-01 | Stop reason: SDUPTHER

## 2020-02-18 NOTE — TELEPHONE ENCOUNTER
My [facility] message sent.  Diabetes still very uncontrolled.  I am sending Lantus over 10 units daily with self titration instructions listed below.  Needs one-month follow-up appointment with blood sugar log      Dear Francisco Coppola    Your recent labs were reviewed and released to your account. Your lab results show that the diabetes test A1c while somewhat improved from before still markedly elevated in the 10 range.  I think continuing metformin, glimepiride, and Trulicity is certainly reasonable but I think we will have to start some insulin to help further get the blood sugar down.  I would like to start a once a day insulin called Lantus at 10 units daily.  We would like to see your fasting blood sugars around 140 or less and 2 hr after meal sugars less than 180.  Of course watching your eating habits will be very important to helping control the diabetes as well.    I would like you to also follow the following protocol on self increasing the Lantus based on what your blood sugars are doing      Guideline for managing basal glargine insulin (Lantus, Basaglar, Toujeo) :    Check your fasting blood sugars every morning for 5 days.  Goal is to have most of your fasting blood sugars below 140.    If most of your fasting blood sugars in that 5 day period are above 140, increase your glargine insulin dose by 5 units.    If most of your fasting blood sugars in that 5 day period of are below 140 but higher than 70, keep your glargine insulin dose the same.    If most of your fasting blood sugars in that 5 day period are below 70, decrease your glargine insulin dose by 5 units.    Make sure to write your blood sugars down in a notebook; do not simply just store the numbers in your glucose meter.    I would like to have you check back in a month with your blood sugar log so we can see if we are making any improvements.  I will send a prescription for the Lantus to the pharmacy along with the pen needles that fit on  top of it.  Comes as a pen, similar to Trulicity although the needles had to be screwed on instead of it being a disposable 1 use pen like Trulicity.      Laureano Hebert MD

## 2020-02-19 ENCOUNTER — PATIENT MESSAGE (OUTPATIENT)
Dept: FAMILY MEDICINE | Facility: CLINIC | Age: 70
End: 2020-02-19

## 2020-02-24 ENCOUNTER — PATIENT OUTREACH (OUTPATIENT)
Dept: ADMINISTRATIVE | Facility: OTHER | Age: 70
End: 2020-02-24

## 2020-02-24 NOTE — PROGRESS NOTES
Chart reviewed.   Immunizations reconciled.   HM updated.  DM eye exam previously ordered  Optometry appt 02/27/2020 with Dr. Powell

## 2020-03-06 ENCOUNTER — PATIENT OUTREACH (OUTPATIENT)
Dept: ADMINISTRATIVE | Facility: HOSPITAL | Age: 70
End: 2020-03-06

## 2020-03-10 ENCOUNTER — PES CALL (OUTPATIENT)
Dept: ADMINISTRATIVE | Facility: CLINIC | Age: 70
End: 2020-03-10

## 2020-03-13 DIAGNOSIS — E11.65 UNCONTROLLED TYPE 2 DIABETES MELLITUS WITH HYPERGLYCEMIA: ICD-10-CM

## 2020-03-16 ENCOUNTER — PATIENT MESSAGE (OUTPATIENT)
Dept: FAMILY MEDICINE | Facility: CLINIC | Age: 70
End: 2020-03-16

## 2020-03-16 NOTE — TELEPHONE ENCOUNTER
See my chart message.  Okay to cancel patient's appointment and reschedule in the next several months.    No I think it is probably best if you want to cancel the appointment it is okay.  I would like you to keep a track of your blood sugars not only fasting in the morning but also 2 hr after meals, I have sent a chart that puts this together somewhat nicely (note the chart states before meals but you can not think of the before meals sugar being several hours after the prior meal if that makes sense).  This does give us a better sense of what your sugars are doing in response to food and as a baseline.  For now you can continue current medications.  It is reasonable to reschedule our appointment in the next couple of months    Laureano Hebert MD    ===View-only below this line===      ----- Message -----     From: Francisco Coppola     Sent: 3/16/2020  8:40 AM CDT       To: Laureano Hebert MD  Subject: Non-Urgent Medical    Dr. Hebert,I would like to know if you still want me to keep my appointment this friday because of the current virus situation. My blood sugar has been 114-187 range.

## 2020-04-20 ENCOUNTER — DOCUMENTATION ONLY (OUTPATIENT)
Dept: REHABILITATION | Facility: HOSPITAL | Age: 70
End: 2020-04-20

## 2020-04-20 DIAGNOSIS — R53.1 WEAKNESS: ICD-10-CM

## 2020-04-20 DIAGNOSIS — M25.561 CHRONIC PAIN OF BOTH KNEES: ICD-10-CM

## 2020-04-20 DIAGNOSIS — R26.2 DIFFICULTY WALKING: ICD-10-CM

## 2020-04-20 DIAGNOSIS — M25.562 CHRONIC PAIN OF BOTH KNEES: ICD-10-CM

## 2020-04-20 DIAGNOSIS — G89.29 CHRONIC PAIN OF BOTH KNEES: ICD-10-CM

## 2020-04-20 NOTE — PROGRESS NOTES
Pt was evaluated on 8/16/2019 and was seen 13 times for PT. Pt has not attended PT since 10/11/2019. Pt was given HEP. Current status is unknown. Pt to be d/c'd at this time.

## 2020-05-19 ENCOUNTER — OFFICE VISIT (OUTPATIENT)
Dept: FAMILY MEDICINE | Facility: CLINIC | Age: 70
End: 2020-05-19
Payer: MEDICARE

## 2020-05-19 VITALS
OXYGEN SATURATION: 95 % | DIASTOLIC BLOOD PRESSURE: 78 MMHG | TEMPERATURE: 98 F | BODY MASS INDEX: 36.31 KG/M2 | HEIGHT: 69 IN | SYSTOLIC BLOOD PRESSURE: 116 MMHG | WEIGHT: 245.13 LBS | HEART RATE: 98 BPM

## 2020-05-19 DIAGNOSIS — E11.69 HYPERLIPIDEMIA ASSOCIATED WITH TYPE 2 DIABETES MELLITUS: ICD-10-CM

## 2020-05-19 DIAGNOSIS — R60.0 PERIPHERAL EDEMA: ICD-10-CM

## 2020-05-19 DIAGNOSIS — M46.99 UNSPECIFIED INFLAMMATORY SPONDYLOPATHY, MULTIPLE SITES IN SPINE: ICD-10-CM

## 2020-05-19 DIAGNOSIS — E11.622 TYPE 2 DIABETES MELLITUS WITH OTHER SKIN ULCER (CODE): ICD-10-CM

## 2020-05-19 DIAGNOSIS — E11.65 UNCONTROLLED TYPE 2 DIABETES MELLITUS WITH HYPERGLYCEMIA: Primary | ICD-10-CM

## 2020-05-19 DIAGNOSIS — E78.5 HYPERLIPIDEMIA ASSOCIATED WITH TYPE 2 DIABETES MELLITUS: ICD-10-CM

## 2020-05-19 DIAGNOSIS — I15.2 HYPERTENSION ASSOCIATED WITH DIABETES: ICD-10-CM

## 2020-05-19 DIAGNOSIS — E11.59 HYPERTENSION ASSOCIATED WITH DIABETES: ICD-10-CM

## 2020-05-19 PROCEDURE — 1125F PR PAIN SEVERITY QUANTIFIED, PAIN PRESENT: ICD-10-PCS | Mod: HCNC,S$GLB,, | Performed by: FAMILY MEDICINE

## 2020-05-19 PROCEDURE — 99499 RISK ADDL DX/OHS AUDIT: ICD-10-PCS | Mod: HCNC,S$GLB,, | Performed by: FAMILY MEDICINE

## 2020-05-19 PROCEDURE — 3074F PR MOST RECENT SYSTOLIC BLOOD PRESSURE < 130 MM HG: ICD-10-PCS | Mod: HCNC,CPTII,S$GLB, | Performed by: FAMILY MEDICINE

## 2020-05-19 PROCEDURE — 1159F MED LIST DOCD IN RCRD: CPT | Mod: HCNC,S$GLB,, | Performed by: FAMILY MEDICINE

## 2020-05-19 PROCEDURE — 99214 PR OFFICE/OUTPT VISIT, EST, LEVL IV, 30-39 MIN: ICD-10-PCS | Mod: HCNC,S$GLB,, | Performed by: FAMILY MEDICINE

## 2020-05-19 PROCEDURE — 1101F PT FALLS ASSESS-DOCD LE1/YR: CPT | Mod: HCNC,CPTII,S$GLB, | Performed by: FAMILY MEDICINE

## 2020-05-19 PROCEDURE — 1125F AMNT PAIN NOTED PAIN PRSNT: CPT | Mod: HCNC,S$GLB,, | Performed by: FAMILY MEDICINE

## 2020-05-19 PROCEDURE — 3046F HEMOGLOBIN A1C LEVEL >9.0%: CPT | Mod: HCNC,CPTII,S$GLB, | Performed by: FAMILY MEDICINE

## 2020-05-19 PROCEDURE — 99214 OFFICE O/P EST MOD 30 MIN: CPT | Mod: HCNC,S$GLB,, | Performed by: FAMILY MEDICINE

## 2020-05-19 PROCEDURE — 99999 PR PBB SHADOW E&M-EST. PATIENT-LVL V: CPT | Mod: PBBFAC,HCNC,, | Performed by: FAMILY MEDICINE

## 2020-05-19 PROCEDURE — 3078F DIAST BP <80 MM HG: CPT | Mod: HCNC,CPTII,S$GLB, | Performed by: FAMILY MEDICINE

## 2020-05-19 PROCEDURE — 99499 UNLISTED E&M SERVICE: CPT | Mod: HCNC,S$GLB,, | Performed by: FAMILY MEDICINE

## 2020-05-19 PROCEDURE — 3046F PR MOST RECENT HEMOGLOBIN A1C LEVEL > 9.0%: ICD-10-PCS | Mod: HCNC,CPTII,S$GLB, | Performed by: FAMILY MEDICINE

## 2020-05-19 PROCEDURE — 1159F PR MEDICATION LIST DOCUMENTED IN MEDICAL RECORD: ICD-10-PCS | Mod: HCNC,S$GLB,, | Performed by: FAMILY MEDICINE

## 2020-05-19 PROCEDURE — 99999 PR PBB SHADOW E&M-EST. PATIENT-LVL V: ICD-10-PCS | Mod: PBBFAC,HCNC,, | Performed by: FAMILY MEDICINE

## 2020-05-19 PROCEDURE — 3074F SYST BP LT 130 MM HG: CPT | Mod: HCNC,CPTII,S$GLB, | Performed by: FAMILY MEDICINE

## 2020-05-19 PROCEDURE — 3078F PR MOST RECENT DIASTOLIC BLOOD PRESSURE < 80 MM HG: ICD-10-PCS | Mod: HCNC,CPTII,S$GLB, | Performed by: FAMILY MEDICINE

## 2020-05-19 PROCEDURE — 1101F PR PT FALLS ASSESS DOC 0-1 FALLS W/OUT INJ PAST YR: ICD-10-PCS | Mod: HCNC,CPTII,S$GLB, | Performed by: FAMILY MEDICINE

## 2020-05-19 RX ORDER — GABAPENTIN 300 MG/1
300 CAPSULE ORAL NIGHTLY
Qty: 90 CAPSULE | Refills: 11 | Status: SHIPPED | OUTPATIENT
Start: 2020-05-19 | End: 2022-04-06 | Stop reason: SDUPTHER

## 2020-05-19 RX ORDER — MUPIROCIN 20 MG/G
OINTMENT TOPICAL 3 TIMES DAILY
Qty: 30 G | Refills: 0 | Status: SHIPPED | OUTPATIENT
Start: 2020-05-19 | End: 2021-07-07 | Stop reason: SDUPTHER

## 2020-05-19 NOTE — PROGRESS NOTES
(Portions of this note were dictated using voice recognition software and may contain dictation related errors in spelling/grammar/syntax not found on text review)    CC:   Chief Complaint   Patient presents with    Follow-up       HPI: 70 y.o. male Visit 02/11/2020    Diabetes type 2, markedly uncontrolled, has stage 3 chronic kidney disease.  A1c had markedly increased as below On metformin 2 g daily and glimepiride 4 mg b.i.d..  Tried on Victoza but could not afford so he got off this.  Therefore started Trulicity 1.5 mg weekly.  A1c still markedly elevated in the 10 range in February but down from 12 range prior.  Sent a message to start glargine 10 units daily, self titration protocol also provided.  For got his blood sugar log at home.  Readings can depend.  He does state that diet has a lot to do with if his readings are higher not.  This morning he was in the 120 range.  Sometimes may get to 200 range.    Eye exam: optometry referral placed.  scheduled but had to cancel secondary to COVID-19 pandemic, plans to reschedule.  Foot exam: occ numbness.  Checks sugars periodically.   Not much exercise because of knee osteoarthritis     Hypertension on lisinopril HCT 20/25, furosemide 20 mg daily.  Blood pressure stable     Dyslipidemia on pravastatin 40 mg daily        Past Medical History:   Diagnosis Date    Allergy     DDD (degenerative disc disease), lumbar     Hyperlipidemia     Hypertension     Nicotine abuse     Obesity     Scrotal mass     Type II diabetes mellitus with renal manifestations, uncontrolled     microalbuminuria    Type II or unspecified type diabetes mellitus without mention of complication, not stated as uncontrolled     Ulcer        Past Surgical History:   Procedure Laterality Date    CARPAL TUNNEL RELEASE      CONDYLOMA EXCISION/FULGURATION Bilateral 2014    scrotal    KNEE SURGERY Left     removal of cartilage with no implant    LUMBAR LAMINECTOMY  2017    L4-5    ULNAR  NERVE TRANSPOSITION         Family History   Problem Relation Age of Onset    Cancer Mother         Liver    Cataracts Mother     Liver cancer Mother     Arthritis Mother     Cancer Father         Lung met Brain    Lung cancer Father     Brain cancer Father     Cancer Paternal Grandfather     Diabetes Paternal Grandmother     Cancer Sister     No Known Problems Brother     No Known Problems Daughter     Amblyopia Neg Hx     Blindness Neg Hx     Glaucoma Neg Hx     Macular degeneration Neg Hx     Retinal detachment Neg Hx     Strabismus Neg Hx        Social History     Tobacco Use    Smoking status: Current Some Day Smoker     Packs/day: 1.00     Years: 55.00     Pack years: 55.00     Types: Cigarettes    Smokeless tobacco: Former User     Quit date: 11/6/2018   Substance Use Topics    Alcohol use: Yes     Alcohol/week: 8.0 standard drinks     Types: 2 Cans of beer, 1 Shots of liquor, 5 Standard drinks or equivalent per week     Frequency: 2-4 times a month     Drinks per session: 3 or 4     Binge frequency: Less than monthly    Drug use: No       Lab Results   Component Value Date    WBC 9.39 02/11/2020    HGB 15.3 02/11/2020    HCT 46.6 02/11/2020    MCV 93 02/11/2020     02/11/2020    CHOL 156 02/11/2020    TRIG 189 (H) 02/11/2020    HDL 38 (L) 02/11/2020    ALT 31 02/11/2020    AST 30 02/11/2020    BILITOT 0.8 02/11/2020    ALKPHOS 72 02/11/2020     02/11/2020    K 4.2 02/11/2020     02/11/2020    CREATININE 1.3 02/11/2020    ESTGFRAFRICA >60 02/11/2020    EGFRNONAA 55 (A) 02/11/2020    CALCIUM 9.9 02/11/2020    ALBUMIN 3.7 02/11/2020    BUN 22 02/11/2020    CO2 30 (H) 02/11/2020    TSH 2.206 02/11/2020    PSA 4.0 08/16/2018    INR 1.0 07/12/2017    HGBA1C 10.5 (H) 02/11/2020    MICALBCREAT 128.1 (H) 06/24/2017    LDLCALC 80.2 02/11/2020     (H) 02/11/2020            Hemoglobin A1C (%)   Date Value   02/11/2020 10.5 (H)   11/07/2019 12.9 (H)   08/16/2018 10.1 (H)    06/24/2017 7.7 (H)   03/30/2016 8.6 (H)   11/18/2015 7.5 (H)   01/12/2015 7.2 (H)                 ROS:  GENERAL: No fever, chills, fatigability or weight loss.  SKIN: No rashes, no itching.  HEAD: No headaches.  EYES: No visual changes  EARS: No ear pain or changes in hearing.  NOSE: No congestion or rhinorrhea.  MOUTH & THROAT: No hoarseness, change in voice, or sore throat.  NODES: Denies swollen glands.  CHEST: Denies HASTINGS, cyanosis, wheezing, cough and sputum production.  CARDIOVASCULAR: Denies chest pain, PND, orthopnea.  ABDOMEN: No nausea, vomiting, or changes in bowel function.  URINARY: No flank pain, dysuria or hematuria.  PERIPHERAL VASCULAR: No claudication or cyanosis.  MUSCULOSKELETAL:  Chronic knee pain  NEUROLOGIC: No weakness or numbness.    Vital signs reviewed  PE:   APPEARANCE: Well nourished, well developed, in no acute distress.    HEAD: Normocephalic, atraumatic.  EYES: .   Conjunctivae noninjected.  EARS: TM's intact. Light reflex normal. No retraction or perforation  NECK: Supple with no cervical lymphadenopathy.  No carotid bruits.  No thyromegaly  CHEST: Good inspiratory effort. Lungs clear to auscultation with no wheezes or crackles.  CARDIOVASCULAR: Normal S1, S2. No rubs, murmurs, or gallops.  ABDOMEN: Bowel sounds normal. Not distended. Soft. No tenderness or masses. No organomegaly.  EXTREMITIES:  2+ pitting edema BLE.  Left lower extremity, area of redness, nontender but he does have a few small sores noted anterior aspect pretibial region.  Thinks he may have scratched himself here.  No drainage.      IMPRESSION    1. Uncontrolled type 2 diabetes mellitus with hyperglycemia    2. Hyperlipidemia associated with type 2 diabetes mellitus    3. Hypertension associated with diabetes    4. Uncontrolled secondary diabetes mellitus with stage 3 CKD (GFR 30-59)    5. 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 Type 2 diabetes mellitus with other skin ulcer (CODE)     7. Peripheral edema            PLAN  renew labs.  Continue metformin 2 g daily and glimepiride 4 mg b.i.d, Trulicity 1.5 mg weekly, glargine:  10 units with self titration protocol every 5 days, provided instructions.   referral to diabetes digital program for monitoring    Through his insurance should have free gym membership available.  Discussed importance of regular aerobic exercise.  If inhibited by significant knee osteoarthritis, can do low-impact aerobic activities like exercise bike or even hand bike to eliminate lower extremity action altogether.     Hypertension controlled    Peripheral edema, along with some slight redness noted left lower extremity without significant pain or induration.  Have given mupirocin ointment to apply to the open wounds noted above.  Notify for any progressive redness or swelling which may indicate cellulitis and need for oral antibiotic therapy      Dyslipidemia:  Continue pravastatin    Tobacco dependence:  Offered cessation program although patient was to try this himself        Also needs refill of gabapentin for neuropathy symptoms, provided      Orders Placed This Encounter   Procedures    CBC auto differential    Comprehensive metabolic panel    Lipid Panel    Hemoglobin A1C    Diabetes Digital Medicine (DDMP) Enrollment Order             SCREENINGS (males >=65)     Immunizations:  Tetanus: 1/1/2014  Pneumovax: 9/19/2014  Prevnar 7/12/17  Flu:  utd  Prostate:  PSA normal 2018 as above.     Colon cancer screening: due (addressed colonoscopy with pt prior visit and he had declined).  Fit kit ordered.  Patient has not yet completed this.     AAA screen 2017, normal       Answers for HPI/ROS submitted by the patient on 5/18/2020   activity change: No  unexpected weight change: No  neck pain: No  hearing loss: No  rhinorrhea: No  trouble swallowing: No  eye discharge: No  visual disturbance: No  chest tightness: No  wheezing: No  chest pain: No  palpitations: No  blood  in stool: No  constipation: No  vomiting: No  diarrhea: No  polydipsia: No  polyuria: No  difficulty urinating: No  urgency: No  hematuria: No  joint swelling: Yes  arthralgias: Yes  headaches: No  weakness: No  confusion: No  dysphoric mood: No

## 2020-05-19 NOTE — PATIENT INSTRUCTIONS
Guideline for managing basal glargine insulin (Lantus, Basaglar, Toujeo) :    1. Check your fasting blood sugars every morning for 5 days.  Goal is to have most of your fasting blood sugars below 140.    2. If most of your fasting blood sugars in that 5 day period are above 140, increase your glargine insulin dose by 5 units.    3. If most of your fasting blood sugars in that 5 day period of are below 140 but higher than 70, keep your glargine insulin dose the same.    4. If most of your fasting blood sugars in that 5 day period are below 70, decrease your glargine insulin dose by 5 units.    5. Make sure to write your blood sugars down in a notebook; do not simply just store the numbers in your glucose meter.

## 2020-05-20 ENCOUNTER — LAB VISIT (OUTPATIENT)
Dept: LAB | Facility: HOSPITAL | Age: 70
End: 2020-05-20
Attending: FAMILY MEDICINE
Payer: MEDICARE

## 2020-05-20 ENCOUNTER — PATIENT MESSAGE (OUTPATIENT)
Dept: FAMILY MEDICINE | Facility: CLINIC | Age: 70
End: 2020-05-20

## 2020-05-20 DIAGNOSIS — E78.5 HYPERLIPIDEMIA ASSOCIATED WITH TYPE 2 DIABETES MELLITUS: ICD-10-CM

## 2020-05-20 DIAGNOSIS — E11.59 HYPERTENSION ASSOCIATED WITH DIABETES: ICD-10-CM

## 2020-05-20 DIAGNOSIS — I15.2 HYPERTENSION ASSOCIATED WITH DIABETES: ICD-10-CM

## 2020-05-20 DIAGNOSIS — E11.65 UNCONTROLLED TYPE 2 DIABETES MELLITUS WITH HYPERGLYCEMIA: ICD-10-CM

## 2020-05-20 DIAGNOSIS — E11.69 HYPERLIPIDEMIA ASSOCIATED WITH TYPE 2 DIABETES MELLITUS: ICD-10-CM

## 2020-05-20 LAB
ALBUMIN SERPL BCP-MCNC: 3.6 G/DL (ref 3.5–5.2)
ALP SERPL-CCNC: 69 U/L (ref 55–135)
ALT SERPL W/O P-5'-P-CCNC: 27 U/L (ref 10–44)
ANION GAP SERPL CALC-SCNC: 10 MMOL/L (ref 8–16)
AST SERPL-CCNC: 31 U/L (ref 10–40)
BASOPHILS # BLD AUTO: 0.03 K/UL (ref 0–0.2)
BASOPHILS NFR BLD: 0.3 % (ref 0–1.9)
BILIRUB SERPL-MCNC: 0.6 MG/DL (ref 0.1–1)
BUN SERPL-MCNC: 25 MG/DL (ref 8–23)
CALCIUM SERPL-MCNC: 9.8 MG/DL (ref 8.7–10.5)
CHLORIDE SERPL-SCNC: 102 MMOL/L (ref 95–110)
CHOLEST SERPL-MCNC: 153 MG/DL (ref 120–199)
CHOLEST/HDLC SERPL: 4.1 {RATIO} (ref 2–5)
CO2 SERPL-SCNC: 29 MMOL/L (ref 23–29)
CREAT SERPL-MCNC: 1.2 MG/DL (ref 0.5–1.4)
DIFFERENTIAL METHOD: NORMAL
EOSINOPHIL # BLD AUTO: 0.1 K/UL (ref 0–0.5)
EOSINOPHIL NFR BLD: 1.4 % (ref 0–8)
ERYTHROCYTE [DISTWIDTH] IN BLOOD BY AUTOMATED COUNT: 12.9 % (ref 11.5–14.5)
EST. GFR  (AFRICAN AMERICAN): >60 ML/MIN/1.73 M^2
EST. GFR  (NON AFRICAN AMERICAN): >60 ML/MIN/1.73 M^2
ESTIMATED AVG GLUCOSE: 174 MG/DL (ref 68–131)
GLUCOSE SERPL-MCNC: 87 MG/DL (ref 70–110)
HBA1C MFR BLD HPLC: 7.7 % (ref 4–5.6)
HCT VFR BLD AUTO: 45.6 % (ref 40–54)
HDLC SERPL-MCNC: 37 MG/DL (ref 40–75)
HDLC SERPL: 24.2 % (ref 20–50)
HGB BLD-MCNC: 14.7 G/DL (ref 14–18)
IMM GRANULOCYTES # BLD AUTO: 0.04 K/UL (ref 0–0.04)
IMM GRANULOCYTES NFR BLD AUTO: 0.4 % (ref 0–0.5)
LDLC SERPL CALC-MCNC: 78 MG/DL (ref 63–159)
LYMPHOCYTES # BLD AUTO: 3.7 K/UL (ref 1–4.8)
LYMPHOCYTES NFR BLD: 35.7 % (ref 18–48)
MCH RBC QN AUTO: 30.6 PG (ref 27–31)
MCHC RBC AUTO-ENTMCNC: 32.2 G/DL (ref 32–36)
MCV RBC AUTO: 95 FL (ref 82–98)
MONOCYTES # BLD AUTO: 0.9 K/UL (ref 0.3–1)
MONOCYTES NFR BLD: 8.4 % (ref 4–15)
NEUTROPHILS # BLD AUTO: 5.5 K/UL (ref 1.8–7.7)
NEUTROPHILS NFR BLD: 53.8 % (ref 38–73)
NONHDLC SERPL-MCNC: 116 MG/DL
NRBC BLD-RTO: 0 /100 WBC
PLATELET # BLD AUTO: 292 K/UL (ref 150–350)
PMV BLD AUTO: 10.7 FL (ref 9.2–12.9)
POTASSIUM SERPL-SCNC: 4.1 MMOL/L (ref 3.5–5.1)
PROT SERPL-MCNC: 7.7 G/DL (ref 6–8.4)
RBC # BLD AUTO: 4.81 M/UL (ref 4.6–6.2)
SODIUM SERPL-SCNC: 141 MMOL/L (ref 136–145)
TRIGL SERPL-MCNC: 190 MG/DL (ref 30–150)
WBC # BLD AUTO: 10.22 K/UL (ref 3.9–12.7)

## 2020-05-20 PROCEDURE — 36415 COLL VENOUS BLD VENIPUNCTURE: CPT | Mod: HCNC

## 2020-05-20 PROCEDURE — 85025 COMPLETE CBC W/AUTO DIFF WBC: CPT | Mod: HCNC

## 2020-05-20 PROCEDURE — 80053 COMPREHEN METABOLIC PANEL: CPT | Mod: HCNC

## 2020-05-20 PROCEDURE — 83036 HEMOGLOBIN GLYCOSYLATED A1C: CPT | Mod: HCNC

## 2020-05-20 PROCEDURE — 80061 LIPID PANEL: CPT | Mod: HCNC

## 2020-05-20 NOTE — TELEPHONE ENCOUNTER
Dear Francisco Coppola    Your recent labs were reviewed and released to your account. Your lab results show that your diabetes test is getting much better.  Your A1c is down to 7.7 which is much improved from the 10 range before.  Please continue on the Lantus insulin, Trulicity, metformin, glimepiride.  Please check your sugars like we talked about to see if you need any adjustment of your Lantus as per the protocol that I gave you.      Laureano Hebert MD

## 2020-05-25 RX ORDER — METFORMIN HYDROCHLORIDE 500 MG/1
TABLET, EXTENDED RELEASE ORAL
Qty: 120 TABLET | Refills: 11 | Status: SHIPPED | OUTPATIENT
Start: 2020-05-25 | End: 2021-05-02 | Stop reason: SDUPTHER

## 2020-07-17 DIAGNOSIS — Z12.11 COLON CANCER SCREENING: ICD-10-CM

## 2020-08-12 ENCOUNTER — OFFICE VISIT (OUTPATIENT)
Dept: FAMILY MEDICINE | Facility: CLINIC | Age: 70
End: 2020-08-12
Payer: MEDICARE

## 2020-08-12 DIAGNOSIS — E78.5 HYPERLIPIDEMIA ASSOCIATED WITH TYPE 2 DIABETES MELLITUS: ICD-10-CM

## 2020-08-12 DIAGNOSIS — E11.69 HYPERLIPIDEMIA ASSOCIATED WITH TYPE 2 DIABETES MELLITUS: ICD-10-CM

## 2020-08-12 DIAGNOSIS — E11.59 HYPERTENSION ASSOCIATED WITH DIABETES: ICD-10-CM

## 2020-08-12 DIAGNOSIS — Z01.84 ENCOUNTER FOR ANTIBODY RESPONSE EXAMINATION: ICD-10-CM

## 2020-08-12 DIAGNOSIS — I15.2 HYPERTENSION ASSOCIATED WITH DIABETES: ICD-10-CM

## 2020-08-12 DIAGNOSIS — E11.65 UNCONTROLLED TYPE 2 DIABETES MELLITUS WITH HYPERGLYCEMIA: ICD-10-CM

## 2020-08-12 DIAGNOSIS — M17.11 OSTEOARTHRITIS OF RIGHT KNEE, UNSPECIFIED OSTEOARTHRITIS TYPE: Primary | ICD-10-CM

## 2020-08-12 PROCEDURE — 3077F PR MOST RECENT SYSTOLIC BLOOD PRESSURE >= 140 MM HG: ICD-10-PCS | Mod: HCNC,CPTII,S$GLB, | Performed by: FAMILY MEDICINE

## 2020-08-12 PROCEDURE — 3078F PR MOST RECENT DIASTOLIC BLOOD PRESSURE < 80 MM HG: ICD-10-PCS | Mod: HCNC,CPTII,S$GLB, | Performed by: FAMILY MEDICINE

## 2020-08-12 PROCEDURE — 3051F HG A1C>EQUAL 7.0%<8.0%: CPT | Mod: HCNC,CPTII,S$GLB, | Performed by: FAMILY MEDICINE

## 2020-08-12 PROCEDURE — 3051F PR MOST RECENT HEMOGLOBIN A1C LEVEL 7.0 - < 8.0%: ICD-10-PCS | Mod: HCNC,CPTII,S$GLB, | Performed by: FAMILY MEDICINE

## 2020-08-12 PROCEDURE — 1101F PR PT FALLS ASSESS DOC 0-1 FALLS W/OUT INJ PAST YR: ICD-10-PCS | Mod: HCNC,CPTII,S$GLB, | Performed by: FAMILY MEDICINE

## 2020-08-12 PROCEDURE — 20610 PR DRAIN/INJECT LARGE JOINT/BURSA: ICD-10-PCS | Mod: HCNC,RT,S$GLB, | Performed by: FAMILY MEDICINE

## 2020-08-12 PROCEDURE — 99999 PR PBB SHADOW E&M-EST. PATIENT-LVL V: ICD-10-PCS | Mod: PBBFAC,HCNC,, | Performed by: FAMILY MEDICINE

## 2020-08-12 PROCEDURE — 3077F SYST BP >= 140 MM HG: CPT | Mod: HCNC,CPTII,S$GLB, | Performed by: FAMILY MEDICINE

## 2020-08-12 PROCEDURE — 3078F DIAST BP <80 MM HG: CPT | Mod: HCNC,CPTII,S$GLB, | Performed by: FAMILY MEDICINE

## 2020-08-12 PROCEDURE — 3008F BODY MASS INDEX DOCD: CPT | Mod: HCNC,CPTII,S$GLB, | Performed by: FAMILY MEDICINE

## 2020-08-12 PROCEDURE — 3008F PR BODY MASS INDEX (BMI) DOCUMENTED: ICD-10-PCS | Mod: HCNC,CPTII,S$GLB, | Performed by: FAMILY MEDICINE

## 2020-08-12 PROCEDURE — 99499 UNLISTED E&M SERVICE: CPT | Mod: S$GLB,,, | Performed by: FAMILY MEDICINE

## 2020-08-12 PROCEDURE — 1101F PT FALLS ASSESS-DOCD LE1/YR: CPT | Mod: HCNC,CPTII,S$GLB, | Performed by: FAMILY MEDICINE

## 2020-08-12 PROCEDURE — 1125F AMNT PAIN NOTED PAIN PRSNT: CPT | Mod: HCNC,S$GLB,, | Performed by: FAMILY MEDICINE

## 2020-08-12 PROCEDURE — 99499 RISK ADDL DX/OHS AUDIT: ICD-10-PCS | Mod: S$GLB,,, | Performed by: FAMILY MEDICINE

## 2020-08-12 PROCEDURE — 99214 OFFICE O/P EST MOD 30 MIN: CPT | Mod: 25,HCNC,S$GLB, | Performed by: FAMILY MEDICINE

## 2020-08-12 PROCEDURE — 1159F PR MEDICATION LIST DOCUMENTED IN MEDICAL RECORD: ICD-10-PCS | Mod: HCNC,S$GLB,, | Performed by: FAMILY MEDICINE

## 2020-08-12 PROCEDURE — 99214 PR OFFICE/OUTPT VISIT, EST, LEVL IV, 30-39 MIN: ICD-10-PCS | Mod: 25,HCNC,S$GLB, | Performed by: FAMILY MEDICINE

## 2020-08-12 PROCEDURE — 20610 DRAIN/INJ JOINT/BURSA W/O US: CPT | Mod: HCNC,RT,S$GLB, | Performed by: FAMILY MEDICINE

## 2020-08-12 PROCEDURE — 1159F MED LIST DOCD IN RCRD: CPT | Mod: HCNC,S$GLB,, | Performed by: FAMILY MEDICINE

## 2020-08-12 PROCEDURE — 99999 PR PBB SHADOW E&M-EST. PATIENT-LVL V: CPT | Mod: PBBFAC,HCNC,, | Performed by: FAMILY MEDICINE

## 2020-08-12 PROCEDURE — 1125F PR PAIN SEVERITY QUANTIFIED, PAIN PRESENT: ICD-10-PCS | Mod: HCNC,S$GLB,, | Performed by: FAMILY MEDICINE

## 2020-08-12 RX ORDER — TRIAMCINOLONE ACETONIDE 40 MG/ML
40 INJECTION, SUSPENSION INTRA-ARTICULAR; INTRAMUSCULAR
Status: COMPLETED | OUTPATIENT
Start: 2020-08-12 | End: 2020-08-12

## 2020-08-12 RX ADMIN — TRIAMCINOLONE ACETONIDE 40 MG: 40 INJECTION, SUSPENSION INTRA-ARTICULAR; INTRAMUSCULAR at 10:08

## 2020-08-12 NOTE — PROGRESS NOTES
(Portions of this note were dictated using voice recognition software and may contain dictation related errors in spelling/grammar/syntax not found on text review)    CC:   Chief Complaint   Patient presents with    Knee Pain     right       HPI: 70 y.o. male Visit May of 2020    Here primarily for knee pain on the right side..  Has known severe degenerative disease bilateral knees, x-ray in 2019 showed severe medial and moderate lateral compartment narrowing, moderate osteophyte formation.  Has described pain with prolonged walking, better if sitting for a while, sometimes radiation anterior knee down anterior tibia, has also had some associated hip pain and leg cramps at night.  Had been given Voltaren gel topically, advised home therapy program, consider formal physical therapy for persistent symptoms.  Voltaren helps a little bit but he feels that in the past week or so right leg is hurting than usual.  He does have bilateral leg swelling which is chronic.  He denies any new trauma.  States that 1 day he was walking and felt a click in the leg did feel somewhat better after that.  Does do some home knee exercises twice a week    Other medical history as below    Diabetes type 2, uncontrolled, has stage 3 chronic kidney disease.  A1c had markedly increased as below On metformin 2 g daily and glimepiride 4 mg b.i.d..   started Trulicity 1.5 mg weekly And subsequently started on glargine 10 units daily, self titration protocol also provided.  A1c did significantly improve as below.  Blood sugars in the morning usually in the 115-130 range.  Last night he did get and ate some sugar donuts and this morning it was 205 after an hour it went down to 177.  Usually reports spikes of blood sugar when eats something that he should not eaten.    Eye exam: optometry referral placed.  scheduled but had to cancel secondary to COVID-19 pandemic, plans to reschedule.  Foot exam: occ numbness.  Exam done on November 2019  Checks  sugars periodically.       Hypertension on lisinopril HCT 20/25, furosemide 20 mg daily.  Blood pressure typically stable, was elevated today the patient is in quite a bit of pain     Dyslipidemia on pravastatin 40 mg daily     A month and a half ago had a respiratory illness review short of breath, chest heaviness, coughing.  This is now cleared but his daughter who lives with him and is a nursing assistant with [employer] tested positive for COVID-19.  He would like antibody testing done.    Past Medical History:   Diagnosis Date    Allergy     DDD (degenerative disc disease), lumbar     Hyperlipidemia     Hypertension     Nicotine abuse     Obesity     Scrotal mass     Type II diabetes mellitus with renal manifestations, uncontrolled     microalbuminuria    Type II or unspecified type diabetes mellitus without mention of complication, not stated as uncontrolled     Ulcer        Past Surgical History:   Procedure Laterality Date    CARPAL TUNNEL RELEASE      CONDYLOMA EXCISION/FULGURATION Bilateral 2014    scrotal    KNEE SURGERY Left     removal of cartilage with no implant    LUMBAR LAMINECTOMY  2017    L4-5    ULNAR NERVE TRANSPOSITION         Family History   Problem Relation Age of Onset    Cancer Mother         Liver    Cataracts Mother     Liver cancer Mother     Arthritis Mother     Cancer Father         Lung met Brain    Lung cancer Father     Brain cancer Father     Cancer Paternal Grandfather     Diabetes Paternal Grandmother     Cancer Sister     No Known Problems Brother     No Known Problems Daughter     Amblyopia Neg Hx     Blindness Neg Hx     Glaucoma Neg Hx     Macular degeneration Neg Hx     Retinal detachment Neg Hx     Strabismus Neg Hx        Social History     Tobacco Use    Smoking status: Current Some Day Smoker     Packs/day: 1.00     Years: 55.00     Pack years: 55.00     Types: Cigarettes    Smokeless tobacco: Former User     Quit date: 11/6/2018    Substance Use Topics    Alcohol use: Yes     Alcohol/week: 8.0 standard drinks     Types: 2 Cans of beer, 1 Shots of liquor, 5 Standard drinks or equivalent per week     Frequency: 4 or more times a week     Drinks per session: 1 or 2     Binge frequency: Less than monthly    Drug use: No       Lab Results   Component Value Date    WBC 10.22 05/20/2020    HGB 14.7 05/20/2020    HCT 45.6 05/20/2020    MCV 95 05/20/2020     05/20/2020    CHOL 153 05/20/2020    TRIG 190 (H) 05/20/2020    HDL 37 (L) 05/20/2020    ALT 27 05/20/2020    AST 31 05/20/2020    BILITOT 0.6 05/20/2020    ALKPHOS 69 05/20/2020     05/20/2020    K 4.1 05/20/2020     05/20/2020    CREATININE 1.2 05/20/2020    ESTGFRAFRICA >60 05/20/2020    EGFRNONAA >60 05/20/2020    CALCIUM 9.8 05/20/2020    ALBUMIN 3.6 05/20/2020    BUN 25 (H) 05/20/2020    CO2 29 05/20/2020    TSH 2.206 02/11/2020    PSA 4.0 08/16/2018    INR 1.0 07/12/2017    HGBA1C 7.7 (H) 05/20/2020    MICALBCREAT 128.1 (H) 06/24/2017    LDLCALC 78.0 05/20/2020    GLU 87 05/20/2020            Hemoglobin A1C (%)   Date Value   05/20/2020 7.7 (H)   02/11/2020 10.5 (H)   11/07/2019 12.9 (H)   08/16/2018 10.1 (H)   06/24/2017 7.7 (H)   03/30/2016 8.6 (H)   11/18/2015 7.5 (H)       Answers for HPI/ROS submitted by the patient on 8/12/2020   activity change: No  unexpected weight change: No  neck pain: No  hearing loss: No  rhinorrhea: No  trouble swallowing: No  eye discharge: No  visual disturbance: No  chest tightness: No  wheezing: No  chest pain: No  palpitations: No  blood in stool: No  constipation: No  vomiting: No  diarrhea: No  polydipsia: No  polyuria: No  difficulty urinating: No  urgency: No  hematuria: No  joint swelling: Yes  arthralgias: Yes  headaches: Yes  weakness: No  confusion: No  dysphoric mood: No    Vital signs reviewed  PE:   APPEARANCE: Well nourished, well developed, in no acute distress.    HEAD: Normocephalic, atraumatic.  EYES: .   Conjunctivae  noninjected.  EARS: TM's intact. Light reflex normal. No retraction or perforation  NECK: Supple with no cervical lymphadenopathy.  No carotid bruits.  No thyromegaly  CHEST: Good inspiratory effort. Lungs clear to auscultation with no wheezes or crackles.  CARDIOVASCULAR: Normal S1, S2. No rubs, murmurs, or gallops.  ABDOMEN: Bowel sounds normal. Not distended. Soft. No tenderness or masses. No organomegaly.  EXTREMITIES:  2+ pitting edema BLE.       IMPRESSION    1. Osteoarthritis of right knee, unspecified osteoarthritis type    2. Uncontrolled type 2 diabetes mellitus with hyperglycemia    3. Hyperlipidemia associated with type 2 diabetes mellitus    4. Hypertension associated with diabetes    5. Encounter for antibody response examination            PLAN  Since diabetes has improved with addition of Lantus, discussed potential for corticosteroid intra-articular injection.  Patient agrees.  PROCEDURE NOTE:After adequate cleansing of the overlying skin with alcohol, 5 cc total solution apprised of 4 cc of 2 percent plain lidocaine combined with 1 cc of 40 mg Kenalog injected into the right knee, lateral to the patellar tendon.    He was strongly counseled on strict dietary precautions for the next several weeks and strict monitoring of blood sugar.  I have advised that he go up to 15 units of Lantus as he does sometimes get elevated readings of his blood glucose and does not have any recent hypoglycemia issues.  He can continue his metformin, Trulicity, glimepiride as is.  Notify for any a developing hyperglycemia which needs further titration    Hypertension:  Will reassess when in less pain.  His last  blood pressure during last visit was stable.    Dyslipidemia continue statin    Will check CoVID antibody given concerned about prior exposure and prior resolved respiratory illness.    Orders Placed This Encounter   Procedures    CBC auto differential    Comprehensive metabolic panel    Lipid Panel     Hemoglobin A1C    COVID-19 (SARS CoV-2) IgG Antibody                     SCREENINGS (males >=65)     Immunizations:  Tetanus: 1/1/2014  Pneumovax: 9/19/2014  Prevnar 7/12/17  Flu:  utd  Prostate:  PSA normal 2018 as above.     Colon cancer screening: due (addressed colonoscopy with pt prior visit and he had declined).  Fit kit ordered.  Patient has not yet completed this.     AAA screen 2017, normal       Answers for HPI/ROS submitted by the patient on 5/18/2020   activity change: No  unexpected weight change: No  neck pain: No  hearing loss: No  rhinorrhea: No  trouble swallowing: No  eye discharge: No  visual disturbance: No  chest tightness: No  wheezing: No  chest pain: No  palpitations: No  blood in stool: No  constipation: No  vomiting: No  diarrhea: No  polydipsia: No  polyuria: No  difficulty urinating: No  urgency: No  hematuria: No  joint swelling: Yes  arthralgias: Yes  headaches: No  weakness: No  confusion: No  dysphoric mood: No

## 2020-08-13 ENCOUNTER — PATIENT MESSAGE (OUTPATIENT)
Dept: FAMILY MEDICINE | Facility: CLINIC | Age: 70
End: 2020-08-13

## 2020-08-13 ENCOUNTER — LAB VISIT (OUTPATIENT)
Dept: LAB | Facility: HOSPITAL | Age: 70
End: 2020-08-13
Attending: FAMILY MEDICINE
Payer: MEDICARE

## 2020-08-13 VITALS
DIASTOLIC BLOOD PRESSURE: 74 MMHG | OXYGEN SATURATION: 96 % | HEART RATE: 118 BPM | WEIGHT: 252.63 LBS | SYSTOLIC BLOOD PRESSURE: 130 MMHG | TEMPERATURE: 99 F | BODY MASS INDEX: 37.42 KG/M2 | HEIGHT: 69 IN

## 2020-08-13 DIAGNOSIS — E11.69 HYPERLIPIDEMIA ASSOCIATED WITH TYPE 2 DIABETES MELLITUS: ICD-10-CM

## 2020-08-13 DIAGNOSIS — Z01.84 ENCOUNTER FOR ANTIBODY RESPONSE EXAMINATION: ICD-10-CM

## 2020-08-13 DIAGNOSIS — E78.5 HYPERLIPIDEMIA ASSOCIATED WITH TYPE 2 DIABETES MELLITUS: ICD-10-CM

## 2020-08-13 DIAGNOSIS — M17.11 OSTEOARTHRITIS OF RIGHT KNEE, UNSPECIFIED OSTEOARTHRITIS TYPE: ICD-10-CM

## 2020-08-13 DIAGNOSIS — E11.59 HYPERTENSION ASSOCIATED WITH DIABETES: ICD-10-CM

## 2020-08-13 DIAGNOSIS — E11.65 UNCONTROLLED TYPE 2 DIABETES MELLITUS WITH HYPERGLYCEMIA: ICD-10-CM

## 2020-08-13 DIAGNOSIS — I15.2 HYPERTENSION ASSOCIATED WITH DIABETES: ICD-10-CM

## 2020-08-13 LAB
ALBUMIN SERPL BCP-MCNC: 3.7 G/DL (ref 3.5–5.2)
ALP SERPL-CCNC: 68 U/L (ref 55–135)
ALT SERPL W/O P-5'-P-CCNC: 24 U/L (ref 10–44)
ANION GAP SERPL CALC-SCNC: 11 MMOL/L (ref 8–16)
AST SERPL-CCNC: 22 U/L (ref 10–40)
BASOPHILS # BLD AUTO: 0.02 K/UL (ref 0–0.2)
BASOPHILS NFR BLD: 0.2 % (ref 0–1.9)
BILIRUB SERPL-MCNC: 0.6 MG/DL (ref 0.1–1)
BUN SERPL-MCNC: 39 MG/DL (ref 8–23)
CALCIUM SERPL-MCNC: 9.4 MG/DL (ref 8.7–10.5)
CHLORIDE SERPL-SCNC: 99 MMOL/L (ref 95–110)
CHOLEST SERPL-MCNC: 162 MG/DL (ref 120–199)
CHOLEST/HDLC SERPL: 3.9 {RATIO} (ref 2–5)
CO2 SERPL-SCNC: 24 MMOL/L (ref 23–29)
CREAT SERPL-MCNC: 1.6 MG/DL (ref 0.5–1.4)
DIFFERENTIAL METHOD: ABNORMAL
EOSINOPHIL # BLD AUTO: 0 K/UL (ref 0–0.5)
EOSINOPHIL NFR BLD: 0 % (ref 0–8)
ERYTHROCYTE [DISTWIDTH] IN BLOOD BY AUTOMATED COUNT: 13.3 % (ref 11.5–14.5)
EST. GFR  (AFRICAN AMERICAN): 49 ML/MIN/1.73 M^2
EST. GFR  (NON AFRICAN AMERICAN): 43 ML/MIN/1.73 M^2
ESTIMATED AVG GLUCOSE: 163 MG/DL (ref 68–131)
GLUCOSE SERPL-MCNC: 313 MG/DL (ref 70–110)
HBA1C MFR BLD HPLC: 7.3 % (ref 4–5.6)
HCT VFR BLD AUTO: 44 % (ref 40–54)
HDLC SERPL-MCNC: 42 MG/DL (ref 40–75)
HDLC SERPL: 25.9 % (ref 20–50)
HGB BLD-MCNC: 14.5 G/DL (ref 14–18)
IMM GRANULOCYTES # BLD AUTO: 0.08 K/UL (ref 0–0.04)
IMM GRANULOCYTES NFR BLD AUTO: 0.7 % (ref 0–0.5)
LDLC SERPL CALC-MCNC: 86.8 MG/DL (ref 63–159)
LYMPHOCYTES # BLD AUTO: 1.6 K/UL (ref 1–4.8)
LYMPHOCYTES NFR BLD: 12.9 % (ref 18–48)
MCH RBC QN AUTO: 30.7 PG (ref 27–31)
MCHC RBC AUTO-ENTMCNC: 33 G/DL (ref 32–36)
MCV RBC AUTO: 93 FL (ref 82–98)
MONOCYTES # BLD AUTO: 0.7 K/UL (ref 0.3–1)
MONOCYTES NFR BLD: 5.9 % (ref 4–15)
NEUTROPHILS # BLD AUTO: 9.9 K/UL (ref 1.8–7.7)
NEUTROPHILS NFR BLD: 80.3 % (ref 38–73)
NONHDLC SERPL-MCNC: 120 MG/DL
NRBC BLD-RTO: 0 /100 WBC
PLATELET # BLD AUTO: 284 K/UL (ref 150–350)
PMV BLD AUTO: 11 FL (ref 9.2–12.9)
POTASSIUM SERPL-SCNC: 4.7 MMOL/L (ref 3.5–5.1)
PROT SERPL-MCNC: 7.5 G/DL (ref 6–8.4)
RBC # BLD AUTO: 4.73 M/UL (ref 4.6–6.2)
SARS-COV-2 IGG SERPLBLD QL IA.RAPID: NEGATIVE
SODIUM SERPL-SCNC: 134 MMOL/L (ref 136–145)
TRIGL SERPL-MCNC: 166 MG/DL (ref 30–150)
WBC # BLD AUTO: 12.29 K/UL (ref 3.9–12.7)

## 2020-08-13 PROCEDURE — 85025 COMPLETE CBC W/AUTO DIFF WBC: CPT | Mod: HCNC

## 2020-08-13 PROCEDURE — 80061 LIPID PANEL: CPT | Mod: HCNC

## 2020-08-13 PROCEDURE — 36415 COLL VENOUS BLD VENIPUNCTURE: CPT | Mod: HCNC

## 2020-08-13 PROCEDURE — 80053 COMPREHEN METABOLIC PANEL: CPT | Mod: HCNC

## 2020-08-13 PROCEDURE — 83036 HEMOGLOBIN GLYCOSYLATED A1C: CPT | Mod: HCNC

## 2020-08-13 PROCEDURE — 86769 SARS-COV-2 COVID-19 ANTIBODY: CPT | Mod: HCNC

## 2020-08-13 NOTE — TELEPHONE ENCOUNTER
Dear Francisco Coppola    Your diabetes test is looking better but the kidney test had bumped down just a bit,   so I would just make sure that your hydrating well,  monitoring your  blood pressure  and continuing to monitor blood sugars, along with staying away from any ibuprofen or Aleve or Advil.  I would like to recheck this in 3 months    Also your CoVID antibody test was negative    Laureano Hebert MD

## 2020-08-28 RX ORDER — PRAVASTATIN SODIUM 40 MG/1
TABLET ORAL
Qty: 30 TABLET | Refills: 11 | Status: SHIPPED | OUTPATIENT
Start: 2020-08-28 | End: 2021-03-25 | Stop reason: SDUPTHER

## 2020-09-04 ENCOUNTER — CLINICAL SUPPORT (OUTPATIENT)
Dept: CARDIOLOGY | Facility: CLINIC | Age: 70
End: 2020-09-04
Attending: PHYSICIAN ASSISTANT
Payer: MEDICARE

## 2020-09-04 ENCOUNTER — OFFICE VISIT (OUTPATIENT)
Dept: URGENT CARE | Facility: CLINIC | Age: 70
End: 2020-09-04
Payer: MEDICARE

## 2020-09-04 ENCOUNTER — TELEPHONE (OUTPATIENT)
Dept: URGENT CARE | Facility: CLINIC | Age: 70
End: 2020-09-04

## 2020-09-04 VITALS
HEIGHT: 69 IN | BODY MASS INDEX: 37.33 KG/M2 | RESPIRATION RATE: 16 BRPM | TEMPERATURE: 98 F | DIASTOLIC BLOOD PRESSURE: 72 MMHG | SYSTOLIC BLOOD PRESSURE: 134 MMHG | WEIGHT: 252 LBS | HEART RATE: 112 BPM | OXYGEN SATURATION: 97 %

## 2020-09-04 DIAGNOSIS — R11.2 NON-INTRACTABLE VOMITING WITH NAUSEA, UNSPECIFIED VOMITING TYPE: ICD-10-CM

## 2020-09-04 DIAGNOSIS — R51.9 ACUTE NONINTRACTABLE HEADACHE, UNSPECIFIED HEADACHE TYPE: ICD-10-CM

## 2020-09-04 DIAGNOSIS — M79.89 PAIN AND SWELLING OF RIGHT LOWER LEG: ICD-10-CM

## 2020-09-04 DIAGNOSIS — M79.661 PAIN AND SWELLING OF RIGHT LOWER LEG: ICD-10-CM

## 2020-09-04 DIAGNOSIS — R50.9 FEVER, UNSPECIFIED FEVER CAUSE: Primary | ICD-10-CM

## 2020-09-04 LAB
CTP QC/QA: YES
SARS-COV-2 RDRP RESP QL NAA+PROBE: NEGATIVE

## 2020-09-04 PROCEDURE — 99214 OFFICE O/P EST MOD 30 MIN: CPT | Mod: 25,S$GLB,, | Performed by: PHYSICIAN ASSISTANT

## 2020-09-04 PROCEDURE — 99214 PR OFFICE/OUTPT VISIT, EST, LEVL IV, 30-39 MIN: ICD-10-PCS | Mod: 25,S$GLB,, | Performed by: PHYSICIAN ASSISTANT

## 2020-09-04 PROCEDURE — U0002: ICD-10-PCS | Mod: S$GLB,,, | Performed by: PHYSICIAN ASSISTANT

## 2020-09-04 PROCEDURE — 93971 CV US DOPPLER VENOUS LEG RIGHT (CUPID ONLY): ICD-10-PCS | Mod: HCNC,RT,S$GLB, | Performed by: INTERNAL MEDICINE

## 2020-09-04 PROCEDURE — 93971 EXTREMITY STUDY: CPT | Mod: HCNC,RT,S$GLB, | Performed by: INTERNAL MEDICINE

## 2020-09-04 PROCEDURE — U0002 COVID-19 LAB TEST NON-CDC: HCPCS | Mod: S$GLB,,, | Performed by: PHYSICIAN ASSISTANT

## 2020-09-04 RX ORDER — CLINDAMYCIN PHOSPHATE 150 MG/ML
600 INJECTION, SOLUTION INTRAVENOUS
Status: COMPLETED | OUTPATIENT
Start: 2020-09-04 | End: 2020-09-04

## 2020-09-04 RX ORDER — CLINDAMYCIN HYDROCHLORIDE 300 MG/1
300 CAPSULE ORAL 3 TIMES DAILY
Qty: 21 CAPSULE | Refills: 0 | Status: SHIPPED | OUTPATIENT
Start: 2020-09-05 | End: 2020-09-12

## 2020-09-04 RX ORDER — LANCETS 30 GAUGE
EACH MISCELLANEOUS
COMMUNITY
Start: 2020-08-11

## 2020-09-04 RX ORDER — ACETAMINOPHEN 500 MG
1000 TABLET ORAL
Status: COMPLETED | OUTPATIENT
Start: 2020-09-04 | End: 2020-09-04

## 2020-09-04 RX ADMIN — Medication 1000 MG: at 09:09

## 2020-09-04 RX ADMIN — CLINDAMYCIN PHOSPHATE 600 MG: 150 INJECTION, SOLUTION INTRAVENOUS at 10:09

## 2020-09-04 NOTE — PROGRESS NOTES
"Subjective:       Patient ID: Francisco Coppola is a 70 y.o. male.    Vitals:  height is 5' 8.5" (1.74 m) and weight is 114.3 kg (252 lb). His temporal temperature is 97.9 °F (36.6 °C). His blood pressure is 134/72 and his pulse is 112 (abnormal). His respiration is 16 and oxygen saturation is 97%.     Chief Complaint: Leg Pain (R LEG)    70-year-old male with PMHx HTN, T2DM, presents with right leg pain and swelling that started yesterday evening.  Pain is in lower leg and medial thigh. Patient denies trauma or activity inciting leg pain.  Patient reports 2 days ago he had severe chills, nausea and vomiting and fever that resolved after 24 hr.  Patient denies being sedentary, recent long trips or surgeries. Pt reports HA today. Pt reports increased SOB, but no CP. Pt denies anosmia or recent sick contacts.     Leg Pain   The incident occurred 6 to 12 hours ago. The incident occurred at home. There was no injury mechanism. The pain is present in the right foot, right thigh and right ankle. The quality of the pain is described as aching. The pain is at a severity of 8/10. The pain is mild. The pain has been constant since onset. He reports no foreign bodies present. The symptoms are aggravated by palpation. He has tried nothing for the symptoms.       Constitution: Positive for chills and fever. Negative for fatigue.   HENT: Negative for congestion and sore throat.    Neck: Negative for neck pain and painful lymph nodes.   Cardiovascular: Negative for chest pain and leg swelling.   Eyes: Negative for double vision and blurred vision.   Respiratory: Positive for shortness of breath. Negative for cough.    Gastrointestinal: Positive for nausea and vomiting. Negative for diarrhea.   Genitourinary: Negative for dysuria, frequency and urgency.   Musculoskeletal: Positive for pain and joint swelling. Negative for trauma, joint pain, muscle cramps and muscle ache.        R LEG PAIN/SWELLING, NO TRAUMA   Skin: Positive " for color change. Negative for pale and rash.   Allergic/Immunologic: Negative for seasonal allergies.   Neurological: Negative for dizziness, history of vertigo, light-headedness, passing out and headaches.   Hematologic/Lymphatic: Negative for swollen lymph nodes, easy bruising/bleeding and history of blood clots. Does not bruise/bleed easily.   Psychiatric/Behavioral: Negative for nervous/anxious, sleep disturbance and depression. The patient is not nervous/anxious.        Objective:      Physical Exam   Constitutional: He is oriented to person, place, and time. He appears well-developed. obesity  HENT:   Head: Normocephalic and atraumatic. Head is without abrasion, without contusion and without laceration.   Ears:   Right Ear: External ear normal.   Left Ear: External ear normal.   Nose: Nose normal.   Mouth/Throat: Oropharynx is clear and moist and mucous membranes are normal.   Eyes: Pupils are equal, round, and reactive to light. Conjunctivae, EOM and lids are normal.   Neck: Trachea normal, full passive range of motion without pain and phonation normal. Neck supple.   Cardiovascular:   Pulses:       Dorsalis pedis pulses are 2+ on the right side.        Posterior tibial pulses are 2+ on the right side.   Pulmonary/Chest: Effort normal. No respiratory distress.   Musculoskeletal: Normal range of motion.      Right upper leg: He exhibits tenderness (medial aspect). He exhibits no swelling.      Right lower leg: He exhibits tenderness and swelling. He exhibits no deformity. Edema (severe) present.   Neurological: He is alert and oriented to person, place, and time.   Skin: Skin is warm, dry, intact and no rash. Capillary refill takes less than 2 seconds. abrasion, burn, bruising and ecchymosis     Psychiatric: His speech is normal and behavior is normal. Judgment and thought content normal.   Nursing note and vitals reviewed.        Assessment:       1. Fever, unspecified fever cause    2. Non-intractable  vomiting with nausea, unspecified vomiting type    3. Acute nonintractable headache, unspecified headache type    4. Pain and swelling of right lower leg        Plan:         Right lower extremity pain and swelling differential includes cellulitis, DVT, PVD, CHF, ruptured Baker's cyst.   I will cover for cellulitis with antibiotics and send for ultrasound to rule out DVT.  Patient was instructed to increase Lasix to 40 mg today.  Patient to follow up with PCP if no significant improvement over the next several days.  Patient given ER instructions including chest pain, shortness of breath, increased swelling and pain of the lower extremity.  I will notify patient of ultrasound results later today.  Patient has an appointment today for ultrasound at 2:00 p.m..    Fever, unspecified fever cause  -     POCT COVID-19 Rapid Screening    Non-intractable vomiting with nausea, unspecified vomiting type  -     POCT COVID-19 Rapid Screening    Acute nonintractable headache, unspecified headache type  -     acetaminophen tablet 1,000 mg    Pain and swelling of right lower leg  -     CV Ultrasound doppler venous DVT leg right; Future  -     clindamycin injection 600 mg  -     clindamycin (CLEOCIN) 300 MG capsule; Take 1 capsule (300 mg total) by mouth 3 (three) times daily. for 7 days  Dispense: 21 capsule; Refill: 0      Patient Instructions     Leg Swelling in a Single Leg  Swelling of the arms, feet, ankles, and legs is called edema. It is caused by extra fluid collecting in the tissues. Because of gravity, extra fluid in the body settles to the lowest part. That is why the legs and feet are most affected. You have swelling in a single leg.  Some of the causes for swelling in only a single leg include:  · Infection in the foot or leg  · Long-term problem with a vein not working well (venous insufficiency)  · Swollen, twisted vein in the leg (varicose veins)  · Insect bite or sting on the foot or leg  · Injury or recent  surgery on the foot or leg  · Blood clot in a deep vein of the leg (deep vein thrombosis or DVT)  · Inflammation of the joints of the lower leg  Medical treatment will depend on what is causing your swelling.  Home care  Follow these guidelines when caring for yourself at home:  · Dont wear tight clothing.  · Keep your legs up while lying or sitting.  · Take any medicines as directed.  · If infection, injury, or recent surgery is the cause of your swelling, stay off your legs as much as possible until your symptoms get better.  · If you have venous insufficiency or varicose veins, dont sit or  one place for long periods of time. Take breaks and walk around every few hours. Talk with your healthcare provider about wearing support stockings to help lessen swelling during the day.  · Wear compression stockings with your doctor's approval  Follow-up care  Follow up with your healthcare provider as advised.  Call 911  Call 911 if any of these occur:  · Shortness of breath or trouble breathing  · Chest pain  · Coughing up blood  · Fainting or loss of consciousness   When to seek medical advice  Call your healthcare provider right away if any of these occur:  · Increased pain, swelling, warmth, or redness of the leg, ankle, or foot  · Fever of 100.4°F (38ºC) or higher, or as directed by your healthcare provider  · Weakness or dizziness  · Shaking chills  · Drenching sweats  Date Last Reviewed: 4/11/2016  © 8068-1156 [employer]. [address] [place] [address], [place] [id number]. All rights reserved. This information is not intended as a substitute for professional medical care. Always follow your healthcare professional's instructions.

## 2020-09-04 NOTE — TELEPHONE ENCOUNTER
Notified patient of negative ultrasound.  Patient was instructed to take antibiotics as directed and to follow up with primary care if no significant improvement over the next several days.  Patient verbalized understanding and was appreciative.

## 2020-09-04 NOTE — PATIENT INSTRUCTIONS
Leg Swelling in a Single Leg  Swelling of the arms, feet, ankles, and legs is called edema. It is caused by extra fluid collecting in the tissues. Because of gravity, extra fluid in the body settles to the lowest part. That is why the legs and feet are most affected. You have swelling in a single leg.  Some of the causes for swelling in only a single leg include:  · Infection in the foot or leg  · Long-term problem with a vein not working well (venous insufficiency)  · Swollen, twisted vein in the leg (varicose veins)  · Insect bite or sting on the foot or leg  · Injury or recent surgery on the foot or leg  · Blood clot in a deep vein of the leg (deep vein thrombosis or DVT)  · Inflammation of the joints of the lower leg  Medical treatment will depend on what is causing your swelling.  Home care  Follow these guidelines when caring for yourself at home:  · Dont wear tight clothing.  · Keep your legs up while lying or sitting.  · Take any medicines as directed.  · If infection, injury, or recent surgery is the cause of your swelling, stay off your legs as much as possible until your symptoms get better.  · If you have venous insufficiency or varicose veins, dont sit or  one place for long periods of time. Take breaks and walk around every few hours. Talk with your healthcare provider about wearing support stockings to help lessen swelling during the day.  · Wear compression stockings with your doctor's approval  Follow-up care  Follow up with your healthcare provider as advised.  Call 911  Call 911 if any of these occur:  · Shortness of breath or trouble breathing  · Chest pain  · Coughing up blood  · Fainting or loss of consciousness   When to seek medical advice  Call your healthcare provider right away if any of these occur:  · Increased pain, swelling, warmth, or redness of the leg, ankle, or foot  · Fever of 100.4°F (38ºC) or higher, or as directed by your healthcare provider  · Weakness or  dizziness  · Shaking chills  · Drenching sweats  Date Last Reviewed: 4/11/2016  © 7306-7503 [employer]. [address] [place] [address], [place] [address]. All rights reserved. This information is not intended as a substitute for professional medical care. Always follow your healthcare professional's instructions.

## 2020-09-29 ENCOUNTER — PATIENT MESSAGE (OUTPATIENT)
Dept: OTHER | Facility: OTHER | Age: 70
End: 2020-09-29

## 2020-10-05 ENCOUNTER — PATIENT MESSAGE (OUTPATIENT)
Dept: ADMINISTRATIVE | Facility: HOSPITAL | Age: 70
End: 2020-10-05

## 2020-11-20 ENCOUNTER — TELEPHONE (OUTPATIENT)
Dept: FAMILY MEDICINE | Facility: CLINIC | Age: 70
End: 2020-11-20

## 2020-11-28 DIAGNOSIS — E11.9 TYPE 2 DIABETES MELLITUS WITHOUT COMPLICATION: ICD-10-CM

## 2020-12-09 DIAGNOSIS — N18.9 CHRONIC KIDNEY DISEASE, UNSPECIFIED CKD STAGE: ICD-10-CM

## 2020-12-11 ENCOUNTER — PATIENT MESSAGE (OUTPATIENT)
Dept: OTHER | Facility: OTHER | Age: 70
End: 2020-12-11

## 2021-01-04 ENCOUNTER — PATIENT MESSAGE (OUTPATIENT)
Dept: ADMINISTRATIVE | Facility: HOSPITAL | Age: 71
End: 2021-01-04

## 2021-01-09 ENCOUNTER — IMMUNIZATION (OUTPATIENT)
Dept: INTERNAL MEDICINE | Facility: CLINIC | Age: 71
End: 2021-01-09
Payer: MEDICARE

## 2021-01-09 DIAGNOSIS — Z23 NEED FOR VACCINATION: ICD-10-CM

## 2021-01-09 PROCEDURE — 91300 COVID-19, MRNA, LNP-S, PF, 30 MCG/0.3 ML DOSE VACCINE: CPT | Mod: PBBFAC | Performed by: FAMILY MEDICINE

## 2021-01-13 DIAGNOSIS — E11.9 TYPE 2 DIABETES MELLITUS WITHOUT COMPLICATION, UNSPECIFIED WHETHER LONG TERM INSULIN USE: ICD-10-CM

## 2021-01-29 ENCOUNTER — PATIENT MESSAGE (OUTPATIENT)
Dept: ADMINISTRATIVE | Facility: HOSPITAL | Age: 71
End: 2021-01-29

## 2021-01-30 ENCOUNTER — IMMUNIZATION (OUTPATIENT)
Dept: INTERNAL MEDICINE | Facility: CLINIC | Age: 71
End: 2021-01-30
Payer: MEDICARE

## 2021-01-30 DIAGNOSIS — Z23 NEED FOR VACCINATION: Primary | ICD-10-CM

## 2021-01-30 PROCEDURE — 91300 COVID-19, MRNA, LNP-S, PF, 30 MCG/0.3 ML DOSE VACCINE: CPT | Mod: PBBFAC | Performed by: FAMILY MEDICINE

## 2021-01-30 PROCEDURE — 0002A COVID-19, MRNA, LNP-S, PF, 30 MCG/0.3 ML DOSE VACCINE: CPT | Mod: PBBFAC | Performed by: FAMILY MEDICINE

## 2021-03-25 RX ORDER — PRAVASTATIN SODIUM 40 MG/1
TABLET ORAL
Qty: 30 TABLET | Refills: 11 | Status: SHIPPED | OUTPATIENT
Start: 2021-03-25 | End: 2022-03-11

## 2021-03-25 RX ORDER — FUROSEMIDE 20 MG/1
20 TABLET ORAL DAILY
Qty: 30 TABLET | Refills: 10 | Status: SHIPPED | OUTPATIENT
Start: 2021-03-25 | End: 2021-12-15

## 2021-04-01 ENCOUNTER — PATIENT MESSAGE (OUTPATIENT)
Dept: ADMINISTRATIVE | Facility: HOSPITAL | Age: 71
End: 2021-04-01

## 2021-05-05 DIAGNOSIS — E11.9 TYPE 2 DIABETES MELLITUS WITHOUT COMPLICATION: ICD-10-CM

## 2021-05-21 ENCOUNTER — PES CALL (OUTPATIENT)
Dept: ADMINISTRATIVE | Facility: CLINIC | Age: 71
End: 2021-05-21

## 2021-07-06 ENCOUNTER — PATIENT MESSAGE (OUTPATIENT)
Dept: ADMINISTRATIVE | Facility: HOSPITAL | Age: 71
End: 2021-07-06

## 2021-07-07 ENCOUNTER — PATIENT MESSAGE (OUTPATIENT)
Dept: ADMINISTRATIVE | Facility: HOSPITAL | Age: 71
End: 2021-07-07

## 2021-07-07 ENCOUNTER — OFFICE VISIT (OUTPATIENT)
Dept: FAMILY MEDICINE | Facility: CLINIC | Age: 71
End: 2021-07-07
Attending: FAMILY MEDICINE
Payer: MEDICARE

## 2021-07-07 VITALS
OXYGEN SATURATION: 96 % | HEART RATE: 110 BPM | HEIGHT: 69 IN | BODY MASS INDEX: 37.39 KG/M2 | SYSTOLIC BLOOD PRESSURE: 122 MMHG | WEIGHT: 252.44 LBS | DIASTOLIC BLOOD PRESSURE: 76 MMHG

## 2021-07-07 DIAGNOSIS — M65.261 CALCIFIC TENDINITIS OF RIGHT KNEE: ICD-10-CM

## 2021-07-07 DIAGNOSIS — L03.116 BILATERAL CELLULITIS OF LOWER LEG: Primary | ICD-10-CM

## 2021-07-07 DIAGNOSIS — S81.802A OPEN WOUND OF LEFT LOWER EXTREMITY, INITIAL ENCOUNTER: ICD-10-CM

## 2021-07-07 DIAGNOSIS — L03.115 BILATERAL CELLULITIS OF LOWER LEG: Primary | ICD-10-CM

## 2021-07-07 PROCEDURE — 99214 OFFICE O/P EST MOD 30 MIN: CPT | Mod: S$GLB,,, | Performed by: FAMILY MEDICINE

## 2021-07-07 PROCEDURE — 1126F PR PAIN SEVERITY QUANTIFIED, NO PAIN PRESENT: ICD-10-PCS | Mod: S$GLB,,, | Performed by: FAMILY MEDICINE

## 2021-07-07 PROCEDURE — 1126F AMNT PAIN NOTED NONE PRSNT: CPT | Mod: S$GLB,,, | Performed by: FAMILY MEDICINE

## 2021-07-07 PROCEDURE — 99999 PR PBB SHADOW E&M-EST. PATIENT-LVL V: CPT | Mod: PBBFAC,,, | Performed by: FAMILY MEDICINE

## 2021-07-07 PROCEDURE — 3008F PR BODY MASS INDEX (BMI) DOCUMENTED: ICD-10-PCS | Mod: CPTII,S$GLB,, | Performed by: FAMILY MEDICINE

## 2021-07-07 PROCEDURE — 3288F FALL RISK ASSESSMENT DOCD: CPT | Mod: CPTII,S$GLB,, | Performed by: FAMILY MEDICINE

## 2021-07-07 PROCEDURE — 3288F PR FALLS RISK ASSESSMENT DOCUMENTED: ICD-10-PCS | Mod: CPTII,S$GLB,, | Performed by: FAMILY MEDICINE

## 2021-07-07 PROCEDURE — 1101F PR PT FALLS ASSESS DOC 0-1 FALLS W/OUT INJ PAST YR: ICD-10-PCS | Mod: CPTII,S$GLB,, | Performed by: FAMILY MEDICINE

## 2021-07-07 PROCEDURE — 1159F PR MEDICATION LIST DOCUMENTED IN MEDICAL RECORD: ICD-10-PCS | Mod: S$GLB,,, | Performed by: FAMILY MEDICINE

## 2021-07-07 PROCEDURE — 1101F PT FALLS ASSESS-DOCD LE1/YR: CPT | Mod: CPTII,S$GLB,, | Performed by: FAMILY MEDICINE

## 2021-07-07 PROCEDURE — 99214 PR OFFICE/OUTPT VISIT, EST, LEVL IV, 30-39 MIN: ICD-10-PCS | Mod: S$GLB,,, | Performed by: FAMILY MEDICINE

## 2021-07-07 PROCEDURE — 99999 PR PBB SHADOW E&M-EST. PATIENT-LVL V: ICD-10-PCS | Mod: PBBFAC,,, | Performed by: FAMILY MEDICINE

## 2021-07-07 PROCEDURE — 3008F BODY MASS INDEX DOCD: CPT | Mod: CPTII,S$GLB,, | Performed by: FAMILY MEDICINE

## 2021-07-07 PROCEDURE — 1159F MED LIST DOCD IN RCRD: CPT | Mod: S$GLB,,, | Performed by: FAMILY MEDICINE

## 2021-07-07 RX ORDER — DOXYCYCLINE 100 MG/1
100 CAPSULE ORAL 2 TIMES DAILY
Qty: 20 CAPSULE | Refills: 0 | Status: SHIPPED | OUTPATIENT
Start: 2021-07-07 | End: 2022-04-06

## 2021-07-07 RX ORDER — CELECOXIB 200 MG/1
200 CAPSULE ORAL 2 TIMES DAILY
Qty: 14 CAPSULE | Refills: 0 | Status: SHIPPED | OUTPATIENT
Start: 2021-07-07 | End: 2021-07-14

## 2021-07-07 RX ORDER — MUPIROCIN 20 MG/G
OINTMENT TOPICAL 3 TIMES DAILY
Qty: 30 G | Refills: 0 | Status: SHIPPED | OUTPATIENT
Start: 2021-07-07 | End: 2022-04-06 | Stop reason: SDUPTHER

## 2021-07-21 DIAGNOSIS — Z12.11 COLON CANCER SCREENING: ICD-10-CM

## 2021-08-04 ENCOUNTER — PATIENT MESSAGE (OUTPATIENT)
Dept: ADMINISTRATIVE | Facility: HOSPITAL | Age: 71
End: 2021-08-04

## 2021-08-23 RX ORDER — LISINOPRIL AND HYDROCHLOROTHIAZIDE 20; 25 MG/1; MG/1
1 TABLET ORAL DAILY
Qty: 30 TABLET | Refills: 11 | Status: SHIPPED | OUTPATIENT
Start: 2021-08-23 | End: 2022-06-09

## 2021-10-04 ENCOUNTER — PATIENT MESSAGE (OUTPATIENT)
Dept: ADMINISTRATIVE | Facility: HOSPITAL | Age: 71
End: 2021-10-04

## 2021-11-26 ENCOUNTER — OFFICE VISIT (OUTPATIENT)
Dept: URGENT CARE | Facility: CLINIC | Age: 71
End: 2021-11-26
Payer: MEDICARE

## 2021-11-26 ENCOUNTER — TELEPHONE (OUTPATIENT)
Dept: URGENT CARE | Facility: CLINIC | Age: 71
End: 2021-11-26

## 2021-11-26 VITALS
OXYGEN SATURATION: 95 % | TEMPERATURE: 99 F | WEIGHT: 250 LBS | SYSTOLIC BLOOD PRESSURE: 128 MMHG | BODY MASS INDEX: 37.03 KG/M2 | HEIGHT: 69 IN | DIASTOLIC BLOOD PRESSURE: 79 MMHG | RESPIRATION RATE: 18 BRPM | HEART RATE: 112 BPM

## 2021-11-26 DIAGNOSIS — L03.90 CELLULITIS, UNSPECIFIED CELLULITIS SITE: Primary | ICD-10-CM

## 2021-11-26 DIAGNOSIS — L08.9 DIABETIC FOOT INFECTION: ICD-10-CM

## 2021-11-26 DIAGNOSIS — E11.628 DIABETIC FOOT INFECTION: ICD-10-CM

## 2021-11-26 PROCEDURE — 4010F ACE/ARB THERAPY RXD/TAKEN: CPT | Mod: CPTII,S$GLB,,

## 2021-11-26 PROCEDURE — 99203 OFFICE O/P NEW LOW 30 MIN: CPT | Mod: 25,S$GLB,,

## 2021-11-26 PROCEDURE — 96372 PR INJECTION,THERAP/PROPH/DIAG2ST, IM OR SUBCUT: ICD-10-PCS | Mod: S$GLB,,,

## 2021-11-26 PROCEDURE — 4010F PR ACE/ARB THEARPY RXD/TAKEN: ICD-10-PCS | Mod: CPTII,S$GLB,,

## 2021-11-26 PROCEDURE — 99499 RISK ADDL DX/OHS AUDIT: ICD-10-PCS | Mod: S$GLB,,,

## 2021-11-26 PROCEDURE — 99203 PR OFFICE/OUTPT VISIT, NEW, LEVL III, 30-44 MIN: ICD-10-PCS | Mod: 25,S$GLB,,

## 2021-11-26 PROCEDURE — 96372 THER/PROPH/DIAG INJ SC/IM: CPT | Mod: S$GLB,,,

## 2021-11-26 PROCEDURE — 99499 UNLISTED E&M SERVICE: CPT | Mod: S$GLB,,,

## 2021-11-26 RX ORDER — CLINDAMYCIN PHOSPHATE 150 MG/ML
600 INJECTION, SOLUTION INTRAVENOUS
Status: COMPLETED | OUTPATIENT
Start: 2021-11-26 | End: 2021-11-26

## 2021-11-26 RX ORDER — CLINDAMYCIN HYDROCHLORIDE 300 MG/1
300 CAPSULE ORAL 3 TIMES DAILY
Qty: 30 CAPSULE | Refills: 0 | Status: SHIPPED | OUTPATIENT
Start: 2021-11-26 | End: 2021-12-06

## 2021-11-26 RX ADMIN — CLINDAMYCIN PHOSPHATE 600 MG: 150 INJECTION, SOLUTION INTRAVENOUS at 11:11

## 2021-12-13 RX ORDER — DULAGLUTIDE 1.5 MG/.5ML
INJECTION, SOLUTION SUBCUTANEOUS
Qty: 4 PEN | Refills: 11 | Status: SHIPPED | OUTPATIENT
Start: 2021-12-13 | End: 2023-01-07 | Stop reason: SDUPTHER

## 2021-12-15 RX ORDER — FUROSEMIDE 20 MG/1
TABLET ORAL
Qty: 30 TABLET | Refills: 10 | Status: SHIPPED | OUTPATIENT
Start: 2021-12-15 | End: 2022-09-07 | Stop reason: SDUPTHER

## 2021-12-27 ENCOUNTER — PATIENT MESSAGE (OUTPATIENT)
Dept: ADMINISTRATIVE | Facility: HOSPITAL | Age: 71
End: 2021-12-27
Payer: MEDICARE

## 2022-03-11 RX ORDER — PRAVASTATIN SODIUM 40 MG/1
TABLET ORAL
Qty: 30 TABLET | Refills: 11 | Status: SHIPPED | OUTPATIENT
Start: 2022-03-11 | End: 2023-05-22 | Stop reason: SDUPTHER

## 2022-03-11 RX ORDER — PRAVASTATIN SODIUM 40 MG/1
TABLET ORAL
Qty: 30 TABLET | Refills: 11 | Status: SHIPPED | OUTPATIENT
Start: 2022-03-11 | End: 2022-04-06

## 2022-03-11 NOTE — TELEPHONE ENCOUNTER

## 2022-03-11 NOTE — TELEPHONE ENCOUNTER
Care Due:                  Date            Visit Type   Department     Provider  --------------------------------------------------------------------------------                                MYCHART                              FOLLOWUP/OF  [facility]    Enmanuel Odell  Last Visit: 07-      [facility] VISIT   MEDICINE       Bailey  Next Visit: None Scheduled  None         None Found                                                            Last  Test          Frequency    Reason                     Performed    Due Date  --------------------------------------------------------------------------------    CMP.........  12 months..  glimepiride,               08- 08-                             lisinopriL-hydrochlorothi                             azide, pravastatin.......    HBA1C.......  6 months...  TRULICITY, glimepiride...  08- 02-    Lipid Panel.  12 months..  pravastatin..............  08- 08-    Powered by [employer]. Reference number: [id number].   3/11/2022 5:42:39 AM CST

## 2022-03-16 DIAGNOSIS — E11.65 UNCONTROLLED TYPE 2 DIABETES MELLITUS WITH HYPERGLYCEMIA: ICD-10-CM

## 2022-03-17 NOTE — TELEPHONE ENCOUNTER
No new care gaps identified.  Powered by [employer]. Reference number: [id number].   3/16/2022 11:22:43 PM CDT

## 2022-03-17 NOTE — TELEPHONE ENCOUNTER
This Rx Request does not qualify for assessment with the [facility]   Please review protocol details and the Care Due Message for extra clinical information    Reasons Rx Request may be deferred:  Labs/Vitals overdue  Pt due for OV with PCP    Note composed:4:20 PM 03/17/2022

## 2022-03-18 RX ORDER — INSULIN GLARGINE 100 [IU]/ML
INJECTION, SOLUTION SUBCUTANEOUS
Qty: 15 ML | Refills: 11 | Status: SHIPPED | OUTPATIENT
Start: 2022-03-18 | End: 2023-05-22 | Stop reason: SDUPTHER

## 2022-03-22 ENCOUNTER — PATIENT MESSAGE (OUTPATIENT)
Dept: FAMILY MEDICINE | Facility: CLINIC | Age: 72
End: 2022-03-22
Payer: MEDICARE

## 2022-03-23 ENCOUNTER — PATIENT OUTREACH (OUTPATIENT)
Dept: ADMINISTRATIVE | Facility: HOSPITAL | Age: 72
End: 2022-03-23
Payer: MEDICARE

## 2022-03-23 ENCOUNTER — DOCUMENTATION ONLY (OUTPATIENT)
Dept: ADMINISTRATIVE | Facility: HOSPITAL | Age: 72
End: 2022-03-23
Payer: MEDICARE

## 2022-04-06 ENCOUNTER — OFFICE VISIT (OUTPATIENT)
Dept: FAMILY MEDICINE | Facility: CLINIC | Age: 72
End: 2022-04-06
Payer: MEDICARE

## 2022-04-06 VITALS
SYSTOLIC BLOOD PRESSURE: 122 MMHG | HEART RATE: 115 BPM | BODY MASS INDEX: 37.2 KG/M2 | TEMPERATURE: 98 F | OXYGEN SATURATION: 96 % | DIASTOLIC BLOOD PRESSURE: 74 MMHG | HEIGHT: 69 IN | WEIGHT: 251.13 LBS

## 2022-04-06 DIAGNOSIS — R60.0 PERIPHERAL EDEMA: ICD-10-CM

## 2022-04-06 DIAGNOSIS — E11.42 DIABETIC POLYNEUROPATHY ASSOCIATED WITH TYPE 2 DIABETES MELLITUS: ICD-10-CM

## 2022-04-06 DIAGNOSIS — F17.200 TOBACCO DEPENDENCE: ICD-10-CM

## 2022-04-06 DIAGNOSIS — R23.8 SKIN BREAKDOWN: ICD-10-CM

## 2022-04-06 DIAGNOSIS — J84.10 CALCIFIED GRANULOMA OF LUNG: ICD-10-CM

## 2022-04-06 DIAGNOSIS — E66.01 SEVERE OBESITY (BMI 35.0-39.9) WITH COMORBIDITY: ICD-10-CM

## 2022-04-06 DIAGNOSIS — M46.99 UNSPECIFIED INFLAMMATORY SPONDYLOPATHY, MULTIPLE SITES IN SPINE: ICD-10-CM

## 2022-04-06 DIAGNOSIS — Z00.00 ROUTINE GENERAL MEDICAL EXAMINATION AT A HEALTH CARE FACILITY: Primary | ICD-10-CM

## 2022-04-06 DIAGNOSIS — M17.0 PRIMARY OSTEOARTHRITIS OF BOTH KNEES: ICD-10-CM

## 2022-04-06 DIAGNOSIS — I15.2 HYPERTENSION ASSOCIATED WITH DIABETES: ICD-10-CM

## 2022-04-06 DIAGNOSIS — E78.5 HYPERLIPIDEMIA ASSOCIATED WITH TYPE 2 DIABETES MELLITUS: ICD-10-CM

## 2022-04-06 DIAGNOSIS — N18.30 STAGE 3 CHRONIC KIDNEY DISEASE, UNSPECIFIED WHETHER STAGE 3A OR 3B CKD: ICD-10-CM

## 2022-04-06 DIAGNOSIS — E11.59 HYPERTENSION ASSOCIATED WITH DIABETES: ICD-10-CM

## 2022-04-06 DIAGNOSIS — E11.622 TYPE 2 DIABETES MELLITUS WITH OTHER SKIN ULCER (CODE): ICD-10-CM

## 2022-04-06 DIAGNOSIS — Z87.891 PERSONAL HISTORY OF NICOTINE DEPENDENCE: ICD-10-CM

## 2022-04-06 DIAGNOSIS — E11.69 HYPERLIPIDEMIA ASSOCIATED WITH TYPE 2 DIABETES MELLITUS: ICD-10-CM

## 2022-04-06 PROCEDURE — 4010F ACE/ARB THERAPY RXD/TAKEN: CPT | Mod: CPTII,S$GLB,, | Performed by: FAMILY MEDICINE

## 2022-04-06 PROCEDURE — 99499 RISK ADDL DX/OHS AUDIT: ICD-10-PCS | Mod: S$GLB,,, | Performed by: FAMILY MEDICINE

## 2022-04-06 PROCEDURE — 3074F PR MOST RECENT SYSTOLIC BLOOD PRESSURE < 130 MM HG: ICD-10-PCS | Mod: CPTII,S$GLB,, | Performed by: FAMILY MEDICINE

## 2022-04-06 PROCEDURE — 1125F PR PAIN SEVERITY QUANTIFIED, PAIN PRESENT: ICD-10-PCS | Mod: CPTII,S$GLB,, | Performed by: FAMILY MEDICINE

## 2022-04-06 PROCEDURE — 1101F PT FALLS ASSESS-DOCD LE1/YR: CPT | Mod: CPTII,S$GLB,, | Performed by: FAMILY MEDICINE

## 2022-04-06 PROCEDURE — 1125F AMNT PAIN NOTED PAIN PRSNT: CPT | Mod: CPTII,S$GLB,, | Performed by: FAMILY MEDICINE

## 2022-04-06 PROCEDURE — 1159F PR MEDICATION LIST DOCUMENTED IN MEDICAL RECORD: ICD-10-PCS | Mod: CPTII,S$GLB,, | Performed by: FAMILY MEDICINE

## 2022-04-06 PROCEDURE — 99397 PER PM REEVAL EST PAT 65+ YR: CPT | Mod: S$GLB,,, | Performed by: FAMILY MEDICINE

## 2022-04-06 PROCEDURE — 99999 PR PBB SHADOW E&M-EST. PATIENT-LVL V: CPT | Mod: PBBFAC,,, | Performed by: FAMILY MEDICINE

## 2022-04-06 PROCEDURE — 1159F MED LIST DOCD IN RCRD: CPT | Mod: CPTII,S$GLB,, | Performed by: FAMILY MEDICINE

## 2022-04-06 PROCEDURE — 3078F DIAST BP <80 MM HG: CPT | Mod: CPTII,S$GLB,, | Performed by: FAMILY MEDICINE

## 2022-04-06 PROCEDURE — 3078F PR MOST RECENT DIASTOLIC BLOOD PRESSURE < 80 MM HG: ICD-10-PCS | Mod: CPTII,S$GLB,, | Performed by: FAMILY MEDICINE

## 2022-04-06 PROCEDURE — 99499 UNLISTED E&M SERVICE: CPT | Mod: S$GLB,,, | Performed by: FAMILY MEDICINE

## 2022-04-06 PROCEDURE — 3008F BODY MASS INDEX DOCD: CPT | Mod: CPTII,S$GLB,, | Performed by: FAMILY MEDICINE

## 2022-04-06 PROCEDURE — 3288F FALL RISK ASSESSMENT DOCD: CPT | Mod: CPTII,S$GLB,, | Performed by: FAMILY MEDICINE

## 2022-04-06 PROCEDURE — 3008F PR BODY MASS INDEX (BMI) DOCUMENTED: ICD-10-PCS | Mod: CPTII,S$GLB,, | Performed by: FAMILY MEDICINE

## 2022-04-06 PROCEDURE — 99999 PR PBB SHADOW E&M-EST. PATIENT-LVL V: ICD-10-PCS | Mod: PBBFAC,,, | Performed by: FAMILY MEDICINE

## 2022-04-06 PROCEDURE — 1101F PR PT FALLS ASSESS DOC 0-1 FALLS W/OUT INJ PAST YR: ICD-10-PCS | Mod: CPTII,S$GLB,, | Performed by: FAMILY MEDICINE

## 2022-04-06 PROCEDURE — 99397 PR PREVENTIVE VISIT,EST,65 & OVER: ICD-10-PCS | Mod: S$GLB,,, | Performed by: FAMILY MEDICINE

## 2022-04-06 PROCEDURE — 4010F PR ACE/ARB THEARPY RXD/TAKEN: ICD-10-PCS | Mod: CPTII,S$GLB,, | Performed by: FAMILY MEDICINE

## 2022-04-06 PROCEDURE — 3074F SYST BP LT 130 MM HG: CPT | Mod: CPTII,S$GLB,, | Performed by: FAMILY MEDICINE

## 2022-04-06 PROCEDURE — 3288F PR FALLS RISK ASSESSMENT DOCUMENTED: ICD-10-PCS | Mod: CPTII,S$GLB,, | Performed by: FAMILY MEDICINE

## 2022-04-06 RX ORDER — METFORMIN HYDROCHLORIDE 500 MG/1
1000 TABLET, EXTENDED RELEASE ORAL 2 TIMES DAILY WITH MEALS
Qty: 360 TABLET | Refills: 11 | Status: SHIPPED | OUTPATIENT
Start: 2022-04-06 | End: 2023-05-08 | Stop reason: SDUPTHER

## 2022-04-06 RX ORDER — DICLOFENAC SODIUM 10 MG/G
2 GEL TOPICAL 3 TIMES DAILY PRN
Qty: 100 G | Refills: 11 | Status: SHIPPED | OUTPATIENT
Start: 2022-04-06 | End: 2023-01-31 | Stop reason: SDUPTHER

## 2022-04-06 RX ORDER — MUPIROCIN 20 MG/G
OINTMENT TOPICAL 3 TIMES DAILY
Qty: 30 G | Refills: 3 | Status: SHIPPED | OUTPATIENT
Start: 2022-04-06

## 2022-04-06 RX ORDER — GABAPENTIN 300 MG/1
300 CAPSULE ORAL 3 TIMES DAILY
Qty: 270 CAPSULE | Refills: 11 | Status: SHIPPED | OUTPATIENT
Start: 2022-04-06 | End: 2023-04-17 | Stop reason: SDUPTHER

## 2022-04-06 NOTE — PATIENT INSTRUCTIONS
Get labs    Orders Placed This Encounter   Procedures    CBC Auto Differential    Comprehensive Metabolic Panel    Lipid Panel    TSH    Microalbumin/Creatinine Ratio, Urine    Hemoglobin A1C         Leg swelling: try over the counter low pressure (10-15 mmHg setting) knee high compression stockings to help with swelling. Being careful about making sure your sodium intake is reduced (salt can worsen swelling)    Regular moisturizing of skin: can use over the counter Cetaphil or Cerave\    CETAPHIL cream moisturizer--apply daily
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
## 2022-04-11 ENCOUNTER — TELEPHONE (OUTPATIENT)
Dept: FAMILY MEDICINE | Facility: CLINIC | Age: 72
End: 2022-04-11
Payer: MEDICARE

## 2022-04-11 DIAGNOSIS — M25.562 PAIN IN BOTH KNEES, UNSPECIFIED CHRONICITY: Primary | ICD-10-CM

## 2022-04-11 DIAGNOSIS — M25.561 PAIN IN BOTH KNEES, UNSPECIFIED CHRONICITY: Primary | ICD-10-CM

## 2022-04-12 ENCOUNTER — HOSPITAL ENCOUNTER (OUTPATIENT)
Dept: RADIOLOGY | Facility: HOSPITAL | Age: 72
Discharge: HOME | End: 2022-04-12
Attending: PHYSICIAN ASSISTANT
Payer: MEDICARE

## 2022-04-12 ENCOUNTER — OFFICE VISIT (OUTPATIENT)
Dept: ORTHOPEDICS | Facility: CLINIC | Age: 72
End: 2022-04-12
Payer: MEDICARE

## 2022-04-12 ENCOUNTER — LAB VISIT (OUTPATIENT)
Dept: LAB | Facility: HOSPITAL | Age: 72
End: 2022-04-12
Attending: FAMILY MEDICINE
Payer: MEDICARE

## 2022-04-12 ENCOUNTER — PATIENT MESSAGE (OUTPATIENT)
Dept: FAMILY MEDICINE | Facility: CLINIC | Age: 72
End: 2022-04-12
Payer: MEDICARE

## 2022-04-12 VITALS
SYSTOLIC BLOOD PRESSURE: 134 MMHG | HEIGHT: 69 IN | DIASTOLIC BLOOD PRESSURE: 72 MMHG | WEIGHT: 254.06 LBS | HEART RATE: 118 BPM | BODY MASS INDEX: 37.63 KG/M2

## 2022-04-12 DIAGNOSIS — I15.2 HYPERTENSION ASSOCIATED WITH DIABETES: ICD-10-CM

## 2022-04-12 DIAGNOSIS — M17.0 PRIMARY OSTEOARTHRITIS OF BOTH KNEES: ICD-10-CM

## 2022-04-12 DIAGNOSIS — M25.561 PAIN IN BOTH KNEES, UNSPECIFIED CHRONICITY: ICD-10-CM

## 2022-04-12 DIAGNOSIS — E11.622 TYPE 2 DIABETES MELLITUS WITH OTHER SKIN ULCER (CODE): ICD-10-CM

## 2022-04-12 DIAGNOSIS — N18.30 STAGE 3 CHRONIC KIDNEY DISEASE, UNSPECIFIED WHETHER STAGE 3A OR 3B CKD: ICD-10-CM

## 2022-04-12 DIAGNOSIS — M25.562 PAIN IN BOTH KNEES, UNSPECIFIED CHRONICITY: ICD-10-CM

## 2022-04-12 DIAGNOSIS — E11.59 HYPERTENSION ASSOCIATED WITH DIABETES: ICD-10-CM

## 2022-04-12 LAB
ALBUMIN SERPL BCP-MCNC: 3.7 G/DL (ref 3.5–5.2)
ALP SERPL-CCNC: 60 U/L (ref 55–135)
ALT SERPL W/O P-5'-P-CCNC: 26 U/L (ref 10–44)
ANION GAP SERPL CALC-SCNC: 10 MMOL/L (ref 8–16)
AST SERPL-CCNC: 29 U/L (ref 10–40)
BASOPHILS # BLD AUTO: 0.04 K/UL (ref 0–0.2)
BASOPHILS NFR BLD: 0.4 % (ref 0–1.9)
BILIRUB SERPL-MCNC: 0.8 MG/DL (ref 0.1–1)
BUN SERPL-MCNC: 28 MG/DL (ref 8–23)
CALCIUM SERPL-MCNC: 9.8 MG/DL (ref 8.7–10.5)
CHLORIDE SERPL-SCNC: 102 MMOL/L (ref 95–110)
CHOLEST SERPL-MCNC: 156 MG/DL (ref 120–199)
CHOLEST/HDLC SERPL: 4.1 {RATIO} (ref 2–5)
CO2 SERPL-SCNC: 29 MMOL/L (ref 23–29)
CREAT SERPL-MCNC: 1.3 MG/DL (ref 0.5–1.4)
DIFFERENTIAL METHOD: ABNORMAL
EOSINOPHIL # BLD AUTO: 0.1 K/UL (ref 0–0.5)
EOSINOPHIL NFR BLD: 0.7 % (ref 0–8)
ERYTHROCYTE [DISTWIDTH] IN BLOOD BY AUTOMATED COUNT: 13.3 % (ref 11.5–14.5)
EST. GFR  (AFRICAN AMERICAN): >60 ML/MIN/1.73 M^2
EST. GFR  (NON AFRICAN AMERICAN): 54 ML/MIN/1.73 M^2
ESTIMATED AVG GLUCOSE: 154 MG/DL (ref 68–131)
GLUCOSE SERPL-MCNC: 61 MG/DL (ref 70–110)
HBA1C MFR BLD: 7 % (ref 4–5.6)
HCT VFR BLD AUTO: 46.8 % (ref 40–54)
HDLC SERPL-MCNC: 38 MG/DL (ref 40–75)
HDLC SERPL: 24.4 % (ref 20–50)
HGB BLD-MCNC: 15.6 G/DL (ref 14–18)
IMM GRANULOCYTES # BLD AUTO: 0.03 K/UL (ref 0–0.04)
IMM GRANULOCYTES NFR BLD AUTO: 0.3 % (ref 0–0.5)
LDLC SERPL CALC-MCNC: 84.6 MG/DL (ref 63–159)
LYMPHOCYTES # BLD AUTO: 3.4 K/UL (ref 1–4.8)
LYMPHOCYTES NFR BLD: 32.7 % (ref 18–48)
MCH RBC QN AUTO: 31.9 PG (ref 27–31)
MCHC RBC AUTO-ENTMCNC: 33.3 G/DL (ref 32–36)
MCV RBC AUTO: 96 FL (ref 82–98)
MONOCYTES # BLD AUTO: 0.9 K/UL (ref 0.3–1)
MONOCYTES NFR BLD: 8.4 % (ref 4–15)
NEUTROPHILS # BLD AUTO: 6.1 K/UL (ref 1.8–7.7)
NEUTROPHILS NFR BLD: 57.5 % (ref 38–73)
NONHDLC SERPL-MCNC: 118 MG/DL
NRBC BLD-RTO: 0 /100 WBC
PLATELET # BLD AUTO: 280 K/UL (ref 150–450)
PMV BLD AUTO: 11.3 FL (ref 9.2–12.9)
POTASSIUM SERPL-SCNC: 4.4 MMOL/L (ref 3.5–5.1)
PROT SERPL-MCNC: 7.7 G/DL (ref 6–8.4)
RBC # BLD AUTO: 4.89 M/UL (ref 4.6–6.2)
SODIUM SERPL-SCNC: 141 MMOL/L (ref 136–145)
TRIGL SERPL-MCNC: 167 MG/DL (ref 30–150)
TSH SERPL DL<=0.005 MIU/L-ACNC: 1.09 UIU/ML (ref 0.4–4)
WBC # BLD AUTO: 10.52 K/UL (ref 3.9–12.7)

## 2022-04-12 PROCEDURE — 84443 ASSAY THYROID STIM HORMONE: CPT | Performed by: FAMILY MEDICINE

## 2022-04-12 PROCEDURE — 73562 XR KNEE ORTHO BILAT: ICD-10-PCS | Mod: 26,50,, | Performed by: RADIOLOGY

## 2022-04-12 PROCEDURE — 3051F HG A1C>EQUAL 7.0%<8.0%: CPT | Mod: CPTII,S$GLB,, | Performed by: PHYSICIAN ASSISTANT

## 2022-04-12 PROCEDURE — 4010F PR ACE/ARB THEARPY RXD/TAKEN: ICD-10-PCS | Mod: CPTII,S$GLB,, | Performed by: PHYSICIAN ASSISTANT

## 2022-04-12 PROCEDURE — 99204 PR OFFICE/OUTPT VISIT, NEW, LEVL IV, 45-59 MIN: ICD-10-PCS | Mod: 25,S$GLB,, | Performed by: PHYSICIAN ASSISTANT

## 2022-04-12 PROCEDURE — 4010F ACE/ARB THERAPY RXD/TAKEN: CPT | Mod: CPTII,S$GLB,, | Performed by: PHYSICIAN ASSISTANT

## 2022-04-12 PROCEDURE — 73562 X-RAY EXAM OF KNEE 3: CPT | Mod: TC,50,FY

## 2022-04-12 PROCEDURE — 36415 COLL VENOUS BLD VENIPUNCTURE: CPT | Performed by: FAMILY MEDICINE

## 2022-04-12 PROCEDURE — 99999 PR PBB SHADOW E&M-EST. PATIENT-LVL IV: ICD-10-PCS | Mod: PBBFAC,,, | Performed by: PHYSICIAN ASSISTANT

## 2022-04-12 PROCEDURE — 3288F PR FALLS RISK ASSESSMENT DOCUMENTED: ICD-10-PCS | Mod: CPTII,S$GLB,, | Performed by: PHYSICIAN ASSISTANT

## 2022-04-12 PROCEDURE — 73562 X-RAY EXAM OF KNEE 3: CPT | Mod: 26,50,, | Performed by: RADIOLOGY

## 2022-04-12 PROCEDURE — 3078F DIAST BP <80 MM HG: CPT | Mod: CPTII,S$GLB,, | Performed by: PHYSICIAN ASSISTANT

## 2022-04-12 PROCEDURE — 1101F PT FALLS ASSESS-DOCD LE1/YR: CPT | Mod: CPTII,S$GLB,, | Performed by: PHYSICIAN ASSISTANT

## 2022-04-12 PROCEDURE — 3075F SYST BP GE 130 - 139MM HG: CPT | Mod: CPTII,S$GLB,, | Performed by: PHYSICIAN ASSISTANT

## 2022-04-12 PROCEDURE — 20610 LARGE JOINT ASPIRATION/INJECTION: BILATERAL KNEE: ICD-10-PCS | Mod: 50,S$GLB,, | Performed by: PHYSICIAN ASSISTANT

## 2022-04-12 PROCEDURE — 80053 COMPREHEN METABOLIC PANEL: CPT | Performed by: FAMILY MEDICINE

## 2022-04-12 PROCEDURE — 80061 LIPID PANEL: CPT | Performed by: FAMILY MEDICINE

## 2022-04-12 PROCEDURE — 3078F PR MOST RECENT DIASTOLIC BLOOD PRESSURE < 80 MM HG: ICD-10-PCS | Mod: CPTII,S$GLB,, | Performed by: PHYSICIAN ASSISTANT

## 2022-04-12 PROCEDURE — 99204 OFFICE O/P NEW MOD 45 MIN: CPT | Mod: 25,S$GLB,, | Performed by: PHYSICIAN ASSISTANT

## 2022-04-12 PROCEDURE — 1159F MED LIST DOCD IN RCRD: CPT | Mod: CPTII,S$GLB,, | Performed by: PHYSICIAN ASSISTANT

## 2022-04-12 PROCEDURE — 3288F FALL RISK ASSESSMENT DOCD: CPT | Mod: CPTII,S$GLB,, | Performed by: PHYSICIAN ASSISTANT

## 2022-04-12 PROCEDURE — 1159F PR MEDICATION LIST DOCUMENTED IN MEDICAL RECORD: ICD-10-PCS | Mod: CPTII,S$GLB,, | Performed by: PHYSICIAN ASSISTANT

## 2022-04-12 PROCEDURE — 20610 DRAIN/INJ JOINT/BURSA W/O US: CPT | Mod: 50,S$GLB,, | Performed by: PHYSICIAN ASSISTANT

## 2022-04-12 PROCEDURE — 1125F PR PAIN SEVERITY QUANTIFIED, PAIN PRESENT: ICD-10-PCS | Mod: CPTII,S$GLB,, | Performed by: PHYSICIAN ASSISTANT

## 2022-04-12 PROCEDURE — 1125F AMNT PAIN NOTED PAIN PRSNT: CPT | Mod: CPTII,S$GLB,, | Performed by: PHYSICIAN ASSISTANT

## 2022-04-12 PROCEDURE — 99999 PR PBB SHADOW E&M-EST. PATIENT-LVL IV: CPT | Mod: PBBFAC,,, | Performed by: PHYSICIAN ASSISTANT

## 2022-04-12 PROCEDURE — 3051F PR MOST RECENT HEMOGLOBIN A1C LEVEL 7.0 - < 8.0%: ICD-10-PCS | Mod: CPTII,S$GLB,, | Performed by: PHYSICIAN ASSISTANT

## 2022-04-12 PROCEDURE — 3008F PR BODY MASS INDEX (BMI) DOCUMENTED: ICD-10-PCS | Mod: CPTII,S$GLB,, | Performed by: PHYSICIAN ASSISTANT

## 2022-04-12 PROCEDURE — 1160F PR REVIEW ALL MEDS BY PRESCRIBER/CLIN PHARMACIST DOCUMENTED: ICD-10-PCS | Mod: CPTII,S$GLB,, | Performed by: PHYSICIAN ASSISTANT

## 2022-04-12 PROCEDURE — 1160F RVW MEDS BY RX/DR IN RCRD: CPT | Mod: CPTII,S$GLB,, | Performed by: PHYSICIAN ASSISTANT

## 2022-04-12 PROCEDURE — 3008F BODY MASS INDEX DOCD: CPT | Mod: CPTII,S$GLB,, | Performed by: PHYSICIAN ASSISTANT

## 2022-04-12 PROCEDURE — 85025 COMPLETE CBC W/AUTO DIFF WBC: CPT | Performed by: FAMILY MEDICINE

## 2022-04-12 PROCEDURE — 1101F PR PT FALLS ASSESS DOC 0-1 FALLS W/OUT INJ PAST YR: ICD-10-PCS | Mod: CPTII,S$GLB,, | Performed by: PHYSICIAN ASSISTANT

## 2022-04-12 PROCEDURE — 83036 HEMOGLOBIN GLYCOSYLATED A1C: CPT | Performed by: FAMILY MEDICINE

## 2022-04-12 PROCEDURE — 3075F PR MOST RECENT SYSTOLIC BLOOD PRESS GE 130-139MM HG: ICD-10-PCS | Mod: CPTII,S$GLB,, | Performed by: PHYSICIAN ASSISTANT

## 2022-04-12 RX ORDER — TRIAMCINOLONE ACETONIDE 40 MG/ML
40 INJECTION, SUSPENSION INTRA-ARTICULAR; INTRAMUSCULAR
Status: DISCONTINUED | OUTPATIENT
Start: 2022-04-12 | End: 2022-04-12 | Stop reason: HOSPADM

## 2022-04-12 RX ADMIN — TRIAMCINOLONE ACETONIDE 40 MG: 40 INJECTION, SUSPENSION INTRA-ARTICULAR; INTRAMUSCULAR at 09:04

## 2022-04-12 NOTE — PROGRESS NOTES
Subjective:      Patient ID: Francisco Coppola is a 72 y.o. male.    Chief Complaint: Pain of the Right Knee and Pain of the Left Knee      71yo male smoker, DM2, CKD3, HTN presents with many year history of bilateral knee pain. Right worse than left. Pain is medial and lateral joint lines. Worse with ambulation. Notes stiffness and unsteadiness. Uses a cane.  Has tried  Tylenol and Voltaren gel. Unable to take NSAIDs due to CKD3. Has tried PT in the past with minimal relief. Does not recall getting injections. He has history of right knee total meniscectomy at age 18.  He has been referred to a smoking cessation program.       Review of Systems   Constitutional: Negative for chills and fever.   Cardiovascular: Negative for chest pain.   Respiratory: Negative for cough.    Hematologic/Lymphatic: Does not bruise/bleed easily.   Skin: Negative for poor wound healing and rash.   Musculoskeletal: Positive for arthritis, back pain, joint pain, joint swelling, muscle weakness, myalgias and stiffness.   Gastrointestinal: Negative for abdominal pain.   Genitourinary: Negative for bladder incontinence.   Neurological: Negative for dizziness, loss of balance and weakness.   Psychiatric/Behavioral: Negative for altered mental status.       Review of patient's allergies indicates:   Allergen Reactions    Augmentin [amoxicillin-pot clavulanate] Hives and Rash    Tramadol Nausea Only        Current Outpatient Medications   Medication Sig Dispense Refill    aspirin (ECOTRIN) 81 MG EC tablet Take 81 mg by mouth once daily.      blood sugar diagnostic Strp To check BG 3 times daily, to use with insurance preferred meter 100 strip 11    diclofenac sodium (VOLTAREN) 1 % Gel Apply 2 g topically 3 (three) times daily as needed. 100 g 11    furosemide (LASIX) 20 MG tablet TAKE 1 TABLET(20 MG) BY MOUTH EVERY DAY 30 tablet 10    gabapentin (NEURONTIN) 300 MG capsule Take 1 capsule (300 mg total) by mouth 3 (three) times daily.  "270 capsule 11    glimepiride (AMARYL) 4 MG tablet Take 1 tablet (4 mg total) by mouth 2 (two) times a day. 60 tablet 11    lancets Misc To check BG 2 times daily, to use with insurance preferred meter 100 each 11    LANTUS SOLOSTAR U-100 INSULIN glargine 100 units/mL (3mL) SubQ pen ADMINISTER 10 UNITS UNDER THE SKIN EVERY DAY 15 mL 11    lisinopriL-hydrochlorothiazide (PRINZIDE,ZESTORETIC) 20-25 mg Tab Take 1 tablet by mouth once daily. 30 tablet 11    metFORMIN (GLUCOPHAGE-XR) 500 MG ER 24hr tablet Take 2 tablets (1,000 mg total) by mouth 2 (two) times daily with meals. 360 tablet 11    mupirocin (BACTROBAN) 2 % ointment Apply topically 3 (three) times daily. 30 g 3    pen needle, diabetic (BD ULTRA-FINE SHORT PEN NEEDLE) 31 gauge x 5/16" Ndle USE WITH INSULIN PEN AS DIRECTED 100 each 3    pravastatin (PRAVACHOL) 40 MG tablet TAKE 1 TABLET(40 MG) BY MOUTH EVERY NIGHT 30 tablet 11    sildenafil (REVATIO) 20 mg Tab Take 1 to 5 tablets (20 mg - 100 mg) by mouth as needed 1 hour before sexual activity. (Patient taking differently: Take 1 to 5 tablets (20 mg - 100 mg) by mouth as needed 1 hour before sexual activity.) 30 tablet 11    TRULICITY 1.5 mg/0.5 mL pen injector ADMINISTER 1.5MG UNDER THE SKIN 1 TIME A WEEK 4 pen 11    ULTRA THIN LANCETS 30 gauge Misc CHECK BLOOD GLUCOSE BID      blood-glucose meter kit To check BG 2 times daily, to use with insurance preferred meter 100 each 11     No current facility-administered medications for this visit.        The patient's relevant past medical, surgical, and social history was reviewed in Epic.       Objective:      VITAL SIGNS: /72 (BP Location: Left arm, Patient Position: Sitting, BP Method: X-Large (Automatic))   Pulse (!) 118   Ht 5' 9" (1.753 m)   Wt 115.2 kg (254 lb 1.3 oz)   BMI 37.52 kg/m²     General    Nursing note and vitals reviewed.  Constitutional: He is oriented to person, place, and time. He appears well-developed.   Obese  "   Neurological: He is alert and oriented to person, place, and time.     General Musculoskeletal Exam   Gait: antalgic       Right Knee Exam     Inspection   Swelling: present    Tenderness   The patient is tender to palpation of the medial joint line.    Range of Motion   Extension: 15   Flexion: 110     Other   Sensation: normal    Left Knee Exam     Inspection   Swelling: present    Tenderness   The patient tender to palpation of the medial joint line.    Range of Motion   Extension: 10   Flexion: 110     Other   Sensation: normal    Muscle Strength   Right Lower Extremity   Quadriceps:  4/5   Left Lower Extremity   Quadriceps:  4/5     Vascular Exam     Right Pulses  Dorsalis Pedis:      2+  Posterior Tibial:      1+        Left Pulses  Dorsalis Pedis:      2+  Posterior Tibial:      1+           X-ray Knee Ortho Bilateral  Narrative: EXAMINATION:  XR KNEE ORTHO BILAT    CLINICAL HISTORY:  Pain in right knee    TECHNIQUE:  AP standing, lateral and Merchant views of both knees were performed.    COMPARISON:  None    FINDINGS:  No evidence of an acute fracture or dislocation.  Significant joint space loss is noted.  There is significant medial compartment narrowing bilaterally.  Degenerative change of the bilateral patellofemoral compartments as well.  Degenerative change in the lateral compartment is noted as well.  Impression: Tricompartmental degenerative change bilaterally.    Electronically signed by: Corey Palomares MD  Date:    04/12/2022  Time:    09:26      I have reviewed the above radiograph and agree with the findings stated by the radiologist.         Assessment:       1. Primary osteoarthritis of both knees          Plan:         Francisco was seen today for pain and pain.    Diagnoses and all orders for this visit:    Primary osteoarthritis of both knees  -     Ambulatory referral/consult to Orthopedics  -     Large Joint Aspiration/Injection: bilateral knee      Bilateral knee bone-on-bone  osteoarthritis.  Discussed the natural history of arthritis with the patient.  He has never tried injections before saw would like to start off with bilateral knee corticosteroid injections.  He knows the risks with steroids and being diabetic.  He has no relief from injections, I gave him a brochure on the Iovera procedure.  He will call to make that appointment if needed.  I explained to him he would need to stop smoking and get clearance by his PCP and a cardiologist in order to have a total knee replacement.        Karina Read PA-C   04/12/2022

## 2022-04-12 NOTE — PROCEDURES
Large Joint Aspiration/Injection: bilateral knee    Date/Time: 4/12/2022 9:45 AM  Performed by: Karina Read PA-C  Authorized by: Karina Read PA-C     Consent Done?:  Yes (Verbal)  Indications:  Pain  Site marked: the procedure site was marked    Timeout: prior to procedure the correct patient, procedure, and site was verified    Prep: patient was prepped and draped in usual sterile fashion    Local anesthesia used?: No    Local anesthetic:  Topical anesthetic and lidocaine 1% without epinephrine    Details:  Needle Size:  22 G  Ultrasonic Guidance for needle placement?: No    Approach:  Anterolateral  Location:  Knee  Laterality:  Bilateral  Site:  Bilateral knee  Medications (Right):  40 mg triamcinolone acetonide 40 mg/mL  Medications (Left):  40 mg triamcinolone acetonide 40 mg/mL  Patient tolerance:  Patient tolerated the procedure well with no immediate complications

## 2022-04-20 ENCOUNTER — CLINICAL SUPPORT (OUTPATIENT)
Dept: SMOKING CESSATION | Facility: CLINIC | Age: 72
End: 2022-04-20
Payer: COMMERCIAL

## 2022-04-20 DIAGNOSIS — F17.210 CIGARETTE SMOKER: Primary | ICD-10-CM

## 2022-04-20 PROCEDURE — 99999 PR PBB SHADOW E&M-EST. PATIENT-LVL III: ICD-10-PCS | Mod: PBBFAC,,,

## 2022-04-20 PROCEDURE — 99999 PR PBB SHADOW E&M-EST. PATIENT-LVL III: CPT | Mod: PBBFAC,,,

## 2022-04-20 PROCEDURE — 99404 PREV MED CNSL INDIV APPRX 60: CPT | Mod: S$GLB,,,

## 2022-04-20 PROCEDURE — 99404 PR PREVENT COUNSEL,INDIV,60 MIN: ICD-10-PCS | Mod: S$GLB,,,

## 2022-04-20 RX ORDER — MICONAZOLE NITRATE 2 %
2 CREAM (GRAM) TOPICAL
Qty: 100 EACH | Refills: 0 | Status: SHIPPED | OUTPATIENT
Start: 2022-04-20 | End: 2022-07-19

## 2022-04-20 RX ORDER — IBUPROFEN 200 MG
1 TABLET ORAL DAILY
Qty: 14 PATCH | Refills: 0 | Status: SHIPPED | OUTPATIENT
Start: 2022-04-20 | End: 2022-07-19

## 2022-04-20 NOTE — PROGRESS NOTES
CESD of 3  is perceived as no mental distress or depression at this time. FTND of  Indicates a  Level 2 of tobacco/nicotine dependency. Exhaled carbon monoxide level was14 ppm per Smokerlyzer. Patient smokes 1ppd. The patient will begin the prescribed tobacco cessation medication regimen of 21 mg patches and 2 mg Gum.

## 2022-05-04 ENCOUNTER — CLINICAL SUPPORT (OUTPATIENT)
Dept: SMOKING CESSATION | Facility: CLINIC | Age: 72
End: 2022-05-04
Payer: COMMERCIAL

## 2022-05-04 DIAGNOSIS — F17.210 CIGARETTE SMOKER: Primary | ICD-10-CM

## 2022-05-04 PROCEDURE — 99999 PR PBB SHADOW E&M-EST. PATIENT-LVL III: ICD-10-PCS | Mod: PBBFAC,,,

## 2022-05-04 PROCEDURE — 99402 PREV MED CNSL INDIV APPRX 30: CPT | Mod: S$GLB,,,

## 2022-05-04 PROCEDURE — 99999 PR PBB SHADOW E&M-EST. PATIENT-LVL III: CPT | Mod: PBBFAC,,,

## 2022-05-04 PROCEDURE — 99402 PR PREVENT COUNSEL,INDIV,30 MIN: ICD-10-PCS | Mod: S$GLB,,,

## 2022-05-04 NOTE — PROGRESS NOTES
Individual Follow-Up Form    5/4/2022    Quit Date:     Clinical Status of Patient: Outpatient    Length of Service: 30 minutes    Continuing Medication: yes  Patches or Nicotine gum    Other Medications: None     Target Symptoms: Withdrawal and medication side effects. The following were  rated moderate (3) to severe (4) on TCRS:  · Moderate (3): desire/crave tobacco  · Severe (4): none    Comments: Spoke with patient at length by telephone for a smoking cessation follow up. Patient is smoking 3/4ppd. Patient states he is not wearing patches everyday. Pt mentioned he is under stressful situations and this quit is different and harder.Encouraged pt to stay positive and get back on track by being consistent with use of patch. Ways to combat nicotine craving were discussed with pt.       Diagnosis: F17.210    Next Visit: 2 weeks

## 2022-05-18 ENCOUNTER — TELEPHONE (OUTPATIENT)
Dept: SMOKING CESSATION | Facility: CLINIC | Age: 72
End: 2022-05-18
Payer: MEDICARE

## 2022-05-18 NOTE — TELEPHONE ENCOUNTER
Attempted to call patient for their smoking cessation appointment. Left a message with my contact information to reschedule the appointment  Ani Venegas [phone].

## 2022-05-30 ENCOUNTER — OFFICE VISIT (OUTPATIENT)
Dept: OPTOMETRY | Facility: CLINIC | Age: 72
End: 2022-05-30
Payer: MEDICARE

## 2022-05-30 DIAGNOSIS — E11.9 TYPE 2 DIABETES MELLITUS WITHOUT RETINOPATHY: Primary | ICD-10-CM

## 2022-05-30 DIAGNOSIS — H52.4 PRESBYOPIA: ICD-10-CM

## 2022-05-30 DIAGNOSIS — Z13.5 GLAUCOMA SCREENING: ICD-10-CM

## 2022-05-30 DIAGNOSIS — H25.13 NUCLEAR SCLEROSIS, BILATERAL: ICD-10-CM

## 2022-05-30 PROCEDURE — 1160F RVW MEDS BY RX/DR IN RCRD: CPT | Mod: CPTII,S$GLB,, | Performed by: OPTOMETRIST

## 2022-05-30 PROCEDURE — 2023F DILAT RTA XM W/O RTNOPTHY: CPT | Mod: CPTII,S$GLB,, | Performed by: OPTOMETRIST

## 2022-05-30 PROCEDURE — 1160F PR REVIEW ALL MEDS BY PRESCRIBER/CLIN PHARMACIST DOCUMENTED: ICD-10-PCS | Mod: CPTII,S$GLB,, | Performed by: OPTOMETRIST

## 2022-05-30 PROCEDURE — 92015 PR REFRACTION: ICD-10-PCS | Mod: S$GLB,,, | Performed by: OPTOMETRIST

## 2022-05-30 PROCEDURE — 99999 PR PBB SHADOW E&M-EST. PATIENT-LVL III: ICD-10-PCS | Mod: PBBFAC,,, | Performed by: OPTOMETRIST

## 2022-05-30 PROCEDURE — 1159F PR MEDICATION LIST DOCUMENTED IN MEDICAL RECORD: ICD-10-PCS | Mod: CPTII,S$GLB,, | Performed by: OPTOMETRIST

## 2022-05-30 PROCEDURE — 92004 PR EYE EXAM, NEW PATIENT,COMPREHESV: ICD-10-PCS | Mod: S$GLB,,, | Performed by: OPTOMETRIST

## 2022-05-30 PROCEDURE — 99999 PR PBB SHADOW E&M-EST. PATIENT-LVL III: CPT | Mod: PBBFAC,,, | Performed by: OPTOMETRIST

## 2022-05-30 PROCEDURE — 4010F PR ACE/ARB THEARPY RXD/TAKEN: ICD-10-PCS | Mod: CPTII,S$GLB,, | Performed by: OPTOMETRIST

## 2022-05-30 PROCEDURE — 92004 COMPRE OPH EXAM NEW PT 1/>: CPT | Mod: S$GLB,,, | Performed by: OPTOMETRIST

## 2022-05-30 PROCEDURE — 3062F POS MACROALBUMINURIA REV: CPT | Mod: CPTII,S$GLB,, | Performed by: OPTOMETRIST

## 2022-05-30 PROCEDURE — 2023F PR DILATED RETINAL EXAM W/O EVID OF RETINOPATHY: ICD-10-PCS | Mod: CPTII,S$GLB,, | Performed by: OPTOMETRIST

## 2022-05-30 PROCEDURE — 3066F NEPHROPATHY DOC TX: CPT | Mod: CPTII,S$GLB,, | Performed by: OPTOMETRIST

## 2022-05-30 PROCEDURE — 92015 DETERMINE REFRACTIVE STATE: CPT | Mod: S$GLB,,, | Performed by: OPTOMETRIST

## 2022-05-30 PROCEDURE — 3062F PR POS MACROALBUMINURIA RESULT DOCUMENTED/REVIEW: ICD-10-PCS | Mod: CPTII,S$GLB,, | Performed by: OPTOMETRIST

## 2022-05-30 PROCEDURE — 3066F PR DOCUMENTATION OF TREATMENT FOR NEPHROPATHY: ICD-10-PCS | Mod: CPTII,S$GLB,, | Performed by: OPTOMETRIST

## 2022-05-30 PROCEDURE — 3051F PR MOST RECENT HEMOGLOBIN A1C LEVEL 7.0 - < 8.0%: ICD-10-PCS | Mod: CPTII,S$GLB,, | Performed by: OPTOMETRIST

## 2022-05-30 PROCEDURE — 1159F MED LIST DOCD IN RCRD: CPT | Mod: CPTII,S$GLB,, | Performed by: OPTOMETRIST

## 2022-05-30 PROCEDURE — 3051F HG A1C>EQUAL 7.0%<8.0%: CPT | Mod: CPTII,S$GLB,, | Performed by: OPTOMETRIST

## 2022-05-30 PROCEDURE — 4010F ACE/ARB THERAPY RXD/TAKEN: CPT | Mod: CPTII,S$GLB,, | Performed by: OPTOMETRIST

## 2022-05-30 NOTE — PROGRESS NOTES
HPI     Francisco Coppola is a/an 72 y.o. male who returns, for continued eye   care. He was last seen on 05/23/20/18 for diabetic eye care. He states   that he hardly wears his bifocal glasses for distance and only wears them   for near vision. He would like to update his prescription and verify   ocular health.  He measures his BSL usually daily but has not done so today.  Hemoglobin A1C (%)       Date                     Value                 04/12/2022               7.0 (H)               08/13/2020               7.3 (H)               05/20/2020               7.7 (H)          ----------        (--)blurred vision  (--)Headaches  (--)diplopia  (--)flashes  (--)floaters  (--)pain  (--)Itching  (--)tearing  (--)burning  (--)Dryness  (--) OTC Drops  (--)Photophobia         Last edited by Raghu Kevin on 5/30/2022  2:36 PM.   (History)        ROS     Positive for: Endocrine (DM)    Negative for: Constitutional, Gastrointestinal, Neurological, Skin,   Genitourinary, Musculoskeletal, HENT, Cardiovascular, Eyes, Respiratory,   Psychiatric, Allergic/Imm, Heme/Lymph    Last edited by Harpreet Powell, OD on 5/30/2022  2:52 PM. (History)        Assessment /Plan     For exam results, see Encounter Report.    Type 2 diabetes mellitus without retinopathy    Nuclear sclerosis, bilateral    Glaucoma screening    Presbyopia        1. Cat OU--pt wishes new Rx  2. DM- WITHOUT RETINOPATHY.  Advised yearly DFE    PLAN:    rtc 1 yr

## 2022-05-31 ENCOUNTER — PATIENT MESSAGE (OUTPATIENT)
Dept: ADMINISTRATIVE | Facility: HOSPITAL | Age: 72
End: 2022-05-31
Payer: MEDICARE

## 2022-06-09 RX ORDER — LISINOPRIL AND HYDROCHLOROTHIAZIDE 20; 25 MG/1; MG/1
TABLET ORAL
Qty: 90 TABLET | Refills: 3 | Status: SHIPPED | OUTPATIENT
Start: 2022-06-09 | End: 2023-06-11 | Stop reason: SDUPTHER

## 2022-06-09 RX ORDER — GLIMEPIRIDE 4 MG/1
TABLET ORAL
Qty: 60 TABLET | Refills: 11 | Status: SHIPPED | OUTPATIENT
Start: 2022-06-09 | End: 2023-05-12 | Stop reason: SDUPTHER

## 2022-06-09 NOTE — TELEPHONE ENCOUNTER
No new care gaps identified.  [facility] Embedded Care Gaps. Reference number: [id number]. 6/09/2022   5:45:14 AM CDT

## 2022-06-09 NOTE — TELEPHONE ENCOUNTER
Refill Routing Note   Medication(s) are not appropriate for processing by [facility] for the following reason(s):      - Required laboratory values are abnormal  - Drug-Drug Interaction ( furosemide, lisinopriL-hydrochlorothiazide )    [facility] action(s):  Route Medication-related problems identified: Drug-drug interaction        Medication reconciliation completed: No     Appointments  past 12m or future 3m with PCP    Date Provider   Last Visit   4/6/2022 Laureano Hebert MD   Next Visit   7/19/2022 Laureano Hebert MD   ED visits in past 90 days: 0        Note composed:9:21 AM 06/09/2022

## 2022-06-21 ENCOUNTER — PATIENT OUTREACH (OUTPATIENT)
Dept: ADMINISTRATIVE | Facility: HOSPITAL | Age: 72
End: 2022-06-21
Payer: MEDICARE

## 2022-06-21 NOTE — PROGRESS NOTES
Non-compliant GAP report chart review - Chart review completed for the following HM test if overdue  (Mammogram, Colonoscopy, Cervical Cancer Screening,  Diabetic lab testing, and/or Dilated EYE EXAM)  06/21/2022    Care Everywhere and Media reports - updates requested and reviewed.       Fitkit will be mailed to pt.      Health Maintenance Due   Topic Date Due    Colorectal Cancer Screening  Never done    LDCT Lung Screen  Never done    Shingles Vaccine (1 of 2) Never done    COVID-19 Vaccine (3 - Booster for Pfizer series) 06/30/2021

## 2022-07-12 ENCOUNTER — LAB VISIT (OUTPATIENT)
Dept: LAB | Facility: HOSPITAL | Age: 72
End: 2022-07-12
Attending: FAMILY MEDICINE
Payer: MEDICARE

## 2022-07-12 DIAGNOSIS — E11.59 HYPERTENSION ASSOCIATED WITH DIABETES: ICD-10-CM

## 2022-07-12 DIAGNOSIS — E11.622 TYPE 2 DIABETES MELLITUS WITH OTHER SKIN ULCER (CODE): ICD-10-CM

## 2022-07-12 DIAGNOSIS — N18.30 STAGE 3 CHRONIC KIDNEY DISEASE, UNSPECIFIED WHETHER STAGE 3A OR 3B CKD: ICD-10-CM

## 2022-07-12 DIAGNOSIS — I15.2 HYPERTENSION ASSOCIATED WITH DIABETES: ICD-10-CM

## 2022-07-12 LAB
ALBUMIN SERPL BCP-MCNC: 3.5 G/DL (ref 3.5–5.2)
ALP SERPL-CCNC: 62 U/L (ref 55–135)
ALT SERPL W/O P-5'-P-CCNC: 22 U/L (ref 10–44)
ANION GAP SERPL CALC-SCNC: 13 MMOL/L (ref 8–16)
AST SERPL-CCNC: 23 U/L (ref 10–40)
BASOPHILS # BLD AUTO: 0.04 K/UL (ref 0–0.2)
BASOPHILS NFR BLD: 0.4 % (ref 0–1.9)
BILIRUB SERPL-MCNC: 0.7 MG/DL (ref 0.1–1)
BUN SERPL-MCNC: 28 MG/DL (ref 8–23)
CALCIUM SERPL-MCNC: 9.4 MG/DL (ref 8.7–10.5)
CHLORIDE SERPL-SCNC: 100 MMOL/L (ref 95–110)
CO2 SERPL-SCNC: 28 MMOL/L (ref 23–29)
CREAT SERPL-MCNC: 1.5 MG/DL (ref 0.5–1.4)
DIFFERENTIAL METHOD: ABNORMAL
EOSINOPHIL # BLD AUTO: 0.1 K/UL (ref 0–0.5)
EOSINOPHIL NFR BLD: 1.5 % (ref 0–8)
ERYTHROCYTE [DISTWIDTH] IN BLOOD BY AUTOMATED COUNT: 13.2 % (ref 11.5–14.5)
EST. GFR  (AFRICAN AMERICAN): 53 ML/MIN/1.73 M^2
EST. GFR  (NON AFRICAN AMERICAN): 46 ML/MIN/1.73 M^2
ESTIMATED AVG GLUCOSE: 171 MG/DL (ref 68–131)
GLUCOSE SERPL-MCNC: 109 MG/DL (ref 70–110)
HBA1C MFR BLD: 7.6 % (ref 4–5.6)
HCT VFR BLD AUTO: 44.4 % (ref 40–54)
HGB BLD-MCNC: 14.9 G/DL (ref 14–18)
IMM GRANULOCYTES # BLD AUTO: 0.05 K/UL (ref 0–0.04)
IMM GRANULOCYTES NFR BLD AUTO: 0.5 % (ref 0–0.5)
LYMPHOCYTES # BLD AUTO: 3.2 K/UL (ref 1–4.8)
LYMPHOCYTES NFR BLD: 34.3 % (ref 18–48)
MCH RBC QN AUTO: 32 PG (ref 27–31)
MCHC RBC AUTO-ENTMCNC: 33.6 G/DL (ref 32–36)
MCV RBC AUTO: 96 FL (ref 82–98)
MONOCYTES # BLD AUTO: 0.8 K/UL (ref 0.3–1)
MONOCYTES NFR BLD: 8.3 % (ref 4–15)
NEUTROPHILS # BLD AUTO: 5.1 K/UL (ref 1.8–7.7)
NEUTROPHILS NFR BLD: 55 % (ref 38–73)
NRBC BLD-RTO: 0 /100 WBC
PLATELET # BLD AUTO: 248 K/UL (ref 150–450)
PMV BLD AUTO: 10.6 FL (ref 9.2–12.9)
POTASSIUM SERPL-SCNC: 4 MMOL/L (ref 3.5–5.1)
PROT SERPL-MCNC: 7 G/DL (ref 6–8.4)
RBC # BLD AUTO: 4.65 M/UL (ref 4.6–6.2)
SODIUM SERPL-SCNC: 141 MMOL/L (ref 136–145)
WBC # BLD AUTO: 9.3 K/UL (ref 3.9–12.7)

## 2022-07-12 PROCEDURE — 85025 COMPLETE CBC W/AUTO DIFF WBC: CPT | Performed by: FAMILY MEDICINE

## 2022-07-12 PROCEDURE — 36415 COLL VENOUS BLD VENIPUNCTURE: CPT | Performed by: FAMILY MEDICINE

## 2022-07-12 PROCEDURE — 83036 HEMOGLOBIN GLYCOSYLATED A1C: CPT | Performed by: FAMILY MEDICINE

## 2022-07-12 PROCEDURE — 80053 COMPREHEN METABOLIC PANEL: CPT | Performed by: FAMILY MEDICINE

## 2022-07-19 ENCOUNTER — OFFICE VISIT (OUTPATIENT)
Dept: FAMILY MEDICINE | Facility: CLINIC | Age: 72
End: 2022-07-19
Payer: MEDICARE

## 2022-07-19 VITALS
TEMPERATURE: 98 F | WEIGHT: 245.81 LBS | OXYGEN SATURATION: 95 % | DIASTOLIC BLOOD PRESSURE: 70 MMHG | HEIGHT: 69 IN | SYSTOLIC BLOOD PRESSURE: 118 MMHG | HEART RATE: 113 BPM | BODY MASS INDEX: 36.41 KG/M2

## 2022-07-19 DIAGNOSIS — N18.30 TYPE 2 DIABETES MELLITUS WITH STAGE 3 CHRONIC KIDNEY DISEASE, WITH LONG-TERM CURRENT USE OF INSULIN, UNSPECIFIED WHETHER STAGE 3A OR 3B CKD: Primary | ICD-10-CM

## 2022-07-19 DIAGNOSIS — I15.2 HYPERTENSION ASSOCIATED WITH DIABETES: ICD-10-CM

## 2022-07-19 DIAGNOSIS — N52.9 ERECTILE DYSFUNCTION, UNSPECIFIED ERECTILE DYSFUNCTION TYPE: ICD-10-CM

## 2022-07-19 DIAGNOSIS — E78.5 HYPERLIPIDEMIA ASSOCIATED WITH TYPE 2 DIABETES MELLITUS: ICD-10-CM

## 2022-07-19 DIAGNOSIS — E11.22 TYPE 2 DIABETES MELLITUS WITH STAGE 3 CHRONIC KIDNEY DISEASE, WITH LONG-TERM CURRENT USE OF INSULIN, UNSPECIFIED WHETHER STAGE 3A OR 3B CKD: Primary | ICD-10-CM

## 2022-07-19 DIAGNOSIS — M17.0 PRIMARY OSTEOARTHRITIS OF BOTH KNEES: ICD-10-CM

## 2022-07-19 DIAGNOSIS — E11.69 HYPERLIPIDEMIA ASSOCIATED WITH TYPE 2 DIABETES MELLITUS: ICD-10-CM

## 2022-07-19 DIAGNOSIS — E11.59 HYPERTENSION ASSOCIATED WITH DIABETES: ICD-10-CM

## 2022-07-19 DIAGNOSIS — Z79.4 TYPE 2 DIABETES MELLITUS WITH STAGE 3 CHRONIC KIDNEY DISEASE, WITH LONG-TERM CURRENT USE OF INSULIN, UNSPECIFIED WHETHER STAGE 3A OR 3B CKD: Primary | ICD-10-CM

## 2022-07-19 PROCEDURE — 3288F FALL RISK ASSESSMENT DOCD: CPT | Mod: CPTII,S$GLB,, | Performed by: FAMILY MEDICINE

## 2022-07-19 PROCEDURE — 3062F PR POS MACROALBUMINURIA RESULT DOCUMENTED/REVIEW: ICD-10-PCS | Mod: CPTII,S$GLB,, | Performed by: FAMILY MEDICINE

## 2022-07-19 PROCEDURE — 3078F PR MOST RECENT DIASTOLIC BLOOD PRESSURE < 80 MM HG: ICD-10-PCS | Mod: CPTII,S$GLB,, | Performed by: FAMILY MEDICINE

## 2022-07-19 PROCEDURE — 1159F MED LIST DOCD IN RCRD: CPT | Mod: CPTII,S$GLB,, | Performed by: FAMILY MEDICINE

## 2022-07-19 PROCEDURE — 4010F ACE/ARB THERAPY RXD/TAKEN: CPT | Mod: CPTII,S$GLB,, | Performed by: FAMILY MEDICINE

## 2022-07-19 PROCEDURE — 99999 PR PBB SHADOW E&M-EST. PATIENT-LVL V: CPT | Mod: PBBFAC,,, | Performed by: FAMILY MEDICINE

## 2022-07-19 PROCEDURE — 1101F PT FALLS ASSESS-DOCD LE1/YR: CPT | Mod: CPTII,S$GLB,, | Performed by: FAMILY MEDICINE

## 2022-07-19 PROCEDURE — 3051F HG A1C>EQUAL 7.0%<8.0%: CPT | Mod: CPTII,S$GLB,, | Performed by: FAMILY MEDICINE

## 2022-07-19 PROCEDURE — 99214 OFFICE O/P EST MOD 30 MIN: CPT | Mod: S$GLB,,, | Performed by: FAMILY MEDICINE

## 2022-07-19 PROCEDURE — 3066F NEPHROPATHY DOC TX: CPT | Mod: CPTII,S$GLB,, | Performed by: FAMILY MEDICINE

## 2022-07-19 PROCEDURE — 3051F PR MOST RECENT HEMOGLOBIN A1C LEVEL 7.0 - < 8.0%: ICD-10-PCS | Mod: CPTII,S$GLB,, | Performed by: FAMILY MEDICINE

## 2022-07-19 PROCEDURE — 99999 PR PBB SHADOW E&M-EST. PATIENT-LVL V: ICD-10-PCS | Mod: PBBFAC,,, | Performed by: FAMILY MEDICINE

## 2022-07-19 PROCEDURE — 1126F AMNT PAIN NOTED NONE PRSNT: CPT | Mod: CPTII,S$GLB,, | Performed by: FAMILY MEDICINE

## 2022-07-19 PROCEDURE — 4010F PR ACE/ARB THEARPY RXD/TAKEN: ICD-10-PCS | Mod: CPTII,S$GLB,, | Performed by: FAMILY MEDICINE

## 2022-07-19 PROCEDURE — 1126F PR PAIN SEVERITY QUANTIFIED, NO PAIN PRESENT: ICD-10-PCS | Mod: CPTII,S$GLB,, | Performed by: FAMILY MEDICINE

## 2022-07-19 PROCEDURE — 3062F POS MACROALBUMINURIA REV: CPT | Mod: CPTII,S$GLB,, | Performed by: FAMILY MEDICINE

## 2022-07-19 PROCEDURE — 1159F PR MEDICATION LIST DOCUMENTED IN MEDICAL RECORD: ICD-10-PCS | Mod: CPTII,S$GLB,, | Performed by: FAMILY MEDICINE

## 2022-07-19 PROCEDURE — 3288F PR FALLS RISK ASSESSMENT DOCUMENTED: ICD-10-PCS | Mod: CPTII,S$GLB,, | Performed by: FAMILY MEDICINE

## 2022-07-19 PROCEDURE — 3074F SYST BP LT 130 MM HG: CPT | Mod: CPTII,S$GLB,, | Performed by: FAMILY MEDICINE

## 2022-07-19 PROCEDURE — 3078F DIAST BP <80 MM HG: CPT | Mod: CPTII,S$GLB,, | Performed by: FAMILY MEDICINE

## 2022-07-19 PROCEDURE — 1101F PR PT FALLS ASSESS DOC 0-1 FALLS W/OUT INJ PAST YR: ICD-10-PCS | Mod: CPTII,S$GLB,, | Performed by: FAMILY MEDICINE

## 2022-07-19 PROCEDURE — 3008F BODY MASS INDEX DOCD: CPT | Mod: CPTII,S$GLB,, | Performed by: FAMILY MEDICINE

## 2022-07-19 PROCEDURE — 3066F PR DOCUMENTATION OF TREATMENT FOR NEPHROPATHY: ICD-10-PCS | Mod: CPTII,S$GLB,, | Performed by: FAMILY MEDICINE

## 2022-07-19 PROCEDURE — 3074F PR MOST RECENT SYSTOLIC BLOOD PRESSURE < 130 MM HG: ICD-10-PCS | Mod: CPTII,S$GLB,, | Performed by: FAMILY MEDICINE

## 2022-07-19 PROCEDURE — 99214 PR OFFICE/OUTPT VISIT, EST, LEVL IV, 30-39 MIN: ICD-10-PCS | Mod: S$GLB,,, | Performed by: FAMILY MEDICINE

## 2022-07-19 PROCEDURE — 3008F PR BODY MASS INDEX (BMI) DOCUMENTED: ICD-10-PCS | Mod: CPTII,S$GLB,, | Performed by: FAMILY MEDICINE

## 2022-07-19 RX ORDER — SILDENAFIL 100 MG/1
100 TABLET, FILM COATED ORAL DAILY PRN
Qty: 30 TABLET | Refills: 11 | Status: SHIPPED | OUTPATIENT
Start: 2022-07-19
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
## 2022-07-20 ENCOUNTER — TELEPHONE (OUTPATIENT)
Dept: FAMILY MEDICINE | Facility: CLINIC | Age: 72
End: 2022-07-20
Payer: MEDICARE

## 2022-07-20 NOTE — TELEPHONE ENCOUNTER
----- Message from Laureano Hebert MD sent at 7/19/2022 11:56 AM CDT -----  Needs to be set up for low-dose CT and also needs fit test , orders are in the computer.  Thanks.

## 2022-07-20 NOTE — TELEPHONE ENCOUNTER
Fit Kit was mailed to patient.    Also Asha tried to schedule ct order but patient declined.  04/11/22 patient stated copay for CT Chest is too high, declined EG

## 2022-07-27 DIAGNOSIS — Z12.11 COLON CANCER SCREENING: ICD-10-CM

## 2022-08-01 ENCOUNTER — PATIENT MESSAGE (OUTPATIENT)
Dept: ADMINISTRATIVE | Facility: HOSPITAL | Age: 72
End: 2022-08-01
Payer: MEDICARE

## 2022-08-03 ENCOUNTER — TELEPHONE (OUTPATIENT)
Dept: SMOKING CESSATION | Facility: CLINIC | Age: 72
End: 2022-08-03
Payer: MEDICARE

## 2022-08-17 ENCOUNTER — TELEPHONE (OUTPATIENT)
Dept: ADMINISTRATIVE | Facility: CLINIC | Age: 72
End: 2022-08-17
Payer: MEDICARE

## 2022-08-17 NOTE — TELEPHONE ENCOUNTER
Called pt, informed pt I was calling to remind pt of his in office EAWV on 8/19/22; clinic location provided to patient; pt confirmed appointment

## 2022-08-18 ENCOUNTER — CLINICAL SUPPORT (OUTPATIENT)
Dept: SMOKING CESSATION | Facility: CLINIC | Age: 72
End: 2022-08-18
Payer: COMMERCIAL

## 2022-08-18 DIAGNOSIS — F17.200 NICOTINE DEPENDENCE: Primary | ICD-10-CM

## 2022-08-18 PROCEDURE — 99999 PR PBB SHADOW E&M-EST. PATIENT-LVL I: ICD-10-PCS | Mod: PBBFAC,,,

## 2022-08-18 PROCEDURE — 99999 PR PBB SHADOW E&M-EST. PATIENT-LVL I: CPT | Mod: PBBFAC,,,

## 2022-08-18 PROCEDURE — 99407 BEHAV CHNG SMOKING > 10 MIN: CPT | Mod: S$GLB,,,

## 2022-08-18 PROCEDURE — 99407 PR TOBACCO USE CESSATION INTENSIVE >10 MINUTES: ICD-10-PCS | Mod: S$GLB,,,

## 2022-08-18 NOTE — PROGRESS NOTES
Called pt to f/u on his 3 month smoking cessation quit status. Pt stated he is still smoking. Informed him he has benefits available and is able to rejoin. Pt not interested in program, stated he is doing it on his own. Informed him of benefit period, phone follow ups, and contact information. Will complete smart form for 3 months and will continue to follow up on quit #1 episode.

## 2022-08-19 ENCOUNTER — OFFICE VISIT (OUTPATIENT)
Dept: FAMILY MEDICINE | Facility: CLINIC | Age: 72
End: 2022-08-19
Payer: MEDICARE

## 2022-08-19 VITALS
HEIGHT: 69 IN | HEART RATE: 102 BPM | WEIGHT: 242.31 LBS | BODY MASS INDEX: 35.89 KG/M2 | SYSTOLIC BLOOD PRESSURE: 112 MMHG | DIASTOLIC BLOOD PRESSURE: 60 MMHG | OXYGEN SATURATION: 96 %

## 2022-08-19 DIAGNOSIS — Z74.09 OTHER REDUCED MOBILITY: ICD-10-CM

## 2022-08-19 DIAGNOSIS — E11.65 TYPE 2 DIABETES MELLITUS WITH HYPERGLYCEMIA, WITH LONG-TERM CURRENT USE OF INSULIN: ICD-10-CM

## 2022-08-19 DIAGNOSIS — E66.01 SEVERE OBESITY (BMI 35.0-35.9 WITH COMORBIDITY): ICD-10-CM

## 2022-08-19 DIAGNOSIS — M47.819 ARTHRITIS OF FACET JOINTS AT MULTIPLE VERTEBRAL LEVELS: ICD-10-CM

## 2022-08-19 DIAGNOSIS — F17.200 TOBACCO DEPENDENCE: ICD-10-CM

## 2022-08-19 DIAGNOSIS — I10 HTN (HYPERTENSION), BENIGN: ICD-10-CM

## 2022-08-19 DIAGNOSIS — Z00.00 ENCOUNTER FOR PREVENTIVE HEALTH EXAMINATION: Primary | ICD-10-CM

## 2022-08-19 DIAGNOSIS — J84.10 CALCIFIED GRANULOMA OF LUNG: ICD-10-CM

## 2022-08-19 DIAGNOSIS — R29.818 NEUROGENIC CLAUDICATION: ICD-10-CM

## 2022-08-19 DIAGNOSIS — M46.99 UNSPECIFIED INFLAMMATORY SPONDYLOPATHY, MULTIPLE SITES IN SPINE: ICD-10-CM

## 2022-08-19 DIAGNOSIS — E78.5 HYPERLIPIDEMIA, UNSPECIFIED HYPERLIPIDEMIA TYPE: ICD-10-CM

## 2022-08-19 DIAGNOSIS — E11.42 DIABETIC POLYNEUROPATHY ASSOCIATED WITH TYPE 2 DIABETES MELLITUS: ICD-10-CM

## 2022-08-19 DIAGNOSIS — Z79.4 TYPE 2 DIABETES MELLITUS WITH HYPERGLYCEMIA, WITH LONG-TERM CURRENT USE OF INSULIN: ICD-10-CM

## 2022-08-19 PROBLEM — M48.062 LUMBAR STENOSIS WITH NEUROGENIC CLAUDICATION: Status: RESOLVED | Noted: 2017-08-02 | Resolved: 2022-08-19

## 2022-08-19 PROBLEM — E11.69 HYPERLIPIDEMIA ASSOCIATED WITH TYPE 2 DIABETES MELLITUS: Status: RESOLVED | Noted: 2017-07-12 | Resolved: 2022-08-19

## 2022-08-19 PROCEDURE — 3008F BODY MASS INDEX DOCD: CPT | Mod: CPTII,S$GLB,, | Performed by: NURSE PRACTITIONER

## 2022-08-19 PROCEDURE — 3051F PR MOST RECENT HEMOGLOBIN A1C LEVEL 7.0 - < 8.0%: ICD-10-PCS | Mod: CPTII,S$GLB,, | Performed by: NURSE PRACTITIONER

## 2022-08-19 PROCEDURE — 3078F PR MOST RECENT DIASTOLIC BLOOD PRESSURE < 80 MM HG: ICD-10-PCS | Mod: CPTII,S$GLB,, | Performed by: NURSE PRACTITIONER

## 2022-08-19 PROCEDURE — 3062F POS MACROALBUMINURIA REV: CPT | Mod: CPTII,S$GLB,, | Performed by: NURSE PRACTITIONER

## 2022-08-19 PROCEDURE — 1101F PR PT FALLS ASSESS DOC 0-1 FALLS W/OUT INJ PAST YR: ICD-10-PCS | Mod: CPTII,S$GLB,, | Performed by: NURSE PRACTITIONER

## 2022-08-19 PROCEDURE — 1125F AMNT PAIN NOTED PAIN PRSNT: CPT | Mod: CPTII,S$GLB,, | Performed by: NURSE PRACTITIONER

## 2022-08-19 PROCEDURE — 99999 PR PBB SHADOW E&M-EST. PATIENT-LVL V: ICD-10-PCS | Mod: PBBFAC,,, | Performed by: NURSE PRACTITIONER

## 2022-08-19 PROCEDURE — 4010F PR ACE/ARB THEARPY RXD/TAKEN: ICD-10-PCS | Mod: CPTII,S$GLB,, | Performed by: NURSE PRACTITIONER

## 2022-08-19 PROCEDURE — 3288F FALL RISK ASSESSMENT DOCD: CPT | Mod: CPTII,S$GLB,, | Performed by: NURSE PRACTITIONER

## 2022-08-19 PROCEDURE — 3288F PR FALLS RISK ASSESSMENT DOCUMENTED: ICD-10-PCS | Mod: CPTII,S$GLB,, | Performed by: NURSE PRACTITIONER

## 2022-08-19 PROCEDURE — 99999 PR PBB SHADOW E&M-EST. PATIENT-LVL V: CPT | Mod: PBBFAC,,, | Performed by: NURSE PRACTITIONER

## 2022-08-19 PROCEDURE — 4010F ACE/ARB THERAPY RXD/TAKEN: CPT | Mod: CPTII,S$GLB,, | Performed by: NURSE PRACTITIONER

## 2022-08-19 PROCEDURE — G0439 PPPS, SUBSEQ VISIT: HCPCS | Mod: S$GLB,,, | Performed by: NURSE PRACTITIONER

## 2022-08-19 PROCEDURE — 1159F MED LIST DOCD IN RCRD: CPT | Mod: CPTII,S$GLB,, | Performed by: NURSE PRACTITIONER

## 2022-08-19 PROCEDURE — 3051F HG A1C>EQUAL 7.0%<8.0%: CPT | Mod: CPTII,S$GLB,, | Performed by: NURSE PRACTITIONER

## 2022-08-19 PROCEDURE — 1125F PR PAIN SEVERITY QUANTIFIED, PAIN PRESENT: ICD-10-PCS | Mod: CPTII,S$GLB,, | Performed by: NURSE PRACTITIONER

## 2022-08-19 PROCEDURE — 1170F FXNL STATUS ASSESSED: CPT | Mod: CPTII,S$GLB,, | Performed by: NURSE PRACTITIONER

## 2022-08-19 PROCEDURE — 3066F PR DOCUMENTATION OF TREATMENT FOR NEPHROPATHY: ICD-10-PCS | Mod: CPTII,S$GLB,, | Performed by: NURSE PRACTITIONER

## 2022-08-19 PROCEDURE — 3078F DIAST BP <80 MM HG: CPT | Mod: CPTII,S$GLB,, | Performed by: NURSE PRACTITIONER

## 2022-08-19 PROCEDURE — 3074F PR MOST RECENT SYSTOLIC BLOOD PRESSURE < 130 MM HG: ICD-10-PCS | Mod: CPTII,S$GLB,, | Performed by: NURSE PRACTITIONER

## 2022-08-19 PROCEDURE — 1101F PT FALLS ASSESS-DOCD LE1/YR: CPT | Mod: CPTII,S$GLB,, | Performed by: NURSE PRACTITIONER

## 2022-08-19 PROCEDURE — 1170F PR FUNCTIONAL STATUS ASSESSED: ICD-10-PCS | Mod: CPTII,S$GLB,, | Performed by: NURSE PRACTITIONER

## 2022-08-19 PROCEDURE — G0439 PR MEDICARE ANNUAL WELLNESS SUBSEQUENT VISIT: ICD-10-PCS | Mod: S$GLB,,, | Performed by: NURSE PRACTITIONER

## 2022-08-19 PROCEDURE — 1160F PR REVIEW ALL MEDS BY PRESCRIBER/CLIN PHARMACIST DOCUMENTED: ICD-10-PCS | Mod: CPTII,S$GLB,, | Performed by: NURSE PRACTITIONER

## 2022-08-19 PROCEDURE — 3062F PR POS MACROALBUMINURIA RESULT DOCUMENTED/REVIEW: ICD-10-PCS | Mod: CPTII,S$GLB,, | Performed by: NURSE PRACTITIONER

## 2022-08-19 PROCEDURE — 3008F PR BODY MASS INDEX (BMI) DOCUMENTED: ICD-10-PCS | Mod: CPTII,S$GLB,, | Performed by: NURSE PRACTITIONER

## 2022-08-19 PROCEDURE — 1159F PR MEDICATION LIST DOCUMENTED IN MEDICAL RECORD: ICD-10-PCS | Mod: CPTII,S$GLB,, | Performed by: NURSE PRACTITIONER

## 2022-08-19 PROCEDURE — 3066F NEPHROPATHY DOC TX: CPT | Mod: CPTII,S$GLB,, | Performed by: NURSE PRACTITIONER

## 2022-08-19 PROCEDURE — 3074F SYST BP LT 130 MM HG: CPT | Mod: CPTII,S$GLB,, | Performed by: NURSE PRACTITIONER

## 2022-08-19 PROCEDURE — 1160F RVW MEDS BY RX/DR IN RCRD: CPT | Mod: CPTII,S$GLB,, | Performed by: NURSE PRACTITIONER

## 2022-08-19 NOTE — PATIENT INSTRUCTIONS
Counseling and Referral of Other Preventative  (Italic type indicates deductible and co-insurance are waived)    Patient Name: Francisco Coppola  Today's Date: 8/19/2022    Health Maintenance       Date Due Completion Date    Colorectal Cancer Screening Never done ---    LDCT Lung Screen Never done ---    COVID-19 Vaccine (3 - Booster for Pfizer series) 06/30/2021 1/30/2021    Shingles Vaccine (1 of 2) 09/09/2022 (Originally 2/[birth year]/1999) ---    Influenza Vaccine (1) 09/01/2022 12/27/2020    Hemoglobin A1c 01/12/2023 7/12/2022    Foot Exam 04/06/2023 4/6/2022    Diabetes Urine Screening 04/12/2023 4/12/2022    Lipid Panel 04/12/2023 4/12/2022    Eye Exam 05/30/2023 5/30/2022    Low Dose Statin 08/19/2023 8/19/2022    TETANUS VACCINE 01/01/2024 1/1/2014        No orders of the defined types were placed in this encounter.    The following information is provided to all patients.  This information is to help you find resources for any of the problems found today that may be affecting your health:                Living healthy guide: www.[facility].[place].gov      Understanding Diabetes: www.diabetes.org      Eating healthy: www.cdc.gov/healthyweight      CDC home safety checklist: www.cdc.gov/steadi/patient.html      Agency on Aging: www.goea.[place].[facility]      Alcoholics anonymous (AA): www.aa.org      Physical Activity: www.kash.nih.gov/xu7kqsr      Tobacco use: www.quitwithusla.org

## 2022-08-19 NOTE — PROGRESS NOTES
"  Francisco Coppola presented for a  Medicare AWV and comprehensive Health Risk Assessment today. The following components were reviewed and updated:    · Medical history  · Family History  · Social history  · Allergies and Current Medications  · Health Risk Assessment  · Health Maintenance  · Care Team         ** See Completed Assessments for Annual Wellness Visit within the encounter summary.**         The following assessments were completed:  · Living Situation  · CAGE  · Depression Screening  · Timed Get Up and Go  · Whisper Test  · Cognitive Function Screening      · Nutrition Screening  · ADL Screening  · PAQ Screening        Vitals:    08/19/22 1344   BP: 112/60   BP Location: Right arm   Patient Position: Sitting   BP Method: Large (Manual)   Pulse: 102   SpO2: 96%   Weight: 109.9 kg (242 lb 4.6 oz)   Height: 5' 9" (1.753 m)     Body mass index is 35.78 kg/m².     Physical Exam  Vitals reviewed.   Constitutional:       General: He is not in acute distress.     Appearance: Normal appearance. He is well-developed and well-groomed. He is obese.   HENT:      Head: Normocephalic.   Cardiovascular:      Rate and Rhythm: Normal rate.   Pulmonary:      Effort: Pulmonary effort is normal. No respiratory distress.   Abdominal:      General: There is no distension.   Skin:     General: Skin is warm and dry.      Coloration: Skin is not pale.   Neurological:      Mental Status: He is alert and oriented to person, place, and time.      Coordination: Coordination normal.      Gait: Gait abnormal (slightly slowed gait).   Psychiatric:         Attention and Perception: Attention normal.         Mood and Affect: Mood and affect normal.         Speech: Speech normal.         Behavior: Behavior normal. Behavior is cooperative.         Cognition and Memory: Cognition and memory normal.             Diagnoses and health risks identified today and associated recommendations/orders:    1. Encounter for preventive health " examination    2. Type 2 diabetes mellitus with hyperglycemia, with long-term current use of insulin  Chronic; stable on medication. Follow up with PCP.    3. Diabetic polyneuropathy associated with type 2 diabetes mellitus  Chronic; stable on medication. Follow up with PCP.    4. HTN (hypertension), benign  Chronic; stable on medication. Follow up with PCP.    5. Hyperlipidemia, unspecified hyperlipidemia type  Chronic; stable on medication. Follow up with PCP.    6. Neurogenic claudication  Hx of lumbar stenosis; stable. Follow up with PCP.    7. Calcified granuloma of lung  Chronic; stable. As seen on previous imaging. Follow up with PCP.    8. 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 Follow up with PCP.    9. Arthritis of facet joints at multiple vertebral levels  Chronic; stable on medication. Follow up with PCP.    10. Tobacco dependence  Current every day smoker; smoking about 1/2 ppd. Ready to quit but wishing to do on his own. Follow up with PCP.    11. Other reduced mobility  Ambulates independently; slowed but steady gait. No recent falls. Follow up with PCP.    12. Severe obesity (BMI 35.0-35.9 with comorbidity)  Continue to eat a low salt/low fat ADA diet and discussed importance of engaging in physical activity at least 5x/week for a minimum of 30 min/day.      Provided Francisco with a 5-10 year written screening schedule and personal prevention plan. Recommendations were developed using the USPSTF age appropriate recommendations. Education, counseling, and referrals were provided as needed. After Visit Summary printed and given to patient which includes a list of additional screenings/tests needed.    Follow up for your next annual wellness visit.    Donna Barrera NP    Advance Care Planning     I offered to discuss advanced care planning, including how to pick a person who would make decisions for you if you were unable to make them for  yourself, called a health care power of , and what kind of decisions you might make such as use of life sustaining treatments such as ventilators and tube feeding when faced with a life limiting illness recorded on a living will that they will need to know. (How you want to be cared for as you near the end of your natural life)     X Patient is interested in learning more about how to make advanced directives.  I provided them paperwork and offered to discuss this with them.

## 2022-08-25 ENCOUNTER — HOSPITAL ENCOUNTER (OUTPATIENT)
Dept: RADIOLOGY | Facility: HOSPITAL | Age: 72
Discharge: HOME | End: 2022-08-25
Attending: FAMILY MEDICINE
Payer: MEDICARE

## 2022-08-25 ENCOUNTER — IMMUNIZATION (OUTPATIENT)
Dept: INTERNAL MEDICINE | Facility: CLINIC | Age: 72
End: 2022-08-25
Payer: MEDICARE

## 2022-08-25 DIAGNOSIS — Z23 NEED FOR VACCINATION: Primary | ICD-10-CM

## 2022-08-25 DIAGNOSIS — Z87.891 PERSONAL HISTORY OF NICOTINE DEPENDENCE: ICD-10-CM

## 2022-08-25 DIAGNOSIS — F17.200 TOBACCO DEPENDENCE: Primary | ICD-10-CM

## 2022-08-25 DIAGNOSIS — Z12.2 ENCOUNTER FOR SCREENING FOR LUNG CANCER: ICD-10-CM

## 2022-08-25 DIAGNOSIS — F17.200 TOBACCO DEPENDENCE: ICD-10-CM

## 2022-08-25 PROCEDURE — 71271 CT CHEST LUNG SCREENING LOW DOSE: ICD-10-PCS | Mod: 26,,, | Performed by: RADIOLOGY

## 2022-08-25 PROCEDURE — 91305 COVID-19, MRNA, LNP-S, PF, 30 MCG/0.3 ML DOSE VACCINE (PFIZER): CPT | Mod: PBBFAC | Performed by: FAMILY MEDICINE

## 2022-08-25 PROCEDURE — 71271 CT THORAX LUNG CANCER SCR C-: CPT | Mod: TC

## 2022-08-25 PROCEDURE — 71271 CT THORAX LUNG CANCER SCR C-: CPT | Mod: 26,,, | Performed by: RADIOLOGY

## 2022-09-26 ENCOUNTER — PATIENT MESSAGE (OUTPATIENT)
Dept: ADMINISTRATIVE | Facility: HOSPITAL | Age: 72
End: 2022-09-26
Payer: MEDICARE

## 2022-09-26 DIAGNOSIS — Z12.11 SCREENING FOR COLON CANCER: ICD-10-CM

## 2022-10-19 ENCOUNTER — LAB VISIT (OUTPATIENT)
Dept: LAB | Facility: HOSPITAL | Age: 72
End: 2022-10-19
Attending: FAMILY MEDICINE
Payer: MEDICARE

## 2022-10-19 ENCOUNTER — TELEPHONE (OUTPATIENT)
Dept: FAMILY MEDICINE | Facility: CLINIC | Age: 72
End: 2022-10-19
Payer: MEDICARE

## 2022-10-19 ENCOUNTER — PATIENT MESSAGE (OUTPATIENT)
Dept: FAMILY MEDICINE | Facility: CLINIC | Age: 72
End: 2022-10-19
Payer: MEDICARE

## 2022-10-19 DIAGNOSIS — E11.59 HYPERTENSION ASSOCIATED WITH DIABETES: ICD-10-CM

## 2022-10-19 DIAGNOSIS — I15.2 HYPERTENSION ASSOCIATED WITH DIABETES: ICD-10-CM

## 2022-10-19 DIAGNOSIS — E11.22 TYPE 2 DIABETES MELLITUS WITH STAGE 3 CHRONIC KIDNEY DISEASE, WITH LONG-TERM CURRENT USE OF INSULIN, UNSPECIFIED WHETHER STAGE 3A OR 3B CKD: ICD-10-CM

## 2022-10-19 DIAGNOSIS — Z79.4 TYPE 2 DIABETES MELLITUS WITH STAGE 3 CHRONIC KIDNEY DISEASE, WITH LONG-TERM CURRENT USE OF INSULIN, UNSPECIFIED WHETHER STAGE 3A OR 3B CKD: ICD-10-CM

## 2022-10-19 DIAGNOSIS — N18.30 TYPE 2 DIABETES MELLITUS WITH STAGE 3 CHRONIC KIDNEY DISEASE, WITH LONG-TERM CURRENT USE OF INSULIN, UNSPECIFIED WHETHER STAGE 3A OR 3B CKD: ICD-10-CM

## 2022-10-19 LAB
ALBUMIN SERPL BCP-MCNC: 3.5 G/DL (ref 3.5–5.2)
ALP SERPL-CCNC: 63 U/L (ref 55–135)
ALT SERPL W/O P-5'-P-CCNC: 26 U/L (ref 10–44)
ANION GAP SERPL CALC-SCNC: 13 MMOL/L (ref 8–16)
AST SERPL-CCNC: 30 U/L (ref 10–40)
BILIRUB SERPL-MCNC: 0.6 MG/DL (ref 0.1–1)
BUN SERPL-MCNC: 26 MG/DL (ref 8–23)
CALCIUM SERPL-MCNC: 9.2 MG/DL (ref 8.7–10.5)
CHLORIDE SERPL-SCNC: 100 MMOL/L (ref 95–110)
CHOLEST SERPL-MCNC: 144 MG/DL (ref 120–199)
CHOLEST/HDLC SERPL: 3.6 {RATIO} (ref 2–5)
CO2 SERPL-SCNC: 26 MMOL/L (ref 23–29)
CREAT SERPL-MCNC: 1.5 MG/DL (ref 0.5–1.4)
EST. GFR  (NO RACE VARIABLE): 49 ML/MIN/1.73 M^2
ESTIMATED AVG GLUCOSE: 143 MG/DL (ref 68–131)
GLUCOSE SERPL-MCNC: 111 MG/DL (ref 70–110)
HBA1C MFR BLD: 6.6 % (ref 4–5.6)
HDLC SERPL-MCNC: 40 MG/DL (ref 40–75)
HDLC SERPL: 27.8 % (ref 20–50)
LDLC SERPL CALC-MCNC: 72.8 MG/DL (ref 63–159)
NONHDLC SERPL-MCNC: 104 MG/DL
POTASSIUM SERPL-SCNC: 5 MMOL/L (ref 3.5–5.1)
PROT SERPL-MCNC: 7.2 G/DL (ref 6–8.4)
SODIUM SERPL-SCNC: 139 MMOL/L (ref 136–145)
TRIGL SERPL-MCNC: 156 MG/DL (ref 30–150)

## 2022-10-19 PROCEDURE — 83036 HEMOGLOBIN GLYCOSYLATED A1C: CPT | Performed by: FAMILY MEDICINE

## 2022-10-19 PROCEDURE — 36415 COLL VENOUS BLD VENIPUNCTURE: CPT | Performed by: FAMILY MEDICINE

## 2022-10-19 PROCEDURE — 80061 LIPID PANEL: CPT | Performed by: FAMILY MEDICINE

## 2022-10-19 PROCEDURE — 80053 COMPREHEN METABOLIC PANEL: CPT | Performed by: FAMILY MEDICINE

## 2022-10-25 ENCOUNTER — OFFICE VISIT (OUTPATIENT)
Dept: FAMILY MEDICINE | Facility: CLINIC | Age: 72
End: 2022-10-25
Payer: MEDICARE

## 2022-10-25 VITALS
WEIGHT: 247.56 LBS | HEART RATE: 114 BPM | HEIGHT: 69 IN | BODY MASS INDEX: 36.67 KG/M2 | DIASTOLIC BLOOD PRESSURE: 78 MMHG | TEMPERATURE: 98 F | OXYGEN SATURATION: 95 % | SYSTOLIC BLOOD PRESSURE: 118 MMHG

## 2022-10-25 DIAGNOSIS — Z87.891 PERSONAL HISTORY OF NICOTINE DEPENDENCE: ICD-10-CM

## 2022-10-25 DIAGNOSIS — N18.30 TYPE 2 DIABETES MELLITUS WITH STAGE 3 CHRONIC KIDNEY DISEASE, WITH LONG-TERM CURRENT USE OF INSULIN, UNSPECIFIED WHETHER STAGE 3A OR 3B CKD: ICD-10-CM

## 2022-10-25 DIAGNOSIS — E11.22 TYPE 2 DIABETES MELLITUS WITH STAGE 3 CHRONIC KIDNEY DISEASE, WITH LONG-TERM CURRENT USE OF INSULIN, UNSPECIFIED WHETHER STAGE 3A OR 3B CKD: ICD-10-CM

## 2022-10-25 DIAGNOSIS — L98.9 SKIN LESION OF BACK: ICD-10-CM

## 2022-10-25 DIAGNOSIS — E66.01 SEVERE OBESITY (BMI 35.0-35.9 WITH COMORBIDITY): ICD-10-CM

## 2022-10-25 DIAGNOSIS — I10 HTN (HYPERTENSION), BENIGN: ICD-10-CM

## 2022-10-25 DIAGNOSIS — Z79.4 TYPE 2 DIABETES MELLITUS WITH STAGE 3 CHRONIC KIDNEY DISEASE, WITH LONG-TERM CURRENT USE OF INSULIN, UNSPECIFIED WHETHER STAGE 3A OR 3B CKD: ICD-10-CM

## 2022-10-25 DIAGNOSIS — E11.42 DIABETIC POLYNEUROPATHY ASSOCIATED WITH TYPE 2 DIABETES MELLITUS: Primary | ICD-10-CM

## 2022-10-25 PROCEDURE — 3044F HG A1C LEVEL LT 7.0%: CPT | Mod: CPTII,S$GLB,, | Performed by: FAMILY MEDICINE

## 2022-10-25 PROCEDURE — 3066F PR DOCUMENTATION OF TREATMENT FOR NEPHROPATHY: ICD-10-PCS | Mod: CPTII,S$GLB,, | Performed by: FAMILY MEDICINE

## 2022-10-25 PROCEDURE — 1159F MED LIST DOCD IN RCRD: CPT | Mod: CPTII,S$GLB,, | Performed by: FAMILY MEDICINE

## 2022-10-25 PROCEDURE — 3288F FALL RISK ASSESSMENT DOCD: CPT | Mod: CPTII,S$GLB,, | Performed by: FAMILY MEDICINE

## 2022-10-25 PROCEDURE — 3078F DIAST BP <80 MM HG: CPT | Mod: CPTII,S$GLB,, | Performed by: FAMILY MEDICINE

## 2022-10-25 PROCEDURE — 1125F PR PAIN SEVERITY QUANTIFIED, PAIN PRESENT: ICD-10-PCS | Mod: CPTII,S$GLB,, | Performed by: FAMILY MEDICINE

## 2022-10-25 PROCEDURE — 99499 UNLISTED E&M SERVICE: CPT | Mod: HCNC,S$GLB,, | Performed by: FAMILY MEDICINE

## 2022-10-25 PROCEDURE — 3066F NEPHROPATHY DOC TX: CPT | Mod: CPTII,S$GLB,, | Performed by: FAMILY MEDICINE

## 2022-10-25 PROCEDURE — 99999 PR PBB SHADOW E&M-EST. PATIENT-LVL V: CPT | Mod: PBBFAC,,, | Performed by: FAMILY MEDICINE

## 2022-10-25 PROCEDURE — 3078F PR MOST RECENT DIASTOLIC BLOOD PRESSURE < 80 MM HG: ICD-10-PCS | Mod: CPTII,S$GLB,, | Performed by: FAMILY MEDICINE

## 2022-10-25 PROCEDURE — 90694 FLU VACCINE - QUADRIVALENT - ADJUVANTED: ICD-10-PCS | Mod: S$GLB,,, | Performed by: FAMILY MEDICINE

## 2022-10-25 PROCEDURE — 99214 PR OFFICE/OUTPT VISIT, EST, LEVL IV, 30-39 MIN: ICD-10-PCS | Mod: 25,S$GLB,, | Performed by: FAMILY MEDICINE

## 2022-10-25 PROCEDURE — 90694 VACC AIIV4 NO PRSRV 0.5ML IM: CPT | Mod: S$GLB,,, | Performed by: FAMILY MEDICINE

## 2022-10-25 PROCEDURE — 3074F PR MOST RECENT SYSTOLIC BLOOD PRESSURE < 130 MM HG: ICD-10-PCS | Mod: CPTII,S$GLB,, | Performed by: FAMILY MEDICINE

## 2022-10-25 PROCEDURE — 3074F SYST BP LT 130 MM HG: CPT | Mod: CPTII,S$GLB,, | Performed by: FAMILY MEDICINE

## 2022-10-25 PROCEDURE — G0008 ADMIN INFLUENZA VIRUS VAC: HCPCS | Mod: S$GLB,,, | Performed by: FAMILY MEDICINE

## 2022-10-25 PROCEDURE — G0008 FLU VACCINE - QUADRIVALENT - ADJUVANTED: ICD-10-PCS | Mod: S$GLB,,, | Performed by: FAMILY MEDICINE

## 2022-10-25 PROCEDURE — 99999 PR PBB SHADOW E&M-EST. PATIENT-LVL V: ICD-10-PCS | Mod: PBBFAC,,, | Performed by: FAMILY MEDICINE

## 2022-10-25 PROCEDURE — 3062F POS MACROALBUMINURIA REV: CPT | Mod: CPTII,S$GLB,, | Performed by: FAMILY MEDICINE

## 2022-10-25 PROCEDURE — 3062F PR POS MACROALBUMINURIA RESULT DOCUMENTED/REVIEW: ICD-10-PCS | Mod: CPTII,S$GLB,, | Performed by: FAMILY MEDICINE

## 2022-10-25 PROCEDURE — 1159F PR MEDICATION LIST DOCUMENTED IN MEDICAL RECORD: ICD-10-PCS | Mod: CPTII,S$GLB,, | Performed by: FAMILY MEDICINE

## 2022-10-25 PROCEDURE — 4010F PR ACE/ARB THEARPY RXD/TAKEN: ICD-10-PCS | Mod: CPTII,S$GLB,, | Performed by: FAMILY MEDICINE

## 2022-10-25 PROCEDURE — 1101F PT FALLS ASSESS-DOCD LE1/YR: CPT | Mod: CPTII,S$GLB,, | Performed by: FAMILY MEDICINE

## 2022-10-25 PROCEDURE — 1101F PR PT FALLS ASSESS DOC 0-1 FALLS W/OUT INJ PAST YR: ICD-10-PCS | Mod: CPTII,S$GLB,, | Performed by: FAMILY MEDICINE

## 2022-10-25 PROCEDURE — 3288F PR FALLS RISK ASSESSMENT DOCUMENTED: ICD-10-PCS | Mod: CPTII,S$GLB,, | Performed by: FAMILY MEDICINE

## 2022-10-25 PROCEDURE — 4010F ACE/ARB THERAPY RXD/TAKEN: CPT | Mod: CPTII,S$GLB,, | Performed by: FAMILY MEDICINE

## 2022-10-25 PROCEDURE — 99214 OFFICE O/P EST MOD 30 MIN: CPT | Mod: 25,S$GLB,, | Performed by: FAMILY MEDICINE

## 2022-10-25 PROCEDURE — 1125F AMNT PAIN NOTED PAIN PRSNT: CPT | Mod: CPTII,S$GLB,, | Performed by: FAMILY MEDICINE

## 2022-10-25 PROCEDURE — 3044F PR MOST RECENT HEMOGLOBIN A1C LEVEL <7.0%: ICD-10-PCS | Mod: CPTII,S$GLB,, | Performed by: FAMILY MEDICINE

## 2022-10-25 RX ORDER — CLOTRIMAZOLE AND BETAMETHASONE DIPROPIONATE 10; .64 MG/G; MG/G
CREAM TOPICAL 2 TIMES DAILY
Qty: 30 G | Refills: 0 | Status: SHIPPED | OUTPATIENT
Start: 2022-10-25
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
## 2022-10-31 ENCOUNTER — CLINICAL SUPPORT (OUTPATIENT)
Dept: SMOKING CESSATION | Facility: CLINIC | Age: 72
End: 2022-10-31
Payer: COMMERCIAL

## 2022-10-31 DIAGNOSIS — F17.200 NICOTINE DEPENDENCE: Primary | ICD-10-CM

## 2022-10-31 PROCEDURE — 99407 PR TOBACCO USE CESSATION INTENSIVE >10 MINUTES: ICD-10-PCS | Mod: S$GLB,,, | Performed by: GENERAL PRACTICE

## 2022-10-31 PROCEDURE — 99407 BEHAV CHNG SMOKING > 10 MIN: CPT | Mod: S$GLB,,, | Performed by: GENERAL PRACTICE

## 2022-10-31 NOTE — PROGRESS NOTES
Spoke with patient regarding 6 month follow up. Patient states that he is not tobacco free at this time. Patient advised on benefits, follow up appointments and contact information. Patient states that he is not interested in the program. Will complete smart form and documentation in the chart for quit #1.

## 2023-01-04 ENCOUNTER — PES CALL (OUTPATIENT)
Dept: ADMINISTRATIVE | Facility: CLINIC | Age: 73
End: 2023-01-04
Payer: MEDICARE

## 2023-01-09 RX ORDER — DULAGLUTIDE 1.5 MG/.5ML
INJECTION, SOLUTION SUBCUTANEOUS
OUTPATIENT
Start: 2023-01-09

## 2023-01-09 NOTE — TELEPHONE ENCOUNTER
Refill Decision Note   Francisco Coppola  is requesting a refill authorization.  Brief Assessment and Rationale for Refill:  Quick Discontinue     Medication Therapy Plan:  Receipt confirmed by pharmacy (1/9/2023  9:54 AM CST)    Medication Reconciliation Completed: No   Comments:     No Care Gaps recommended.     Note composed:11:00 AM 01/09/2023

## 2023-01-09 NOTE — TELEPHONE ENCOUNTER
No new care gaps identified.  [facility] Embedded Care Gaps. Reference number: [id number]. 1/09/2023   9:24:02 AM CST

## 2023-01-25 ENCOUNTER — LAB VISIT (OUTPATIENT)
Dept: LAB | Facility: HOSPITAL | Age: 73
End: 2023-01-25
Attending: FAMILY MEDICINE
Payer: MEDICARE

## 2023-01-25 ENCOUNTER — TELEPHONE (OUTPATIENT)
Dept: ADMINISTRATIVE | Facility: CLINIC | Age: 73
End: 2023-01-25
Payer: MEDICARE

## 2023-01-25 DIAGNOSIS — E11.22 TYPE 2 DIABETES MELLITUS WITH STAGE 3 CHRONIC KIDNEY DISEASE, WITH LONG-TERM CURRENT USE OF INSULIN, UNSPECIFIED WHETHER STAGE 3A OR 3B CKD: ICD-10-CM

## 2023-01-25 DIAGNOSIS — N18.30 TYPE 2 DIABETES MELLITUS WITH STAGE 3 CHRONIC KIDNEY DISEASE, WITH LONG-TERM CURRENT USE OF INSULIN, UNSPECIFIED WHETHER STAGE 3A OR 3B CKD: ICD-10-CM

## 2023-01-25 DIAGNOSIS — Z79.4 TYPE 2 DIABETES MELLITUS WITH STAGE 3 CHRONIC KIDNEY DISEASE, WITH LONG-TERM CURRENT USE OF INSULIN, UNSPECIFIED WHETHER STAGE 3A OR 3B CKD: ICD-10-CM

## 2023-01-25 DIAGNOSIS — E11.42 DIABETIC POLYNEUROPATHY ASSOCIATED WITH TYPE 2 DIABETES MELLITUS: ICD-10-CM

## 2023-01-25 LAB
ALBUMIN SERPL BCP-MCNC: 3.5 G/DL (ref 3.5–5.2)
ALP SERPL-CCNC: 57 U/L (ref 55–135)
ALT SERPL W/O P-5'-P-CCNC: 23 U/L (ref 10–44)
ANION GAP SERPL CALC-SCNC: 9 MMOL/L (ref 8–16)
AST SERPL-CCNC: 27 U/L (ref 10–40)
BILIRUB SERPL-MCNC: 0.8 MG/DL (ref 0.1–1)
BUN SERPL-MCNC: 29 MG/DL (ref 8–23)
CALCIUM SERPL-MCNC: 9.3 MG/DL (ref 8.7–10.5)
CHLORIDE SERPL-SCNC: 104 MMOL/L (ref 95–110)
CO2 SERPL-SCNC: 26 MMOL/L (ref 23–29)
CREAT SERPL-MCNC: 1.5 MG/DL (ref 0.5–1.4)
EST. GFR  (NO RACE VARIABLE): 49 ML/MIN/1.73 M^2
ESTIMATED AVG GLUCOSE: 166 MG/DL (ref 68–131)
GLUCOSE SERPL-MCNC: 160 MG/DL (ref 70–110)
HBA1C MFR BLD: 7.4 % (ref 4–5.6)
POTASSIUM SERPL-SCNC: 4.7 MMOL/L (ref 3.5–5.1)
PROT SERPL-MCNC: 6.9 G/DL (ref 6–8.4)
SODIUM SERPL-SCNC: 139 MMOL/L (ref 136–145)

## 2023-01-25 PROCEDURE — 83036 HEMOGLOBIN GLYCOSYLATED A1C: CPT | Performed by: FAMILY MEDICINE

## 2023-01-25 PROCEDURE — 80053 COMPREHEN METABOLIC PANEL: CPT | Performed by: FAMILY MEDICINE

## 2023-01-25 PROCEDURE — 36415 COLL VENOUS BLD VENIPUNCTURE: CPT | Performed by: FAMILY MEDICINE

## 2023-01-25 NOTE — TELEPHONE ENCOUNTER
Called pt, informed pt I was calling to remind pt of his in office EAWV on 1/27/23; clinic location provided to patient; pt confirmed appointment

## 2023-01-27 ENCOUNTER — PATIENT OUTREACH (OUTPATIENT)
Dept: ADMINISTRATIVE | Facility: OTHER | Age: 73
End: 2023-01-27

## 2023-01-31 ENCOUNTER — HOSPITAL ENCOUNTER (OUTPATIENT)
Dept: RADIOLOGY | Facility: HOSPITAL | Age: 73
Discharge: HOME | End: 2023-01-31
Attending: FAMILY MEDICINE
Payer: MEDICARE

## 2023-01-31 ENCOUNTER — OFFICE VISIT (OUTPATIENT)
Dept: FAMILY MEDICINE | Facility: CLINIC | Age: 73
End: 2023-01-31
Payer: MEDICARE

## 2023-01-31 VITALS
DIASTOLIC BLOOD PRESSURE: 78 MMHG | HEART RATE: 107 BPM | OXYGEN SATURATION: 95 % | HEIGHT: 69 IN | SYSTOLIC BLOOD PRESSURE: 132 MMHG | WEIGHT: 248.88 LBS | TEMPERATURE: 98 F | BODY MASS INDEX: 36.86 KG/M2

## 2023-01-31 DIAGNOSIS — R29.818 NEUROGENIC CLAUDICATION: ICD-10-CM

## 2023-01-31 DIAGNOSIS — E11.22 TYPE 2 DIABETES MELLITUS WITH STAGE 3 CHRONIC KIDNEY DISEASE, WITH LONG-TERM CURRENT USE OF INSULIN, UNSPECIFIED WHETHER STAGE 3A OR 3B CKD: Primary | ICD-10-CM

## 2023-01-31 DIAGNOSIS — E66.01 SEVERE OBESITY (BMI 35.0-35.9 WITH COMORBIDITY): ICD-10-CM

## 2023-01-31 DIAGNOSIS — G89.29 CHRONIC RIGHT SHOULDER PAIN: ICD-10-CM

## 2023-01-31 DIAGNOSIS — M25.511 CHRONIC RIGHT SHOULDER PAIN: ICD-10-CM

## 2023-01-31 DIAGNOSIS — N18.30 TYPE 2 DIABETES MELLITUS WITH STAGE 3 CHRONIC KIDNEY DISEASE, WITH LONG-TERM CURRENT USE OF INSULIN, UNSPECIFIED WHETHER STAGE 3A OR 3B CKD: Primary | ICD-10-CM

## 2023-01-31 DIAGNOSIS — Z79.4 TYPE 2 DIABETES MELLITUS WITH STAGE 3 CHRONIC KIDNEY DISEASE, WITH LONG-TERM CURRENT USE OF INSULIN, UNSPECIFIED WHETHER STAGE 3A OR 3B CKD: Primary | ICD-10-CM

## 2023-01-31 DIAGNOSIS — M46.99 UNSPECIFIED INFLAMMATORY SPONDYLOPATHY, MULTIPLE SITES IN SPINE: ICD-10-CM

## 2023-01-31 DIAGNOSIS — E11.622 TYPE 2 DIABETES MELLITUS WITH OTHER SKIN ULCER (CODE): ICD-10-CM

## 2023-01-31 DIAGNOSIS — J84.10 CALCIFIED GRANULOMA OF LUNG: ICD-10-CM

## 2023-01-31 DIAGNOSIS — G56.01 RIGHT CARPAL TUNNEL SYNDROME: ICD-10-CM

## 2023-01-31 DIAGNOSIS — M54.2 CERVICALGIA: ICD-10-CM

## 2023-01-31 DIAGNOSIS — I70.0 AORTIC ATHEROSCLEROSIS: ICD-10-CM

## 2023-01-31 PROCEDURE — 99499 RISK ADDL DX/OHS AUDIT: ICD-10-PCS | Mod: HCNC,S$GLB,, | Performed by: FAMILY MEDICINE

## 2023-01-31 PROCEDURE — 1125F AMNT PAIN NOTED PAIN PRSNT: CPT | Mod: CPTII,S$GLB,, | Performed by: FAMILY MEDICINE

## 2023-01-31 PROCEDURE — 99999 PR PBB SHADOW E&M-EST. PATIENT-LVL V: ICD-10-PCS | Mod: PBBFAC,,, | Performed by: FAMILY MEDICINE

## 2023-01-31 PROCEDURE — 1125F PR PAIN SEVERITY QUANTIFIED, PAIN PRESENT: ICD-10-PCS | Mod: CPTII,S$GLB,, | Performed by: FAMILY MEDICINE

## 2023-01-31 PROCEDURE — 3288F FALL RISK ASSESSMENT DOCD: CPT | Mod: CPTII,S$GLB,, | Performed by: FAMILY MEDICINE

## 2023-01-31 PROCEDURE — 73030 X-RAY EXAM OF SHOULDER: CPT | Mod: TC,FY,RT

## 2023-01-31 PROCEDURE — 99214 OFFICE O/P EST MOD 30 MIN: CPT | Mod: S$GLB,,, | Performed by: FAMILY MEDICINE

## 2023-01-31 PROCEDURE — 1101F PT FALLS ASSESS-DOCD LE1/YR: CPT | Mod: CPTII,S$GLB,, | Performed by: FAMILY MEDICINE

## 2023-01-31 PROCEDURE — 73030 XR SHOULDER TRAUMA 3 VIEW RIGHT: ICD-10-PCS | Mod: 26,RT,, | Performed by: RADIOLOGY

## 2023-01-31 PROCEDURE — 1101F PR PT FALLS ASSESS DOC 0-1 FALLS W/OUT INJ PAST YR: ICD-10-PCS | Mod: CPTII,S$GLB,, | Performed by: FAMILY MEDICINE

## 2023-01-31 PROCEDURE — 1159F MED LIST DOCD IN RCRD: CPT | Mod: CPTII,S$GLB,, | Performed by: FAMILY MEDICINE

## 2023-01-31 PROCEDURE — 3072F PR LOW RISK FOR RETINOPATHY: ICD-10-PCS | Mod: CPTII,S$GLB,, | Performed by: FAMILY MEDICINE

## 2023-01-31 PROCEDURE — 3072F LOW RISK FOR RETINOPATHY: CPT | Mod: CPTII,S$GLB,, | Performed by: FAMILY MEDICINE

## 2023-01-31 PROCEDURE — 3078F PR MOST RECENT DIASTOLIC BLOOD PRESSURE < 80 MM HG: ICD-10-PCS | Mod: CPTII,S$GLB,, | Performed by: FAMILY MEDICINE

## 2023-01-31 PROCEDURE — 73030 X-RAY EXAM OF SHOULDER: CPT | Mod: 26,RT,, | Performed by: RADIOLOGY

## 2023-01-31 PROCEDURE — 3075F PR MOST RECENT SYSTOLIC BLOOD PRESS GE 130-139MM HG: ICD-10-PCS | Mod: CPTII,S$GLB,, | Performed by: FAMILY MEDICINE

## 2023-01-31 PROCEDURE — 3288F PR FALLS RISK ASSESSMENT DOCUMENTED: ICD-10-PCS | Mod: CPTII,S$GLB,, | Performed by: FAMILY MEDICINE

## 2023-01-31 PROCEDURE — 3008F PR BODY MASS INDEX (BMI) DOCUMENTED: ICD-10-PCS | Mod: CPTII,S$GLB,, | Performed by: FAMILY MEDICINE

## 2023-01-31 PROCEDURE — 99214 PR OFFICE/OUTPT VISIT, EST, LEVL IV, 30-39 MIN: ICD-10-PCS | Mod: S$GLB,,, | Performed by: FAMILY MEDICINE

## 2023-01-31 PROCEDURE — 99499 UNLISTED E&M SERVICE: CPT | Mod: HCNC,S$GLB,, | Performed by: FAMILY MEDICINE

## 2023-01-31 PROCEDURE — 1159F PR MEDICATION LIST DOCUMENTED IN MEDICAL RECORD: ICD-10-PCS | Mod: CPTII,S$GLB,, | Performed by: FAMILY MEDICINE

## 2023-01-31 PROCEDURE — 99999 PR PBB SHADOW E&M-EST. PATIENT-LVL V: CPT | Mod: PBBFAC,,, | Performed by: FAMILY MEDICINE

## 2023-01-31 PROCEDURE — 3075F SYST BP GE 130 - 139MM HG: CPT | Mod: CPTII,S$GLB,, | Performed by: FAMILY MEDICINE

## 2023-01-31 PROCEDURE — 3078F DIAST BP <80 MM HG: CPT | Mod: CPTII,S$GLB,, | Performed by: FAMILY MEDICINE

## 2023-01-31 PROCEDURE — 3051F PR MOST RECENT HEMOGLOBIN A1C LEVEL 7.0 - < 8.0%: ICD-10-PCS | Mod: CPTII,S$GLB,, | Performed by: FAMILY MEDICINE

## 2023-01-31 PROCEDURE — 3008F BODY MASS INDEX DOCD: CPT | Mod: CPTII,S$GLB,, | Performed by: FAMILY MEDICINE

## 2023-01-31 PROCEDURE — 3051F HG A1C>EQUAL 7.0%<8.0%: CPT | Mod: CPTII,S$GLB,, | Performed by: FAMILY MEDICINE

## 2023-01-31 RX ORDER — DICLOFENAC SODIUM 10 MG/G
2 GEL TOPICAL 3 TIMES DAILY PRN
Qty: 100 G | Refills: 11 | Status: SHIPPED | OUTPATIENT
Start: 2023-01-31

## 2023-01-31 RX ORDER — TIRZEPATIDE 2.5 MG/.5ML
2.5 INJECTION, SOLUTION SUBCUTANEOUS
Qty: 4 PEN | Refills: 11 | Status: SHIPPED | OUTPATIENT
Start: 2023-01-31 | End: 2023-05-12

## 2023-01-31 NOTE — PATIENT INSTRUCTIONS
Shoulder Clock Exercise    To start, stand tall with your ears, shoulders, and hips in line. Your feet should be slightly apart, positioned just under your hips. Focus your eyes directly in front of you.  this position for a few seconds before starting your exercise. This helps increase your awareness of proper posture.  Imagine that your right shoulder is the center of a clock. With the outer point of your shoulder, roll it around to slowly trace the outer edge of the clock.  Move clockwise first, then counterclockwise.  Repeat 3 to 5 times. Switch shoulders.   Date Last Reviewed: 10/2/2015  © 2353-9641 [employer]. [address] [place] [address]. All rights reserved. This information is not intended as a substitute for professional medical care. Always follow your healthcare professional's instructions.        Shoulder Shrug Exercise    To start, sit in a chair with your feet flat on the floor. Shift your weight slightly forward to avoid rounding your back. Relax. Keep your ears, shoulders, and hips aligned:  Raise both of your shoulders as high as you can, as if you were trying to touch them to your ears. Keep your head and neck still and relaxed.  Hold for a count of 10. Release.  Repeat 5 times.  For your safety, check with your healthcare provider before starting an exercise program.   Date Last Reviewed: 8/16/2015  © 0242-2497 [employer]. [address] [place] [address]. All rights reserved. This information is not intended as a substitute for professional medical care. Always follow your healthcare professional's instructions.        Neck Exercises: Neck Flex  To start, sit in a chair with your feet flat on the floor. Your weight should be slightly forward so that youre balanced evenly on your buttocks. Relax your shoulders and keep your head level. Avoid arching your back or rounding your shoulders. Using a chair with arms may help you keep your  balance:  Rest the back of your left hand against your lower back. Place your right palm on the top of your head.  Gently pull your head forward and down until you feel a stretch in the muscles in the back of your neck. Dont force the motion.  Hold for 20 seconds, then return to starting position. Switch arms.    Date Last Reviewed: 10/2/2015  © 6321-4941 [employer]. [address] [place] [address]. All rights reserved. This information is not intended as a substitute for professional medical care. Always follow your healthcare professional's instructions.        Neck Clock (Flexibility)    Lie on your back on the floor, with your knees bent and your feet flat on the floor. Place a rolled-up towel or neck roll under your neck.  Close your eyes and imagine a clock face. With your nose, slowly trace the outer edge of the clock in a clockwise direction. Move your neck smoothly. Dont force your head or neck.  Repeat 5 times, or as instructed.  Then switch to a counterclockwise direction, and repeat the exercise 5 times, or as instructed.     Challenge yourself  You can also do this exercise while sitting at your work desk. Sit up straight with your back supported firmly against your chair. You can do the exercise several times throughout your day.   Date Last Reviewed: 3/10/2016  © 4622-2074 [employer]. [address] [place] [address]. All rights reserved. This information is not intended as a substitute for professional medical care. Always follow your healthcare professional's instructions.        Head Tilt / Upper Trapezius Stretch (Flexibility)    Sit up straight in a chair with your head and neck in a neutral position, ears in line with shoulders. Hold the edge of your chair seat with your right hand. Tuck your chin in slightly.  Tilt your head to the left, while looking straight ahead.  Put your left hand on the right side of your head. Gently pull your head to  the left. Hold for 30 to 60 seconds. Use gentle pressure to increase the stretch. Dont force your head into position.  Return your head and neck to the neutral position.  Repeat this exercise 2 times, or as instructed.  Switch sides and repeat 2 times, or as instructed.  Challenge yourself  Tuck one end of a towel under your left arm. Then bring the other end over your right shoulder. Pull the towel down on your right shoulder with both hands as you side-bend your head to the left. Repeat with the other side.   Date Last Reviewed: 3/10/2016  © 8937-3912 [employer]. [address] [place] [address], [place] [address]. All rights reserved. This information is not intended as a substitute for professional medical care. Always follow your healthcare professional's instructions.

## 2023-02-01 NOTE — PROGRESS NOTES
CHW - Outreach Attempt    Community Health Worker left a voicemail message for 1st attempt to contact patient regarding: SDOH  Community Health Worker to attempt to contact patient on: 2/8      CHW - Outreach Attempt    Community Health Worker left a In Basket message for 2nd attempt to contact patient regarding: SDOH      CHW - Outreach Attempt    Community Health Worker left a voicemail message for 3rd attempt to contact patient regarding: SDOH      CHW - Unable to Contact    Community Health Worker to close episode at this time due to three missed attempts for patient contact.

## 2023-02-07 DIAGNOSIS — Z00.00 ENCOUNTER FOR MEDICARE ANNUAL WELLNESS EXAM: ICD-10-CM

## 2023-02-09 DIAGNOSIS — Z00.00 ENCOUNTER FOR MEDICARE ANNUAL WELLNESS EXAM: ICD-10-CM

## 2023-02-15 ENCOUNTER — PATIENT MESSAGE (OUTPATIENT)
Dept: ADMINISTRATIVE | Facility: OTHER | Age: 73
End: 2023-02-15
Payer: MEDICARE

## 2023-04-27 ENCOUNTER — CLINICAL SUPPORT (OUTPATIENT)
Dept: SMOKING CESSATION | Facility: CLINIC | Age: 73
End: 2023-04-27
Payer: COMMERCIAL

## 2023-04-27 DIAGNOSIS — F17.200 NICOTINE DEPENDENCE: Primary | ICD-10-CM

## 2023-04-27 PROCEDURE — 99407 PR TOBACCO USE CESSATION INTENSIVE >10 MINUTES: ICD-10-PCS | Mod: S$GLB,,, | Performed by: GENERAL PRACTICE

## 2023-04-27 PROCEDURE — 99407 BEHAV CHNG SMOKING > 10 MIN: CPT | Mod: S$GLB,,, | Performed by: GENERAL PRACTICE

## 2023-04-27 PROCEDURE — 99999 PR PBB SHADOW E&M-EST. PATIENT-LVL I: CPT | Mod: PBBFAC,,,

## 2023-04-27 PROCEDURE — 99999 PR PBB SHADOW E&M-EST. PATIENT-LVL I: ICD-10-PCS | Mod: PBBFAC,,,

## 2023-04-27 NOTE — PROGRESS NOTES
Spoke with patient today in regard to smoking cessation progress 12 month telephone follow up, he states that he is not tobacco free.  Patient stated that he did not feel like the program was beneficial toward his attempt to quit smoking. Commended patient on the accomplishments thus far.  Informed patient of benefit period, future follow up, and contact information if any further help or support is needed.  Will complete smart form for 12 month follow up on Quit attempt #1 and resolved episode.

## 2023-04-28 ENCOUNTER — LAB VISIT (OUTPATIENT)
Dept: LAB | Facility: HOSPITAL | Age: 73
End: 2023-04-28
Attending: FAMILY MEDICINE
Payer: MEDICARE

## 2023-04-28 DIAGNOSIS — N18.30 TYPE 2 DIABETES MELLITUS WITH STAGE 3 CHRONIC KIDNEY DISEASE, WITH LONG-TERM CURRENT USE OF INSULIN, UNSPECIFIED WHETHER STAGE 3A OR 3B CKD: ICD-10-CM

## 2023-04-28 DIAGNOSIS — E11.22 TYPE 2 DIABETES MELLITUS WITH STAGE 3 CHRONIC KIDNEY DISEASE, WITH LONG-TERM CURRENT USE OF INSULIN, UNSPECIFIED WHETHER STAGE 3A OR 3B CKD: ICD-10-CM

## 2023-04-28 DIAGNOSIS — Z79.4 TYPE 2 DIABETES MELLITUS WITH STAGE 3 CHRONIC KIDNEY DISEASE, WITH LONG-TERM CURRENT USE OF INSULIN, UNSPECIFIED WHETHER STAGE 3A OR 3B CKD: ICD-10-CM

## 2023-04-28 LAB
ALBUMIN SERPL BCP-MCNC: 3.5 G/DL (ref 3.5–5.2)
ALP SERPL-CCNC: 68 U/L (ref 55–135)
ALT SERPL W/O P-5'-P-CCNC: 16 U/L (ref 10–44)
ANION GAP SERPL CALC-SCNC: 11 MMOL/L (ref 8–16)
AST SERPL-CCNC: 22 U/L (ref 10–40)
BASOPHILS # BLD AUTO: 0.03 K/UL (ref 0–0.2)
BASOPHILS NFR BLD: 0.3 % (ref 0–1.9)
BILIRUB SERPL-MCNC: 0.6 MG/DL (ref 0.1–1)
BUN SERPL-MCNC: 34 MG/DL (ref 8–23)
CALCIUM SERPL-MCNC: 9.4 MG/DL (ref 8.7–10.5)
CHLORIDE SERPL-SCNC: 104 MMOL/L (ref 95–110)
CHOLEST SERPL-MCNC: 156 MG/DL (ref 120–199)
CHOLEST/HDLC SERPL: 4.2 {RATIO} (ref 2–5)
CO2 SERPL-SCNC: 26 MMOL/L (ref 23–29)
CREAT SERPL-MCNC: 1.6 MG/DL (ref 0.5–1.4)
DIFFERENTIAL METHOD: ABNORMAL
EOSINOPHIL # BLD AUTO: 0.1 K/UL (ref 0–0.5)
EOSINOPHIL NFR BLD: 1.3 % (ref 0–8)
ERYTHROCYTE [DISTWIDTH] IN BLOOD BY AUTOMATED COUNT: 13 % (ref 11.5–14.5)
EST. GFR  (NO RACE VARIABLE): 45 ML/MIN/1.73 M^2
ESTIMATED AVG GLUCOSE: 148 MG/DL (ref 68–131)
GLUCOSE SERPL-MCNC: 96 MG/DL (ref 70–110)
HBA1C MFR BLD: 6.8 % (ref 4–5.6)
HCT VFR BLD AUTO: 47.7 % (ref 40–54)
HDLC SERPL-MCNC: 37 MG/DL (ref 40–75)
HDLC SERPL: 23.7 % (ref 20–50)
HGB BLD-MCNC: 15.3 G/DL (ref 14–18)
IMM GRANULOCYTES # BLD AUTO: 0.03 K/UL (ref 0–0.04)
IMM GRANULOCYTES NFR BLD AUTO: 0.3 % (ref 0–0.5)
LDLC SERPL CALC-MCNC: 83 MG/DL (ref 63–159)
LYMPHOCYTES # BLD AUTO: 3 K/UL (ref 1–4.8)
LYMPHOCYTES NFR BLD: 32.8 % (ref 18–48)
MCH RBC QN AUTO: 31.4 PG (ref 27–31)
MCHC RBC AUTO-ENTMCNC: 32.1 G/DL (ref 32–36)
MCV RBC AUTO: 98 FL (ref 82–98)
MONOCYTES # BLD AUTO: 0.9 K/UL (ref 0.3–1)
MONOCYTES NFR BLD: 10 % (ref 4–15)
NEUTROPHILS # BLD AUTO: 5.1 K/UL (ref 1.8–7.7)
NEUTROPHILS NFR BLD: 55.3 % (ref 38–73)
NONHDLC SERPL-MCNC: 119 MG/DL
NRBC BLD-RTO: 0 /100 WBC
PLATELET # BLD AUTO: 262 K/UL (ref 150–450)
PMV BLD AUTO: 11.4 FL (ref 9.2–12.9)
POTASSIUM SERPL-SCNC: 4.2 MMOL/L (ref 3.5–5.1)
PROT SERPL-MCNC: 7.3 G/DL (ref 6–8.4)
RBC # BLD AUTO: 4.88 M/UL (ref 4.6–6.2)
SODIUM SERPL-SCNC: 141 MMOL/L (ref 136–145)
TRIGL SERPL-MCNC: 180 MG/DL (ref 30–150)
TSH SERPL DL<=0.005 MIU/L-ACNC: 2.25 UIU/ML (ref 0.4–4)
WBC # BLD AUTO: 9.18 K/UL (ref 3.9–12.7)

## 2023-04-28 PROCEDURE — 83036 HEMOGLOBIN GLYCOSYLATED A1C: CPT | Mod: HCNC | Performed by: FAMILY MEDICINE

## 2023-04-28 PROCEDURE — 80061 LIPID PANEL: CPT | Mod: HCNC | Performed by: FAMILY MEDICINE

## 2023-04-28 PROCEDURE — 85025 COMPLETE CBC W/AUTO DIFF WBC: CPT | Mod: HCNC | Performed by: FAMILY MEDICINE

## 2023-04-28 PROCEDURE — 36415 COLL VENOUS BLD VENIPUNCTURE: CPT | Mod: HCNC | Performed by: FAMILY MEDICINE

## 2023-04-28 PROCEDURE — 80053 COMPREHEN METABOLIC PANEL: CPT | Mod: HCNC | Performed by: FAMILY MEDICINE

## 2023-04-28 PROCEDURE — 84443 ASSAY THYROID STIM HORMONE: CPT | Mod: HCNC | Performed by: FAMILY MEDICINE

## 2023-05-02 ENCOUNTER — PES CALL (OUTPATIENT)
Dept: ADMINISTRATIVE | Facility: CLINIC | Age: 73
End: 2023-05-02
Payer: MEDICARE

## 2023-05-08 RX ORDER — METFORMIN HYDROCHLORIDE 500 MG/1
1000 TABLET, EXTENDED RELEASE ORAL 2 TIMES DAILY WITH MEALS
Qty: 360 TABLET | Refills: 11 | Status: SHIPPED | OUTPATIENT
Start: 2023-05-08

## 2023-05-08 NOTE — TELEPHONE ENCOUNTER
No care due was identified.  [facility] Embedded Care Due Messages. Reference number: [id number].   5/08/2023 12:31:01 PM CDT

## 2023-05-12 ENCOUNTER — OFFICE VISIT (OUTPATIENT)
Dept: FAMILY MEDICINE | Facility: CLINIC | Age: 73
End: 2023-05-12
Payer: MEDICARE

## 2023-05-12 VITALS
DIASTOLIC BLOOD PRESSURE: 76 MMHG | OXYGEN SATURATION: 97 % | BODY MASS INDEX: 36.96 KG/M2 | SYSTOLIC BLOOD PRESSURE: 130 MMHG | HEART RATE: 112 BPM | WEIGHT: 249.56 LBS | TEMPERATURE: 98 F | HEIGHT: 69 IN

## 2023-05-12 DIAGNOSIS — N18.30 TYPE 2 DIABETES MELLITUS WITH STAGE 3 CHRONIC KIDNEY DISEASE, WITH LONG-TERM CURRENT USE OF INSULIN, UNSPECIFIED WHETHER STAGE 3A OR 3B CKD: ICD-10-CM

## 2023-05-12 DIAGNOSIS — E11.22 TYPE 2 DIABETES MELLITUS WITH STAGE 3 CHRONIC KIDNEY DISEASE, WITH LONG-TERM CURRENT USE OF INSULIN, UNSPECIFIED WHETHER STAGE 3A OR 3B CKD: ICD-10-CM

## 2023-05-12 DIAGNOSIS — I10 HTN (HYPERTENSION), BENIGN: ICD-10-CM

## 2023-05-12 DIAGNOSIS — I70.0 AORTIC ATHEROSCLEROSIS: Primary | ICD-10-CM

## 2023-05-12 DIAGNOSIS — Z79.4 TYPE 2 DIABETES MELLITUS WITH STAGE 3 CHRONIC KIDNEY DISEASE, WITH LONG-TERM CURRENT USE OF INSULIN, UNSPECIFIED WHETHER STAGE 3A OR 3B CKD: ICD-10-CM

## 2023-05-12 DIAGNOSIS — E66.01 SEVERE OBESITY (BMI 35.0-35.9 WITH COMORBIDITY): ICD-10-CM

## 2023-05-12 DIAGNOSIS — J84.10 CALCIFIED GRANULOMA OF LUNG: ICD-10-CM

## 2023-05-12 DIAGNOSIS — E78.5 HYPERLIPIDEMIA, UNSPECIFIED HYPERLIPIDEMIA TYPE: ICD-10-CM

## 2023-05-12 PROCEDURE — 4010F ACE/ARB THERAPY RXD/TAKEN: CPT | Mod: HCNC,CPTII,S$GLB, | Performed by: FAMILY MEDICINE

## 2023-05-12 PROCEDURE — 3072F PR LOW RISK FOR RETINOPATHY: ICD-10-PCS | Mod: HCNC,CPTII,S$GLB, | Performed by: FAMILY MEDICINE

## 2023-05-12 PROCEDURE — 3044F PR MOST RECENT HEMOGLOBIN A1C LEVEL <7.0%: ICD-10-PCS | Mod: HCNC,CPTII,S$GLB, | Performed by: FAMILY MEDICINE

## 2023-05-12 PROCEDURE — 99214 PR OFFICE/OUTPT VISIT, EST, LEVL IV, 30-39 MIN: ICD-10-PCS | Mod: HCNC,S$GLB,, | Performed by: FAMILY MEDICINE

## 2023-05-12 PROCEDURE — 4010F PR ACE/ARB THEARPY RXD/TAKEN: ICD-10-PCS | Mod: HCNC,CPTII,S$GLB, | Performed by: FAMILY MEDICINE

## 2023-05-12 PROCEDURE — 3075F SYST BP GE 130 - 139MM HG: CPT | Mod: HCNC,CPTII,S$GLB, | Performed by: FAMILY MEDICINE

## 2023-05-12 PROCEDURE — 3008F PR BODY MASS INDEX (BMI) DOCUMENTED: ICD-10-PCS | Mod: HCNC,CPTII,S$GLB, | Performed by: FAMILY MEDICINE

## 2023-05-12 PROCEDURE — 99214 OFFICE O/P EST MOD 30 MIN: CPT | Mod: HCNC,S$GLB,, | Performed by: FAMILY MEDICINE

## 2023-05-12 PROCEDURE — 3075F PR MOST RECENT SYSTOLIC BLOOD PRESS GE 130-139MM HG: ICD-10-PCS | Mod: HCNC,CPTII,S$GLB, | Performed by: FAMILY MEDICINE

## 2023-05-12 PROCEDURE — 3288F PR FALLS RISK ASSESSMENT DOCUMENTED: ICD-10-PCS | Mod: HCNC,CPTII,S$GLB, | Performed by: FAMILY MEDICINE

## 2023-05-12 PROCEDURE — 99999 PR PBB SHADOW E&M-EST. PATIENT-LVL V: ICD-10-PCS | Mod: PBBFAC,HCNC,, | Performed by: FAMILY MEDICINE

## 2023-05-12 PROCEDURE — 3078F DIAST BP <80 MM HG: CPT | Mod: HCNC,CPTII,S$GLB, | Performed by: FAMILY MEDICINE

## 2023-05-12 PROCEDURE — 3288F FALL RISK ASSESSMENT DOCD: CPT | Mod: HCNC,CPTII,S$GLB, | Performed by: FAMILY MEDICINE

## 2023-05-12 PROCEDURE — 1125F AMNT PAIN NOTED PAIN PRSNT: CPT | Mod: HCNC,CPTII,S$GLB, | Performed by: FAMILY MEDICINE

## 2023-05-12 PROCEDURE — 3078F PR MOST RECENT DIASTOLIC BLOOD PRESSURE < 80 MM HG: ICD-10-PCS | Mod: HCNC,CPTII,S$GLB, | Performed by: FAMILY MEDICINE

## 2023-05-12 PROCEDURE — 1125F PR PAIN SEVERITY QUANTIFIED, PAIN PRESENT: ICD-10-PCS | Mod: HCNC,CPTII,S$GLB, | Performed by: FAMILY MEDICINE

## 2023-05-12 PROCEDURE — 3044F HG A1C LEVEL LT 7.0%: CPT | Mod: HCNC,CPTII,S$GLB, | Performed by: FAMILY MEDICINE

## 2023-05-12 PROCEDURE — 1101F PT FALLS ASSESS-DOCD LE1/YR: CPT | Mod: HCNC,CPTII,S$GLB, | Performed by: FAMILY MEDICINE

## 2023-05-12 PROCEDURE — 1101F PR PT FALLS ASSESS DOC 0-1 FALLS W/OUT INJ PAST YR: ICD-10-PCS | Mod: HCNC,CPTII,S$GLB, | Performed by: FAMILY MEDICINE

## 2023-05-12 PROCEDURE — 1159F MED LIST DOCD IN RCRD: CPT | Mod: HCNC,CPTII,S$GLB, | Performed by: FAMILY MEDICINE

## 2023-05-12 PROCEDURE — 1159F PR MEDICATION LIST DOCUMENTED IN MEDICAL RECORD: ICD-10-PCS | Mod: HCNC,CPTII,S$GLB, | Performed by: FAMILY MEDICINE

## 2023-05-12 PROCEDURE — 99999 PR PBB SHADOW E&M-EST. PATIENT-LVL V: CPT | Mod: PBBFAC,HCNC,, | Performed by: FAMILY MEDICINE

## 2023-05-12 PROCEDURE — 3008F BODY MASS INDEX DOCD: CPT | Mod: HCNC,CPTII,S$GLB, | Performed by: FAMILY MEDICINE

## 2023-05-12 PROCEDURE — 3072F LOW RISK FOR RETINOPATHY: CPT | Mod: HCNC,CPTII,S$GLB, | Performed by: FAMILY MEDICINE

## 2023-05-12 RX ORDER — TIRZEPATIDE 5 MG/.5ML
5 INJECTION, SOLUTION SUBCUTANEOUS
Qty: 4 PEN | Refills: 11 | Status: SHIPPED | OUTPATIENT
Start: 2023-05-12 | End: 2023-08-16

## 2023-05-12 RX ORDER — GLIMEPIRIDE 4 MG/1
4 TABLET ORAL
Qty: 30 TABLET | Refills: 11 | Status: SHIPPED | OUTPATIENT
Start: 2023-05-12 | End: 2023-08-16
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
## 2023-05-12 NOTE — PATIENT INSTRUCTIONS
Increase mounjaro to 5 mg weekly. When you start this, reduce glimepiride to one pill daily instead of twice daily. You can continue metformin and lantus as is.

## 2023-05-22 DIAGNOSIS — E11.65 UNCONTROLLED TYPE 2 DIABETES MELLITUS WITH HYPERGLYCEMIA: ICD-10-CM

## 2023-05-22 NOTE — TELEPHONE ENCOUNTER
No care due was identified.  [facility] Embedded Care Due Messages. Reference number: [id number].   5/22/2023 1:25:06 PM CDT

## 2023-05-23 RX ORDER — INSULIN GLARGINE 100 [IU]/ML
INJECTION, SOLUTION SUBCUTANEOUS
Qty: 15 ML | Refills: 11 | Status: SHIPPED | OUTPATIENT
Start: 2023-05-23 | End: 2024-03-08

## 2023-05-23 RX ORDER — PEN NEEDLE, DIABETIC 30 GX3/16"
NEEDLE, DISPOSABLE MISCELLANEOUS
Qty: 100 EACH | Refills: 3 | Status: SHIPPED | OUTPATIENT
Start: 2023-05-23

## 2023-05-23 RX ORDER — PRAVASTATIN SODIUM 40 MG/1
40 TABLET ORAL NIGHTLY
Qty: 30 TABLET | Refills: 11 | Status: SHIPPED | OUTPATIENT
Start: 2023-05-23

## 2023-06-11 NOTE — TELEPHONE ENCOUNTER
No care due was identified.  [facility] Embedded Care Due Messages. Reference number: [id number].   6/11/2023 10:12:59 AM CDT

## 2023-06-12 RX ORDER — LISINOPRIL AND HYDROCHLOROTHIAZIDE 20; 25 MG/1; MG/1
1 TABLET ORAL DAILY
Qty: 90 TABLET | Refills: 3 | Status: SHIPPED | OUTPATIENT
Start: 2023-06-12

## 2023-06-19 ENCOUNTER — TELEPHONE (OUTPATIENT)
Dept: FAMILY MEDICINE | Facility: CLINIC | Age: 73
End: 2023-06-19
Payer: MEDICARE

## 2023-07-18 PROBLEM — Z79.4 LONG-TERM INSULIN USE: Status: ACTIVE | Noted: 2023-07-18

## 2023-07-18 NOTE — PROGRESS NOTES
Francisco Coppola presented for a  Medicare AWV and comprehensive Health Risk Assessment today. The following components were reviewed and updated:    Medical history  Family History  Social history  Allergies and Current Medications  Health Risk Assessment  Health Maintenance  Care Team         ** See Completed Assessments for Annual Wellness Visit within the encounter summary.**         The following assessments were completed:  Living Situation  CAGE  Depression Screening  Timed Get Up and Go  Whisper Test  Cognitive Function Screening        Nutrition Screening  ADL Screening  PAQ Screening      Review for Opioid Screening: Pt does not have Rx for Opioids      Review for Substance Use Disorders: Patient does not use substance        Current Outpatient Medications:     aspirin (ECOTRIN) 81 MG EC tablet, Take 81 mg by mouth once daily., Disp: , Rfl:     blood sugar diagnostic Strp, To check BG 3 times daily, to use with insurance preferred meter, Disp: 100 strip, Rfl: 11    blood-glucose meter kit, To check BG 2 times daily, to use with insurance preferred meter, Disp: 100 each, Rfl: 11    clotrimazole-betamethasone 1-0.05% (LOTRISONE) cream, Apply topically 2 (two) times daily., Disp: 30 g, Rfl: 0    diclofenac sodium (VOLTAREN) 1 % Gel, Apply 2 g topically 3 (three) times daily as needed., Disp: 100 g, Rfl: 11    furosemide (LASIX) 20 MG tablet, TAKE 1 TABLET(20 MG) BY MOUTH EVERY DAY, Disp: 30 tablet, Rfl: 10    gabapentin (NEURONTIN) 300 MG capsule, TAKE 1 CAPSULE(300 MG) BY MOUTH THREE TIMES DAILY, Disp: 270 capsule, Rfl: 11    glimepiride (AMARYL) 4 MG tablet, Take 1 tablet (4 mg total) by mouth daily with breakfast., Disp: 30 tablet, Rfl: 11    insulin (LANTUS SOLOSTAR U-100 INSULIN) glargine 100 units/mL SubQ pen, ADMINISTER 10 UNITS UNDER THE SKIN EVERY DAY, Disp: 15 mL, Rfl: 11    lancets Misc, To check BG 2 times daily, to use with insurance preferred meter, Disp: 100 each, Rfl: 11     "lisinopriL-hydrochlorothiazide (PRINZIDE,ZESTORETIC) 20-25 mg Tab, Take 1 tablet by mouth once daily., Disp: 90 tablet, Rfl: 3    metFORMIN (GLUCOPHAGE-XR) 500 MG ER 24hr tablet, Take 2 tablets (1,000 mg total) by mouth 2 (two) times daily with meals., Disp: 360 tablet, Rfl: 11    multivitamin capsule, Take 1 capsule by mouth once daily., Disp: , Rfl:     mupirocin (BACTROBAN) 2 % ointment, Apply topically 3 (three) times daily., Disp: 30 g, Rfl: 3    pen needle, diabetic (BD ULTRA-FINE SHORT PEN NEEDLE) 31 gauge x 5/16" Ndle, USE WITH INSULIN PEN AS DIRECTED, Disp: 100 each, Rfl: 3    pravastatin (PRAVACHOL) 40 MG tablet, Take 1 tablet (40 mg total) by mouth every evening., Disp: 30 tablet, Rfl: 11    sildenafiL (VIAGRA) 100 MG tablet, Take 1 tablet (100 mg total) by mouth daily as needed for Erectile Dysfunction., Disp: 30 tablet, Rfl: 11    tirzepatide (MOUNJARO) 5 mg/0.5 mL PnIj, Inject 5 mg into the skin every 7 days., Disp: 4 pen, Rfl: 11    ULTRA THIN LANCETS 30 gauge Misc, CHECK BLOOD GLUCOSE BID, Disp: , Rfl:        Vitals:    07/19/23 1338   BP: 128/74   Pulse: 104   SpO2: 95%   Weight: 111.1 kg (245 lb)   Height: 5' 9" (1.753 m)   PainSc:   8   PainLoc: Knee      Physical Exam  Vitals and nursing note reviewed.   Constitutional:       General: He is not in acute distress.     Appearance: Normal appearance. He is obese. He is not ill-appearing.   HENT:      Head: Normocephalic and atraumatic.   Eyes:      General: No scleral icterus.        Right eye: No discharge.         Left eye: No discharge.      Extraocular Movements: Extraocular movements intact.      Conjunctiva/sclera: Conjunctivae normal.   Cardiovascular:      Rate and Rhythm: Normal rate.   Pulmonary:      Effort: Pulmonary effort is normal.   Musculoskeletal:      Cervical back: Normal range of motion.      Right lower leg: Edema present.      Left lower leg: Edema present.   Skin:     General: Skin is warm and dry.      Findings: No rash.     "  Comments: David appearance bilat lower ext   Neurological:      Mental Status: He is alert and oriented to person, place, and time.   Psychiatric:         Mood and Affect: Mood normal.         Behavior: Behavior normal. Behavior is cooperative.         Cognition and Memory: Cognition and memory normal.             Diagnoses and health risks identified today and associated recommendations/orders:    1. Encounter for preventive health examination  - Chart reviewed. Problem list updated. Discussed current medical diagnosis, current medications, medical/surgical/family/social history; updated provider list; documented vital signs; identified any cognitive impairment; and updated risk factor list. Addressed any outstanding health maintenance. Provided patient with personalized health advice. Continue to follow up with PCP and any specialists.       2. Type 2 diabetes mellitus with stage 3a chronic kidney disease and hypertension  Chronic; stable on current treatment plan; follow up with PCP  -recommend avoiding NSAIDS and nephrotoxins, renal dose meds    3. Calcified granuloma of lung  Chronic; stable on current treatment plan; follow up with PCP    4. Long-term insulin use  Chronic; stable on current treatment plan; follow up with PCP      5. Aortic atherosclerosis  Chronic; stable on current treatment plan; follow up with PCP  - taking statin     6. 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 Diabetic polyneuropathy associated with type 2 diabetes mellitus  Chronic; stable on current treatment plan; follow up with PCP  - taking gabapentin    8. Severe obesity (BMI 35.0-39.9) with comorbidity  - Recommendation for healthy diet and increasing exercise as tolerated with goal of 150min/week . Recommend weight loss    9. Other hyperlipidemia  Chronic; stable on current treatment plan; follow up with PCP  -  taking statin     10. Carpal tunnel syndrome of right wrist   Chronic; feels it is getting worse; follow up with PCP  - interested in getting referral for evaluation  - Ambulatory referral/consult to Orthopedics; Future    11. Screening for malignant neoplasm of colon  - due for screening; refused colonoscopy  - Cologuard Screening (Multitarget Stool DNA); Future  - Cologuard Screening (Multitarget Stool DNA)    12. Encounter for Medicare annual wellness exam  - Ambulatory Referral/Consult to Enhanced Annual Wellness Visit (eAWV)      Provided Francisco with a 5-10 year written screening schedule and personal prevention plan. Recommendations were developed using the USPSTF age appropriate recommendations. Education, counseling, and referrals were provided as needed. After Visit Summary printed and given to patient which includes a list of additional screenings\tests needed.    Follow up in about 1 year (around 7/19/2024) for your next annual wellness visit.    Ana Casillas, FNP-C    Advance Care Planning     I offered to discuss advanced care planning, including how to pick a person who would make decisions for you if you were unable to make them for yourself, called a health care power of , and what kind of decisions you might make such as use of life sustaining treatments such as ventilators and tube feeding when faced with a life limiting illness recorded on a living will that they will need to know. (How you want to be cared for as you near the end of your natural life)     X Patient is interested in learning more about how to make advanced directives.  I provided them paperwork and offered to discuss this with them.

## 2023-07-19 ENCOUNTER — OFFICE VISIT (OUTPATIENT)
Dept: FAMILY MEDICINE | Facility: CLINIC | Age: 73
End: 2023-07-19
Payer: MEDICARE

## 2023-07-19 VITALS
BODY MASS INDEX: 36.29 KG/M2 | HEART RATE: 104 BPM | HEIGHT: 69 IN | SYSTOLIC BLOOD PRESSURE: 128 MMHG | OXYGEN SATURATION: 95 % | WEIGHT: 245 LBS | DIASTOLIC BLOOD PRESSURE: 74 MMHG

## 2023-07-19 DIAGNOSIS — G56.01 CARPAL TUNNEL SYNDROME OF RIGHT WRIST: ICD-10-CM

## 2023-07-19 DIAGNOSIS — E66.01 SEVERE OBESITY (BMI 35.0-39.9) WITH COMORBIDITY: ICD-10-CM

## 2023-07-19 DIAGNOSIS — I70.0 AORTIC ATHEROSCLEROSIS: ICD-10-CM

## 2023-07-19 DIAGNOSIS — E11.42 DIABETIC POLYNEUROPATHY ASSOCIATED WITH TYPE 2 DIABETES MELLITUS: ICD-10-CM

## 2023-07-19 DIAGNOSIS — J84.10 CALCIFIED GRANULOMA OF LUNG: ICD-10-CM

## 2023-07-19 DIAGNOSIS — N18.31 TYPE 2 DIABETES MELLITUS WITH STAGE 3A CHRONIC KIDNEY DISEASE AND HYPERTENSION: ICD-10-CM

## 2023-07-19 DIAGNOSIS — E11.22 TYPE 2 DIABETES MELLITUS WITH STAGE 3A CHRONIC KIDNEY DISEASE AND HYPERTENSION: ICD-10-CM

## 2023-07-19 DIAGNOSIS — M46.99 UNSPECIFIED INFLAMMATORY SPONDYLOPATHY, MULTIPLE SITES IN SPINE: ICD-10-CM

## 2023-07-19 DIAGNOSIS — Z00.00 ENCOUNTER FOR MEDICARE ANNUAL WELLNESS EXAM: ICD-10-CM

## 2023-07-19 DIAGNOSIS — Z00.00 ENCOUNTER FOR PREVENTIVE HEALTH EXAMINATION: Primary | ICD-10-CM

## 2023-07-19 DIAGNOSIS — E78.49 OTHER HYPERLIPIDEMIA: ICD-10-CM

## 2023-07-19 DIAGNOSIS — Z79.4 LONG-TERM INSULIN USE: ICD-10-CM

## 2023-07-19 DIAGNOSIS — Z12.11 SCREENING FOR MALIGNANT NEOPLASM OF COLON: ICD-10-CM

## 2023-07-19 DIAGNOSIS — I12.9 TYPE 2 DIABETES MELLITUS WITH STAGE 3A CHRONIC KIDNEY DISEASE AND HYPERTENSION: ICD-10-CM

## 2023-07-19 PROCEDURE — 1125F AMNT PAIN NOTED PAIN PRSNT: CPT | Mod: HCNC,CPTII,S$GLB, | Performed by: NURSE PRACTITIONER

## 2023-07-19 PROCEDURE — 4010F ACE/ARB THERAPY RXD/TAKEN: CPT | Mod: HCNC,CPTII,S$GLB, | Performed by: NURSE PRACTITIONER

## 2023-07-19 PROCEDURE — 3074F PR MOST RECENT SYSTOLIC BLOOD PRESSURE < 130 MM HG: ICD-10-PCS | Mod: HCNC,CPTII,S$GLB, | Performed by: NURSE PRACTITIONER

## 2023-07-19 PROCEDURE — 3044F HG A1C LEVEL LT 7.0%: CPT | Mod: HCNC,CPTII,S$GLB, | Performed by: NURSE PRACTITIONER

## 2023-07-19 PROCEDURE — 1101F PT FALLS ASSESS-DOCD LE1/YR: CPT | Mod: HCNC,CPTII,S$GLB, | Performed by: NURSE PRACTITIONER

## 2023-07-19 PROCEDURE — 3078F PR MOST RECENT DIASTOLIC BLOOD PRESSURE < 80 MM HG: ICD-10-PCS | Mod: HCNC,CPTII,S$GLB, | Performed by: NURSE PRACTITIONER

## 2023-07-19 PROCEDURE — 3288F FALL RISK ASSESSMENT DOCD: CPT | Mod: HCNC,CPTII,S$GLB, | Performed by: NURSE PRACTITIONER

## 2023-07-19 PROCEDURE — 1159F MED LIST DOCD IN RCRD: CPT | Mod: HCNC,CPTII,S$GLB, | Performed by: NURSE PRACTITIONER

## 2023-07-19 PROCEDURE — 1159F PR MEDICATION LIST DOCUMENTED IN MEDICAL RECORD: ICD-10-PCS | Mod: HCNC,CPTII,S$GLB, | Performed by: NURSE PRACTITIONER

## 2023-07-19 PROCEDURE — 1160F PR REVIEW ALL MEDS BY PRESCRIBER/CLIN PHARMACIST DOCUMENTED: ICD-10-PCS | Mod: HCNC,CPTII,S$GLB, | Performed by: NURSE PRACTITIONER

## 2023-07-19 PROCEDURE — 4010F PR ACE/ARB THEARPY RXD/TAKEN: ICD-10-PCS | Mod: HCNC,CPTII,S$GLB, | Performed by: NURSE PRACTITIONER

## 2023-07-19 PROCEDURE — G0439 PPPS, SUBSEQ VISIT: HCPCS | Mod: HCNC,S$GLB,, | Performed by: NURSE PRACTITIONER

## 2023-07-19 PROCEDURE — 1170F FXNL STATUS ASSESSED: CPT | Mod: HCNC,CPTII,S$GLB, | Performed by: NURSE PRACTITIONER

## 2023-07-19 PROCEDURE — 3078F DIAST BP <80 MM HG: CPT | Mod: HCNC,CPTII,S$GLB, | Performed by: NURSE PRACTITIONER

## 2023-07-19 PROCEDURE — 1160F RVW MEDS BY RX/DR IN RCRD: CPT | Mod: HCNC,CPTII,S$GLB, | Performed by: NURSE PRACTITIONER

## 2023-07-19 PROCEDURE — 3008F BODY MASS INDEX DOCD: CPT | Mod: HCNC,CPTII,S$GLB, | Performed by: NURSE PRACTITIONER

## 2023-07-19 PROCEDURE — 3288F PR FALLS RISK ASSESSMENT DOCUMENTED: ICD-10-PCS | Mod: HCNC,CPTII,S$GLB, | Performed by: NURSE PRACTITIONER

## 2023-07-19 PROCEDURE — 3072F LOW RISK FOR RETINOPATHY: CPT | Mod: HCNC,CPTII,S$GLB, | Performed by: NURSE PRACTITIONER

## 2023-07-19 PROCEDURE — 99999 PR PBB SHADOW E&M-EST. PATIENT-LVL V: ICD-10-PCS | Mod: PBBFAC,HCNC,, | Performed by: NURSE PRACTITIONER

## 2023-07-19 PROCEDURE — 3008F PR BODY MASS INDEX (BMI) DOCUMENTED: ICD-10-PCS | Mod: HCNC,CPTII,S$GLB, | Performed by: NURSE PRACTITIONER

## 2023-07-19 PROCEDURE — 99999 PR PBB SHADOW E&M-EST. PATIENT-LVL V: CPT | Mod: PBBFAC,HCNC,, | Performed by: NURSE PRACTITIONER

## 2023-07-19 PROCEDURE — 3044F PR MOST RECENT HEMOGLOBIN A1C LEVEL <7.0%: ICD-10-PCS | Mod: HCNC,CPTII,S$GLB, | Performed by: NURSE PRACTITIONER

## 2023-07-19 PROCEDURE — 1125F PR PAIN SEVERITY QUANTIFIED, PAIN PRESENT: ICD-10-PCS | Mod: HCNC,CPTII,S$GLB, | Performed by: NURSE PRACTITIONER

## 2023-07-19 PROCEDURE — 3072F PR LOW RISK FOR RETINOPATHY: ICD-10-PCS | Mod: HCNC,CPTII,S$GLB, | Performed by: NURSE PRACTITIONER

## 2023-07-19 PROCEDURE — G0439 PR MEDICARE ANNUAL WELLNESS SUBSEQUENT VISIT: ICD-10-PCS | Mod: HCNC,S$GLB,, | Performed by: NURSE PRACTITIONER

## 2023-07-19 PROCEDURE — 1101F PR PT FALLS ASSESS DOC 0-1 FALLS W/OUT INJ PAST YR: ICD-10-PCS | Mod: HCNC,CPTII,S$GLB, | Performed by: NURSE PRACTITIONER

## 2023-07-19 PROCEDURE — 1170F PR FUNCTIONAL STATUS ASSESSED: ICD-10-PCS | Mod: HCNC,CPTII,S$GLB, | Performed by: NURSE PRACTITIONER

## 2023-07-19 PROCEDURE — 3074F SYST BP LT 130 MM HG: CPT | Mod: HCNC,CPTII,S$GLB, | Performed by: NURSE PRACTITIONER

## 2023-07-19 NOTE — PATIENT INSTRUCTIONS
Counseling and Referral of Other Preventative  (Italic type indicates deductible and co-insurance are waived)    Patient Name: Francisco Coppola  Today's Date: 7/19/2023    Health Maintenance       Date Due Completion Date    Colorectal Cancer Screening Never done ---    Diabetes Urine Screening 04/12/2023 4/12/2022    Eye Exam 07/25/2023 (Originally 5/30/2023) 5/30/2022    Shingles Vaccine (1 of 2) 07/18/2024 (Originally 2/[birth year]/1999) ---    COVID-19 Vaccine (4 - Pfizer series) 07/18/2024 (Originally 10/20/2022) 8/25/2022    LDCT Lung Screen 08/25/2023 8/25/2022    Influenza Vaccine (1) 09/01/2023 10/25/2022    Hemoglobin A1c 10/28/2023 4/28/2023    TETANUS VACCINE 01/01/2024 1/1/2014    Lipid Panel 04/28/2024 4/28/2023    Foot Exam 05/12/2024 5/12/2023    High Dose Statin 07/19/2024 7/19/2023        Orders Placed This Encounter   Procedures    Ambulatory referral/consult to Orthopedics       The following information is provided to all patients.  This information is to help you find resources for any of the problems found today that may be affecting your health:                Living healthy guide: www.[facility].[place].gov      Understanding Diabetes: www.diabetes.org      Eating healthy: www.cdc.gov/healthyweight      CDC home safety checklist: www.cdc.gov/steadi/patient.html      Agency on Aging: www.goea.[place].[facility]      Alcoholics anonymous (AA): www.aa.org      Physical Activity: www.kash.nih.gov/tc6dcnq      Tobacco use: www.quitwithusla.org      Patient Needs Assistance to Leave Residence...

## 2023-07-28 DIAGNOSIS — G56.01 CARPAL TUNNEL SYNDROME ON RIGHT: Primary | ICD-10-CM

## 2023-07-31 ENCOUNTER — HOSPITAL ENCOUNTER (OUTPATIENT)
Dept: RADIOLOGY | Facility: HOSPITAL | Age: 73
Discharge: HOME | End: 2023-07-31
Attending: ORTHOPAEDIC SURGERY
Payer: MEDICARE

## 2023-07-31 ENCOUNTER — OFFICE VISIT (OUTPATIENT)
Dept: ORTHOPEDICS | Facility: CLINIC | Age: 73
End: 2023-07-31
Payer: MEDICARE

## 2023-07-31 DIAGNOSIS — M19.041 PRIMARY OSTEOARTHRITIS OF BOTH HANDS: ICD-10-CM

## 2023-07-31 DIAGNOSIS — M65.331 TRIGGER MIDDLE FINGER OF RIGHT HAND: ICD-10-CM

## 2023-07-31 DIAGNOSIS — M18.11 PRIMARY OSTEOARTHRITIS OF FIRST CARPOMETACARPAL JOINT OF RIGHT HAND: ICD-10-CM

## 2023-07-31 DIAGNOSIS — G56.01 CARPAL TUNNEL SYNDROME ON RIGHT: ICD-10-CM

## 2023-07-31 DIAGNOSIS — M19.042 PRIMARY OSTEOARTHRITIS OF BOTH HANDS: ICD-10-CM

## 2023-07-31 DIAGNOSIS — G56.01 CARPAL TUNNEL SYNDROME OF RIGHT WRIST: Primary | ICD-10-CM

## 2023-07-31 PROCEDURE — 4010F PR ACE/ARB THEARPY RXD/TAKEN: ICD-10-PCS | Mod: HCNC,CPTII,S$GLB, | Performed by: ORTHOPAEDIC SURGERY

## 2023-07-31 PROCEDURE — 3044F HG A1C LEVEL LT 7.0%: CPT | Mod: HCNC,CPTII,S$GLB, | Performed by: ORTHOPAEDIC SURGERY

## 2023-07-31 PROCEDURE — 20550 NJX 1 TENDON SHEATH/LIGAMENT: CPT | Mod: HCNC,RT,S$GLB, | Performed by: ORTHOPAEDIC SURGERY

## 2023-07-31 PROCEDURE — 4010F ACE/ARB THERAPY RXD/TAKEN: CPT | Mod: HCNC,CPTII,S$GLB, | Performed by: ORTHOPAEDIC SURGERY

## 2023-07-31 PROCEDURE — 1159F MED LIST DOCD IN RCRD: CPT | Mod: HCNC,CPTII,S$GLB, | Performed by: ORTHOPAEDIC SURGERY

## 2023-07-31 PROCEDURE — 3044F PR MOST RECENT HEMOGLOBIN A1C LEVEL <7.0%: ICD-10-PCS | Mod: HCNC,CPTII,S$GLB, | Performed by: ORTHOPAEDIC SURGERY

## 2023-07-31 PROCEDURE — 99999 PR PBB SHADOW E&M-EST. PATIENT-LVL III: ICD-10-PCS | Mod: PBBFAC,HCNC,, | Performed by: ORTHOPAEDIC SURGERY

## 2023-07-31 PROCEDURE — 99204 OFFICE O/P NEW MOD 45 MIN: CPT | Mod: HCNC,25,S$GLB, | Performed by: ORTHOPAEDIC SURGERY

## 2023-07-31 PROCEDURE — 1101F PR PT FALLS ASSESS DOC 0-1 FALLS W/OUT INJ PAST YR: ICD-10-PCS | Mod: HCNC,CPTII,S$GLB, | Performed by: ORTHOPAEDIC SURGERY

## 2023-07-31 PROCEDURE — 3066F PR DOCUMENTATION OF TREATMENT FOR NEPHROPATHY: ICD-10-PCS | Mod: HCNC,CPTII,S$GLB, | Performed by: ORTHOPAEDIC SURGERY

## 2023-07-31 PROCEDURE — 1126F PR PAIN SEVERITY QUANTIFIED, NO PAIN PRESENT: ICD-10-PCS | Mod: HCNC,CPTII,S$GLB, | Performed by: ORTHOPAEDIC SURGERY

## 2023-07-31 PROCEDURE — 99204 PR OFFICE/OUTPT VISIT, NEW, LEVL IV, 45-59 MIN: ICD-10-PCS | Mod: HCNC,25,S$GLB, | Performed by: ORTHOPAEDIC SURGERY

## 2023-07-31 PROCEDURE — 20550 TENDON SHEATH: ICD-10-PCS | Mod: HCNC,RT,S$GLB, | Performed by: ORTHOPAEDIC SURGERY

## 2023-07-31 PROCEDURE — 3066F NEPHROPATHY DOC TX: CPT | Mod: HCNC,CPTII,S$GLB, | Performed by: ORTHOPAEDIC SURGERY

## 2023-07-31 PROCEDURE — 73130 X-RAY EXAM OF HAND: CPT | Mod: 26,HCNC,RT, | Performed by: RADIOLOGY

## 2023-07-31 PROCEDURE — 99999 PR PBB SHADOW E&M-EST. PATIENT-LVL III: CPT | Mod: PBBFAC,HCNC,, | Performed by: ORTHOPAEDIC SURGERY

## 2023-07-31 PROCEDURE — 3288F FALL RISK ASSESSMENT DOCD: CPT | Mod: HCNC,CPTII,S$GLB, | Performed by: ORTHOPAEDIC SURGERY

## 2023-07-31 PROCEDURE — 73130 XR HAND COMPLETE 3 VIEW RIGHT: ICD-10-PCS | Mod: 26,HCNC,RT, | Performed by: RADIOLOGY

## 2023-07-31 PROCEDURE — 3062F POS MACROALBUMINURIA REV: CPT | Mod: HCNC,CPTII,S$GLB, | Performed by: ORTHOPAEDIC SURGERY

## 2023-07-31 PROCEDURE — 1126F AMNT PAIN NOTED NONE PRSNT: CPT | Mod: HCNC,CPTII,S$GLB, | Performed by: ORTHOPAEDIC SURGERY

## 2023-07-31 PROCEDURE — 1159F PR MEDICATION LIST DOCUMENTED IN MEDICAL RECORD: ICD-10-PCS | Mod: HCNC,CPTII,S$GLB, | Performed by: ORTHOPAEDIC SURGERY

## 2023-07-31 PROCEDURE — 73130 X-RAY EXAM OF HAND: CPT | Mod: TC,HCNC,PO,RT

## 2023-07-31 PROCEDURE — 1160F RVW MEDS BY RX/DR IN RCRD: CPT | Mod: HCNC,CPTII,S$GLB, | Performed by: ORTHOPAEDIC SURGERY

## 2023-07-31 PROCEDURE — 1160F PR REVIEW ALL MEDS BY PRESCRIBER/CLIN PHARMACIST DOCUMENTED: ICD-10-PCS | Mod: HCNC,CPTII,S$GLB, | Performed by: ORTHOPAEDIC SURGERY

## 2023-07-31 PROCEDURE — 3288F PR FALLS RISK ASSESSMENT DOCUMENTED: ICD-10-PCS | Mod: HCNC,CPTII,S$GLB, | Performed by: ORTHOPAEDIC SURGERY

## 2023-07-31 PROCEDURE — 1101F PT FALLS ASSESS-DOCD LE1/YR: CPT | Mod: HCNC,CPTII,S$GLB, | Performed by: ORTHOPAEDIC SURGERY

## 2023-07-31 PROCEDURE — 3062F PR POS MACROALBUMINURIA RESULT DOCUMENTED/REVIEW: ICD-10-PCS | Mod: HCNC,CPTII,S$GLB, | Performed by: ORTHOPAEDIC SURGERY

## 2023-07-31 RX ADMIN — METHYLPREDNISOLONE ACETATE 40 MG: 40 INJECTION, SUSPENSION INTRA-ARTICULAR; INTRALESIONAL; INTRAMUSCULAR; SOFT TISSUE at 02:07

## 2023-07-31 NOTE — PROGRESS NOTES
[facility]  History & Physical  Orthopedics    SUBJECTIVE:      COVID-19 attestation:  This patient was treated during the COVID-19 pandemic.  This was discussed with the patient, they are aware of our current policies and procedures, were given the option of delaying their visit and or switching to a virtual visit, delaying their surgery when applicable, and they elect to proceed.    Chief Complaint:   Chief Complaint   Patient presents with    Right Hand - Pain       Referring Provider: Ana Casillas FNP-C     History of Present Illness:  Patient is a 73 y.o. right hand dominant male who presents today with complaints of Right hand numbness in the median nerve distribution for approx 6-8 months. He has a previous Left CTR and cubital tunnel release by Dr. Roman in 2004-05. He has a h/o DM with lower extremity peripheral neuropathy. Last A1c 6.8. He denies any pain in his right hand but does have difficulty grasping objects and decreased sensation over his first 3 digits. He does occasionally wear a brace at night with some relief. He has no recent injuries or surgeries to his Right hand.    The patient is a/an retired [employer].     The patient denies any fevers, chills, N/V, D/C and presents for evaluation.       Past Medical History:   Diagnosis Date    Allergy     DDD (degenerative disc disease), lumbar     Hyperlipidemia     Hypertension     Nicotine abuse     Obesity     Scrotal mass     Type II diabetes mellitus with renal manifestations, uncontrolled     microalbuminuria    Type II or unspecified type diabetes mellitus without mention of complication, not stated as uncontrolled     Ulcer      Past Surgical History:   Procedure Laterality Date    CARPAL TUNNEL RELEASE      CONDYLOMA EXCISION/FULGURATION Bilateral 2014    scrotal    KNEE SURGERY Left     removal of cartilage with no implant    LUMBAR LAMINECTOMY  2017    L4-5    ULNAR NERVE TRANSPOSITION       Review of patient's  allergies indicates:   Allergen Reactions    Augmentin [amoxicillin-pot clavulanate] Hives and Rash    Tramadol Nausea Only     Social History     Social History Narrative    Not on file     Family History   Problem Relation Age of Onset    Cancer Mother         Liver    Cataracts Mother     Liver cancer Mother     Arthritis Mother     Dementia Mother     Cancer Father         Lung met Brain    Lung cancer Father     Brain cancer Father     Cancer Sister     Ovarian cancer Sister     No Known Problems Brother     No Known Problems Daughter     Alcohol abuse Paternal Uncle     Cirrhosis Paternal Uncle     Diabetes Paternal Grandmother     Cancer Paternal Grandfather     Amblyopia Neg Hx     Blindness Neg Hx     Glaucoma Neg Hx     Macular degeneration Neg Hx     Retinal detachment Neg Hx     Strabismus Neg Hx          Current Outpatient Medications:     aspirin (ECOTRIN) 81 MG EC tablet, Take 81 mg by mouth once daily., Disp: , Rfl:     blood sugar diagnostic Strp, To check BG 3 times daily, to use with insurance preferred meter, Disp: 100 strip, Rfl: 11    clotrimazole-betamethasone 1-0.05% (LOTRISONE) cream, Apply topically 2 (two) times daily., Disp: 30 g, Rfl: 0    diclofenac sodium (VOLTAREN) 1 % Gel, Apply 2 g topically 3 (three) times daily as needed., Disp: 100 g, Rfl: 11    gabapentin (NEURONTIN) 300 MG capsule, TAKE 1 CAPSULE(300 MG) BY MOUTH THREE TIMES DAILY, Disp: 270 capsule, Rfl: 11    insulin (LANTUS SOLOSTAR U-100 INSULIN) glargine 100 units/mL SubQ pen, ADMINISTER 10 UNITS UNDER THE SKIN EVERY DAY, Disp: 15 mL, Rfl: 11    lancets Misc, To check BG 2 times daily, to use with insurance preferred meter, Disp: 100 each, Rfl: 11    lisinopriL-hydrochlorothiazide (PRINZIDE,ZESTORETIC) 20-25 mg Tab, Take 1 tablet by mouth once daily., Disp: 90 tablet, Rfl: 3    metFORMIN (GLUCOPHAGE-XR) 500 MG ER 24hr tablet, Take 2 tablets (1,000 mg total) by mouth 2 (two) times daily with meals., Disp: 360 tablet, Rfl:  "11    multivitamin capsule, Take 1 capsule by mouth once daily., Disp: , Rfl:     mupirocin (BACTROBAN) 2 % ointment, Apply topically 3 (three) times daily., Disp: 30 g, Rfl: 3    pen needle, diabetic (BD ULTRA-FINE SHORT PEN NEEDLE) 31 gauge x 5/16" Ndle, USE WITH INSULIN PEN AS DIRECTED, Disp: 100 each, Rfl: 3    pravastatin (PRAVACHOL) 40 MG tablet, Take 1 tablet (40 mg total) by mouth every evening., Disp: 30 tablet, Rfl: 11    sildenafiL (VIAGRA) 100 MG tablet, Take 1 tablet (100 mg total) by mouth daily as needed for Erectile Dysfunction., Disp: 30 tablet, Rfl: 11    ULTRA THIN LANCETS 30 gauge Misc, CHECK BLOOD GLUCOSE BID, Disp: , Rfl:     blood-glucose meter kit, To check BG 2 times daily, to use with insurance preferred meter, Disp: 100 each, Rfl: 11    furosemide (LASIX) 20 MG tablet, TAKE 1 TABLET(20 MG) BY MOUTH EVERY DAY, Disp: 30 tablet, Rfl: 10    tirzepatide 7.5 mg/0.5 mL PnIj, Inject 7.5 mg into the skin every 7 days., Disp: 4 pen , Rfl: 11    ROS    Review of Systems:  Constitutional: no fever or chills  Eyes: no visual changes  ENT: no nasal congestion or sore throat  Respiratory: no cough or shortness of breath  Cardiovascular: no chest pain  Gastrointestinal: no nausea or vomiting, tolerating diet  Musculoskeletal: numbness/tingling    OBJECTIVE:      Vital Signs (Most Recent):  There were no vitals filed for this visit.  There is no height or weight on file to calculate BMI.    Physical Exam    Physical Exam:  Constitutional: The patient appears well-developed and well-nourished. No distress.   Head: Normocephalic and atraumatic.   Nose: Nose normal.   Eyes: Conjunctivae and EOM are normal.   Neck: No tracheal deviation present.   Cardiovascular: Normal rate and intact distal pulses.    Pulmonary/Chest: Effort normal. No respiratory distress.   Abdominal: There is no guarding.   Lymphatic: Negative for adenopathy   Neurological: The patient is alert.   Psychiatric: The patient has a normal " mood and affect.     Bilateral Hand/Wrist Examination:    Observation/Inspection:  Swelling  Right long finger at palm    Deformity  none  Discoloration  none     Scars   none    Atrophy  none    HAND/WRIST EXAMINATION:    ROM hand/wrist/elbow full    +Tinels Right wrist  Right long Trigger finger  Neg Phalens bilaterally  Neg at elbow bilaterally  +TTP over basilar Right thumb CMC    Neurovascular Exam:  Digits WWP, brisk CR < 3s throughout  NVI motor/LTS to M/R/U nerves, radial pulse 2+  2+ biceps and brachioradialis reflexes    Diagnostic Results:     X-rays AP, lateral and oblique Right hand taken today are independently reviewed by me and show Eaton stage III basilar thumb arthritis as well as IP joint arthritis.       ASSESSMENT/PLAN:      73 y.o. yo male with   Encounter Diagnoses   Name Primary?    Carpal tunnel syndrome of right wrist Yes    Trigger middle finger of right hand     Primary osteoarthritis of both hands     Primary osteoarthritis of first carpometacarpal joint of right hand       Plan:  We have discussed the natural history of carpal tunnel, trigger fingers and hand arthritis including treatment options such as splinting, oral and topical anti-inflammatories, cortisone injections and surgery. I have ordered an EMG/NCS.    -I have offered him a selective injection. I have explained the risks, benefits, and alternatives of the procedure in detail.  The patient voices understanding and all questions have been answered. The patient agrees to proceed as planned. So after a sterile prep of the skin in the normal fashion the right long flexor sheath was injected using a 25 gauge needle with a combination of 1cc 1% plain lidocaine and 40 mg of methylprednisolone.  The patient is cautioned and immediate relief of pain is secondary to the local anesthetic and will be temporary.  After the anesthetic wears off there may be a increase in pain that may last for a few hours or a few days and they should use  ice to help alleviate this flair up of pain. Patient tolerated the procedure well.    - F/U after EMG/NCS  - Call with any questions/concerns in the interim     The patient's pathophysiology was explained in detail with reference to x-rays, models, other visual aids as appropriate.  Treatment options were discussed in detail.  Questions were invited and answered to the patient's satisfaction. I reviewed Primary care , and other specialty's notes to better coordinate patient's care.          Sobeida Salcedo MD    Please be aware that this note has been generated with the assistance of MModal voice-to-text.  Please excuse any spelling or grammatical errors.

## 2023-07-31 NOTE — PROCEDURES
Tendon Sheath    Date/Time: 7/31/2023 2:15 PM    Performed by: Sobeida Salcedo MD  Authorized by: Sobeida Salcedo MD    Consent Done?:  Yes (Verbal)  Indications:  Pain  Timeout: prior to procedure the correct patient, procedure, and site was verified    Prep: patient was prepped and draped in usual sterile fashion      Local anesthesia used?: Yes    Anesthesia:  Local infiltration  Local anesthetic:  Lidocaine 1% without epinephrine  Anesthetic total (ml):  1    Location:  Long finger  Site:  R long flexor tendon sheath  Ultrasonic guidance for needle placement?: No    Needle size:  25 G  Approach:  Volar  Medications:  40 mg methylPREDNISolone acetate 40 mg/mL  Patient tolerance:  Patient tolerated the procedure well with no immediate complications

## 2023-08-04 ENCOUNTER — OFFICE VISIT (OUTPATIENT)
Dept: OPTOMETRY | Facility: CLINIC | Age: 73
End: 2023-08-04
Payer: COMMERCIAL

## 2023-08-04 DIAGNOSIS — Z79.4 TYPE 2 DIABETES MELLITUS WITH STAGE 3 CHRONIC KIDNEY DISEASE, WITH LONG-TERM CURRENT USE OF INSULIN, UNSPECIFIED WHETHER STAGE 3A OR 3B CKD: ICD-10-CM

## 2023-08-04 DIAGNOSIS — H25.13 NUCLEAR SCLEROSIS, BILATERAL: ICD-10-CM

## 2023-08-04 DIAGNOSIS — E11.9 TYPE 2 DIABETES MELLITUS WITHOUT RETINOPATHY: Primary | ICD-10-CM

## 2023-08-04 DIAGNOSIS — E11.22 TYPE 2 DIABETES MELLITUS WITH STAGE 3 CHRONIC KIDNEY DISEASE, WITH LONG-TERM CURRENT USE OF INSULIN, UNSPECIFIED WHETHER STAGE 3A OR 3B CKD: ICD-10-CM

## 2023-08-04 DIAGNOSIS — Z13.5 GLAUCOMA SCREENING: ICD-10-CM

## 2023-08-04 DIAGNOSIS — N18.30 TYPE 2 DIABETES MELLITUS WITH STAGE 3 CHRONIC KIDNEY DISEASE, WITH LONG-TERM CURRENT USE OF INSULIN, UNSPECIFIED WHETHER STAGE 3A OR 3B CKD: ICD-10-CM

## 2023-08-04 DIAGNOSIS — H52.4 PRESBYOPIA: ICD-10-CM

## 2023-08-04 PROCEDURE — 92015 PR REFRACTION: ICD-10-PCS | Mod: S$GLB,,, | Performed by: OPTOMETRIST

## 2023-08-04 PROCEDURE — 92014 COMPRE OPH EXAM EST PT 1/>: CPT | Mod: S$GLB,,, | Performed by: OPTOMETRIST

## 2023-08-04 PROCEDURE — 92015 DETERMINE REFRACTIVE STATE: CPT | Mod: S$GLB,,, | Performed by: OPTOMETRIST

## 2023-08-04 PROCEDURE — 92014 PR EYE EXAM, EST PATIENT,COMPREHESV: ICD-10-PCS | Mod: S$GLB,,, | Performed by: OPTOMETRIST

## 2023-08-04 PROCEDURE — 99999 PR PBB SHADOW E&M-EST. PATIENT-LVL IV: ICD-10-PCS | Mod: PBBFAC,,, | Performed by: OPTOMETRIST

## 2023-08-04 PROCEDURE — 99999 PR PBB SHADOW E&M-EST. PATIENT-LVL IV: CPT | Mod: PBBFAC,,, | Performed by: OPTOMETRIST

## 2023-08-04 NOTE — PROGRESS NOTES
HPI    74 Y/o male is here for diabetic eye exam with no C/o about ocular health   Pt denies pain and discomfort   Pt states he see's floater all the time     Eye med: no gtt   Last edited by Alexis Mejia MA on 8/4/2023 10:05 AM.            Assessment /Plan     For exam results, see Encounter Report.    Type 2 diabetes mellitus without retinopathy    Type 2 diabetes mellitus with stage 3 chronic kidney disease, with long-term current use of insulin, unspecified whether stage 3a or 3b CKD  -     Ambulatory referral/consult to Optometry    Nuclear sclerosis, bilateral    Glaucoma screening    Presbyopia          1. Cat OU--discussed surgery, but pt wishes to wait.  Happy w spex  2. DM- WITHOUT RETINOPATHY.  Advised yearly DFE    PLAN:    rtc 1 yr

## 2023-08-14 ENCOUNTER — OFFICE VISIT (OUTPATIENT)
Dept: FAMILY MEDICINE | Facility: CLINIC | Age: 73
End: 2023-08-14
Payer: MEDICARE

## 2023-08-14 VITALS
DIASTOLIC BLOOD PRESSURE: 64 MMHG | HEART RATE: 126 BPM | WEIGHT: 244.94 LBS | SYSTOLIC BLOOD PRESSURE: 116 MMHG | TEMPERATURE: 98 F | HEIGHT: 69 IN | OXYGEN SATURATION: 95 % | BODY MASS INDEX: 36.28 KG/M2

## 2023-08-14 DIAGNOSIS — E11.42 DIABETIC POLYNEUROPATHY ASSOCIATED WITH TYPE 2 DIABETES MELLITUS: ICD-10-CM

## 2023-08-14 DIAGNOSIS — I12.9 TYPE 2 DIABETES MELLITUS WITH STAGE 3A CHRONIC KIDNEY DISEASE AND HYPERTENSION: Primary | ICD-10-CM

## 2023-08-14 DIAGNOSIS — J84.10 CALCIFIED GRANULOMA OF LUNG: ICD-10-CM

## 2023-08-14 DIAGNOSIS — N18.31 TYPE 2 DIABETES MELLITUS WITH STAGE 3A CHRONIC KIDNEY DISEASE AND HYPERTENSION: Primary | ICD-10-CM

## 2023-08-14 DIAGNOSIS — M46.99 UNSPECIFIED INFLAMMATORY SPONDYLOPATHY, MULTIPLE SITES IN SPINE: ICD-10-CM

## 2023-08-14 DIAGNOSIS — E11.22 TYPE 2 DIABETES MELLITUS WITH STAGE 3A CHRONIC KIDNEY DISEASE AND HYPERTENSION: Primary | ICD-10-CM

## 2023-08-14 DIAGNOSIS — E78.49 OTHER HYPERLIPIDEMIA: ICD-10-CM

## 2023-08-14 DIAGNOSIS — I70.0 AORTIC ATHEROSCLEROSIS: ICD-10-CM

## 2023-08-14 DIAGNOSIS — E66.01 SEVERE OBESITY (BMI 35.0-39.9) WITH COMORBIDITY: ICD-10-CM

## 2023-08-14 PROCEDURE — 3288F PR FALLS RISK ASSESSMENT DOCUMENTED: ICD-10-PCS | Mod: HCNC,CPTII,S$GLB, | Performed by: FAMILY MEDICINE

## 2023-08-14 PROCEDURE — 3078F PR MOST RECENT DIASTOLIC BLOOD PRESSURE < 80 MM HG: ICD-10-PCS | Mod: HCNC,CPTII,S$GLB, | Performed by: FAMILY MEDICINE

## 2023-08-14 PROCEDURE — 99214 PR OFFICE/OUTPT VISIT, EST, LEVL IV, 30-39 MIN: ICD-10-PCS | Mod: HCNC,S$GLB,, | Performed by: FAMILY MEDICINE

## 2023-08-14 PROCEDURE — 3008F BODY MASS INDEX DOCD: CPT | Mod: HCNC,CPTII,S$GLB, | Performed by: FAMILY MEDICINE

## 2023-08-14 PROCEDURE — 4010F PR ACE/ARB THEARPY RXD/TAKEN: ICD-10-PCS | Mod: HCNC,CPTII,S$GLB, | Performed by: FAMILY MEDICINE

## 2023-08-14 PROCEDURE — 3044F PR MOST RECENT HEMOGLOBIN A1C LEVEL <7.0%: ICD-10-PCS | Mod: HCNC,CPTII,S$GLB, | Performed by: FAMILY MEDICINE

## 2023-08-14 PROCEDURE — 99999 PR PBB SHADOW E&M-EST. PATIENT-LVL V: CPT | Mod: PBBFAC,HCNC,, | Performed by: FAMILY MEDICINE

## 2023-08-14 PROCEDURE — 1125F PR PAIN SEVERITY QUANTIFIED, PAIN PRESENT: ICD-10-PCS | Mod: HCNC,CPTII,S$GLB, | Performed by: FAMILY MEDICINE

## 2023-08-14 PROCEDURE — 3008F PR BODY MASS INDEX (BMI) DOCUMENTED: ICD-10-PCS | Mod: HCNC,CPTII,S$GLB, | Performed by: FAMILY MEDICINE

## 2023-08-14 PROCEDURE — 3288F FALL RISK ASSESSMENT DOCD: CPT | Mod: HCNC,CPTII,S$GLB, | Performed by: FAMILY MEDICINE

## 2023-08-14 PROCEDURE — 1159F MED LIST DOCD IN RCRD: CPT | Mod: HCNC,CPTII,S$GLB, | Performed by: FAMILY MEDICINE

## 2023-08-14 PROCEDURE — 3044F HG A1C LEVEL LT 7.0%: CPT | Mod: HCNC,CPTII,S$GLB, | Performed by: FAMILY MEDICINE

## 2023-08-14 PROCEDURE — 1101F PT FALLS ASSESS-DOCD LE1/YR: CPT | Mod: HCNC,CPTII,S$GLB, | Performed by: FAMILY MEDICINE

## 2023-08-14 PROCEDURE — 4010F ACE/ARB THERAPY RXD/TAKEN: CPT | Mod: HCNC,CPTII,S$GLB, | Performed by: FAMILY MEDICINE

## 2023-08-14 PROCEDURE — 1125F AMNT PAIN NOTED PAIN PRSNT: CPT | Mod: HCNC,CPTII,S$GLB, | Performed by: FAMILY MEDICINE

## 2023-08-14 PROCEDURE — 1159F PR MEDICATION LIST DOCUMENTED IN MEDICAL RECORD: ICD-10-PCS | Mod: HCNC,CPTII,S$GLB, | Performed by: FAMILY MEDICINE

## 2023-08-14 PROCEDURE — 1160F PR REVIEW ALL MEDS BY PRESCRIBER/CLIN PHARMACIST DOCUMENTED: ICD-10-PCS | Mod: HCNC,CPTII,S$GLB, | Performed by: FAMILY MEDICINE

## 2023-08-14 PROCEDURE — 1160F RVW MEDS BY RX/DR IN RCRD: CPT | Mod: HCNC,CPTII,S$GLB, | Performed by: FAMILY MEDICINE

## 2023-08-14 PROCEDURE — 3078F DIAST BP <80 MM HG: CPT | Mod: HCNC,CPTII,S$GLB, | Performed by: FAMILY MEDICINE

## 2023-08-14 PROCEDURE — 1101F PR PT FALLS ASSESS DOC 0-1 FALLS W/OUT INJ PAST YR: ICD-10-PCS | Mod: HCNC,CPTII,S$GLB, | Performed by: FAMILY MEDICINE

## 2023-08-14 PROCEDURE — 3074F PR MOST RECENT SYSTOLIC BLOOD PRESSURE < 130 MM HG: ICD-10-PCS | Mod: HCNC,CPTII,S$GLB, | Performed by: FAMILY MEDICINE

## 2023-08-14 PROCEDURE — 99999 PR PBB SHADOW E&M-EST. PATIENT-LVL V: ICD-10-PCS | Mod: PBBFAC,HCNC,, | Performed by: FAMILY MEDICINE

## 2023-08-14 PROCEDURE — 3074F SYST BP LT 130 MM HG: CPT | Mod: HCNC,CPTII,S$GLB, | Performed by: FAMILY MEDICINE

## 2023-08-14 PROCEDURE — 99214 OFFICE O/P EST MOD 30 MIN: CPT | Mod: HCNC,S$GLB,, | Performed by: FAMILY MEDICINE
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
## 2023-08-16 ENCOUNTER — LAB VISIT (OUTPATIENT)
Dept: LAB | Facility: HOSPITAL | Age: 73
End: 2023-08-16
Attending: FAMILY MEDICINE
Payer: MEDICARE

## 2023-08-16 ENCOUNTER — PATIENT MESSAGE (OUTPATIENT)
Dept: FAMILY MEDICINE | Facility: CLINIC | Age: 73
End: 2023-08-16
Payer: MEDICARE

## 2023-08-16 DIAGNOSIS — N18.30 TYPE 2 DIABETES MELLITUS WITH STAGE 3 CHRONIC KIDNEY DISEASE, WITH LONG-TERM CURRENT USE OF INSULIN, UNSPECIFIED WHETHER STAGE 3A OR 3B CKD: ICD-10-CM

## 2023-08-16 DIAGNOSIS — E11.22 TYPE 2 DIABETES MELLITUS WITH STAGE 3 CHRONIC KIDNEY DISEASE, WITH LONG-TERM CURRENT USE OF INSULIN, UNSPECIFIED WHETHER STAGE 3A OR 3B CKD: ICD-10-CM

## 2023-08-16 DIAGNOSIS — Z79.4 TYPE 2 DIABETES MELLITUS WITH STAGE 3 CHRONIC KIDNEY DISEASE, WITH LONG-TERM CURRENT USE OF INSULIN, UNSPECIFIED WHETHER STAGE 3A OR 3B CKD: ICD-10-CM

## 2023-08-16 LAB
ALBUMIN SERPL BCP-MCNC: 3.5 G/DL (ref 3.5–5.2)
ALBUMIN/CREAT UR: 1097.1 UG/MG (ref 0–30)
ALP SERPL-CCNC: 72 U/L (ref 55–135)
ALT SERPL W/O P-5'-P-CCNC: 16 U/L (ref 10–44)
ANION GAP SERPL CALC-SCNC: 11 MMOL/L (ref 8–16)
AST SERPL-CCNC: 21 U/L (ref 10–40)
BILIRUB SERPL-MCNC: 0.8 MG/DL (ref 0.1–1)
BUN SERPL-MCNC: 42 MG/DL (ref 8–23)
CALCIUM SERPL-MCNC: 9.3 MG/DL (ref 8.7–10.5)
CHLORIDE SERPL-SCNC: 104 MMOL/L (ref 95–110)
CHOLEST SERPL-MCNC: 135 MG/DL (ref 120–199)
CHOLEST/HDLC SERPL: 3.9 {RATIO} (ref 2–5)
CO2 SERPL-SCNC: 24 MMOL/L (ref 23–29)
CREAT SERPL-MCNC: 1.3 MG/DL (ref 0.5–1.4)
CREAT UR-MCNC: 102 MG/DL (ref 23–375)
EST. GFR  (NO RACE VARIABLE): 58 ML/MIN/1.73 M^2
ESTIMATED AVG GLUCOSE: 126 MG/DL (ref 68–131)
GLUCOSE SERPL-MCNC: 83 MG/DL (ref 70–110)
HBA1C MFR BLD: 6 % (ref 4–5.6)
HDLC SERPL-MCNC: 35 MG/DL (ref 40–75)
HDLC SERPL: 25.9 % (ref 20–50)
LDLC SERPL CALC-MCNC: 63.2 MG/DL (ref 63–159)
MICROALBUMIN UR DL<=1MG/L-MCNC: 1119 UG/ML
NONHDLC SERPL-MCNC: 100 MG/DL
POTASSIUM SERPL-SCNC: 4.1 MMOL/L (ref 3.5–5.1)
PROT SERPL-MCNC: 7.4 G/DL (ref 6–8.4)
SODIUM SERPL-SCNC: 139 MMOL/L (ref 136–145)
TRIGL SERPL-MCNC: 184 MG/DL (ref 30–150)

## 2023-08-16 PROCEDURE — 82570 ASSAY OF URINE CREATININE: CPT | Mod: HCNC | Performed by: FAMILY MEDICINE

## 2023-08-16 PROCEDURE — 80061 LIPID PANEL: CPT | Mod: HCNC | Performed by: FAMILY MEDICINE

## 2023-08-16 PROCEDURE — 36415 COLL VENOUS BLD VENIPUNCTURE: CPT | Mod: HCNC | Performed by: FAMILY MEDICINE

## 2023-08-16 PROCEDURE — 83036 HEMOGLOBIN GLYCOSYLATED A1C: CPT | Mod: HCNC | Performed by: FAMILY MEDICINE

## 2023-08-16 PROCEDURE — 80053 COMPREHEN METABOLIC PANEL: CPT | Mod: HCNC | Performed by: FAMILY MEDICINE

## 2023-08-18 RX ORDER — METHYLPREDNISOLONE ACETATE 40 MG/ML
40 INJECTION, SUSPENSION INTRA-ARTICULAR; INTRALESIONAL; INTRAMUSCULAR; SOFT TISSUE
Status: DISCONTINUED | OUTPATIENT
Start: 2023-07-31 | End: 2023-08-18 | Stop reason: HOSPADM

## 2023-08-18 RX ORDER — FUROSEMIDE 20 MG/1
TABLET ORAL
Qty: 30 TABLET | Refills: 10 | Status: SHIPPED | OUTPATIENT
Start: 2023-08-18

## 2023-08-18 NOTE — TELEPHONE ENCOUNTER
Refill Routing Note   Medication(s) are not appropriate for processing by [facility] for the following reason(s):      Required vitals abnormal    [facility] action(s):  Defer Care Due:  None identified              Appointments  past 12m or future 3m with PCP    Date Provider   Last Visit   8/14/2023 Laureano Hebert MD   Next Visit   Visit date not found Laureano Hebert MD   ED visits in past 90 days: 0        Note composed:11:49 AM 08/18/2023

## 2023-08-18 NOTE — TELEPHONE ENCOUNTER
No care due was identified.  [facility] Embedded Care Due Messages. Reference number: [id number].   8/18/2023 5:39:29 AM CDT

## 2023-08-28 ENCOUNTER — HOSPITAL ENCOUNTER (OUTPATIENT)
Dept: RADIOLOGY | Facility: HOSPITAL | Age: 73
Discharge: HOME | End: 2023-08-28
Attending: FAMILY MEDICINE
Payer: MEDICARE

## 2023-08-28 DIAGNOSIS — Z87.891 PERSONAL HISTORY OF NICOTINE DEPENDENCE: ICD-10-CM

## 2023-08-28 DIAGNOSIS — F17.200 TOBACCO DEPENDENCE: ICD-10-CM

## 2023-08-28 DIAGNOSIS — Z12.2 ENCOUNTER FOR SCREENING FOR LUNG CANCER: ICD-10-CM

## 2023-08-28 PROCEDURE — 71271 CT THORAX LUNG CANCER SCR C-: CPT | Mod: 26,HCNC,, | Performed by: RADIOLOGY

## 2023-08-28 PROCEDURE — 71271 CT CHEST LUNG SCREENING LOW DOSE: ICD-10-PCS | Mod: 26,HCNC,, | Performed by: RADIOLOGY

## 2023-08-28 PROCEDURE — 71271 CT THORAX LUNG CANCER SCR C-: CPT | Mod: TC,HCNC

## 2023-09-06 ENCOUNTER — PATIENT MESSAGE (OUTPATIENT)
Dept: FAMILY MEDICINE | Facility: CLINIC | Age: 73
End: 2023-09-06
Payer: MEDICARE

## 2023-09-07 ENCOUNTER — PATIENT MESSAGE (OUTPATIENT)
Dept: FAMILY MEDICINE | Facility: CLINIC | Age: 73
End: 2023-09-07
Payer: MEDICARE

## 2023-09-07 DIAGNOSIS — E04.1 THYROID NODULE: Primary | ICD-10-CM

## 2023-09-12 ENCOUNTER — TELEPHONE (OUTPATIENT)
Dept: ADMINISTRATIVE | Facility: OTHER | Age: 73
End: 2023-09-12
Payer: MEDICARE

## 2024-02-19 ENCOUNTER — PATIENT MESSAGE (OUTPATIENT)
Dept: ADMINISTRATIVE | Facility: HOSPITAL | Age: 74
End: 2024-02-19
Payer: MEDICARE

## 2024-02-26 ENCOUNTER — PATIENT OUTREACH (OUTPATIENT)
Dept: ADMINISTRATIVE | Facility: HOSPITAL | Age: 74
End: 2024-02-26
Payer: MEDICARE

## 2024-02-26 NOTE — PROGRESS NOTES
Population Health Chart Review & Patient Outreach Details    Outreach Performed: YES Telephone Not Successful    Additional Pop Health Notes:           Updates Requested / Reviewed:      Updated Care Coordination Note, Care Everywhere, Care Team Updated, and Immunizations Reconciliation Completed or Queried: [facility] Topics Overdue:    Health Maintenance Due   Topic Date Due    Colorectal Cancer Screening  Never done    RSV Vaccine (Age 60+ and Pregnant patients) (1 - 1-dose 60+ series) Never done    Influenza Vaccine (1) 09/01/2023    COVID-19 Vaccine (4 - 2023-24 season) 09/01/2023    TETANUS VACCINE  01/01/2024    Hemoglobin A1c  02/16/2024         Health Maintenance Topic(s) Outreach Outcomes & Actions Taken:    Colorectal Cancer Screening - Outreach Outcomes & Actions Taken  : Colonoscopy Case Request / Referral / Home Test Order Placed

## 2024-02-28 DIAGNOSIS — E11.9 TYPE 2 DIABETES MELLITUS WITHOUT COMPLICATION: ICD-10-CM

## 2024-03-01 ENCOUNTER — OFFICE VISIT (OUTPATIENT)
Dept: FAMILY MEDICINE | Facility: CLINIC | Age: 74
End: 2024-03-01
Payer: MEDICARE

## 2024-03-01 ENCOUNTER — LAB VISIT (OUTPATIENT)
Dept: LAB | Facility: HOSPITAL | Age: 74
End: 2024-03-01
Attending: FAMILY MEDICINE
Payer: MEDICARE

## 2024-03-01 VITALS
HEIGHT: 69 IN | HEART RATE: 108 BPM | DIASTOLIC BLOOD PRESSURE: 74 MMHG | OXYGEN SATURATION: 95 % | WEIGHT: 246.94 LBS | BODY MASS INDEX: 36.57 KG/M2 | SYSTOLIC BLOOD PRESSURE: 136 MMHG

## 2024-03-01 DIAGNOSIS — M17.0 PRIMARY OSTEOARTHRITIS OF BOTH KNEES: ICD-10-CM

## 2024-03-01 DIAGNOSIS — M47.819 ARTHRITIS OF FACET JOINTS AT MULTIPLE VERTEBRAL LEVELS: ICD-10-CM

## 2024-03-01 DIAGNOSIS — R29.818 NEUROGENIC CLAUDICATION: ICD-10-CM

## 2024-03-01 DIAGNOSIS — Z23 NEEDS FLU SHOT: ICD-10-CM

## 2024-03-01 DIAGNOSIS — M46.99 UNSPECIFIED INFLAMMATORY SPONDYLOPATHY, MULTIPLE SITES IN SPINE: ICD-10-CM

## 2024-03-01 DIAGNOSIS — I70.0 AORTIC ATHEROSCLEROSIS: ICD-10-CM

## 2024-03-01 DIAGNOSIS — E11.9 TYPE 2 DIABETES MELLITUS WITHOUT COMPLICATION: ICD-10-CM

## 2024-03-01 DIAGNOSIS — E11.22 TYPE 2 DIABETES MELLITUS WITH STAGE 3A CHRONIC KIDNEY DISEASE AND HYPERTENSION: ICD-10-CM

## 2024-03-01 DIAGNOSIS — E11.69 TYPE 2 DIABETES MELLITUS WITH HYPERLIPIDEMIA: ICD-10-CM

## 2024-03-01 DIAGNOSIS — M25.561 RIGHT KNEE PAIN, UNSPECIFIED CHRONICITY: Primary | ICD-10-CM

## 2024-03-01 DIAGNOSIS — E11.42 DIABETIC POLYNEUROPATHY ASSOCIATED WITH TYPE 2 DIABETES MELLITUS: ICD-10-CM

## 2024-03-01 DIAGNOSIS — Z00.00 ENCOUNTER FOR PREVENTIVE HEALTH EXAMINATION: Primary | ICD-10-CM

## 2024-03-01 DIAGNOSIS — E78.5 TYPE 2 DIABETES MELLITUS WITH HYPERLIPIDEMIA: ICD-10-CM

## 2024-03-01 DIAGNOSIS — I12.9 TYPE 2 DIABETES MELLITUS WITH STAGE 3A CHRONIC KIDNEY DISEASE AND HYPERTENSION: ICD-10-CM

## 2024-03-01 DIAGNOSIS — M25.562 LEFT KNEE PAIN, UNSPECIFIED CHRONICITY: ICD-10-CM

## 2024-03-01 DIAGNOSIS — Z79.4 LONG-TERM INSULIN USE: ICD-10-CM

## 2024-03-01 DIAGNOSIS — E66.01 SEVERE OBESITY (BMI 35.0-35.9 WITH COMORBIDITY): ICD-10-CM

## 2024-03-01 DIAGNOSIS — N18.31 TYPE 2 DIABETES MELLITUS WITH STAGE 3A CHRONIC KIDNEY DISEASE AND HYPERTENSION: ICD-10-CM

## 2024-03-01 DIAGNOSIS — J84.10 CALCIFIED GRANULOMA OF LUNG: ICD-10-CM

## 2024-03-01 DIAGNOSIS — Z74.09 OTHER REDUCED MOBILITY: ICD-10-CM

## 2024-03-01 PROBLEM — E11.622 TYPE 2 DIABETES MELLITUS WITH OTHER SKIN ULCER (CODE): Status: RESOLVED | Noted: 2023-01-31 | Resolved: 2024-03-01

## 2024-03-01 LAB
ESTIMATED AVG GLUCOSE: 180 MG/DL (ref 68–131)
HBA1C MFR BLD: 7.9 % (ref 4–5.6)

## 2024-03-01 PROCEDURE — 3078F DIAST BP <80 MM HG: CPT | Mod: CPTII,S$GLB,, | Performed by: NURSE PRACTITIONER

## 2024-03-01 PROCEDURE — 1160F RVW MEDS BY RX/DR IN RCRD: CPT | Mod: CPTII,S$GLB,, | Performed by: NURSE PRACTITIONER

## 2024-03-01 PROCEDURE — 3075F SYST BP GE 130 - 139MM HG: CPT | Mod: CPTII,S$GLB,, | Performed by: NURSE PRACTITIONER

## 2024-03-01 PROCEDURE — 1159F MED LIST DOCD IN RCRD: CPT | Mod: CPTII,S$GLB,, | Performed by: NURSE PRACTITIONER

## 2024-03-01 PROCEDURE — 3288F FALL RISK ASSESSMENT DOCD: CPT | Mod: CPTII,S$GLB,, | Performed by: NURSE PRACTITIONER

## 2024-03-01 PROCEDURE — 36415 COLL VENOUS BLD VENIPUNCTURE: CPT | Mod: HCNC | Performed by: FAMILY MEDICINE

## 2024-03-01 PROCEDURE — 90694 VACC AIIV4 NO PRSRV 0.5ML IM: CPT | Mod: S$GLB,,, | Performed by: NURSE PRACTITIONER

## 2024-03-01 PROCEDURE — 1125F AMNT PAIN NOTED PAIN PRSNT: CPT | Mod: CPTII,S$GLB,, | Performed by: NURSE PRACTITIONER

## 2024-03-01 PROCEDURE — 1101F PT FALLS ASSESS-DOCD LE1/YR: CPT | Mod: CPTII,S$GLB,, | Performed by: NURSE PRACTITIONER

## 2024-03-01 PROCEDURE — G0439 PPPS, SUBSEQ VISIT: HCPCS | Mod: S$GLB,,, | Performed by: NURSE PRACTITIONER

## 2024-03-01 PROCEDURE — 99999 PR PBB SHADOW E&M-EST. PATIENT-LVL V: CPT | Mod: PBBFAC,HCNC,, | Performed by: NURSE PRACTITIONER

## 2024-03-01 PROCEDURE — 3072F LOW RISK FOR RETINOPATHY: CPT | Mod: CPTII,S$GLB,, | Performed by: NURSE PRACTITIONER

## 2024-03-01 PROCEDURE — 83036 HEMOGLOBIN GLYCOSYLATED A1C: CPT | Mod: HCNC | Performed by: FAMILY MEDICINE

## 2024-03-01 PROCEDURE — 1170F FXNL STATUS ASSESSED: CPT | Mod: CPTII,S$GLB,, | Performed by: NURSE PRACTITIONER

## 2024-03-01 PROCEDURE — G0008 ADMIN INFLUENZA VIRUS VAC: HCPCS | Mod: S$GLB,,, | Performed by: NURSE PRACTITIONER

## 2024-03-01 NOTE — PATIENT INSTRUCTIONS
Counseling and Referral of Other Preventative  (Italic type indicates deductible and co-insurance are waived)    Patient Name: Francisco Coppola  Today's Date: 3/1/2024    Health Maintenance       Date Due Completion Date    Colorectal Cancer Screening Never done ---    Influenza Vaccine (1) 09/01/2023 10/25/2022    Hemoglobin A1c 02/16/2024 8/16/2023    TETANUS VACCINE 03/07/2024 (Originally 1/1/2024) 1/1/2014    COVID-19 Vaccine (4 - 2023-24 season) 03/07/2024 (Originally 9/1/2023) 8/25/2022    RSV Vaccine (Age 60+ and Pregnant patients) (1 - 1-dose 60+ series) 03/07/2024 (Originally 2/12/2009) ---    Shingles Vaccine (1 of 2) 07/18/2024 (Originally 2/12/1999) ---    Foot Exam 05/12/2024 5/12/2023    Eye Exam 08/04/2024 8/4/2023    Diabetes Urine Screening 08/16/2024 8/16/2023    Lipid Panel 08/16/2024 8/16/2023    High Dose Statin 08/18/2024 8/18/2023    LDCT Lung Screen 08/28/2024 8/28/2023        Orders Placed This Encounter   Procedures    Influenza (FLUAD) - Quadrivalent (Adjuvanted) *Preferred* (65+) (PF)    Ambulatory referral/consult to Orthopedics       The following information is provided to all patients.  This information is to help you find resources for any of the problems found today that may be affecting your health:                  Living healthy guide: www.[facility].[place].gov      Understanding Diabetes: www.diabetes.org      Eating healthy: www.cdc.gov/healthyweight      CDC home safety checklist: www.cdc.gov/steadi/patient.html      Agency on Aging: www.goea.[place].gov      Alcoholics anonymous (AA): www.aa.org      Physical Activity: www.kash.nih.gov/je3zmyj      Tobacco use: www.quitwithusla.org

## 2024-03-01 NOTE — PROGRESS NOTES
Francisco Coppola presented for a  Medicare AWV and comprehensive Health Risk Assessment today. The following components were reviewed and updated:    Medical history  Family History  Social history  Allergies and Current Medications  Health Risk Assessment  Health Maintenance  Care Team         ** See Completed Assessments for Annual Wellness Visit within the encounter summary.**         The following assessments were completed:  Living Situation  CAGE  Depression Screening  Timed Get Up and Go  Whisper Test  Cognitive Function Screening    Nutrition Screening  ADL Screening  PAQ Screening      Review for Opioid Screening: Pt does not have Rx for Opioids      Review for Substance Use Disorders: Patient does not use substance        Current Outpatient Medications:     aspirin (ECOTRIN) 81 MG EC tablet, Take 81 mg by mouth once daily., Disp: , Rfl:     blood sugar diagnostic Strp, To check BG 3 times daily, to use with insurance preferred meter, Disp: 100 strip, Rfl: 11    blood-glucose meter kit, To check BG 2 times daily, to use with insurance preferred meter, Disp: 100 each, Rfl: 11    clotrimazole-betamethasone 1-0.05% (LOTRISONE) cream, Apply topically 2 (two) times daily., Disp: 30 g, Rfl: 0    diclofenac sodium (VOLTAREN) 1 % Gel, Apply 2 g topically 3 (three) times daily as needed., Disp: 100 g, Rfl: 11    furosemide (LASIX) 20 MG tablet, TAKE 1 TABLET(20 MG) BY MOUTH EVERY DAY, Disp: 30 tablet, Rfl: 10    gabapentin (NEURONTIN) 300 MG capsule, TAKE 1 CAPSULE(300 MG) BY MOUTH THREE TIMES DAILY, Disp: 270 capsule, Rfl: 11    insulin (LANTUS SOLOSTAR U-100 INSULIN) glargine 100 units/mL SubQ pen, ADMINISTER 10 UNITS UNDER THE SKIN EVERY DAY, Disp: 15 mL, Rfl: 11    lancets Misc, To check BG 2 times daily, to use with insurance preferred meter, Disp: 100 each, Rfl: 11    lisinopriL-hydrochlorothiazide (PRINZIDE,ZESTORETIC) 20-25 mg Tab, Take 1 tablet by mouth once daily., Disp: 90 tablet, Rfl: 3    metFORMIN  "(GLUCOPHAGE-XR) 500 MG ER 24hr tablet, Take 2 tablets (1,000 mg total) by mouth 2 (two) times daily with meals., Disp: 360 tablet, Rfl: 11    multivitamin capsule, Take 1 capsule by mouth once daily., Disp: , Rfl:     mupirocin (BACTROBAN) 2 % ointment, Apply topically 3 (three) times daily., Disp: 30 g, Rfl: 3    pen needle, diabetic (BD ULTRA-FINE SHORT PEN NEEDLE) 31 gauge x 5/16" Ndle, USE WITH INSULIN PEN AS DIRECTED, Disp: 100 each, Rfl: 3    pravastatin (PRAVACHOL) 40 MG tablet, Take 1 tablet (40 mg total) by mouth every evening., Disp: 30 tablet, Rfl: 11    sildenafiL (VIAGRA) 100 MG tablet, Take 1 tablet (100 mg total) by mouth daily as needed for Erectile Dysfunction., Disp: 30 tablet, Rfl: 11    tirzepatide 7.5 mg/0.5 mL PnIj, Inject 7.5 mg into the skin every 7 days., Disp: 4 pen , Rfl: 11    ULTRA THIN LANCETS 30 gauge Misc, CHECK BLOOD GLUCOSE BID, Disp: , Rfl:        Vitals:    03/01/24 0954   BP: 136/74   Pulse: 108   SpO2: 95%   Weight: 112 kg (246 lb 14.6 oz)   Height: 5' 9" (1.753 m)   PainSc:   6   PainLoc: Knee      Physical Exam  Vitals and nursing note reviewed.   Constitutional:       General: He is not in acute distress.     Appearance: Normal appearance. He is obese. He is not ill-appearing.   HENT:      Head: Normocephalic and atraumatic.      Mouth/Throat:      Mouth: Mucous membranes are moist.   Eyes:      General: No scleral icterus.        Right eye: No discharge.         Left eye: No discharge.      Extraocular Movements: Extraocular movements intact.      Conjunctiva/sclera: Conjunctivae normal.   Cardiovascular:      Rate and Rhythm: Tachycardia present.   Pulmonary:      Effort: Pulmonary effort is normal. No respiratory distress.   Musculoskeletal:      Cervical back: Normal range of motion.   Skin:     General: Skin is warm and dry.      Findings: No rash.   Neurological:      Mental Status: He is alert and oriented to person, place, and time.      Gait: Gait abnormal.      " Comments: Walks with cane   Psychiatric:         Mood and Affect: Mood normal.         Behavior: Behavior normal. Behavior is cooperative.         Cognition and Memory: Cognition and memory normal.             Diagnoses and health risks identified today and associated recommendations/orders:    1. Encounter for preventive health examination  - Chart reviewed. Problem list updated. Discussed current medical diagnosis, current medications, medical/surgical/family/social history; updated provider list; documented vital signs; identified any cognitive impairment; and updated risk factor list. Addressed any outstanding health maintenance. Provided patient with personalized health advice. Continue to follow up with PCP and any specialists.     2. Type 2 diabetes mellitus with stage 3a chronic kidney disease and hypertension  Chronic; stable on current treatment plan; follow up with PCP  - taking lisinopril-HCTZ and lasix    3. Calcified granuloma of lung  Chronic; stable on current treatment plan; follow up with PCP  - recommend surveillance monitoring ; CT lung screening up to date     4. Aortic atherosclerosis  Chronic; stable on current treatment plan; follow up with PCP  - taking statin    5. Long-term insulin use  Chronic; stable on current treatment plan; follow up with PCP  -uses insulin daily     6. 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 Diabetic polyneuropathy associated with type 2 diabetes mellitus  Chronic; stable on current treatment plan; follow up with PCP  - taking gabapentin PRN    8. Type 2 diabetes mellitus with hyperlipidemia  Chronic; stable on current treatment plan; follow up with PCP  - taking statin; monjouro, metformin    9. Severe obesity (BMI 35.0-35.9 with comorbidity)  - Recommendation for healthy diet and increasing exercise as tolerated with goal of 150min/week . Recommend  weight loss    10. Neurogenic claudication  Chronic; stable on current treatment plan; follow up with PCP    11. Arthritis of facet joints at multiple vertebral levels  Chronic; stable on current treatment plan; follow up with PCP  - follow up with ortho    12. Primary osteoarthritis of both knees  Chronic; stable on current treatment plan; follow up with PCP  - knees have been bothering him more; needs appt with ortho, referral placeed   - Ambulatory referral/consult to Orthopedics; Future    13. Needs flu shot  - due for flu vaccine, given today   - Influenza (FLUAD) - Quadrivalent (Adjuvanted) *Preferred* (65+) (PF)    14. Other reduced mobility  Chronic; stable on current treatment plan; follow up with PCP  - walks with cane       Provided Francisco with a 5-10 year written screening schedule and personal prevention plan. Recommendations were developed using the USPSTF age appropriate recommendations. Education, counseling, and referrals were provided as needed. After Visit Summary printed and given to patient which includes a list of additional screenings\tests needed.    Follow up in about 1 year (around 3/1/2025) for your next annual wellness visit.    Ana Casillas, RAQUELP-C    Advance Care Planning     I offered to discuss advanced care planning, including how to pick a person who would make decisions for you if you were unable to make them for yourself, called a health care power of , and what kind of decisions you might make such as use of life sustaining treatments such as ventilators and tube feeding when faced with a life limiting illness recorded on a living will that they will need to know. (How you want to be cared for as you near the end of your natural life)     X  Patient is unwilling to engage in a discussion regarding advance directives at this time.

## 2024-03-05 ENCOUNTER — RESEARCH ENCOUNTER (OUTPATIENT)
Dept: RESEARCH | Facility: HOSPITAL | Age: 74
End: 2024-03-05
Payer: MEDICARE

## 2024-03-05 ENCOUNTER — OFFICE VISIT (OUTPATIENT)
Dept: ORTHOPEDICS | Facility: CLINIC | Age: 74
End: 2024-03-05
Payer: MEDICARE

## 2024-03-05 ENCOUNTER — HOSPITAL ENCOUNTER (OUTPATIENT)
Dept: RADIOLOGY | Facility: HOSPITAL | Age: 74
Discharge: HOME | End: 2024-03-05
Attending: ORTHOPAEDIC SURGERY
Payer: MEDICARE

## 2024-03-05 VITALS — WEIGHT: 246.94 LBS | BODY MASS INDEX: 36.57 KG/M2 | HEIGHT: 69 IN

## 2024-03-05 DIAGNOSIS — G89.29 CHRONIC PAIN OF LEFT KNEE: ICD-10-CM

## 2024-03-05 DIAGNOSIS — M17.11 PRIMARY OSTEOARTHRITIS OF RIGHT KNEE: ICD-10-CM

## 2024-03-05 DIAGNOSIS — M17.0 PRIMARY OSTEOARTHRITIS OF BOTH KNEES: ICD-10-CM

## 2024-03-05 DIAGNOSIS — M25.562 LEFT KNEE PAIN, UNSPECIFIED CHRONICITY: ICD-10-CM

## 2024-03-05 DIAGNOSIS — M25.561 RIGHT KNEE PAIN, UNSPECIFIED CHRONICITY: ICD-10-CM

## 2024-03-05 DIAGNOSIS — M17.12 PRIMARY OSTEOARTHRITIS OF LEFT KNEE: Primary | ICD-10-CM

## 2024-03-05 DIAGNOSIS — M25.561 CHRONIC PAIN OF RIGHT KNEE: ICD-10-CM

## 2024-03-05 DIAGNOSIS — M25.562 CHRONIC PAIN OF LEFT KNEE: ICD-10-CM

## 2024-03-05 DIAGNOSIS — G89.29 CHRONIC PAIN OF RIGHT KNEE: ICD-10-CM

## 2024-03-05 PROCEDURE — 73562 X-RAY EXAM OF KNEE 3: CPT | Mod: 26,50,HCNC, | Performed by: RADIOLOGY

## 2024-03-05 PROCEDURE — 3072F LOW RISK FOR RETINOPATHY: CPT | Mod: HCNC,CPTII,S$GLB, | Performed by: ORTHOPAEDIC SURGERY

## 2024-03-05 PROCEDURE — 1125F AMNT PAIN NOTED PAIN PRSNT: CPT | Mod: HCNC,CPTII,S$GLB, | Performed by: ORTHOPAEDIC SURGERY

## 2024-03-05 PROCEDURE — 1159F MED LIST DOCD IN RCRD: CPT | Mod: HCNC,CPTII,S$GLB, | Performed by: ORTHOPAEDIC SURGERY

## 2024-03-05 PROCEDURE — 1101F PT FALLS ASSESS-DOCD LE1/YR: CPT | Mod: HCNC,CPTII,S$GLB, | Performed by: ORTHOPAEDIC SURGERY

## 2024-03-05 PROCEDURE — 99214 OFFICE O/P EST MOD 30 MIN: CPT | Mod: 25,HCNC,S$GLB, | Performed by: ORTHOPAEDIC SURGERY

## 2024-03-05 PROCEDURE — 3051F HG A1C>EQUAL 7.0%<8.0%: CPT | Mod: HCNC,CPTII,S$GLB, | Performed by: ORTHOPAEDIC SURGERY

## 2024-03-05 PROCEDURE — 73562 X-RAY EXAM OF KNEE 3: CPT | Mod: TC,50,HCNC,PN

## 2024-03-05 PROCEDURE — 99999 PR PBB SHADOW E&M-EST. PATIENT-LVL IV: CPT | Mod: PBBFAC,HCNC,, | Performed by: ORTHOPAEDIC SURGERY

## 2024-03-05 PROCEDURE — 3288F FALL RISK ASSESSMENT DOCD: CPT | Mod: HCNC,CPTII,S$GLB, | Performed by: ORTHOPAEDIC SURGERY

## 2024-03-05 PROCEDURE — 20610 DRAIN/INJ JOINT/BURSA W/O US: CPT | Mod: 50,HCNC,S$GLB, | Performed by: ORTHOPAEDIC SURGERY

## 2024-03-05 RX ORDER — KETOROLAC TROMETHAMINE 30 MG/ML
30 INJECTION, SOLUTION INTRAMUSCULAR; INTRAVENOUS
Status: DISCONTINUED | OUTPATIENT
Start: 2024-03-05 | End: 2024-03-05 | Stop reason: HOSPADM

## 2024-03-05 RX ORDER — BUPIVACAINE HYDROCHLORIDE 5 MG/ML
5 INJECTION, SOLUTION PERINEURAL
Status: DISCONTINUED | OUTPATIENT
Start: 2024-03-05 | End: 2024-03-05 | Stop reason: HOSPADM

## 2024-03-05 RX ORDER — LIDOCAINE HYDROCHLORIDE 10 MG/ML
4 INJECTION INFILTRATION; PERINEURAL
Status: DISCONTINUED | OUTPATIENT
Start: 2024-03-05 | End: 2024-03-05 | Stop reason: HOSPADM

## 2024-03-05 RX ADMIN — KETOROLAC TROMETHAMINE 30 MG: 30 INJECTION, SOLUTION INTRAMUSCULAR; INTRAVENOUS at 10:03

## 2024-03-05 RX ADMIN — LIDOCAINE HYDROCHLORIDE 4 ML: 10 INJECTION INFILTRATION; PERINEURAL at 10:03

## 2024-03-05 RX ADMIN — BUPIVACAINE HYDROCHLORIDE 5 ML: 5 INJECTION, SOLUTION PERINEURAL at 10:03

## 2024-03-05 NOTE — PROCEDURES
Large Joint Aspiration/Injection: bilateral knee    Date/Time: 3/5/2024 10:45 AM    Performed by: Harish Morales MD  Authorized by: Harish Morales MD    Consent Done?:  Yes (Verbal)  Indications:  Arthritis  Site marked: the procedure site was marked    Timeout: prior to procedure the correct patient, procedure, and site was verified    Prep: patient was prepped and draped in usual sterile fashion      Local anesthesia used?: Yes    Local anesthetic:  Topical anesthetic    Details:  Needle Size:  22 G  Ultrasonic Guidance for needle placement?: No    Approach:  Anterolateral  Location:  Knee  Laterality:  Bilateral  Site:  Bilateral knee  Medications (Right):  30 mg ketorolac 30 mg/mL (1 mL); 5 mL BUPivacaine 0.5 % (5 mg/mL); 4 mL LIDOcaine HCL 10 mg/ml (1%) 10 mg/mL (1 %)  Medications (Left):  30 mg ketorolac 30 mg/mL (1 mL); 5 mL BUPivacaine 0.5 % (5 mg/mL); 4 mL LIDOcaine HCL 10 mg/ml (1%) 10 mg/mL (1 %)  Patient tolerance:  Patient tolerated the procedure well with no immediate complications

## 2024-03-05 NOTE — PROGRESS NOTES
[facility] [address]           Patient ID: Francisco Coppola is a 74 y.o. male.    Chief Complaint: Pain of the Left Knee and Pain of the Right Knee      KNEE PAIN: Complains of pain to the bilateralknee.   PAIN LOCATED: diffuse  ONSET: 15 years ago.  QUALITY:  Patient states the pain is worsening  HISTORY: Previous knee injury/surgery: Yes  Hx:  Left knee open meniscectomy  ASSOCIATED SYMPTOM AND TRIGGERS:Standing/Weightbearing, walking, trouble w stairs, stiffness, swelling, limping, stiffness w sitting  and Catching/locking  Has tried and failed cardiovascular activities such as walking, biking and resistance exercises  USES ASSISTIVE DEVICE: cane  RELIEVED BY:rest, injections  PATIENT DENIES: bruising, redness, deformity     Medical history is significant for diabetes A1c 7.9 bilateral peripheral neuropathy to soles of feet, smoking.  Right knee bothers him more.  Ambulates with a cane.    Past Medical History:   Diagnosis Date    Allergy     DDD (degenerative disc disease), lumbar     Hyperlipidemia     Hypertension     Nicotine abuse     Obesity     Scrotal mass     Type II diabetes mellitus with renal manifestations, uncontrolled     microalbuminuria    Type II or unspecified type diabetes mellitus without mention of complication, not stated as uncontrolled     Ulcer         Past Surgical History:   Procedure Laterality Date    CARPAL TUNNEL RELEASE      CONDYLOMA EXCISION/FULGURATION Bilateral 2014    scrotal    KNEE SURGERY Left     removal of cartilage with no implant    LUMBAR LAMINECTOMY  2017    L4-5    ULNAR NERVE TRANSPOSITION          Current Outpatient Medications   Medication Instructions    aspirin (ECOTRIN) 81 mg, Oral, Daily    blood sugar diagnostic Strp To check BG 3 times daily, to use with insurance preferred meter    blood-glucose meter kit To check BG 2 times daily, to use with insurance preferred meter    clotrimazole-betamethasone 1-0.05% (LOTRISONE) cream Topical (Top), 2 times  "daily    diclofenac sodium (VOLTAREN) 2 g, Topical (Top), 3 times daily PRN    furosemide (LASIX) 20 MG tablet TAKE 1 TABLET(20 MG) BY MOUTH EVERY DAY    gabapentin (NEURONTIN) 300 MG capsule TAKE 1 CAPSULE(300 MG) BY MOUTH THREE TIMES DAILY    insulin (LANTUS SOLOSTAR U-100 INSULIN) glargine 100 units/mL SubQ pen ADMINISTER 10 UNITS UNDER THE SKIN EVERY DAY    lancets Misc To check BG 2 times daily, to use with insurance preferred meter    lisinopriL-hydrochlorothiazide (PRINZIDE,ZESTORETIC) 20-25 mg Tab 1 tablet, Oral, Daily    metFORMIN (GLUCOPHAGE-XR) 1,000 mg, Oral, 2 times daily with meals    multivitamin capsule 1 capsule, Oral, Daily    mupirocin (BACTROBAN) 2 % ointment Topical (Top), 3 times daily    pen needle, diabetic (BD ULTRA-FINE SHORT PEN NEEDLE) 31 gauge x 5/16" Ndle USE WITH INSULIN PEN AS DIRECTED    pravastatin (PRAVACHOL) 40 mg, Oral, Nightly    sildenafiL (VIAGRA) 100 mg, Oral, Daily PRN    tirzepatide 7.5 mg, Subcutaneous, Every 7 days    ULTRA THIN LANCETS 30 gauge Misc CHECK BLOOD GLUCOSE BID        Review of patient's allergies indicates:   Allergen Reactions    Augmentin [amoxicillin-pot clavulanate] Hives and Rash    Tramadol Nausea Only       Social History     Socioeconomic History    Marital status:    Tobacco Use    Smoking status: Every Day     Current packs/day: 1.80     Average packs/day: 1.8 packs/day for 62.2 years (111.9 ttl pk-yrs)     Types: Cigarettes     Start date: 1962    Smokeless tobacco: Never    Tobacco comments:     Quit for 5 years; used to smoke 3 packs per day when younger, then 2 packs/day, now down to less than 1 pack/day      Declined smoking cessation; he is cutting down amount he smokes    Substance and Sexual Activity    Alcohol use: Not Currently     Comment: rare    Drug use: Never    Sexual activity: Yes     Partners: Female     Social Determinants of Health     Financial Resource Strain: Low Risk  (3/1/2024)    Overall Financial Resource Strain " (CARDIA)     Difficulty of Paying Living Expenses: Not very hard   Food Insecurity: No Food Insecurity (3/1/2024)    Hunger Vital Sign     Worried About Running Out of Food in the Last Year: Never true     Ran Out of Food in the Last Year: Never true   Transportation Needs: No Transportation Needs (3/1/2024)    PRAPARE - Transportation     Lack of Transportation (Medical): No     Lack of Transportation (Non-Medical): No   Physical Activity: Inactive (3/1/2024)    Exercise Vital Sign     Days of Exercise per Week: 0 days     Minutes of Exercise per Session: 0 min   Stress: No Stress Concern Present (3/1/2024)    [employer] - Occupational Stress Questionnaire     Feeling of Stress : Not at all   Social Connections: Moderately Integrated (3/1/2024)    Social Connection and Isolation Panel [NHANES]     Frequency of Communication with Friends and Family: More than three times a week     Frequency of Social Gatherings with Friends and Family: More than three times a week     Attends Protestant Services: 1 to 4 times per year     Active Member of Clubs or Organizations: No     Attends Club or Organization Meetings: Never     Marital Status:    Housing Stability: Low Risk  (3/1/2024)    Housing Stability Vital Sign     Unable to Pay for Housing in the Last Year: No     Number of Places Lived in the Last Year: 1     Unstable Housing in the Last Year: No       Family History   Problem Relation Age of Onset    Cancer Mother         Liver    Cataracts Mother     Liver cancer Mother     Arthritis Mother     Dementia Mother     Cancer Father         Lung met Brain    Lung cancer Father     Brain cancer Father     Cancer Sister     Ovarian cancer Sister     No Known Problems Brother     No Known Problems Daughter     Alcohol abuse Paternal Uncle     Cirrhosis Paternal Uncle     Diabetes Paternal Grandmother     Cancer Paternal Grandfather     Amblyopia Neg Hx     Blindness Neg Hx     Glaucoma  Neg Hx     Macular degeneration Neg Hx     Retinal detachment Neg Hx     Strabismus Neg Hx          Review of systems positive for knee pain/swelling.     Objective:   Physical Exam:   Right knee exam  Skin atraumatic   TTP medial joint line, nonTTP lateral joint line  ROM 5-90  Stable anterior/posterior   Stable varus/valgus  Motor intact TA/GS/EHL/FHL  SILT SP/DP/Sa/Martinez/T  Toes WWP    Left knee exam  Skin atraumatic   TTP medial joint line, nonTTP lateral joint line  ROM 0-105  Stable anterior/posterior   Stable varus/valgus  Motor intact TA/GS/EHL/FHL  SILT SP/DP/Sa/Martinez/T  Toes WWP    Imaging: X-Ray knee reviewed, KL 4 changes with joint space narrowing, sclerosis, and osteophytosis.       Assessment:        Francisco Coppola is a 74 y.o. male severe knee arthritis bilaterally.     Encounter Diagnoses   Name Primary?    Primary osteoarthritis of both knees     Primary osteoarthritis of left knee Yes    Primary osteoarthritis of right knee     Chronic pain of left knee     Chronic pain of right knee        Plan :        We injected the bilateral knee w 1cc of ketorolac, marcaine and lidocaine under sterile conditions with the patient's informed consent for severe bone on bone knee oa  I discussed a new treatment therapy called Iovera, which is cryotherapy, to provide symptomatic relief along the sensory distribution of the infrapatellar tendon branch of the saphenous nerve, AFCN, and LFCN.  The patient elected to move forward with this.  We did discuss the fact that this is a fairly novel procedure and the is very limited scientific data around this; however, it is FDA approved.  In a few patients there can be continued pain along the treated nerve but the exact risk has not been quantified but seems to be rare. The patient was given patient information and literature to review prior to the procedure as well. Based on this, the patient feels the benefits outweigh the risks. (Bilateral)          Orders Placed This  Encounter   Procedures    Large Joint Aspiration/Injection: bilateral knee    Latrice Pearl MD   03/05/2024

## 2024-03-05 NOTE — PROGRESS NOTES
Protocol: innovations in [employer] Outcomes Registry (Jovanna)  Protocol#: Jovanna  IRB#: 2021.156  Version Date: 10-Cayden-2023  PI: Harish Morales MD  Patient Initials: [email or web address]     Study Recruitment Note:     Research coordinator met with patient, in private clinic room, to discuss above mentioned protocol. Patient is alert and oriented x 3. Mood and affect appropriate to the situation. Patient states that he is not participating in any other research studies. Patient states understanding about the diagnosis of osteoarthritis of the knee and of the procedure to being done on that knee.  Purpose of study reviewed with the patient.  Patient states understanding of this information.   Length of study and number of participants reviewed with patient; patient states understanding of the length of the study.   Study procedure discussed in detail with patient. Patient states understanding of this information.  Potential benefits of study along with costs and/or payment reimbursement per insurance discussed with patient; Patient states understanding that insurance will cover cost of all standard of care medications and procedures. Patient aware of $20 payment for qualifying visits with completed questionnaires.  Alternative treatment methods discussed with patient; Patient states understanding of this information.   Study related questions/compensation for injury, and whom to contact regarding rights as a research subject all reviewed with patient; Patient verbalizes understanding of this information.   Voluntary participation and withdrawal from research stressed to patient; patient states understanding that he may withdraw consent at any time without compromise to care.   Confidentiality and HIPAA discussed with patient. Patient verbalizes understanding of this information.          Patient states his interest in study and will decide participation at next OA treatment appointment. Study brochure and paper copy of consent form was  given to patient for review. He was instructed to call if he has any questions or concerns about study.

## 2024-03-08 ENCOUNTER — OFFICE VISIT (OUTPATIENT)
Dept: FAMILY MEDICINE | Facility: CLINIC | Age: 74
End: 2024-03-08
Payer: MEDICARE

## 2024-03-08 VITALS
HEIGHT: 69 IN | BODY MASS INDEX: 36.77 KG/M2 | WEIGHT: 248.25 LBS | DIASTOLIC BLOOD PRESSURE: 84 MMHG | HEART RATE: 112 BPM | SYSTOLIC BLOOD PRESSURE: 132 MMHG | OXYGEN SATURATION: 95 % | TEMPERATURE: 98 F

## 2024-03-08 DIAGNOSIS — E11.65 UNCONTROLLED TYPE 2 DIABETES MELLITUS WITH HYPERGLYCEMIA: ICD-10-CM

## 2024-03-08 DIAGNOSIS — N18.31 TYPE 2 DIABETES MELLITUS WITH STAGE 3A CHRONIC KIDNEY DISEASE AND HYPERTENSION: Primary | ICD-10-CM

## 2024-03-08 DIAGNOSIS — Z72.0 TOBACCO USE: ICD-10-CM

## 2024-03-08 DIAGNOSIS — Z87.891 PERSONAL HISTORY OF NICOTINE DEPENDENCE: ICD-10-CM

## 2024-03-08 DIAGNOSIS — E66.01 SEVERE OBESITY (BMI 35.0-35.9 WITH COMORBIDITY): ICD-10-CM

## 2024-03-08 DIAGNOSIS — Z12.11 COLON CANCER SCREENING: ICD-10-CM

## 2024-03-08 DIAGNOSIS — E11.22 TYPE 2 DIABETES MELLITUS WITH STAGE 3A CHRONIC KIDNEY DISEASE AND HYPERTENSION: Primary | ICD-10-CM

## 2024-03-08 DIAGNOSIS — I12.9 TYPE 2 DIABETES MELLITUS WITH STAGE 3A CHRONIC KIDNEY DISEASE AND HYPERTENSION: Primary | ICD-10-CM

## 2024-03-08 DIAGNOSIS — Z12.2 SCREENING FOR LUNG CANCER: ICD-10-CM

## 2024-03-08 PROCEDURE — 1159F MED LIST DOCD IN RCRD: CPT | Mod: HCNC,CPTII,S$GLB, | Performed by: FAMILY MEDICINE

## 2024-03-08 PROCEDURE — 1126F AMNT PAIN NOTED NONE PRSNT: CPT | Mod: HCNC,CPTII,S$GLB, | Performed by: FAMILY MEDICINE

## 2024-03-08 PROCEDURE — 1101F PT FALLS ASSESS-DOCD LE1/YR: CPT | Mod: HCNC,CPTII,S$GLB, | Performed by: FAMILY MEDICINE

## 2024-03-08 PROCEDURE — 99214 OFFICE O/P EST MOD 30 MIN: CPT | Mod: HCNC,S$GLB,, | Performed by: FAMILY MEDICINE

## 2024-03-08 PROCEDURE — 3072F LOW RISK FOR RETINOPATHY: CPT | Mod: HCNC,CPTII,S$GLB, | Performed by: FAMILY MEDICINE

## 2024-03-08 PROCEDURE — 3288F FALL RISK ASSESSMENT DOCD: CPT | Mod: HCNC,CPTII,S$GLB, | Performed by: FAMILY MEDICINE

## 2024-03-08 PROCEDURE — 3051F HG A1C>EQUAL 7.0%<8.0%: CPT | Mod: HCNC,CPTII,S$GLB, | Performed by: FAMILY MEDICINE

## 2024-03-08 PROCEDURE — 99999 PR PBB SHADOW E&M-EST. PATIENT-LVL IV: CPT | Mod: PBBFAC,HCNC,, | Performed by: FAMILY MEDICINE

## 2024-03-08 PROCEDURE — 3079F DIAST BP 80-89 MM HG: CPT | Mod: HCNC,CPTII,S$GLB, | Performed by: FAMILY MEDICINE

## 2024-03-08 PROCEDURE — G2211 COMPLEX E/M VISIT ADD ON: HCPCS | Mod: HCNC,S$GLB,, | Performed by: FAMILY MEDICINE

## 2024-03-08 PROCEDURE — 3075F SYST BP GE 130 - 139MM HG: CPT | Mod: HCNC,CPTII,S$GLB, | Performed by: FAMILY MEDICINE

## 2024-03-08 RX ORDER — INSULIN PUMP SYRINGE, 3 ML
EACH MISCELLANEOUS
Qty: 100 EACH | Refills: 11 | Status: SHIPPED | OUTPATIENT
Start: 2024-03-08 | End: 2028-06-16

## 2024-03-08 RX ORDER — INSULIN GLARGINE 100 [IU]/ML
INJECTION, SOLUTION SUBCUTANEOUS
Qty: 15 ML | Refills: 11
Start: 2024-03-08 | End: 2024-03-08

## 2024-03-08 RX ORDER — LANCETS
EACH MISCELLANEOUS
Qty: 100 EACH | Refills: 11 | Status: SHIPPED | OUTPATIENT
Start: 2024-03-08

## 2024-03-08 RX ORDER — INSULIN GLARGINE 100 [IU]/ML
15 INJECTION, SOLUTION SUBCUTANEOUS DAILY
Qty: 15 ML | Refills: 11
Start: 2024-03-08
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
## 2024-03-08 NOTE — PATIENT INSTRUCTIONS
.Check with your pharmacy regarding getting the tetanus (Tdap) vaccine (once every 10 years)        Www.Diabetes.org (american diabetes association website)    SAMPLE BLOOD SUGAR CHART  Goal blood sugar when checked in the morning before meals: less than 130-140  Goal sugar when checked at least 2 hours after a meal: less than 180               DATE  Before breakfast Before lunch Before dinner Before bedtime                                                                                                                                                  CARBOHYDRATE GOALS: around 3-4 servings per meal (1 serving is 15 grams of total carbohydrates)

## 2024-04-03 ENCOUNTER — PATIENT OUTREACH (OUTPATIENT)
Dept: ADMINISTRATIVE | Facility: HOSPITAL | Age: 74
End: 2024-04-03
Payer: MEDICARE

## 2024-04-03 NOTE — PROGRESS NOTES
Population Health Chart Review & Patient Outreach Details      Additional Pop Health Notes:    Called patient and gave friendly reminder to complete and return fit kit at earliest convenience. Pt agreed.       Updates Requested / Reviewed:      Updated Care Coordination Note, Care Everywhere, Care Team Updated, and Immunizations Reconciliation Completed or Queried: [facility] Topics Overdue:      [facility] Score: 1     Colon Cancer Screening    Tetanus Vaccine  RSV Vaccine                  Health Maintenance Topic(s) Outreach Outcomes & Actions Taken:    Colorectal Cancer Screening - Outreach Outcomes & Actions Taken  : Reminded Patient to Complete Already Received Home Test

## 2024-05-12 NOTE — TELEPHONE ENCOUNTER
Care Due:                  Date            Visit Type   Department     Provider  --------------------------------------------------------------------------------                                EP -                              [facility]  Last Visit: 03-      CARE ([facility])   [facility] -                              [facility]  Next Visit: 06-      CARE ([facility])   [facility]                                                            Last  Test          Frequency    Reason                     Performed    Due Date  --------------------------------------------------------------------------------    CBC.........  12 months..  diclofenac...............  04- 04-    CMP.........  12 months..  diclofenac, insulin,       08-   08-                             lisinopriL-hydrochlorothi                             azide, metFORMIN,                             pravastatin..............    Lipid Panel.  12 months..  pravastatin..............  08-   08-    [facility] Embedded Care Due Messages. Reference number: [id number].   5/12/2024 2:30:00 PM CDT

## 2024-05-13 RX ORDER — METFORMIN HYDROCHLORIDE 500 MG/1
1000 TABLET, EXTENDED RELEASE ORAL 2 TIMES DAILY WITH MEALS
Qty: 360 TABLET | Refills: 1 | Status: SHIPPED | OUTPATIENT
Start: 2024-05-13

## 2024-05-13 NOTE — TELEPHONE ENCOUNTER
Refill Routing Note   Medication(s) are not appropriate for processing by [facility] for the following reason(s):        Drug-disease interaction  Drug-Disease: metFORMIN and Type 2 diabetes mellitus with stage 3a chronic kidney disease and hypertension    [facility] action(s):  Defer        Medication Therapy Plan: FLOS 06/07/24      Appointments  past 12m or future 3m with PCP    Date Provider   Last Visit   3/8/2024 Laureano Hebert MD   Next Visit   6/14/2024 Laureano Hebert MD   ED visits in past 90 days: 0        Note composed:7:40 AM 05/13/2024

## 2024-05-19 NOTE — TELEPHONE ENCOUNTER
No care due was identified.  [facility] Embedded Care Due Messages. Reference number: [id number].   5/19/2024 10:27:44 AM CDT   no

## 2024-05-19 NOTE — TELEPHONE ENCOUNTER
No care due was identified.  [facility] Embedded Care Due Messages. Reference number: [id number].   5/19/2024 5:37:59 AM CDT

## 2024-05-20 RX ORDER — PRAVASTATIN SODIUM 40 MG/1
40 TABLET ORAL NIGHTLY
Qty: 90 TABLET | Refills: 0 | Status: SHIPPED | OUTPATIENT
Start: 2024-05-20

## 2024-05-20 RX ORDER — PRAVASTATIN SODIUM 40 MG/1
40 TABLET ORAL NIGHTLY
Qty: 30 TABLET | Refills: 11 | OUTPATIENT
Start: 2024-05-20

## 2024-05-20 NOTE — TELEPHONE ENCOUNTER
Refill Decision Note   Francisco Coppola  is requesting a refill authorization.  Brief Assessment and Rationale for Refill:  Quick Discontinue     Medication Therapy Plan:  duplicate      Comments:     Note composed:4:47 AM 05/20/2024             Appointments     Last Visit   3/8/2024 Laureano Hebert MD   Next Visit   6/14/2024 Laureano Hebert MD

## 2024-05-20 NOTE — TELEPHONE ENCOUNTER
Francisco Coppola  is requesting a refill authorization.  Brief Assessment and Rationale for Refill:  Approve     Medication Therapy Plan:         Comments:     Note composed:4:47 AM 05/20/2024

## 2024-05-23 ENCOUNTER — PROCEDURE VISIT (OUTPATIENT)
Dept: ORTHOPEDICS | Facility: CLINIC | Age: 74
End: 2024-05-23
Payer: MEDICARE

## 2024-05-23 ENCOUNTER — RESEARCH ENCOUNTER (OUTPATIENT)
Dept: RESEARCH | Facility: HOSPITAL | Age: 74
End: 2024-05-23
Payer: MEDICARE

## 2024-05-23 VITALS — HEIGHT: 69 IN | BODY MASS INDEX: 35.13 KG/M2 | WEIGHT: 237.19 LBS

## 2024-05-23 DIAGNOSIS — M25.562 CHRONIC PAIN OF LEFT KNEE: ICD-10-CM

## 2024-05-23 DIAGNOSIS — G89.29 CHRONIC PAIN OF RIGHT KNEE: ICD-10-CM

## 2024-05-23 DIAGNOSIS — G89.29 CHRONIC PAIN OF LEFT KNEE: ICD-10-CM

## 2024-05-23 DIAGNOSIS — M25.561 CHRONIC PAIN OF RIGHT KNEE: ICD-10-CM

## 2024-05-23 PROCEDURE — 99499 UNLISTED E&M SERVICE: CPT | Mod: HCNC,S$GLB,, | Performed by: ORTHOPAEDIC SURGERY

## 2024-05-23 PROCEDURE — 64640 INJECTION TREATMENT OF NERVE: CPT | Mod: 50,HCNC,S$GLB, | Performed by: ORTHOPAEDIC SURGERY

## 2024-05-23 NOTE — PROGRESS NOTES
Protocol: innovations in [employer] Outcomes Registry (Jovanna)  Protocol#: Jovanna  IRB#: 2021.156  Version Date: 10-Cayden-2023  PI: Harish Morales MD  Patient Initials: C.M.     Declined Study Note    Patient is in today to be treated for OA knee pain. Patient was called yesterday by Paulino about participation in Jovanna Study and as well as to confirm Iovera appointment. Patient declined study. He proceeded with Bilateral Iovera treatment today.

## 2024-05-23 NOTE — PROCEDURES
Procedures  Procedure Note Iovera:    DATE OF PROCEDURE:  05/23/2024    PREOPERATIVE DIAGNOSIS: left knee pain.     POSTOPERATIVE DIAGNOSIS: left knee pain.     PROCEDURE: Iovera treatment of anterior femoral cutaneous nerve, Lateral femoral cut nerve, and both branches of infrapatellar saphenous nerve using at least 4 different punctures to treat all 4 nerves. (cpt 64640 x4)    ATTENDING SURGEON: Harish Morales M.D.   ASSISTANT: rissa casiano    COMPLICATIONS: None.     IMPLANTS:  None    ESTIMATED BLOOD LOSS:  < 5cc    SPECIMENS REMOVED:  None    ANESTHESIA: Local lidocaine    INDICATIONS FOR PROCEDURE: This is a 74 y.o. male with longstanding knee pain. They have failed non operative management including injections.I discussed a new treatment therapy called Iovera, which is cryotherapy, to provide symptomatic relief along the sensory distribution of the infrapatellar tendon branch of the saphenous nerve  and AFCN. The patient elected to move forward with this  We did discuss the fact that this is a fairly novel procedure and there is very limited scientific data around this.  However, it FDA approved.  The patient was given patient information and literature to review prior to the procedure as well.  Based on this, the patient agreed to move forward with doing the procedure.      PROCEDURE:    The patient was placed supine on the exam table and the proximal medial aspect of the left tibia and anterior aspect of distal femur was prepped with sterile Betadine and alcohol.  A line was drawn extending approximately 5 cm medial to inferior pole of the patella distally to a point approximately 5 cm medial to the tibial tubercle.  A second line was drawn in a medial to lateral direction the width of the patella approximately 7 cm proximal to the patella. We then infiltrated the skin with lidocaine along both lines using a 25g needle. We then introduced the Iovera device along these lines and this device penetrated the skin,  creating cryotherapy to both branches of the infrapatellar saphenous nerve, a third treatment to the anterior femoral cutaneous nerve, and fourth LFCN. 7 punctures of the skin were made to treat the 2 branches of the ISN and another 7 punctures were made to treat the AFCN and LFCN. There were a total of 4 nerves treated with iovera. The patient tolerated the procedure well with no problems.     Procedure Note Iovera:    DATE OF PROCEDURE:  05/23/2024    PREOPERATIVE DIAGNOSIS: right knee pain.     POSTOPERATIVE DIAGNOSIS: right knee pain.     PROCEDURE: Iovera treatment of anterior femoral cutaneous nerve, Lateral femoral cut nerve, and both branches of infrapatellar saphenous nerve using at least 4 different punctures to treat all 4 nerves. (cpt 64640 x4)    ATTENDING SURGEON: Harish Morales M.D.   ASSISTANT: rissa casiano    COMPLICATIONS: None.     IMPLANTS:  None    ESTIMATED BLOOD LOSS:  < 5cc    SPECIMENS REMOVED:  None    ANESTHESIA: Local lidocaine    INDICATIONS FOR PROCEDURE: This is a 74 y.o. male with longstanding knee pain. They have failed non operative management including injections.I discussed a new treatment therapy called Iovera, which is cryotherapy, to provide symptomatic relief along the sensory distribution of the infrapatellar tendon branch of the saphenous nerve  and AFCN. The patient elected to move forward with this  We did discuss the fact that this is a fairly novel procedure and there is very limited scientific data around this.  However, it FDA approved.  The patient was given patient information and literature to review prior to the procedure as well.  Based on this, the patient agreed to move forward with doing the procedure.      PROCEDURE:    The patient was placed supine on the exam table and the proximal medial aspect of the right tibia and anterior aspect of distal femur was prepped with sterile Betadine and alcohol.  A line was drawn extending approximately 5 cm medial to inferior pole  of the patella distally to a point approximately 5 cm medial to the tibial tubercle.  A second line was drawn in a medial to lateral direction the width of the patella approximately 7 cm proximal to the patella. We then infiltrated the skin with lidocaine along both lines using a 25g needle. We then introduced the Iovera device along these lines and this device penetrated the skin, creating cryotherapy to both branches of the infrapatellar saphenous nerve, a third treatment to the anterior femoral cutaneous nerve, and fourth LFCN. 7 punctures of the skin were made to treat the 2 branches of the ISN and another 7 punctures were made to treat the AFCN and LFCN. There were a total of 4 nerves treated with iovera. The patient tolerated the procedure well with no problems.

## 2024-05-29 ENCOUNTER — PATIENT MESSAGE (OUTPATIENT)
Dept: ADMINISTRATIVE | Facility: HOSPITAL | Age: 74
End: 2024-05-29
Payer: MEDICARE

## 2024-06-05 NOTE — PROGRESS NOTES
I offered to discuss end of life issues, including information on how to make advance directives that the patient could use to name someone who would make medical decisions on their behalf if they became too ill to make themselves.    ___Patient declined  _X_Patient is interested, I provided paper work and offered to discuss.   Call the daughter and tell her I sent in an antibiotic ointment to apply to 2-3 times a day.  Tell her the most important thing is that he has to wear knee-high support hose every day from morning till bedtime.  During the day when he is sitting he needs to keep his feet elevated above his chest.  If the swelling does not improve or if the blisters get worse he needs to come back to see me

## 2024-06-07 ENCOUNTER — LAB VISIT (OUTPATIENT)
Dept: LAB | Facility: HOSPITAL | Age: 74
End: 2024-06-07
Attending: FAMILY MEDICINE
Payer: MEDICARE

## 2024-06-07 DIAGNOSIS — N18.31 TYPE 2 DIABETES MELLITUS WITH STAGE 3A CHRONIC KIDNEY DISEASE AND HYPERTENSION: ICD-10-CM

## 2024-06-07 DIAGNOSIS — E11.22 TYPE 2 DIABETES MELLITUS WITH STAGE 3A CHRONIC KIDNEY DISEASE AND HYPERTENSION: ICD-10-CM

## 2024-06-07 DIAGNOSIS — I12.9 TYPE 2 DIABETES MELLITUS WITH STAGE 3A CHRONIC KIDNEY DISEASE AND HYPERTENSION: ICD-10-CM

## 2024-06-07 LAB
ALBUMIN SERPL BCP-MCNC: 3.4 G/DL (ref 3.5–5.2)
ALP SERPL-CCNC: 71 U/L (ref 55–135)
ALT SERPL W/O P-5'-P-CCNC: 17 U/L (ref 10–44)
ANION GAP SERPL CALC-SCNC: 13 MMOL/L (ref 8–16)
AST SERPL-CCNC: 22 U/L (ref 10–40)
BASOPHILS # BLD AUTO: 0.02 K/UL (ref 0–0.2)
BASOPHILS NFR BLD: 0.3 % (ref 0–1.9)
BILIRUB SERPL-MCNC: 0.7 MG/DL (ref 0.1–1)
BUN SERPL-MCNC: 30 MG/DL (ref 8–23)
CALCIUM SERPL-MCNC: 9.5 MG/DL (ref 8.7–10.5)
CHLORIDE SERPL-SCNC: 101 MMOL/L (ref 95–110)
CHOLEST SERPL-MCNC: 147 MG/DL (ref 120–199)
CHOLEST/HDLC SERPL: 3.9 {RATIO} (ref 2–5)
CO2 SERPL-SCNC: 23 MMOL/L (ref 23–29)
CREAT SERPL-MCNC: 1.6 MG/DL (ref 0.5–1.4)
DIFFERENTIAL METHOD BLD: ABNORMAL
EOSINOPHIL # BLD AUTO: 0.1 K/UL (ref 0–0.5)
EOSINOPHIL NFR BLD: 1.2 % (ref 0–8)
ERYTHROCYTE [DISTWIDTH] IN BLOOD BY AUTOMATED COUNT: 13.3 % (ref 11.5–14.5)
EST. GFR  (NO RACE VARIABLE): 45 ML/MIN/1.73 M^2
ESTIMATED AVG GLUCOSE: 177 MG/DL (ref 68–131)
GLUCOSE SERPL-MCNC: 194 MG/DL (ref 70–110)
HBA1C MFR BLD: 7.8 % (ref 4–5.6)
HCT VFR BLD AUTO: 42.8 % (ref 40–54)
HDLC SERPL-MCNC: 38 MG/DL (ref 40–75)
HDLC SERPL: 25.9 % (ref 20–50)
HGB BLD-MCNC: 14.2 G/DL (ref 14–18)
IMM GRANULOCYTES # BLD AUTO: 0.04 K/UL (ref 0–0.04)
IMM GRANULOCYTES NFR BLD AUTO: 0.5 % (ref 0–0.5)
LDLC SERPL CALC-MCNC: 69.4 MG/DL (ref 63–159)
LYMPHOCYTES # BLD AUTO: 2.2 K/UL (ref 1–4.8)
LYMPHOCYTES NFR BLD: 28.2 % (ref 18–48)
MCH RBC QN AUTO: 31.6 PG (ref 27–31)
MCHC RBC AUTO-ENTMCNC: 33.2 G/DL (ref 32–36)
MCV RBC AUTO: 95 FL (ref 82–98)
MONOCYTES # BLD AUTO: 0.7 K/UL (ref 0.3–1)
MONOCYTES NFR BLD: 9.3 % (ref 4–15)
NEUTROPHILS # BLD AUTO: 4.7 K/UL (ref 1.8–7.7)
NEUTROPHILS NFR BLD: 60.5 % (ref 38–73)
NONHDLC SERPL-MCNC: 109 MG/DL
NRBC BLD-RTO: 0 /100 WBC
PLATELET # BLD AUTO: 241 K/UL (ref 150–450)
PMV BLD AUTO: 11.1 FL (ref 9.2–12.9)
POTASSIUM SERPL-SCNC: 4.3 MMOL/L (ref 3.5–5.1)
PROT SERPL-MCNC: 6.9 G/DL (ref 6–8.4)
RBC # BLD AUTO: 4.49 M/UL (ref 4.6–6.2)
SODIUM SERPL-SCNC: 137 MMOL/L (ref 136–145)
TRIGL SERPL-MCNC: 198 MG/DL (ref 30–150)
WBC # BLD AUTO: 7.67 K/UL (ref 3.9–12.7)

## 2024-06-07 PROCEDURE — 85025 COMPLETE CBC W/AUTO DIFF WBC: CPT | Mod: HCNC | Performed by: FAMILY MEDICINE

## 2024-06-07 PROCEDURE — 83036 HEMOGLOBIN GLYCOSYLATED A1C: CPT | Mod: HCNC | Performed by: FAMILY MEDICINE

## 2024-06-07 PROCEDURE — 36415 COLL VENOUS BLD VENIPUNCTURE: CPT | Mod: HCNC | Performed by: FAMILY MEDICINE

## 2024-06-07 PROCEDURE — 80061 LIPID PANEL: CPT | Mod: HCNC | Performed by: FAMILY MEDICINE

## 2024-06-07 PROCEDURE — 80053 COMPREHEN METABOLIC PANEL: CPT | Mod: HCNC | Performed by: FAMILY MEDICINE

## 2024-06-14 ENCOUNTER — OFFICE VISIT (OUTPATIENT)
Dept: FAMILY MEDICINE | Facility: CLINIC | Age: 74
End: 2024-06-14
Payer: MEDICARE

## 2024-06-14 VITALS
DIASTOLIC BLOOD PRESSURE: 62 MMHG | WEIGHT: 237.44 LBS | HEIGHT: 69 IN | BODY MASS INDEX: 35.17 KG/M2 | OXYGEN SATURATION: 95 % | HEART RATE: 123 BPM | TEMPERATURE: 99 F | SYSTOLIC BLOOD PRESSURE: 118 MMHG

## 2024-06-14 DIAGNOSIS — E66.01 SEVERE OBESITY (BMI 35.0-39.9) WITH COMORBIDITY: ICD-10-CM

## 2024-06-14 DIAGNOSIS — I12.9 TYPE 2 DIABETES MELLITUS WITH STAGE 3A CHRONIC KIDNEY DISEASE AND HYPERTENSION: Primary | ICD-10-CM

## 2024-06-14 DIAGNOSIS — N18.31 TYPE 2 DIABETES MELLITUS WITH STAGE 3A CHRONIC KIDNEY DISEASE AND HYPERTENSION: Primary | ICD-10-CM

## 2024-06-14 DIAGNOSIS — Z87.891 PERSONAL HISTORY OF NICOTINE DEPENDENCE: ICD-10-CM

## 2024-06-14 DIAGNOSIS — I10 HTN (HYPERTENSION), BENIGN: ICD-10-CM

## 2024-06-14 DIAGNOSIS — I70.0 AORTIC ATHEROSCLEROSIS: ICD-10-CM

## 2024-06-14 DIAGNOSIS — E11.22 TYPE 2 DIABETES MELLITUS WITH STAGE 3A CHRONIC KIDNEY DISEASE AND HYPERTENSION: Primary | ICD-10-CM

## 2024-06-14 DIAGNOSIS — Z79.4 LONG-TERM INSULIN USE: ICD-10-CM

## 2024-06-14 DIAGNOSIS — H54.62 DECREASED VISION OF LEFT EYE: ICD-10-CM

## 2024-06-14 PROCEDURE — 1126F AMNT PAIN NOTED NONE PRSNT: CPT | Mod: HCNC,CPTII,S$GLB, | Performed by: FAMILY MEDICINE

## 2024-06-14 PROCEDURE — 3078F DIAST BP <80 MM HG: CPT | Mod: HCNC,CPTII,S$GLB, | Performed by: FAMILY MEDICINE

## 2024-06-14 PROCEDURE — 3051F HG A1C>EQUAL 7.0%<8.0%: CPT | Mod: HCNC,CPTII,S$GLB, | Performed by: FAMILY MEDICINE

## 2024-06-14 PROCEDURE — G2211 COMPLEX E/M VISIT ADD ON: HCPCS | Mod: HCNC,S$GLB,, | Performed by: FAMILY MEDICINE

## 2024-06-14 PROCEDURE — 1159F MED LIST DOCD IN RCRD: CPT | Mod: HCNC,CPTII,S$GLB, | Performed by: FAMILY MEDICINE

## 2024-06-14 PROCEDURE — 3074F SYST BP LT 130 MM HG: CPT | Mod: HCNC,CPTII,S$GLB, | Performed by: FAMILY MEDICINE

## 2024-06-14 PROCEDURE — 99999 PR PBB SHADOW E&M-EST. PATIENT-LVL V: CPT | Mod: PBBFAC,HCNC,, | Performed by: FAMILY MEDICINE

## 2024-06-14 PROCEDURE — 99214 OFFICE O/P EST MOD 30 MIN: CPT | Mod: HCNC,S$GLB,, | Performed by: FAMILY MEDICINE

## 2024-06-14 PROCEDURE — 4010F ACE/ARB THERAPY RXD/TAKEN: CPT | Mod: HCNC,CPTII,S$GLB, | Performed by: FAMILY MEDICINE

## 2024-06-14 PROCEDURE — 1101F PT FALLS ASSESS-DOCD LE1/YR: CPT | Mod: HCNC,CPTII,S$GLB, | Performed by: FAMILY MEDICINE

## 2024-06-14 PROCEDURE — 3288F FALL RISK ASSESSMENT DOCD: CPT | Mod: HCNC,CPTII,S$GLB, | Performed by: FAMILY MEDICINE

## 2024-06-14 PROCEDURE — 3072F LOW RISK FOR RETINOPATHY: CPT | Mod: HCNC,CPTII,S$GLB, | Performed by: FAMILY MEDICINE
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
## 2024-06-14 NOTE — PATIENT INSTRUCTIONS
Check with your pharmacy regarding getting the tetanus (Tdap) vaccine (once every 10 years)        Www.Diabetes.org (american diabetes association website)    SAMPLE BLOOD SUGAR CHART  Goal blood sugar when checked in the morning before meals: less than 130-140  Goal sugar when checked at least 2 hours after a meal: less than 180               DATE  Before breakfast Before lunch Before dinner Before bedtime                                                                                                                                                  CARBOHYDRATE GOALS: around 3-4 servings per meal (1 serving is 15 grams of total carbohydrates)

## 2024-06-18 RX ORDER — LISINOPRIL AND HYDROCHLOROTHIAZIDE 20; 25 MG/1; MG/1
1 TABLET ORAL
Qty: 90 TABLET | Refills: 3 | Status: SHIPPED | OUTPATIENT
Start: 2024-06-18

## 2024-06-18 NOTE — TELEPHONE ENCOUNTER
Refill Decision Note   Francisco Coppola  is requesting a refill authorization.  Brief Assessment and Rationale for Refill:  Approve     Medication Therapy Plan:        Pharmacist review requested: Yes   Comments:     Note composed:10:10 AM 06/18/2024

## 2024-06-18 NOTE — TELEPHONE ENCOUNTER
No care due was identified.  Health [facility] Embedded Care Due Messages. Reference number: [id number].   6/18/2024 5:36:38 AM CDT

## 2024-06-18 NOTE — TELEPHONE ENCOUNTER
Refill Routing Note   Medication(s) are not appropriate for processing by [facility] for the following reason(s):      Required labs abnormal:    CREATININE 1.6 (H) 06/07/2024       [facility] action(s):  Defer Care Due:  None identified          Pharmacist review requested: Yes     Appointments  past 12m or future 3m with PCP    Date Provider   Last Visit   6/14/2024 Laureano Hebert MD   Next Visit   9/20/2024 Laureano Hebert MD   ED visits in past 90 days: 0        Note composed:7:47 AM 06/18/2024

## 2024-07-09 ENCOUNTER — OFFICE VISIT (OUTPATIENT)
Dept: OPTOMETRY | Facility: CLINIC | Age: 74
End: 2024-07-09
Payer: MEDICARE

## 2024-07-09 DIAGNOSIS — H52.4 PRESBYOPIA: ICD-10-CM

## 2024-07-09 DIAGNOSIS — I12.9 TYPE 2 DIABETES MELLITUS WITH STAGE 3A CHRONIC KIDNEY DISEASE AND HYPERTENSION: ICD-10-CM

## 2024-07-09 DIAGNOSIS — H54.62 DECREASED VISION OF LEFT EYE: ICD-10-CM

## 2024-07-09 DIAGNOSIS — E11.22 TYPE 2 DIABETES MELLITUS WITH STAGE 3A CHRONIC KIDNEY DISEASE AND HYPERTENSION: ICD-10-CM

## 2024-07-09 DIAGNOSIS — N18.31 TYPE 2 DIABETES MELLITUS WITH STAGE 3A CHRONIC KIDNEY DISEASE AND HYPERTENSION: ICD-10-CM

## 2024-07-09 DIAGNOSIS — E11.9 TYPE 2 DIABETES MELLITUS WITHOUT RETINOPATHY: Primary | ICD-10-CM

## 2024-07-09 DIAGNOSIS — Z13.5 GLAUCOMA SCREENING: ICD-10-CM

## 2024-07-09 DIAGNOSIS — H25.13 NUCLEAR SCLEROSIS, BILATERAL: ICD-10-CM

## 2024-07-09 DIAGNOSIS — H53.15 VISUAL DISTORTIONS OF SHAPE AND SIZE: ICD-10-CM

## 2024-07-09 PROCEDURE — 3051F HG A1C>EQUAL 7.0%<8.0%: CPT | Mod: HCNC,CPTII,S$GLB, | Performed by: OPTOMETRIST

## 2024-07-09 PROCEDURE — 92014 COMPRE OPH EXAM EST PT 1/>: CPT | Mod: HCNC,S$GLB,, | Performed by: OPTOMETRIST

## 2024-07-09 PROCEDURE — 4010F ACE/ARB THERAPY RXD/TAKEN: CPT | Mod: HCNC,CPTII,S$GLB, | Performed by: OPTOMETRIST

## 2024-07-09 PROCEDURE — 3288F FALL RISK ASSESSMENT DOCD: CPT | Mod: HCNC,CPTII,S$GLB, | Performed by: OPTOMETRIST

## 2024-07-09 PROCEDURE — 1160F RVW MEDS BY RX/DR IN RCRD: CPT | Mod: HCNC,CPTII,S$GLB, | Performed by: OPTOMETRIST

## 2024-07-09 PROCEDURE — 2023F DILAT RTA XM W/O RTNOPTHY: CPT | Mod: HCNC,CPTII,S$GLB, | Performed by: OPTOMETRIST

## 2024-07-09 PROCEDURE — 92015 DETERMINE REFRACTIVE STATE: CPT | Mod: HCNC,S$GLB,, | Performed by: OPTOMETRIST

## 2024-07-09 PROCEDURE — 1159F MED LIST DOCD IN RCRD: CPT | Mod: HCNC,CPTII,S$GLB, | Performed by: OPTOMETRIST

## 2024-07-09 PROCEDURE — 1126F AMNT PAIN NOTED NONE PRSNT: CPT | Mod: HCNC,CPTII,S$GLB, | Performed by: OPTOMETRIST

## 2024-07-09 PROCEDURE — 99999 PR PBB SHADOW E&M-EST. PATIENT-LVL III: CPT | Mod: PBBFAC,HCNC,, | Performed by: OPTOMETRIST

## 2024-07-09 PROCEDURE — 92134 CPTRZ OPH DX IMG PST SGM RTA: CPT | Mod: HCNC,S$GLB,, | Performed by: OPTOMETRIST

## 2024-07-09 PROCEDURE — 1101F PT FALLS ASSESS-DOCD LE1/YR: CPT | Mod: HCNC,CPTII,S$GLB, | Performed by: OPTOMETRIST

## 2024-07-09 NOTE — PROGRESS NOTES
HPI    73 Y/o male is here for diabetic eye exam with C/o pt states he went to   DM in April, say's he could not see anything out  OS vision.   Pt denies pain and discomfort   Occ floaters     Eye med: no gtt   Last edited by Alexis Mejia MA on 7/9/2024  1:18 PM.            Assessment /Plan     For exam results, see Encounter Report.    Type 2 diabetes mellitus without retinopathy    Type 2 diabetes mellitus with stage 3a chronic kidney disease and hypertension  -     Ambulatory referral/consult to Optometry    Decreased vision of left eye  -     Ambulatory referral/consult to Optometry  -     OCT, Retina - [facility] - Both Eyes    Nuclear sclerosis, bilateral    Glaucoma screening    Presbyopia    Visual distortions of shape and size  -     OCT, Retina - [facility] - Both Eyes      1. Even w best refraction pt reports VA OS distorted and blurry.  Mac OCT wnl today.  Suspect reduced VA OS 2 to cat  2. DM- WITHOUT RETINOPATHY.  Advised yearly DFE    PLAN:    Surgical consult--Dr Allen

## 2024-07-17 DIAGNOSIS — E11.65 UNCONTROLLED TYPE 2 DIABETES MELLITUS WITH HYPERGLYCEMIA: ICD-10-CM

## 2024-07-17 RX ORDER — INSULIN GLARGINE 100 [IU]/ML
INJECTION, SOLUTION SUBCUTANEOUS
Qty: 15 ML | Refills: 1 | Status: ON HOLD | OUTPATIENT
Start: 2024-07-17 | End: 2024-07-22

## 2024-07-17 NOTE — TELEPHONE ENCOUNTER
Refill Routing Note   Medication(s) are not appropriate for processing by [facility] for the following reason(s):        Required labs abnormal    [facility] action(s):  Defer             Pharmacist review requested: Yes     Appointments  past 12m or future 3m with PCP    Date Provider   Last Visit   6/14/2024 Laureano Hebert MD   Next Visit   9/20/2024 Laureano Hebert MD   ED visits in past 90 days: 0        Note composed:2:46 PM 07/17/2024

## 2024-07-17 NOTE — TELEPHONE ENCOUNTER
No care due was identified.  Health [facility] Embedded Care Due Messages. Reference number: [id number].   7/17/2024 12:31:17 PM CDT

## 2024-07-18 RX ORDER — FUROSEMIDE 20 MG/1
TABLET ORAL
Qty: 90 TABLET | Refills: 3 | Status: SHIPPED | OUTPATIENT
Start: 2024-07-18

## 2024-07-18 NOTE — TELEPHONE ENCOUNTER
No care due was identified.  [facility] Embedded Care Due Messages. Reference number: [id number].   7/18/2024 5:36:37 AM CDT

## 2024-07-18 NOTE — TELEPHONE ENCOUNTER
Refill Routing Note   Medication(s) are not appropriate for processing by [facility] for the following reason(s):        Required labs abnormal    [facility] action(s):  Defer             Pharmacist review requested: Yes     Appointments  past 12m or future 3m with PCP    Date Provider   Last Visit   6/14/2024 Laureano Hebert MD   Next Visit   9/20/2024 Laureano Hebert MD   ED visits in past 90 days: 0        Note composed:9:31 AM 07/18/2024

## 2024-07-20 ENCOUNTER — HOSPITAL ENCOUNTER (INPATIENT)
Facility: HOSPITAL | Age: 74
LOS: 2 days | Discharge: HOME | DRG: 199 | End: 2024-07-22
Attending: EMERGENCY MEDICINE | Admitting: HOSPITALIST
Payer: MEDICARE

## 2024-07-20 DIAGNOSIS — E78.5 TYPE 2 DIABETES MELLITUS WITH HYPERLIPIDEMIA: ICD-10-CM

## 2024-07-20 DIAGNOSIS — E11.65 UNCONTROLLED TYPE 2 DIABETES MELLITUS WITH HYPERGLYCEMIA: ICD-10-CM

## 2024-07-20 DIAGNOSIS — F17.200 TOBACCO DEPENDENCE: ICD-10-CM

## 2024-07-20 DIAGNOSIS — E11.69 TYPE 2 DIABETES MELLITUS WITH HYPERLIPIDEMIA: ICD-10-CM

## 2024-07-20 DIAGNOSIS — R06.02 SOB (SHORTNESS OF BREATH): ICD-10-CM

## 2024-07-20 DIAGNOSIS — J93.9 PNEUMOTHORAX, UNSPECIFIED TYPE: Primary | ICD-10-CM

## 2024-07-20 DIAGNOSIS — I25.10 CORONARY ARTERY DISEASE, UNSPECIFIED VESSEL OR LESION TYPE, UNSPECIFIED WHETHER ANGINA PRESENT, UNSPECIFIED WHETHER NATIVE OR TRANSPLANTED HEART: ICD-10-CM

## 2024-07-20 DIAGNOSIS — R07.9 CHEST PAIN: ICD-10-CM

## 2024-07-20 LAB
ALBUMIN SERPL BCP-MCNC: 3.9 G/DL (ref 3.5–5.2)
ALP SERPL-CCNC: 79 U/L (ref 55–135)
ALT SERPL W/O P-5'-P-CCNC: 20 U/L (ref 10–44)
ANION GAP SERPL CALC-SCNC: 17 MMOL/L (ref 8–16)
AST SERPL-CCNC: 28 U/L (ref 10–40)
BACTERIA #/AREA URNS HPF: ABNORMAL /HPF
BASOPHILS # BLD AUTO: 0.02 K/UL (ref 0–0.2)
BASOPHILS NFR BLD: 0.2 % (ref 0–1.9)
BILIRUB SERPL-MCNC: 0.6 MG/DL (ref 0.1–1)
BILIRUB UR QL STRIP: NEGATIVE
BNP SERPL-MCNC: 17 PG/ML (ref 0–99)
BUN SERPL-MCNC: 32 MG/DL (ref 8–23)
CALCIUM SERPL-MCNC: 9.8 MG/DL (ref 8.7–10.5)
CHLORIDE SERPL-SCNC: 101 MMOL/L (ref 95–110)
CLARITY UR: CLEAR
CO2 SERPL-SCNC: 22 MMOL/L (ref 23–29)
COLOR UR: YELLOW
CREAT SERPL-MCNC: 1.8 MG/DL (ref 0.5–1.4)
DIFFERENTIAL METHOD BLD: ABNORMAL
EOSINOPHIL # BLD AUTO: 0.1 K/UL (ref 0–0.5)
EOSINOPHIL NFR BLD: 1 % (ref 0–8)
ERYTHROCYTE [DISTWIDTH] IN BLOOD BY AUTOMATED COUNT: 13.5 % (ref 11.5–14.5)
EST. GFR  (NO RACE VARIABLE): 39 ML/MIN/1.73 M^2
GLUCOSE SERPL-MCNC: 221 MG/DL (ref 70–110)
GLUCOSE UR QL STRIP: ABNORMAL
HCT VFR BLD AUTO: 45.5 % (ref 40–54)
HGB BLD-MCNC: 15.2 G/DL (ref 14–18)
HGB UR QL STRIP: NEGATIVE
HYALINE CASTS #/AREA URNS LPF: 3 /LPF
IMM GRANULOCYTES # BLD AUTO: 0.05 K/UL (ref 0–0.04)
IMM GRANULOCYTES NFR BLD AUTO: 0.5 % (ref 0–0.5)
INFLUENZA A, MOLECULAR: NEGATIVE
INFLUENZA B, MOLECULAR: NEGATIVE
KETONES UR QL STRIP: NEGATIVE
LACTATE SERPL-SCNC: 2.2 MMOL/L (ref 0.5–2.2)
LACTATE SERPL-SCNC: 3.1 MMOL/L (ref 0.5–2.2)
LEUKOCYTE ESTERASE UR QL STRIP: NEGATIVE
LYMPHOCYTES # BLD AUTO: 2.6 K/UL (ref 1–4.8)
LYMPHOCYTES NFR BLD: 24.4 % (ref 18–48)
MAGNESIUM SERPL-MCNC: 2 MG/DL (ref 1.6–2.6)
MCH RBC QN AUTO: 32.1 PG (ref 27–31)
MCHC RBC AUTO-ENTMCNC: 33.4 G/DL (ref 32–36)
MCV RBC AUTO: 96 FL (ref 82–98)
MICROSCOPIC COMMENT: ABNORMAL
MONOCYTES # BLD AUTO: 0.9 K/UL (ref 0.3–1)
MONOCYTES NFR BLD: 8.7 % (ref 4–15)
NEUTROPHILS # BLD AUTO: 7 K/UL (ref 1.8–7.7)
NEUTROPHILS NFR BLD: 65.2 % (ref 38–73)
NITRITE UR QL STRIP: NEGATIVE
NRBC BLD-RTO: 0 /100 WBC
PH UR STRIP: 6 [PH] (ref 5–8)
PLATELET # BLD AUTO: 266 K/UL (ref 150–450)
PMV BLD AUTO: 10.7 FL (ref 9.2–12.9)
POTASSIUM SERPL-SCNC: 4.7 MMOL/L (ref 3.5–5.1)
PROT SERPL-MCNC: 8.1 G/DL (ref 6–8.4)
PROT UR QL STRIP: ABNORMAL
RBC # BLD AUTO: 4.74 M/UL (ref 4.6–6.2)
RBC #/AREA URNS HPF: 1 /HPF (ref 0–4)
SARS-COV-2 RDRP RESP QL NAA+PROBE: NEGATIVE
SODIUM SERPL-SCNC: 140 MMOL/L (ref 136–145)
SP GR UR STRIP: 1.01 (ref 1–1.03)
SPECIMEN SOURCE: NORMAL
TROPONIN I SERPL DL<=0.01 NG/ML-MCNC: <0.006 NG/ML (ref 0–0.03)
URN SPEC COLLECT METH UR: ABNORMAL
UROBILINOGEN UR STRIP-ACNC: NEGATIVE EU/DL
WBC # BLD AUTO: 10.79 K/UL (ref 3.9–12.7)
WBC #/AREA URNS HPF: 1 /HPF (ref 0–5)

## 2024-07-20 PROCEDURE — 83605 ASSAY OF LACTIC ACID: CPT | Mod: 91,HCNC | Performed by: EMERGENCY MEDICINE

## 2024-07-20 PROCEDURE — 83880 ASSAY OF NATRIURETIC PEPTIDE: CPT | Mod: HCNC

## 2024-07-20 PROCEDURE — 85025 COMPLETE CBC W/AUTO DIFF WBC: CPT | Mod: HCNC

## 2024-07-20 PROCEDURE — 96375 TX/PRO/DX INJ NEW DRUG ADDON: CPT | Mod: HCNC

## 2024-07-20 PROCEDURE — 63600175 PHARM REV CODE 636 W HCPCS: Mod: HCNC | Performed by: EMERGENCY MEDICINE

## 2024-07-20 PROCEDURE — 84484 ASSAY OF TROPONIN QUANT: CPT | Mod: HCNC

## 2024-07-20 PROCEDURE — 96361 HYDRATE IV INFUSION ADD-ON: CPT | Mod: HCNC

## 2024-07-20 PROCEDURE — 0W9B30Z DRAINAGE OF LEFT PLEURAL CAVITY WITH DRAINAGE DEVICE, PERCUTANEOUS APPROACH: ICD-10-PCS | Performed by: EMERGENCY MEDICINE

## 2024-07-20 PROCEDURE — U0002 COVID-19 LAB TEST NON-CDC: HCPCS | Mod: HCNC

## 2024-07-20 PROCEDURE — 99285 EMERGENCY DEPT VISIT HI MDM: CPT | Mod: 25,HCNC

## 2024-07-20 PROCEDURE — 94640 AIRWAY INHALATION TREATMENT: CPT | Mod: HCNC

## 2024-07-20 PROCEDURE — 87502 INFLUENZA DNA AMP PROBE: CPT | Mod: HCNC

## 2024-07-20 PROCEDURE — 25000003 PHARM REV CODE 250: Mod: HCNC | Performed by: EMERGENCY MEDICINE

## 2024-07-20 PROCEDURE — 96365 THER/PROPH/DIAG IV INF INIT: CPT | Mod: HCNC

## 2024-07-20 PROCEDURE — 81000 URINALYSIS NONAUTO W/SCOPE: CPT | Mod: HCNC | Performed by: EMERGENCY MEDICINE

## 2024-07-20 PROCEDURE — 80053 COMPREHEN METABOLIC PANEL: CPT | Mod: HCNC

## 2024-07-20 PROCEDURE — 25000242 PHARM REV CODE 250 ALT 637 W/ HCPCS: Mod: HCNC | Performed by: EMERGENCY MEDICINE

## 2024-07-20 PROCEDURE — 63600175 PHARM REV CODE 636 W HCPCS: Mod: HCNC | Performed by: NURSE PRACTITIONER

## 2024-07-20 PROCEDURE — 25000003 PHARM REV CODE 250: Mod: HCNC | Performed by: NURSE PRACTITIONER

## 2024-07-20 PROCEDURE — 11000001 HC ACUTE MED/SURG PRIVATE ROOM: Mod: HCNC

## 2024-07-20 PROCEDURE — 87040 BLOOD CULTURE FOR BACTERIA: CPT | Mod: HCNC | Performed by: EMERGENCY MEDICINE

## 2024-07-20 PROCEDURE — 96360 HYDRATION IV INFUSION INIT: CPT | Mod: 59,HCNC

## 2024-07-20 PROCEDURE — 32551 INSERTION OF CHEST TUBE: CPT | Mod: HCNC,LT

## 2024-07-20 PROCEDURE — 93010 ELECTROCARDIOGRAM REPORT: CPT | Mod: HCNC,,, | Performed by: INTERNAL MEDICINE

## 2024-07-20 PROCEDURE — 25500020 PHARM REV CODE 255: Mod: HCNC | Performed by: EMERGENCY MEDICINE

## 2024-07-20 PROCEDURE — 83735 ASSAY OF MAGNESIUM: CPT | Mod: HCNC | Performed by: EMERGENCY MEDICINE

## 2024-07-20 PROCEDURE — 93005 ELECTROCARDIOGRAM TRACING: CPT | Mod: HCNC

## 2024-07-20 PROCEDURE — 36415 COLL VENOUS BLD VENIPUNCTURE: CPT | Mod: HCNC | Performed by: EMERGENCY MEDICINE

## 2024-07-20 RX ORDER — ENOXAPARIN SODIUM 100 MG/ML
40 INJECTION SUBCUTANEOUS EVERY 24 HOURS
Status: DISCONTINUED | OUTPATIENT
Start: 2024-07-20 | End: 2024-07-22 | Stop reason: HOSPADM

## 2024-07-20 RX ORDER — SIMETHICONE 80 MG
1 TABLET,CHEWABLE ORAL 4 TIMES DAILY PRN
Status: DISCONTINUED | OUTPATIENT
Start: 2024-07-20 | End: 2024-07-22 | Stop reason: HOSPADM

## 2024-07-20 RX ORDER — AMOXICILLIN 250 MG
1 CAPSULE ORAL 2 TIMES DAILY
Status: DISCONTINUED | OUTPATIENT
Start: 2024-07-20 | End: 2024-07-22 | Stop reason: HOSPADM

## 2024-07-20 RX ORDER — IPRATROPIUM BROMIDE AND ALBUTEROL SULFATE 2.5; .5 MG/3ML; MG/3ML
3 SOLUTION RESPIRATORY (INHALATION)
Status: COMPLETED | OUTPATIENT
Start: 2024-07-20 | End: 2024-07-20

## 2024-07-20 RX ORDER — METHYLPREDNISOLONE SOD SUCC 125 MG
80 VIAL (EA) INJECTION
Status: COMPLETED | OUTPATIENT
Start: 2024-07-20 | End: 2024-07-20

## 2024-07-20 RX ORDER — NALOXONE HCL 0.4 MG/ML
0.02 VIAL (ML) INJECTION
Status: DISCONTINUED | OUTPATIENT
Start: 2024-07-20 | End: 2024-07-22 | Stop reason: HOSPADM

## 2024-07-20 RX ORDER — SODIUM CHLORIDE, SODIUM LACTATE, POTASSIUM CHLORIDE, CALCIUM CHLORIDE 600; 310; 30; 20 MG/100ML; MG/100ML; MG/100ML; MG/100ML
1000 INJECTION, SOLUTION INTRAVENOUS
Status: COMPLETED | OUTPATIENT
Start: 2024-07-20 | End: 2024-07-20

## 2024-07-20 RX ORDER — ONDANSETRON HYDROCHLORIDE 2 MG/ML
4 INJECTION, SOLUTION INTRAVENOUS EVERY 8 HOURS PRN
Status: DISCONTINUED | OUTPATIENT
Start: 2024-07-20 | End: 2024-07-22 | Stop reason: HOSPADM

## 2024-07-20 RX ORDER — HYDROCODONE BITARTRATE AND ACETAMINOPHEN 5; 325 MG/1; MG/1
1 TABLET ORAL EVERY 6 HOURS PRN
Status: DISCONTINUED | OUTPATIENT
Start: 2024-07-20 | End: 2024-07-22 | Stop reason: HOSPADM

## 2024-07-20 RX ORDER — IPRATROPIUM BROMIDE AND ALBUTEROL SULFATE 2.5; .5 MG/3ML; MG/3ML
3 SOLUTION RESPIRATORY (INHALATION) EVERY 4 HOURS PRN
Status: DISCONTINUED | OUTPATIENT
Start: 2024-07-20 | End: 2024-07-22 | Stop reason: HOSPADM

## 2024-07-20 RX ORDER — ACETAMINOPHEN 325 MG/1
650 TABLET ORAL EVERY 4 HOURS PRN
Status: DISCONTINUED | OUTPATIENT
Start: 2024-07-20 | End: 2024-07-22 | Stop reason: HOSPADM

## 2024-07-20 RX ORDER — MORPHINE SULFATE 2 MG/ML
2 INJECTION, SOLUTION INTRAMUSCULAR; INTRAVENOUS ONCE
Status: DISCONTINUED | OUTPATIENT
Start: 2024-07-20 | End: 2024-07-21

## 2024-07-20 RX ORDER — TALC
6 POWDER (GRAM) TOPICAL NIGHTLY PRN
Status: DISCONTINUED | OUTPATIENT
Start: 2024-07-20 | End: 2024-07-22 | Stop reason: HOSPADM

## 2024-07-20 RX ORDER — SODIUM CHLORIDE 0.9 % (FLUSH) 0.9 %
10 SYRINGE (ML) INJECTION EVERY 8 HOURS PRN
Status: DISCONTINUED | OUTPATIENT
Start: 2024-07-20 | End: 2024-07-22 | Stop reason: HOSPADM

## 2024-07-20 RX ORDER — NAPROXEN SODIUM 220 MG/1
TABLET, FILM COATED ORAL
Status: DISPENSED
Start: 2024-07-20 | End: 2024-07-21

## 2024-07-20 RX ORDER — ALUMINUM HYDROXIDE, MAGNESIUM HYDROXIDE, AND SIMETHICONE 1200; 120; 1200 MG/30ML; MG/30ML; MG/30ML
30 SUSPENSION ORAL 4 TIMES DAILY PRN
Status: DISCONTINUED | OUTPATIENT
Start: 2024-07-20 | End: 2024-07-22 | Stop reason: HOSPADM

## 2024-07-20 RX ORDER — LIDOCAINE HYDROCHLORIDE 10 MG/ML
10 INJECTION, SOLUTION EPIDURAL; INFILTRATION; INTRACAUDAL; PERINEURAL
Status: COMPLETED | OUTPATIENT
Start: 2024-07-20 | End: 2024-07-20

## 2024-07-20 RX ORDER — LABETALOL HYDROCHLORIDE 5 MG/ML
10 INJECTION, SOLUTION INTRAVENOUS
Status: COMPLETED | OUTPATIENT
Start: 2024-07-20 | End: 2024-07-20

## 2024-07-20 RX ORDER — NAPROXEN SODIUM 220 MG/1
162 TABLET, FILM COATED ORAL
Status: COMPLETED | OUTPATIENT
Start: 2024-07-20 | End: 2024-07-20

## 2024-07-20 RX ADMIN — HYDROCODONE BITARTRATE AND ACETAMINOPHEN 1 TABLET: 5; 325 TABLET ORAL at 10:07

## 2024-07-20 RX ADMIN — SODIUM CHLORIDE, POTASSIUM CHLORIDE, SODIUM LACTATE AND CALCIUM CHLORIDE 1000 ML: 600; 310; 30; 20 INJECTION, SOLUTION INTRAVENOUS at 04:07

## 2024-07-20 RX ADMIN — ASPIRIN 81 MG CHEWABLE TABLET 162 MG: 81 TABLET CHEWABLE at 02:07

## 2024-07-20 RX ADMIN — ENOXAPARIN SODIUM 40 MG: 40 INJECTION SUBCUTANEOUS at 10:07

## 2024-07-20 RX ADMIN — SODIUM CHLORIDE 1000 ML: 9 INJECTION, SOLUTION INTRAVENOUS at 03:07

## 2024-07-20 RX ADMIN — IOHEXOL 100 ML: 350 INJECTION, SOLUTION INTRAVENOUS at 04:07

## 2024-07-20 RX ADMIN — LIDOCAINE HYDROCHLORIDE 100 MG: 10 INJECTION, SOLUTION EPIDURAL; INFILTRATION; INTRACAUDAL at 05:07

## 2024-07-20 RX ADMIN — IPRATROPIUM BROMIDE AND ALBUTEROL SULFATE 3 ML: 2.5; .5 SOLUTION RESPIRATORY (INHALATION) at 02:07

## 2024-07-20 RX ADMIN — SODIUM CHLORIDE 1000 ML: 9 INJECTION, SOLUTION INTRAVENOUS at 02:07

## 2024-07-20 RX ADMIN — CEFEPIME 2 G: 2 INJECTION, POWDER, FOR SOLUTION INTRAVENOUS at 03:07

## 2024-07-20 RX ADMIN — METHYLPREDNISOLONE SODIUM SUCCINATE 80 MG: 125 INJECTION, POWDER, FOR SOLUTION INTRAMUSCULAR; INTRAVENOUS at 02:07

## 2024-07-20 RX ADMIN — LABETALOL HYDROCHLORIDE 10 MG: 5 INJECTION INTRAVENOUS at 08:07

## 2024-07-20 NOTE — ED PROVIDER NOTES
Encounter Date: 7/20/2024       History     Chief Complaint   Patient presents with    Shortness of Breath     Pt states that he started with SOB has hx of COPD denies home O2. A pk a day smoker      Patient presents with shortness of breath, productive cough for the past 2 days.  He has a history of COPD.  He does not use home oxygen.  He denies any fevers/chills.  Denies any nausea/vomiting.  States that he feels chest pressure whenever he lies down only.    The history is provided by the patient.     Review of patient's allergies indicates:   Allergen Reactions    Augmentin [amoxicillin-pot clavulanate] Hives and Rash    Tramadol Nausea Only     Past Medical History:   Diagnosis Date    Allergy     DDD (degenerative disc disease), lumbar     Hyperlipidemia     Hypertension     Nicotine abuse     Obesity     Scrotal mass     Type II diabetes mellitus with renal manifestations, uncontrolled     microalbuminuria    Type II or unspecified type diabetes mellitus without mention of complication, not stated as uncontrolled     Ulcer      Past Surgical History:   Procedure Laterality Date    CARPAL TUNNEL RELEASE      CONDYLOMA EXCISION/FULGURATION Bilateral 2014    scrotal    KNEE SURGERY Left     removal of cartilage with no implant    LUMBAR LAMINECTOMY  2017    L4-5    ULNAR NERVE TRANSPOSITION       Family History   Problem Relation Name Age of Onset    Cancer Mother          Liver    Cataracts Mother      Liver cancer Mother      Arthritis Mother      Dementia Mother      Cancer Father          Lung met Brain    Lung cancer Father      Brain cancer Father      Cancer Sister x3     Ovarian cancer Sister x3     No Known Problems Brother x2     No Known Problems Daughter x3     Alcohol abuse Paternal Uncle      Cirrhosis Paternal Uncle      Diabetes Paternal Grandmother      Cancer Paternal Grandfather      Amblyopia Neg Hx      Blindness Neg Hx      Glaucoma Neg Hx      Macular degeneration Neg Hx      Retinal  detachment Neg Hx      Strabismus Neg Hx       Social History     Tobacco Use    Smoking status: Every Day     Current packs/day: 1.80     Average packs/day: 1.8 packs/day for 62.6 years (112.6 ttl pk-yrs)     Types: Cigarettes     Start date: 1962    Smokeless tobacco: Never    Tobacco comments:     Quit for 5 years; used to smoke 3 packs per day when younger, then 2 packs/day, now down to less than 1 pack/day      Declined smoking cessation; he is cutting down amount he smokes    Substance Use Topics    Alcohol use: Not Currently     Comment: rare    Drug use: Never     Review of Systems   Respiratory:  Positive for cough and shortness of breath.        Physical Exam     Initial Vitals [07/20/24 1304]   BP Pulse Resp Temp SpO2   (!) 177/80 (!) 126 (!) 26 97.5 °F (36.4 °C) (!) 93 %      MAP       --         Physical Exam    Vitals reviewed.  Constitutional: He appears well-developed. He appears distressed.   Cardiovascular:            Tachycardic rate, regular rhythm, no murmurs noted   Pulmonary/Chest:   Decreased breath sounds bilaterally   Abdominal: Abdomen is soft. There is no abdominal tenderness.   Musculoskeletal:         General: Normal range of motion.     Neurological: He is alert and oriented to person, place, and time. He has normal strength. No sensory deficit.   Skin: Skin is warm and dry. No rash noted.         ED Course   Chest Tube    Date/Time: 7/20/2024 5:20 PM  Location procedure was performed: [facility] EMERGENCY DEPARTMENT    Performed by: Peter Mcbride DO  Authorized by: Peter Mcbride DO  Assisting provider: Conner Cuevas MD  Post-operative diagnosis: Left-sided pneumothorax  Pre-operative diagnosis: Left-sided pneumothorax  Consent Done: Yes  Consent: Verbal consent obtained. Written consent obtained.  Risks and benefits: risks, benefits and alternatives were discussed  Consent given by: patient  Patient understanding: patient states understanding of the procedure being  "performed  Patient consent: the patient's understanding of the procedure matches consent given  Procedure consent: procedure consent matches procedure scheduled  Relevant documents: relevant documents present and verified  Test results: test results available and properly labeled  Site marked: the operative site was marked  Imaging studies: imaging studies available  Required items: required blood products, implants, devices, and special equipment available  Patient identity confirmed: MRN, name and verbally with patient  Time out: Immediately prior to procedure a "time out" was called to verify the correct patient, procedure, equipment, support staff and site/side marked as required.  Indications: pneumothorax    Patient sedated: no  Anesthesia: local infiltration    Anesthesia:  Local Anesthetic: lidocaine 1% without epinephrine  Anesthetic total: 10 mL  Preparation: skin prepped with Betadine  Placement location: left anterior  Scalpel size: 11  Tube size (Namibian): 13.  Ultrasound guidance: no  Tension pneumothorax heard: no  Tube connected to: water seal  Complications: No  Specimens: No  Implants: No  Comments: 13 Tristanian Thora vent placed connected to water-seal        Labs Reviewed   CBC W/ AUTO DIFFERENTIAL - Abnormal       Result Value    WBC 10.79      RBC 4.74      Hemoglobin 15.2      Hematocrit 45.5      MCV 96      MCH 32.1 (*)     MCHC 33.4      RDW 13.5      Platelets 266      MPV 10.7      Immature Granulocytes 0.5      Gran # (ANC) 7.0      Immature Grans (Abs) 0.05 (*)     Lymph # 2.6      Mono # 0.9      Eos # 0.1      Baso # 0.02      nRBC 0      Gran % 65.2      Lymph % 24.4      Mono % 8.7      Eosinophil % 1.0      Basophil % 0.2      Differential Method Automated     COMPREHENSIVE METABOLIC PANEL - Abnormal    Sodium 140      Potassium 4.7      Chloride 101      CO2 22 (*)     Glucose 221 (*)     BUN 32 (*)     Creatinine 1.8 (*)     Calcium 9.8      Total Protein 8.1      Albumin 3.9      " Total Bilirubin 0.6      Alkaline Phosphatase 79      AST 28      ALT 20      eGFR 39 (*)     Anion Gap 17 (*)    LACTIC ACID, PLASMA - Abnormal    Lactate (Lactic Acid) 3.1 (*)    URINALYSIS, REFLEX TO URINE CULTURE - Abnormal    Specimen UA Urine, Clean Catch      Color, UA Yellow      Appearance, UA Clear      pH, UA 6.0      Specific Gravity, UA 1.010      Protein, UA 2+ (*)     Glucose, UA Trace (*)     Ketones, UA Negative      Bilirubin (UA) Negative      Occult Blood UA Negative      Nitrite, UA Negative      Urobilinogen, UA Negative      Leukocytes, UA Negative      Narrative:     Specimen Source->Urine   URINALYSIS MICROSCOPIC - Abnormal    RBC, UA 1      WBC, UA 1      Bacteria None      Hyaline Casts, UA 3 (*)     Microscopic Comment SEE COMMENT      Narrative:     Specimen Source->Urine   INFLUENZA A & B BY MOLECULAR    Influenza A, Molecular Negative      Influenza B, Molecular Negative      Flu A & B Source Nasal swab     B-TYPE NATRIURETIC PEPTIDE    BNP 17     TROPONIN I    Troponin I <0.006     SARS-COV-2 RNA AMPLIFICATION, QUAL    SARS-CoV-2 RNA, Amplification, Qual Negative     MAGNESIUM    Magnesium 2.0       EKG Readings: (Independently Interpreted)   Sinus rhythm rate of 128, right axis, right bundle-branch block noted, nonspecific ST and T-wave abnormalities       Imaging Results              X-Ray Chest AP Portable (Final result)  Result time 07/20/24 18:23:27      Final result by Jere Lovell MD (07/20/24 18:23:27)                   Impression:      As above.      Electronically signed by: Jere Lovell  Date:    07/20/2024  Time:    18:23               Narrative:    EXAMINATION:  XR CHEST AP PORTABLE    CLINICAL HISTORY:  Status post thoravent placement;    TECHNIQUE:  Single frontal view of the chest was performed.    COMPARISON:  07/20/2024 at 14:09 hours    FINDINGS:  Thora vent has been placed with resolution of previous left pneumothorax.  Subcu emphysema is noted over the  axilla on the left.  There is no re-expansion edema.                                       CTA Chest Non-Coronary (PE Studies) (Edited Result - FINAL)  Result time 07/20/24 16:50:03      Addendum (preliminary) 1 of 1 by Timo Giang MD (07/20/24 16:50:03)    COMMUNICATION  This critical result was discovered/received at 1623. The critical information above was relayed directly by me  to EDEN Mcbride  at 1635.      Electronically signed by: Timo Giang MD  Date:    07/20/2024  Time:    16:50                 Final result by Timo Giang MD (07/20/24 16:25:33)                   Impression:      20-30% left lateral pneumothorax with minimal left right midline shift.    No CT angiography evidence of acute pulmonary thromboembolic disease.      Electronically signed by: Timo Giang MD  Date:    07/20/2024  Time:    16:25               Narrative:    EXAMINATION:  CTA CHEST NON CORONARY (PE STUDIES)    CLINICAL HISTORY:  Pulmonary embolism (PE) suspected, high prob;    TECHNIQUE:  Low dose axial images, sagittal and coronal reformations were obtained from the thoracic inlet to the lung bases following the IV administration of 100 mL of Omnipaque 350.  Contrast timing was optimized to evaluate the pulmonary arteries.  MIP images were performed.    COMPARISON:  X-ray dated July 20, 2024 and prior lung screening exam performed August 8, 2023    FINDINGS:  Moderate lower cervical spondylotic changes are noted.  There is no evidence of mediastinal or hilar adenopathy.  There is suggestion of a small hiatal hernia.  A high density dependent gallstone is seen in the gallbladder.  The remainder the upper abdomen is unremarkable.  Calcified granuloma is noted in the right lung at the level the christi on series 2, image number 191 measuring 8 mm.  Vascular calcifications including coronary artery calcifications are present.  The images are somewhat degraded by motion.  There is no CT angiography evidence of acute  pulmonary thromboembolic disease..    There is moderate size left pneumothorax without evidence of mediastinal shift.  This is primarily located anteriorly.  There is mild left right midline shift.                                       X-Ray Chest 1 View (Edited Result - FINAL)  Result time 07/20/24 16:27:03      Addendum (preliminary) 1 of 1 by Timo Giang MD (07/20/24 16:27:03)      Upon further review a 20% to 30% left-sided pneumothorax is evident.      Electronically signed by: Timo Giang MD  Date:    07/20/2024  Time:    16:27                 Final result by Timo Giang MD (07/20/24 14:19:54)                   Impression:      No evidence of acute cardiopulmonary disease      Electronically signed by: Timo Giang MD  Date:    07/20/2024  Time:    14:19               Narrative:    EXAMINATION:  XR CHEST 1 VIEW    CLINICAL HISTORY:  shortness of breath;    TECHNIQUE:  Single frontal view of the chest was performed.    COMPARISON:  August 28, 2023    FINDINGS:  Mild degenerative changes are seen in the thoracic spine.  The heart is not enlarged.  There is no evidence of acute cardiopulmonary disease.                                       Medications   sodium chloride 0.9% flush 10 mL (has no administration in time range)   albuterol-ipratropium 2.5 mg-0.5 mg/3 mL nebulizer solution 3 mL (has no administration in time range)   melatonin tablet 6 mg (has no administration in time range)   ondansetron injection 4 mg (has no administration in time range)   senna-docusate 8.6-50 mg per tablet 1 tablet (1 tablet Oral Not Given 7/21/24 0900)   simethicone chewable tablet 80 mg (has no administration in time range)   aluminum-magnesium hydroxide-simethicone 200-200-20 mg/5 mL suspension 30 mL (has no administration in time range)   naloxone 0.4 mg/mL injection 0.02 mg (has no administration in time range)   enoxaparin injection 40 mg (40 mg Subcutaneous Given 7/20/24 3120)   acetaminophen tablet 650  mg (has no administration in time range)   HYDROcodone-acetaminophen 5-325 mg per tablet 1 tablet (1 tablet Oral Given 7/21/24 1114)   glucose chewable tablet 16 g (has no administration in time range)   glucose chewable tablet 24 g (has no administration in time range)   glucagon (human recombinant) injection 1 mg (has no administration in time range)   insulin aspart U-100 pen 0-5 Units (2 Units Subcutaneous Given 7/21/24 1150)   dextrose 10% bolus 125 mL 125 mL (has no administration in time range)   dextrose 10% bolus 250 mL 250 mL (has no administration in time range)   albuterol-ipratropium 2.5 mg-0.5 mg/3 mL nebulizer solution 3 mL (3 mLs Nebulization Given 7/20/24 1410)   aspirin chewable tablet 162 mg (162 mg Oral Given 7/20/24 1413)   sodium chloride 0.9% bolus 1,000 mL 1,000 mL (0 mLs Intravenous Stopped 7/20/24 1643)   methylPREDNISolone sodium succinate injection 80 mg (80 mg Intravenous Given 7/20/24 1430)   sodium chloride 0.9% bolus 1,000 mL 1,000 mL (0 mLs Intravenous Stopped 7/20/24 1643)   ceFEPIme (MAXIPIME) 2 g in D5W 100 mL IVPB (MB+) (0 g Intravenous Stopped 7/20/24 1607)   iohexoL (OMNIPAQUE 350) injection 100 mL (100 mLs Intravenous Given 7/20/24 1608)   lactated ringers infusion (0 mLs Intravenous Stopped 7/20/24 2013)   LIDOcaine (PF) 10 mg/ml (1%) injection 100 mg (100 mg Infiltration Given 7/20/24 1728)   labetaloL injection 10 mg (10 mg Intravenous Given 7/20/24 2004)     Medical Decision Making  Patient initially treated as sepsis secondary to vital signs, found to have 20-30% pneumothorax on the left side upon obtaining a CTA of the chest, thora vent was placed at bedside with successful re-expansion of the left-sided pneumothorax.  The thora vent was splint to water seal.  Patient accepted for admission by [facility] for further workup and treatment.    Amount and/or Complexity of Data Reviewed  Labs: ordered. Decision-making details documented in ED  Course.  Radiology: ordered. Decision-making details documented in ED Course.    Risk  OTC drugs.  Prescription drug management.  Decision regarding hospitalization.  Risk Details: Differential diagnosis includes but is not limited to:  COPD exacerbation, pneumonia, bronchitis, URI, pneumothorax, pulmonary embolism, ACS, heart failure               ED Course as of 07/21/24 1628   Sat Jul 20, 2024   1419 CBC Auto Differential(!)  Nonspecific findings [CD]   1420 Influenza A & B by Molecular  Negative [CD]   1420 COVID-19 Rapid Screening  Negative [CD]   1430 X-Ray Chest 1 View  No acute findings [CD]   1442 Lactic acid, plasma(!)  Elevated [CD]   1442 Comprehensive Metabolic Panel(!) [CD]   1443 Comprehensive Metabolic Panel(!)  Chronic kidney disease, hyperglycemia with an anion gap acidosis [CD]   1443 Troponin I  Within normal limits [CD]   1444 Magnesium  Within normal limits [CD]   1444 Brain natriuretic peptide  Within normal limits [CD]   1529 Troponin I  Within normal limits [CD]   1529 Brain natriuretic peptide  Within normal limits [CD]   1701 Urinalysis, Reflex to Urine Culture Urine, Clean Catch(!)  No UTI noted [CD]   1743 X-Ray Chest 1 View  Upon further review a 20% to 30% left-sided pneumothorax is evident.      [CD]   1744 CTA Chest Non-Coronary (PE Studies)  20-30% left lateral pneumothorax with minimal left right midline shift.     No CT angiography evidence of acute pulmonary thromboembolic disease   [CD]   1829 X-Ray Chest AP Portable  Thora vent has been placed with resolution of previous left pneumothorax.  Subcu emphysema is noted over the axilla on the left.  There is no re-expansion edema.      [CD]      ED Course User Index  [CD] Peter Mcbride DO                             Clinical Impression:  Final diagnoses:  [R06.02] SOB (shortness of breath)  [J93.9] Pneumothorax, unspecified type (Primary)          ED Disposition Condition    Admit                 Peter Mcbride,  DO  07/21/24 1620

## 2024-07-21 PROBLEM — J93.9 PNEUMOTHORAX ON LEFT: Status: ACTIVE | Noted: 2024-07-21

## 2024-07-21 PROBLEM — I25.10 CAD (CORONARY ARTERY DISEASE): Status: ACTIVE | Noted: 2024-07-21

## 2024-07-21 PROBLEM — J96.01 ACUTE HYPOXIC RESPIRATORY FAILURE: Status: ACTIVE | Noted: 2024-07-21

## 2024-07-21 LAB
FIO2: 21 %
PCO2 BLDA: 48.3 MMHG (ref 35–45)
PH SMN: 7.34 [PH] (ref 7.35–7.45)
PO2 BLDA: 51.6 MMHG (ref 40–60)
POC BASE DEFICIT: -0.1 MMOL/L (ref -2–2)
POC HCO3: 26.2 MMOL/L (ref 24–28)
POC PERFORMED BY: ABNORMAL
POC SATURATED O2: 87.3 % (ref 95–100)
POCT GLUCOSE: 187 MG/DL (ref 70–110)
POCT GLUCOSE: 206 MG/DL (ref 70–110)
POCT GLUCOSE: 219 MG/DL (ref 70–110)
SPECIMEN SOURCE: ABNORMAL

## 2024-07-21 PROCEDURE — 63600175 PHARM REV CODE 636 W HCPCS: Mod: HCNC

## 2024-07-21 PROCEDURE — 25000003 PHARM REV CODE 250: Mod: HCNC | Performed by: NURSE PRACTITIONER

## 2024-07-21 PROCEDURE — 11000001 HC ACUTE MED/SURG PRIVATE ROOM: Mod: HCNC

## 2024-07-21 PROCEDURE — 25000003 PHARM REV CODE 250: Mod: HCNC

## 2024-07-21 PROCEDURE — 94761 N-INVAS EAR/PLS OXIMETRY MLT: CPT | Mod: HCNC

## 2024-07-21 PROCEDURE — 99900035 HC TECH TIME PER 15 MIN (STAT): Mod: HCNC

## 2024-07-21 PROCEDURE — 63600175 PHARM REV CODE 636 W HCPCS: Mod: HCNC | Performed by: NURSE PRACTITIONER

## 2024-07-21 PROCEDURE — 82803 BLOOD GASES ANY COMBINATION: CPT | Mod: HCNC

## 2024-07-21 PROCEDURE — 27000221 HC OXYGEN, UP TO 24 HOURS: Mod: HCNC

## 2024-07-21 RX ORDER — GABAPENTIN 300 MG/1
300 CAPSULE ORAL 3 TIMES DAILY
Status: DISCONTINUED | OUTPATIENT
Start: 2024-07-21 | End: 2024-07-21

## 2024-07-21 RX ORDER — LISINOPRIL 20 MG/1
20 TABLET ORAL DAILY
Status: DISCONTINUED | OUTPATIENT
Start: 2024-07-21 | End: 2024-07-22 | Stop reason: HOSPADM

## 2024-07-21 RX ORDER — GLUCAGON 1 MG
1 KIT INJECTION
Status: DISCONTINUED | OUTPATIENT
Start: 2024-07-21 | End: 2024-07-22 | Stop reason: HOSPADM

## 2024-07-21 RX ORDER — GABAPENTIN 300 MG/1
300 CAPSULE ORAL 3 TIMES DAILY PRN
Status: DISCONTINUED | OUTPATIENT
Start: 2024-07-21 | End: 2024-07-22 | Stop reason: HOSPADM

## 2024-07-21 RX ORDER — ASPIRIN 81 MG/1
81 TABLET ORAL DAILY
Status: DISCONTINUED | OUTPATIENT
Start: 2024-07-22 | End: 2024-07-22 | Stop reason: HOSPADM

## 2024-07-21 RX ORDER — PRAVASTATIN SODIUM 40 MG/1
40 TABLET ORAL NIGHTLY
Status: DISCONTINUED | OUTPATIENT
Start: 2024-07-21 | End: 2024-07-22 | Stop reason: HOSPADM

## 2024-07-21 RX ORDER — INSULIN ASPART 100 [IU]/ML
0-5 INJECTION, SOLUTION INTRAVENOUS; SUBCUTANEOUS
Status: DISCONTINUED | OUTPATIENT
Start: 2024-07-21 | End: 2024-07-22 | Stop reason: HOSPADM

## 2024-07-21 RX ORDER — IBUPROFEN 200 MG
24 TABLET ORAL
Status: DISCONTINUED | OUTPATIENT
Start: 2024-07-21 | End: 2024-07-22 | Stop reason: HOSPADM

## 2024-07-21 RX ORDER — IBUPROFEN 200 MG
16 TABLET ORAL
Status: DISCONTINUED | OUTPATIENT
Start: 2024-07-21 | End: 2024-07-22 | Stop reason: HOSPADM

## 2024-07-21 RX ORDER — FUROSEMIDE 20 MG/1
20 TABLET ORAL DAILY
Status: DISCONTINUED | OUTPATIENT
Start: 2024-07-21 | End: 2024-07-22 | Stop reason: HOSPADM

## 2024-07-21 RX ORDER — FUROSEMIDE 20 MG/1
20 TABLET ORAL DAILY
Status: DISCONTINUED | OUTPATIENT
Start: 2024-07-22 | End: 2024-07-21

## 2024-07-21 RX ORDER — LISINOPRIL 20 MG/1
20 TABLET ORAL DAILY
Status: DISCONTINUED | OUTPATIENT
Start: 2024-07-22 | End: 2024-07-21

## 2024-07-21 RX ADMIN — INSULIN ASPART 2 UNITS: 100 INJECTION, SOLUTION INTRAVENOUS; SUBCUTANEOUS at 04:07

## 2024-07-21 RX ADMIN — ENOXAPARIN SODIUM 40 MG: 40 INJECTION SUBCUTANEOUS at 04:07

## 2024-07-21 RX ADMIN — INSULIN ASPART 2 UNITS: 100 INJECTION, SOLUTION INTRAVENOUS; SUBCUTANEOUS at 11:07

## 2024-07-21 RX ADMIN — PRAVASTATIN SODIUM 40 MG: 40 TABLET ORAL at 10:07

## 2024-07-21 RX ADMIN — LISINOPRIL 20 MG: 20 TABLET ORAL at 06:07

## 2024-07-21 RX ADMIN — HYDROCODONE BITARTRATE AND ACETAMINOPHEN 1 TABLET: 5; 325 TABLET ORAL at 11:07

## 2024-07-21 RX ADMIN — FUROSEMIDE 20 MG: 20 TABLET ORAL at 06:07

## 2024-07-21 RX ADMIN — GABAPENTIN 300 MG: 300 CAPSULE ORAL at 10:07

## 2024-07-21 RX ADMIN — HYDROCODONE BITARTRATE AND ACETAMINOPHEN 1 TABLET: 5; 325 TABLET ORAL at 05:07

## 2024-07-21 NOTE — ASSESSMENT & PLAN NOTE
Creatine stable for now. BMP reviewed- noted Estimated Creatinine Clearance: 49.3 mL/min (A) (based on SCr of 1.8 mg/dL (H)). according to latest data. Based on current GFR, CKD stage is stage 3 - GFR 30-59.  Monitor UOP and serial BMP and adjust therapy as needed. Renally dose meds. Avoid nephrotoxic medications and procedures.   no prior exposure.

## 2024-07-21 NOTE — ASSESSMENT & PLAN NOTE
Creatine stable for now. BMP reviewed- noted Estimated Creatinine Clearance: 49.3 mL/min (A) (based on SCr of 1.8 mg/dL (H)). according to latest data. Based on current GFR, CKD stage is stage 3 - GFR 30-59.  Monitor UOP and serial BMP and adjust therapy as needed. Renally dose meds. Avoid nephrotoxic medications and procedures.    Cr at baseline

## 2024-07-21 NOTE — ASSESSMENT & PLAN NOTE
Patient with Hypoxic Respiratory failure which is Acute.  he is not on home oxygen. Supplemental oxygen was provided and noted-      .   Signs/symptoms of respiratory failure include- tachypnea, increased work of breathing, respiratory distress, use of accessory muscles, and lethargy. Contributing diagnoses includes - COPD and spontaneous pneumothorax  Labs and images were reviewed. Patient Has recent ABG, which has been reviewed. Will treat underlying causes and adjust management of respiratory failure as needed  -continue supplemental O2 wean as tolerated  -consult pulmonology, thoravent placed and lung reexpansion noted on CXR; clamped

## 2024-07-21 NOTE — SUBJECTIVE & OBJECTIVE
Past Medical History:   Diagnosis Date    Allergy     DDD (degenerative disc disease), lumbar     Hyperlipidemia     Hypertension     Nicotine abuse     Obesity     Scrotal mass     Type II diabetes mellitus with renal manifestations, uncontrolled     microalbuminuria    Type II or unspecified type diabetes mellitus without mention of complication, not stated as uncontrolled     Ulcer        Past Surgical History:   Procedure Laterality Date    CARPAL TUNNEL RELEASE      CONDYLOMA EXCISION/FULGURATION Bilateral 2014    scrotal    KNEE SURGERY Left     removal of cartilage with no implant    LUMBAR LAMINECTOMY  2017    L4-5    ULNAR NERVE TRANSPOSITION         Review of patient's allergies indicates:   Allergen Reactions    Augmentin [amoxicillin-pot clavulanate] Hives and Rash    Tramadol Nausea Only       No current facility-administered medications on file prior to encounter.     Current Outpatient Medications on File Prior to Encounter   Medication Sig    aspirin (ECOTRIN) 81 MG EC tablet Take 81 mg by mouth once daily.    clotrimazole-betamethasone 1-0.05% (LOTRISONE) cream Apply topically 2 (two) times daily.    diclofenac sodium (VOLTAREN) 1 % Gel Apply 2 g topically 3 (three) times daily as needed.    furosemide (LASIX) 20 MG tablet TAKE 1 TABLET(20 MG) BY MOUTH EVERY DAY    gabapentin (NEURONTIN) 300 MG capsule TAKE 1 CAPSULE(300 MG) BY MOUTH THREE TIMES DAILY    LANTUS SOLOSTAR U-100 INSULIN 100 unit/mL (3 mL) InPn pen INJECT 10 UNITS UNDER THE SKIN EVERY DAY (Patient taking differently: Inject 15 Units into the skin once daily.)    lisinopriL-hydrochlorothiazide (PRINZIDE,ZESTORETIC) 20-25 mg Tab TAKE 1 TABLET BY MOUTH EVERY DAY    metFORMIN (GLUCOPHAGE-XR) 500 MG ER 24hr tablet TAKE 2 TABLETS(1000 MG) BY MOUTH TWICE DAILY WITH MEALS    multivitamin capsule Take 1 capsule by mouth once daily.    mupirocin (BACTROBAN) 2 % ointment Apply topically 3 (three) times daily.    pravastatin (PRAVACHOL) 40 MG  "tablet TAKE 1 TABLET(40 MG) BY MOUTH EVERY EVENING    sildenafiL (VIAGRA) 100 MG tablet Take 1 tablet (100 mg total) by mouth daily as needed for Erectile Dysfunction.    blood sugar diagnostic Strp To check BG 3 times daily, to use with insurance preferred meter    blood-glucose meter kit To check BG 3 times daily, to use with insurance preferred meter    lancets Misc To check BG 3 times daily, to use with insurance preferred meter    pen needle, diabetic (BD ULTRA-FINE SHORT PEN NEEDLE) 31 gauge x 5/16" Ndle USE WITH INSULIN PEN AS DIRECTED    tirzepatide 10 mg/0.5 mL PnIj Inject 10 mg into the skin every 7 days.    ULTRA THIN LANCETS 30 gauge Misc CHECK BLOOD GLUCOSE BID     Family History       Problem Relation (Age of Onset)    Alcohol abuse Paternal Uncle    Arthritis Mother    Brain cancer Father    Cancer Mother, Father, Sister, Paternal Grandfather    Cataracts Mother    Cirrhosis Paternal Uncle    Dementia Mother    Diabetes Paternal Grandmother    Liver cancer Mother    Lung cancer Father    No Known Problems Brother, Daughter    Ovarian cancer Sister          Tobacco Use    Smoking status: Every Day     Current packs/day: 1.80     Average packs/day: 1.8 packs/day for 62.6 years (112.6 ttl pk-yrs)     Types: Cigarettes     Start date: 1962    Smokeless tobacco: Never    Tobacco comments:     Quit for 5 years; used to smoke 3 packs per day when younger, then 2 packs/day, now down to less than 1 pack/day      Declined smoking cessation; he is cutting down amount he smokes    Substance and Sexual Activity    Alcohol use: Not Currently     Comment: rare    Drug use: Never    Sexual activity: Yes     Partners: Female     Review of Systems   Constitutional:  Positive for fatigue. Negative for chills and fever.   HENT:  Positive for congestion.    Eyes:  Negative for photophobia.   Respiratory:  Positive for cough and shortness of breath. Negative for chest tightness and wheezing.    Cardiovascular:  Positive " for chest pain and leg swelling. Negative for palpitations.   Gastrointestinal:  Negative for abdominal pain, diarrhea, nausea and vomiting.   Genitourinary:  Negative for dysuria, flank pain and hematuria.   Musculoskeletal:  Negative for back pain, myalgias and neck pain.   Skin:  Negative for rash and wound.   Neurological:  Positive for weakness. Negative for dizziness, syncope and headaches.   Psychiatric/Behavioral:  Negative for agitation.      Objective:     Vital Signs (Most Recent):  Temp: 97.9 °F (36.6 °C) (07/21/24 0356)  Pulse: 108 (07/21/24 0356)  Resp: 20 (07/21/24 0515)  BP: 125/66 (07/21/24 0356)  SpO2: 95 % (07/21/24 0356) Vital Signs (24h Range):  Temp:  [97.4 °F (36.3 °C)-97.9 °F (36.6 °C)] 97.9 °F (36.6 °C)  Pulse:  [100-126] 108  Resp:  [18-26] 20  SpO2:  [91 %-100 %] 95 %  BP: (125-194)/() 125/66     Weight: (!) 139.5 kg (307 lb 8.7 oz)  Body mass index is 45.42 kg/m².     Physical Exam  Constitutional:       General: He is not in acute distress.     Appearance: He is well-developed. He is obese. He is ill-appearing.   HENT:      Head: Normocephalic and atraumatic.   Eyes:      Conjunctiva/sclera: Conjunctivae normal.      Pupils: Pupils are equal, round, and reactive to light.   Neck:      Vascular: No JVD.   Cardiovascular:      Rate and Rhythm: Regular rhythm. Tachycardia present.      Heart sounds: Normal heart sounds.   Pulmonary:      Effort: No respiratory distress.      Breath sounds: Normal breath sounds. No wheezing.      Comments: Tachypnea   Abdominal:      General: Bowel sounds are normal. There is no distension.      Palpations: Abdomen is soft.      Tenderness: There is no abdominal tenderness. There is no guarding.      Comments: Round, soft, non tender    Musculoskeletal:         General: No tenderness. Normal range of motion.      Cervical back: Normal range of motion and neck supple.      Right lower leg: Edema present.      Left lower leg: Edema present.   Skin:      General: Skin is warm and dry.      Capillary Refill: Capillary refill takes less than 2 seconds.      Findings: No erythema.   Neurological:      Mental Status: He is alert and oriented to person, place, and time.   Psychiatric:         Behavior: Behavior normal.              CRANIAL NERVES     CN III, IV, VI   Pupils are equal, round, and reactive to light.       Significant Labs: All pertinent labs within the past 24 hours have been reviewed.  CBC:   Recent Labs   Lab 07/20/24  1402   WBC 10.79   HGB 15.2   HCT 45.5        CMP:   Recent Labs   Lab 07/20/24  1402      K 4.7      CO2 22*   *   BUN 32*   CREATININE 1.8*   CALCIUM 9.8   PROT 8.1   ALBUMIN 3.9   BILITOT 0.6   ALKPHOS 79   AST 28   ALT 20   ANIONGAP 17*     Cardiac Markers:   Recent Labs   Lab 07/20/24  1402   BNP 17     Troponin:   Recent Labs   Lab 07/20/24  1402   TROPONINI <0.006     Urine Studies:   Recent Labs   Lab 07/20/24  1624   COLORU Yellow   APPEARANCEUA Clear   PHUR 6.0   SPECGRAV 1.010   PROTEINUA 2+*   GLUCUA Trace*   KETONESU Negative   BILIRUBINUA Negative   OCCULTUA Negative   NITRITE Negative   UROBILINOGEN Negative   LEUKOCYTESUR Negative   RBCUA 1   WBCUA 1   BACTERIA None   HYALINECASTS 3*       Significant Imaging: I have reviewed all pertinent imaging results/findings within the past 24 hours.

## 2024-07-21 NOTE — ASSESSMENT & PLAN NOTE
Patient with Hypoxic Respiratory failure which is Acute.  he is not on home oxygen. Supplemental oxygen was provided and noted- Oxygen Concentration (%):  [100] 100    .   Signs/symptoms of respiratory failure include- tachypnea, increased work of breathing, respiratory distress, use of accessory muscles, and lethargy. Contributing diagnoses includes - COPD and spontaneous pneumothorax  Labs and images were reviewed. Patient Has recent ABG, which has been reviewed. Will treat underlying causes and adjust management of respiratory failure as needed  -continue supplemental O2 wean as tolerated  -consult pulmonology

## 2024-07-21 NOTE — ASSESSMENT & PLAN NOTE
-patient presented with chest tightness, cough, SOB x 2 days exacerbated by lying joey. 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  This is primarily located anteriorly.  There is mild left right midline shift. No PE  - patient s/p thora vent placement with re expansion of left lung noted on f/u images   -continue supplemental O2, wean as tolerated   - pain management   -consult pulmonology

## 2024-07-21 NOTE — ASSESSMENT & PLAN NOTE
Chronic, controlled. Latest blood pressure and vitals reviewed-     Temp:  [97.4 °F (36.3 °C)-98.3 °F (36.8 °C)]   Pulse:  []   Resp:  [18-24]   BP: (116-194)/()   SpO2:  [95 %-100 %] .   Home meds for hypertension were reviewed and noted below.   Hypertension Medications               furosemide (LASIX) 20 MG tablet TAKE 1 TABLET(20 MG) BY MOUTH EVERY DAY    lisinopriL-hydrochlorothiazide (PRINZIDE,ZESTORETIC) 20-25 mg Tab TAKE 1 TABLET BY MOUTH EVERY DAY            While in the hospital, will manage blood pressure as follows; Continue home antihypertensive regimen     Will utilize p.r.n. blood pressure medication only if patient's blood pressure greater than 160/100 and he develops symptoms such as worsening chest pain or shortness of breath.

## 2024-07-21 NOTE — H&P
[facility]  History & Physical    Patient Name: Francisco Coppola  MRN: [id number]  Patient Class: IP- Inpatient  Admission Date: 7/20/2024  Attending Physician: Jg Cuellar,*   Primary Care Provider: Laureano Hebert MD         Patient information was obtained from patient, caregiver / friend, past medical records, and ER records.     Subjective:     Principal Problem:Acute hypoxic respiratory failure    Chief Complaint:   Chief Complaint   Patient presents with    Shortness of Breath     Pt states that he started with SOB has hx of COPD denies home O2. A pk a day smoker         HPI: Francisco Coppola is a 73 yo male with PMH of HTN, CAD, T2DM, CKD 3, tobacco abuse, COPD, pulmonary nodule and DDD who presented to [facility] ED with CC of shortness of breath. Onset 1 day PTA associated with cough and chest tightness. Symptoms worse when lying down. Pt denies fever/chills. No abdominal pain, n/v or diarrhea. He denies use of home O2.     Patient tachycardic, hypoxic and tachypneic on arrival. Labs remarkable for BUN/Cr 32/1.8 (baseline Cr 1.5), lactic acid 3.1. CTA chest shows moderate size left pneumothorax without evidence of mediastinal shift.  This is primarily located anteriorly.  There is mild left right midline shift. Thora-vent placed per ED. Patient reports improved dyspnea on my exam. No distress noted. Pulmonology consulted. Patient admitted to  for management of acute hypoxic respiratory failure In setting of spontaneous pneumothorax.        Past Medical History:   Diagnosis Date    Allergy     DDD (degenerative disc disease), lumbar     Hyperlipidemia     Hypertension     Nicotine abuse     Obesity     Scrotal mass     Type II diabetes mellitus with renal manifestations, uncontrolled     microalbuminuria    Type II or unspecified type diabetes mellitus without mention of complication, not stated as uncontrolled     Ulcer        Past Surgical History:    Procedure Laterality Date    CARPAL TUNNEL RELEASE      CONDYLOMA EXCISION/FULGURATION Bilateral 2014    scrotal    KNEE SURGERY Left     removal of cartilage with no implant    LUMBAR LAMINECTOMY  2017    L4-5    ULNAR NERVE TRANSPOSITION         Review of patient's allergies indicates:   Allergen Reactions    Augmentin [amoxicillin-pot clavulanate] Hives and Rash    Tramadol Nausea Only       No current facility-administered medications on file prior to encounter.     Current Outpatient Medications on File Prior to Encounter   Medication Sig    aspirin (ECOTRIN) 81 MG EC tablet Take 81 mg by mouth once daily.    clotrimazole-betamethasone 1-0.05% (LOTRISONE) cream Apply topically 2 (two) times daily.    diclofenac sodium (VOLTAREN) 1 % Gel Apply 2 g topically 3 (three) times daily as needed.    furosemide (LASIX) 20 MG tablet TAKE 1 TABLET(20 MG) BY MOUTH EVERY DAY    gabapentin (NEURONTIN) 300 MG capsule TAKE 1 CAPSULE(300 MG) BY MOUTH THREE TIMES DAILY    LANTUS SOLOSTAR U-100 INSULIN 100 unit/mL (3 mL) InPn pen INJECT 10 UNITS UNDER THE SKIN EVERY DAY (Patient taking differently: Inject 15 Units into the skin once daily.)    lisinopriL-hydrochlorothiazide (PRINZIDE,ZESTORETIC) 20-25 mg Tab TAKE 1 TABLET BY MOUTH EVERY DAY    metFORMIN (GLUCOPHAGE-XR) 500 MG ER 24hr tablet TAKE 2 TABLETS(1000 MG) BY MOUTH TWICE DAILY WITH MEALS    multivitamin capsule Take 1 capsule by mouth once daily.    mupirocin (BACTROBAN) 2 % ointment Apply topically 3 (three) times daily.    pravastatin (PRAVACHOL) 40 MG tablet TAKE 1 TABLET(40 MG) BY MOUTH EVERY EVENING    sildenafiL (VIAGRA) 100 MG tablet Take 1 tablet (100 mg total) by mouth daily as needed for Erectile Dysfunction.    blood sugar diagnostic Strp To check BG 3 times daily, to use with insurance preferred meter    blood-glucose meter kit To check BG 3 times daily, to use with insurance preferred meter    lancets Misc To check BG 3 times daily, to use with insurance  "preferred meter    pen needle, diabetic (BD ULTRA-FINE SHORT PEN NEEDLE) 31 gauge x 5/16" Ndle USE WITH INSULIN PEN AS DIRECTED    tirzepatide 10 mg/0.5 mL PnIj Inject 10 mg into the skin every 7 days.    ULTRA THIN LANCETS 30 gauge Misc CHECK BLOOD GLUCOSE BID     Family History       Problem Relation (Age of Onset)    Alcohol abuse Paternal Uncle    Arthritis Mother    Brain cancer Father    Cancer Mother, Father, Sister, Paternal Grandfather    Cataracts Mother    Cirrhosis Paternal Uncle    Dementia Mother    Diabetes Paternal Grandmother    Liver cancer Mother    Lung cancer Father    No Known Problems Brother, Daughter    Ovarian cancer Sister          Tobacco Use    Smoking status: Every Day     Current packs/day: 1.80     Average packs/day: 1.8 packs/day for 62.6 years (112.6 ttl pk-yrs)     Types: Cigarettes     Start date: 1962    Smokeless tobacco: Never    Tobacco comments:     Quit for 5 years; used to smoke 3 packs per day when younger, then 2 packs/day, now down to less than 1 pack/day      Declined smoking cessation; he is cutting down amount he smokes    Substance and Sexual Activity    Alcohol use: Not Currently     Comment: rare    Drug use: Never    Sexual activity: Yes     Partners: Female     Review of Systems   Constitutional:  Positive for fatigue. Negative for chills and fever.   HENT:  Positive for congestion.    Eyes:  Negative for photophobia.   Respiratory:  Positive for cough and shortness of breath. Negative for chest tightness and wheezing.    Cardiovascular:  Positive for chest pain and leg swelling. Negative for palpitations.   Gastrointestinal:  Negative for abdominal pain, diarrhea, nausea and vomiting.   Genitourinary:  Negative for dysuria, flank pain and hematuria.   Musculoskeletal:  Negative for back pain, myalgias and neck pain.   Skin:  Negative for rash and wound.   Neurological:  Positive for weakness. Negative for dizziness, syncope and headaches. "   Psychiatric/Behavioral:  Negative for agitation.      Objective:     Vital Signs (Most Recent):  Temp: 97.9 °F (36.6 °C) (07/21/24 0356)  Pulse: 108 (07/21/24 0356)  Resp: 20 (07/21/24 0515)  BP: 125/66 (07/21/24 0356)  SpO2: 95 % (07/21/24 0356) Vital Signs (24h Range):  Temp:  [97.4 °F (36.3 °C)-97.9 °F (36.6 °C)] 97.9 °F (36.6 °C)  Pulse:  [100-126] 108  Resp:  [18-26] 20  SpO2:  [91 %-100 %] 95 %  BP: (125-194)/() 125/66     Weight: (!) 139.5 kg (307 lb 8.7 oz)  Body mass index is 45.42 kg/m².     Physical Exam  Constitutional:       General: He is not in acute distress.     Appearance: He is well-developed. He is obese. He is ill-appearing.   HENT:      Head: Normocephalic and atraumatic.   Eyes:      Conjunctiva/sclera: Conjunctivae normal.      Pupils: Pupils are equal, round, and reactive to light.   Neck:      Vascular: No JVD.   Cardiovascular:      Rate and Rhythm: Regular rhythm. Tachycardia present.      Heart sounds: Normal heart sounds.   Pulmonary:      Effort: No respiratory distress.      Breath sounds: Normal breath sounds. No wheezing.      Comments: Tachypnea   Abdominal:      General: Bowel sounds are normal. There is no distension.      Palpations: Abdomen is soft.      Tenderness: There is no abdominal tenderness. There is no guarding.      Comments: Round, soft, non tender    Musculoskeletal:         General: No tenderness. Normal range of motion.      Cervical back: Normal range of motion and neck supple.      Right lower leg: Edema present.      Left lower leg: Edema present.   Skin:     General: Skin is warm and dry.      Capillary Refill: Capillary refill takes less than 2 seconds.      Findings: No erythema.   Neurological:      Mental Status: He is alert and oriented to person, place, and time.   Psychiatric:         Behavior: Behavior normal.              CRANIAL NERVES     CN III, IV, VI   Pupils are equal, round, and reactive to light.       Significant Labs: All pertinent  labs within the past 24 hours have been reviewed.  CBC:   Recent Labs   Lab 07/20/24  1402   WBC 10.79   HGB 15.2   HCT 45.5        CMP:   Recent Labs   Lab 07/20/24  1402      K 4.7      CO2 22*   *   BUN 32*   CREATININE 1.8*   CALCIUM 9.8   PROT 8.1   ALBUMIN 3.9   BILITOT 0.6   ALKPHOS 79   AST 28   ALT 20   ANIONGAP 17*     Cardiac Markers:   Recent Labs   Lab 07/20/24  1402   BNP 17     Troponin:   Recent Labs   Lab 07/20/24  1402   TROPONINI <0.006     Urine Studies:   Recent Labs   Lab 07/20/24  1624   COLORU Yellow   APPEARANCEUA Clear   PHUR 6.0   SPECGRAV 1.010   PROTEINUA 2+*   GLUCUA Trace*   KETONESU Negative   BILIRUBINUA Negative   OCCULTUA Negative   NITRITE Negative   UROBILINOGEN Negative   LEUKOCYTESUR Negative   RBCUA 1   WBCUA 1   BACTERIA None   HYALINECASTS 3*       Significant Imaging: I have reviewed all pertinent imaging results/findings within the past 24 hours.  Assessment/Plan:     * Acute hypoxic respiratory failure  Patient with Hypoxic Respiratory failure which is Acute.  he is not on home oxygen. Supplemental oxygen was provided and noted- Oxygen Concentration (%):  [100] 100    .   Signs/symptoms of respiratory failure include- tachypnea, increased work of breathing, respiratory distress, use of accessory muscles, and lethargy. Contributing diagnoses includes - COPD and spontaneous pneumothorax  Labs and images were reviewed. Patient Has recent ABG, which has been reviewed. Will treat underlying causes and adjust management of respiratory failure as needed  -continue supplemental O2 wean as tolerated  -consult pulmonology     CAD (coronary artery disease)  Patient with known CAD which is controlled Will continue ASA and Statin and monitor for S/Sx of angina/ACS. Continue to monitor on telemetry.     Pneumothorax on left  -patient presented with chest tightness, cough, SOB x 2 days exacerbated by lying joey. + hx of tobacco abuse, reported hx of  COPD/emphysema   - CTA chest shows moderate size left pneumothorax without evidence of mediastinal shift.  This is primarily located anteriorly.  There is mild left right midline shift. No PE  - patient s/p thora vent placement with re expansion of left lung noted on f/u images   -continue supplemental O2, wean as tolerated   - pain management   -consult pulmonology          Tobacco dependence  Dangers of cigarette smoking were reviewed with patient in detail. Patient was Referred to Tobacco Cessation Program. Nicotine replacement options were discussed. Nicotine replacement was discussed-  not prescribed at this  time    HTN (hypertension), benign  Chronic, controlled. Latest blood pressure and vitals reviewed-     Temp:  [97.4 °F (36.3 °C)-97.9 °F (36.6 °C)]   Pulse:  [100-126]   Resp:  [18-26]   BP: (125-194)/()   SpO2:  [91 %-100 %] .   Home meds for hypertension were reviewed and noted below.   Hypertension Medications               furosemide (LASIX) 20 MG tablet TAKE 1 TABLET(20 MG) BY MOUTH EVERY DAY    lisinopriL-hydrochlorothiazide (PRINZIDE,ZESTORETIC) 20-25 mg Tab TAKE 1 TABLET BY MOUTH EVERY DAY            While in the hospital, will manage blood pressure as follows; Continue home antihypertensive regimen    Will utilize p.r.n. blood pressure medication only if patient's blood pressure greater than 160/100 and he develops symptoms such as worsening chest pain or shortness of breath.    Type 2 diabetes mellitus with stage 3a chronic kidney disease and hypertension  Creatine stable for now. BMP reviewed- noted Estimated Creatinine Clearance: 49.3 mL/min (A) (based on SCr of 1.8 mg/dL (H)). according to latest data. Based on current GFR, CKD stage is stage 3 - GFR 30-59.  Monitor UOP and serial BMP and adjust therapy as needed. Renally dose meds. Avoid nephrotoxic medications and procedures.      VTE Risk Mitigation (From admission, onward)           Ordered     enoxaparin injection 40 mg  Daily          07/20/24 1936     IP VTE HIGH RISK PATIENT  Once         07/20/24 1936     Place sequential compression device  Until discontinued         07/20/24 1936                                    Cristy Shaffer NP  Department of Hospital Medicine  [facility]

## 2024-07-21 NOTE — NURSING
RAPID RESPONSE NURSE PROACTIVE ROUNDING NOTE     Time of Visit:     Admit Date: 2024  LOS: 0  Code Status: Full Code   Date of Visit: 2024  : 1950  Age: 74 y.o.  Sex: male  Race: White  Bed: K465/K465 A:   MRN: [id number]  Was the patient discharged from an ICU this admission? No   Was the patient discharged from a PACU within last 24 hours?  No  Did the patient receive conscious sedation/general anesthesia in last 24 hours?  No  Was the patient in the ED within the past 24 hours?  Yes  Was the patient started on NIPPV within the past 24 hours?  No  Attending Physician: Jg Cuellar,*  Primary Service: Networked reference to record PCT     ASSESSMENT     Notified by  chart review .  Reason for alert: reviewing chart.    Diagnosis: <principal problem not specified>    Abnormal Vital Signs: /72 (BP Location: Left arm, Patient Position: Lying)   Pulse 107   Temp 97.4 °F (36.3 °C) (Oral)   Resp (!) 24   Wt (!) 141 kg (310 lb 13.6 oz)   SpO2 96%   BMI 45.90 kg/m²      Clinical Issues: Respiratory    Patient  has a past medical history of Allergy, DDD (degenerative disc disease), lumbar, Hyperlipidemia, Hypertension, Nicotine abuse, Obesity, Scrotal mass, Type II diabetes mellitus with renal manifestations, uncontrolled, Type II or unspecified type diabetes mellitus without mention of complication, not stated as uncontrolled, and Ulcer.      Patient awake in bed. No respiratory distress noted.  Patient states he feels better then when he came in.  Denies shortness of breath.  Bilateral breath sounds noted.       INTERVENTIONS/ RECOMMENDATIONS       Discussed plan of care with RNEmilia and charge nurse Stephanie.    PHYSICIAN ESCALATION     Yes/No  No    Orders received and case discussed with NA.    Disposition: Remain in room 465.    FOLLOW-UP     Call back the [employer]active [employer], Marge Danielle RN at  for additional questions or concerns.

## 2024-07-21 NOTE — ED NOTES
Introduced self to patient. Patient alert and orientated. SpO2 reading 86% on room air; patient states not being able to take deep breaths r/t drain placement. Patient placed on 5LO2 via non-rebreather mask, sPo2 up to 94% w/ same. Patient denies COPD hx. No other voiced concerns when asked; denies difficulty breathing, just discomfort w/deep breaths r/t drain.

## 2024-07-21 NOTE — ASSESSMENT & PLAN NOTE
-patient presented with chest tightness, cough, SOB x 2 days exacerbated by lying joey. 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  This is primarily located anteriorly.  There is mild left right midline shift. No PE  - patient s/p thora vent placement with re expansion of left lung noted on f/u images; thoravent clamped   -continue supplemental O2, wean as tolerated   - pain management   -consult pulmonology, appreciate rec's

## 2024-07-21 NOTE — ASSESSMENT & PLAN NOTE
Chronic, controlled. Latest blood pressure and vitals reviewed-     Temp:  [97.4 °F (36.3 °C)-97.9 °F (36.6 °C)]   Pulse:  [100-126]   Resp:  [18-26]   BP: (125-194)/()   SpO2:  [91 %-100 %] .   Home meds for hypertension were reviewed and noted below.   Hypertension Medications               furosemide (LASIX) 20 MG tablet TAKE 1 TABLET(20 MG) BY MOUTH EVERY DAY    lisinopriL-hydrochlorothiazide (PRINZIDE,ZESTORETIC) 20-25 mg Tab TAKE 1 TABLET BY MOUTH EVERY DAY            While in the hospital, will manage blood pressure as follows; Continue home antihypertensive regimen    Will utilize p.r.n. blood pressure medication only if patient's blood pressure greater than 160/100 and he develops symptoms such as worsening chest pain or shortness of breath.

## 2024-07-21 NOTE — PLAN OF CARE
Problem: Comorbidity Management  Goal: Blood Pressure in Desired Range  Outcome: Progressing     Problem: Wound  Goal: Optimal Coping  Outcome: Progressing     Problem: Respiratory Compromise (Pneumothorax)  Goal: Optimal Oxygenation and Ventilation  Outcome: Met

## 2024-07-21 NOTE — PROGRESS NOTES
[facility]  Progress Note    Patient Name: Francisco Coppola  MRN: [id number]  Patient Class: IP- Inpatient   Admission Date: 7/20/2024  Length of Stay: 1 days  Attending Physician: Jg Cuellar,*  Primary Care Provider: Laureano Hebert MD        Subjective:     Principal Problem:Acute hypoxic respiratory failure        HPI:  Francisco Coppola is a 73 yo male with PMH of HTN, CAD, T2DM, CKD 3, tobacco abuse, COPD, pulmonary nodule and DDD who presented to [facility] ED with CC of shortness of breath. Onset 1 day PTA associated with cough and chest tightness. Symptoms worse when lying down. Pt denies fever/chills. No abdominal pain, n/v or diarrhea. He denies use of home O2.     Patient tachycardic, hypoxic and tachypneic on arrival. Labs remarkable for BUN/Cr 32/1.8 (baseline Cr 1.5), lactic acid 3.1. CTA chest shows moderate size left pneumothorax without evidence of mediastinal shift.  This is primarily located anteriorly.  There is mild left right midline shift. Thora-vent placed per ED. Patient reports improved dyspnea on my exam. No distress noted. Pulmonology consulted. Patient admitted to  for management of acute hypoxic respiratory failure In setting of spontaneous pneumothorax.        Overview/Hospital Course:  No notes on file    Interval History: Nursing notes reviewed, VSS on 2LNC. Pain controlled on current regimen. Thoravent clamped per Pulmonology and pt stable, awaiting rec's. Weaning O2 as tolerated.    Review of Systems   Constitutional:  Positive for fatigue. Negative for chills and fever.   HENT:  Positive for congestion.    Eyes:  Negative for photophobia.   Respiratory:  Positive for cough and shortness of breath. Negative for chest tightness and wheezing.    Cardiovascular:  Positive for chest pain (at thoravent site) and leg swelling. Negative for palpitations.   Gastrointestinal:  Negative for abdominal pain, diarrhea, nausea and vomiting.    Genitourinary:  Negative for dysuria, flank pain and hematuria.   Musculoskeletal:  Negative for back pain, myalgias and neck pain.   Skin:  Negative for rash and wound.   Neurological:  Positive for weakness. Negative for dizziness, syncope and headaches.   Psychiatric/Behavioral:  Negative for agitation.      Objective:     Vital Signs (Most Recent):  Temp: 98.3 °F (36.8 °C) (07/21/24 1637)  Pulse: 92 (07/21/24 1637)  Resp: 18 (07/21/24 1637)  BP: 136/71 (07/21/24 1637)  SpO2: 96 % (07/21/24 1637) Vital Signs (24h Range):  Temp:  [97.4 °F (36.3 °C)-98.3 °F (36.8 °C)] 98.3 °F (36.8 °C)  Pulse:  [] 92  Resp:  [18-24] 18  SpO2:  [95 %-100 %] 96 %  BP: (116-194)/() 136/71     Weight: (!) 139.5 kg (307 lb 8.7 oz)  Body mass index is 45.42 kg/m².    Intake/Output Summary (Last 24 hours) at 7/21/2024 1730  Last data filed at 7/21/2024 0445  Gross per 24 hour   Intake 1000 ml   Output 400 ml   Net 600 ml         Physical Exam  Constitutional:       General: He is not in acute distress.     Appearance: He is well-developed. He is obese. He is not ill-appearing.   HENT:      Head: Normocephalic and atraumatic.   Eyes:      Conjunctiva/sclera: Conjunctivae normal.      Pupils: Pupils are equal, round, and reactive to light.   Neck:      Vascular: No JVD.   Cardiovascular:      Rate and Rhythm: Regular rhythm. Tachycardia present.      Heart sounds: Normal heart sounds.   Pulmonary:      Effort: Pulmonary effort is normal. No respiratory distress.      Breath sounds: Normal breath sounds. No wheezing.      Comments: Thoravent in place and clamped  Abdominal:      General: Bowel sounds are normal. There is no distension.      Palpations: Abdomen is soft.      Tenderness: There is no abdominal tenderness. There is no guarding.      Comments: Round, soft, non tender    Musculoskeletal:         General: No tenderness. Normal range of motion.      Cervical back: Normal range of motion and neck supple.      Right  lower leg: Edema present.      Left lower leg: Edema present.   Skin:     General: Skin is warm and dry.      Capillary Refill: Capillary refill takes less than 2 seconds.      Findings: No erythema.   Neurological:      Mental Status: He is alert and oriented to person, place, and time.   Psychiatric:         Behavior: Behavior normal.             Significant Labs: All pertinent labs within the past 24 hours have been reviewed.    Significant Imaging: I have reviewed all pertinent imaging results/findings within the past 24 hours.    Assessment/Plan:      * Acute hypoxic respiratory failure  Patient with Hypoxic Respiratory failure which is Acute.  he is not on home oxygen. Supplemental oxygen was provided and noted-      .   Signs/symptoms of respiratory failure include- tachypnea, increased work of breathing, respiratory distress, use of accessory muscles, and lethargy. Contributing diagnoses includes - COPD and spontaneous pneumothorax  Labs and images were reviewed. Patient Has recent ABG, which has been reviewed. Will treat underlying causes and adjust management of respiratory failure as needed  -continue supplemental O2 wean as tolerated  -consult pulmonology, thoravent placed and lung reexpansion noted on CXR; clamped    CAD (coronary artery disease)  Patient with known CAD which is controlled Will continue ASA and Statin and monitor for S/Sx of angina/ACS. Continue to monitor on telemetry.     Pneumothorax on left  -patient presented with chest tightness, cough, SOB x 2 days exacerbated by lying joey. 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  This is primarily located anteriorly.  There is mild left right midline shift. No PE  - patient s/p thora vent placement with re expansion of left lung noted on f/u images; thoravent clamped   -continue supplemental O2, wean as tolerated   - pain management   -consult  pulmonology, appreciate rec's         Tobacco dependence  Dangers of cigarette smoking were reviewed with patient in detail. Patient was Referred to Tobacco Cessation Program. Nicotine replacement options were discussed. Nicotine replacement was discussed-  not prescribed at this  time , patient refused    HTN (hypertension), benign  Chronic, controlled. Latest blood pressure and vitals reviewed-     Temp:  [97.4 °F (36.3 °C)-98.3 °F (36.8 °C)]   Pulse:  []   Resp:  [18-24]   BP: (116-194)/()   SpO2:  [95 %-100 %] .   Home meds for hypertension were reviewed and noted below.   Hypertension Medications               furosemide (LASIX) 20 MG tablet TAKE 1 TABLET(20 MG) BY MOUTH EVERY DAY    lisinopriL-hydrochlorothiazide (PRINZIDE,ZESTORETIC) 20-25 mg Tab TAKE 1 TABLET BY MOUTH EVERY DAY            While in the hospital, will manage blood pressure as follows; Continue home antihypertensive regimen     Will utilize p.r.n. blood pressure medication only if patient's blood pressure greater than 160/100 and he develops symptoms such as worsening chest pain or shortness of breath.    Type 2 diabetes mellitus with stage 3a chronic kidney disease and hypertension  Creatine stable for now. BMP reviewed- noted Estimated Creatinine Clearance: 49.3 mL/min (A) (based on SCr of 1.8 mg/dL (H)). according to latest data. Based on current GFR, CKD stage is stage 3 - GFR 30-59.  Monitor UOP and serial BMP and adjust therapy as needed. Renally dose meds. Avoid nephrotoxic medications and procedures.    Cr at baseline      VTE Risk Mitigation (From admission, onward)           Ordered     enoxaparin injection 40 mg  Daily         07/20/24 1936     IP VTE HIGH RISK PATIENT  Once         07/20/24 1936     Place sequential compression device  Until discontinued         07/20/24 1936                    Discharge Planning   JEANNE:      Code Status: Full Code   Is the patient medically ready for discharge?:     Reason for patient  still in hospital (select all that apply): Patient trending condition, Treatment, Imaging, and Consult recommendations                     Yemi Jacobo PA-C  Department of [facility]   [facility]

## 2024-07-21 NOTE — PLAN OF CARE
07/20/24 2155   Admission   Initial VN Admission Questions Complete   Communication Issues? None   Shift   Pain Management Interventions quiet environment facilitated;relaxation techniques promoted   Virtual Nurse - Patient Verbalized Approval Of Camera Use;VN Rounding   Type of Frequent Check   Type Telemetry Monitoring;Patient Rounds   Safety/Activity   Patient Rounds bed in low position;bed wheels locked;call light in patient/parent reach;clutter free environment maintained;ID band on;visualized patient;placement of personal items at bedside   Safety Promotion/Fall Prevention assistive device/personal item within reach;bed alarm set;room near unit station;side rails raised x 2;instructed to call staff for mobility;nonskid shoes/socks when out of bed;high risk medications identified   Safety Precautions emergency equipment at bedside   Safety Bands on Patient Fall Risk Band   Activity Management Ambulated -L4   Activity Assistance Provided assistance, stand-by   Positioning   Body Position position changed independently   Head of Bed (HOB) Positioning HOB elevated   Positioning/Transfer Devices pillows;in use        VIRTUAL NURSE: Pt arrived to unit. Permission received per patient to turn camera to view patient. VIP model explained; patient informed this VN will be working with bedside nurse and the rest of the care team. Plan of care reviewed with patient.  Educated patient on VTE, fall risk and fall risk precautions in place. Call light within reach, side rails up x2. Admission questions completed. Patient instucted to ask staff for assistance. Patient verbalized complete understanding. Patient denies complaints or any needs at this time. Will continue to be available and intervene as needed.

## 2024-07-21 NOTE — ASSESSMENT & PLAN NOTE
Dangers of cigarette smoking were reviewed with patient in detail. Patient was Referred to Tobacco Cessation Program. Nicotine replacement options were discussed. Nicotine replacement was discussed-  not prescribed at this  time , patient refused

## 2024-07-21 NOTE — NURSING
Pulmonology at bedside. Angely page for trial. Instructed the patient to call with any s/s of difficulty breathing and notify MD.

## 2024-07-21 NOTE — HPI
Francisco Coppola is a 75 yo male with PMH of HTN, CAD, T2DM, CKD 3, tobacco abuse, COPD, pulmonary nodule and DDD who presented to [facility] ED with CC of shortness of breath. Onset 1 day PTA associated with cough and chest tightness. Symptoms worse when lying down. Pt denies fever/chills. No abdominal pain, n/v or diarrhea. He denies use of home O2.     Patient tachycardic, hypoxic and tachypneic on arrival. Labs remarkable for BUN/Cr 32/1.8 (baseline Cr 1.5), lactic acid 3.1. CTA chest shows moderate size left pneumothorax without evidence of mediastinal shift.  This is primarily located anteriorly.  There is mild left right midline shift. Thora-vent placed per ED. Patient reports improved dyspnea on my exam. No distress noted. Pulmonology consulted. Patient admitted to  for management of acute hypoxic respiratory failure In setting of spontaneous pneumothorax.

## 2024-07-21 NOTE — ASSESSMENT & PLAN NOTE
Dangers of cigarette smoking were reviewed with patient in detail. Patient was Referred to Tobacco Cessation Program. Nicotine replacement options were discussed. Nicotine replacement was discussed-  not prescribed at this  time

## 2024-07-21 NOTE — CONSULTS
"Pulmonary & Critical Care Medicine Consult Note    Primary Attending Physician: Jg Cuellar   Consultant Attending: Rene Rosas   Consultant Fellow: Sixto Colindres     Reason for Consult:     Left sided pneumothorax    Subjective:      History of Present Illness:  Francisco Coppola is a 74 y.o. Caucasain male who  has a past medical history of Allergy, DDD (degenerative disc disease), lumbar, Hyperlipidemia, Hypertension, Nicotine abuse, Obesity, Scrotal mass, Type II diabetes mellitus with renal manifestations, uncontrolled, Type II or unspecified type diabetes mellitus without mention of complication, not stated as uncontrolled, and Ulcer.. The patient presented to the [facility] on 7/20/2024 with a primary complaint of Shortness of Breath (Pt states that he started with SOB has hx of COPD denies home O2. A pk a day smoker ). CXR done showing left sided pneumothorax. Patient had Thora-Vent placed and was admitted to hospital medicine team. Pulmonary medicine consulted for evaluation.     Patient seen and examined at bedside with attending physician. Patient sitting up in bed comfortably in no acute distress. Patient stating he has smoked since he was 11 years old. He states he smokes a pack a day. Discussed with him the dangers of smoking and counseled him on stopping. Patient stating he understands and agrees to stop smoking. Patient states that he devloped SOB suddenly last night feeling like an "800 lb gorilla" was sitting on his chest. He states he came immediately to the ed for evaluation. He denies any headaches, fever, chills, chest pain, SOB, abd pain, nausea, vomiting, diarrhea, or constipation.      Past Medical History:  Past Medical History:   Diagnosis Date    Allergy     DDD (degenerative disc disease), lumbar     Hyperlipidemia     Hypertension     Nicotine abuse     Obesity     Scrotal mass     Type II diabetes mellitus with renal manifestations, uncontrolled     microalbuminuria    " RECEIVED REPORT . PATIENT SLEEPING, EASILY AROUSING TO VERBAL COMMANDS. NO DISTRESS NOTED. 
NO BM DURING THE SHIFT . WILL CONTINUE TO MONITOR. SAFETY MEASURES OBSERVED. Type II or unspecified type diabetes mellitus without mention of complication, not stated as uncontrolled     Ulcer        Past Surgical History:  Past Surgical History:   Procedure Laterality Date    CARPAL TUNNEL RELEASE      CONDYLOMA EXCISION/FULGURATION Bilateral 2014    scrotal    KNEE SURGERY Left     removal of cartilage with no implant    LUMBAR LAMINECTOMY  2017    L4-5    ULNAR NERVE TRANSPOSITION         Allergies:  Review of patient's allergies indicates:   Allergen Reactions    Augmentin [amoxicillin-pot clavulanate] Hives and Rash    Tramadol Nausea Only       Medications:   In-Hospital Scheduled Medications:   enoxparin  40 mg Subcutaneous Daily    senna-docusate 8.6-50 mg  1 tablet Oral BID      In-Hospital PRN Medications:    Current Facility-Administered Medications:     acetaminophen, 650 mg, Oral, Q4H PRN    albuterol-ipratropium, 3 mL, Nebulization, Q4H PRN    aluminum-magnesium hydroxide-simethicone, 30 mL, Oral, QID PRN    dextrose 10%, 12.5 g, Intravenous, PRN    dextrose 10%, 25 g, Intravenous, PRN    glucagon (human recombinant), 1 mg, Intramuscular, PRN    glucose, 16 g, Oral, PRN    glucose, 24 g, Oral, PRN    HYDROcodone-acetaminophen, 1 tablet, Oral, Q6H PRN    insulin aspart U-100, 0-5 Units, Subcutaneous, QID (AC + HS) PRN    melatonin, 6 mg, Oral, Nightly PRN    naloxone, 0.02 mg, Intravenous, PRN    ondansetron, 4 mg, Intravenous, Q8H PRN    simethicone, 1 tablet, Oral, QID PRN    sodium chloride 0.9%, 10 mL, Intravenous, Q8H PRN   In-Hospital IV Infusion Medications:     Home Medications:  Prior to Admission medications    Medication Sig Start Date End Date Taking? Authorizing Provider   aspirin (ECOTRIN) 81 MG EC tablet Take 81 mg by mouth once daily.   Yes Provider, Historical   clotrimazole-betamethasone 1-0.05% (LOTRISONE) cream Apply topically 2 (two) times daily. 10/25/22  Yes Laureano Hebert MD   diclofenac sodium (VOLTAREN) 1 % Gel Apply 2 g topically 3 (three) times  "daily as needed. 1/31/23  Yes Laureano Hebert MD   furosemide (LASIX) 20 MG tablet TAKE 1 TABLET(20 MG) BY MOUTH EVERY DAY 7/18/24  Yes Laureano Hebert MD   gabapentin (NEURONTIN) 300 MG capsule TAKE 1 CAPSULE(300 MG) BY MOUTH THREE TIMES DAILY 4/17/23  Yes Laureano Hebert MD   LANTUS SOLOSTAR U-100 INSULIN 100 unit/mL (3 mL) InPn pen INJECT 10 UNITS UNDER THE SKIN EVERY DAY  Patient taking differently: Inject 15 Units into the skin once daily. 7/17/24  Yes Laureano Hebert MD   lisinopriL-hydrochlorothiazide (PRINZIDE,ZESTORETIC) 20-25 mg Tab TAKE 1 TABLET BY MOUTH EVERY DAY 6/18/24  Yes Laureano Hebert MD   metFORMIN (GLUCOPHAGE-XR) 500 MG ER 24hr tablet TAKE 2 TABLETS(1000 MG) BY MOUTH TWICE DAILY WITH MEALS 5/13/24  Yes Laureano Hebert MD   multivitamin capsule Take 1 capsule by mouth once daily.   Yes Provider, Historical   mupirocin (BACTROBAN) 2 % ointment Apply topically 3 (three) times daily. 4/6/22  Yes Laureano Hebert MD   pravastatin (PRAVACHOL) 40 MG tablet TAKE 1 TABLET(40 MG) BY MOUTH EVERY EVENING 5/20/24  Yes Laureano Hebert MD   sildenafiL (VIAGRA) 100 MG tablet Take 1 tablet (100 mg total) by mouth daily as needed for Erectile Dysfunction. 7/19/22  Yes Laureano Hebert MD   blood sugar diagnostic Strp To check BG 3 times daily, to use with insurance preferred meter 3/8/24   Laureano Hebert MD   blood-glucose meter kit To check BG 3 times daily, to use with insurance preferred meter 3/8/24 6/16/28  Laureano Hebert MD   lancets Misc To check BG 3 times daily, to use with insurance preferred meter 3/8/24   Laureano Hebert MD   pen needle, diabetic (BD ULTRA-FINE SHORT PEN NEEDLE) 31 gauge x 5/16" Ndle USE WITH INSULIN PEN AS DIRECTED 5/23/23   Laureano Hebert MD   tirzepatide 10 mg/0.5 mL PnIj Inject 10 mg into the skin every 7 days. 3/8/24   Laureano Hebert MD   ULTRA THIN LANCETS 30 gauge Misc CHECK BLOOD GLUCOSE BID 8/11/20   " "Provider, Historical       Family History:  Family History   Problem Relation Name Age of Onset    Cancer Mother          Liver    Cataracts Mother      Liver cancer Mother      Arthritis Mother      Dementia Mother      Cancer Father          Lung met Brain    Lung cancer Father      Brain cancer Father      Cancer Sister x3     Ovarian cancer Sister x3     No Known Problems Brother x2     No Known Problems Daughter x3     Alcohol abuse Paternal Uncle      Cirrhosis Paternal Uncle      Diabetes Paternal Grandmother      Cancer Paternal Grandfather      Amblyopia Neg Hx      Blindness Neg Hx      Glaucoma Neg Hx      Macular degeneration Neg Hx      Retinal detachment Neg Hx      Strabismus Neg Hx         Social History:  Social History     Tobacco Use    Smoking status: Every Day     Current packs/day: 1.80     Average packs/day: 1.8 packs/day for 62.6 years (112.6 ttl pk-yrs)     Types: Cigarettes     Start date:     Smokeless tobacco: Never    Tobacco comments:     Quit for 5 years; used to smoke 3 packs per day when younger, then 2 packs/day, now down to less than 1 pack/day      Declined smoking cessation; he is cutting down amount he smokes    Substance Use Topics    Alcohol use: Not Currently     Comment: rare    Drug use: Never       Review of Systems:  Pertinent items are noted in HPI. All other systems are reviewed and are negative.     Objective:   Last 24 Hour Vital Signs:  BP  Min: 116/59  Max: 194/84  Temp  Av.8 °F (36.6 °C)  Min: 97.4 °F (36.3 °C)  Max: 98.3 °F (36.8 °C)  Pulse  Av  Min: 88  Max: 111  Resp  Av.6  Min: 18  Max: 24  SpO2  Av.8 %  Min: 95 %  Max: 100 %  Height  Av' 9" (175.3 cm)  Min: 5' 9" (175.3 cm)  Max: 5' 9" (175.3 cm)  Weight  Av.2 kg (309 lb 3.1 oz)  Min: 139.5 kg (307 lb 8.7 oz)  Max: 141 kg (310 lb 13.6 oz)  I/O last 3 completed shifts:  In: 1000 [I.V.:1000]  Out: 400 [Urine:400]    Physical Examination:  GEN: Patient A&O X 3, well-developed, " NAD.  HEENT:  -Head: NC/AT;  -Eyes: No discharge or redness;  -Ears: External ears are normal.  -Nose: Normal nares.  -Mouth and throat: MMM.  CV: RRR, no m/r/g.  LUNGS:Clear to auscultation bilaterally, no wheezing, Thora vent in place on ;eft sided chest  ABD: Soft, NT/ND, NBS, no masses or organomegaly.  SKIN: Warm, well perfused. No skin rashes or abnormal lesions.  MSK: Normal gait. No deformities.  EXT: No clubbing, cyanosis, or edema.  NEURO: No focal deficits  noted     Laboratory:  Trended Lab Data:  Recent Labs     07/20/24  1402   WBC 10.79   HGB 15.2   HCT 45.5         K 4.7      CO2 22*   BUN 32*   CREATININE 1.8*   *   BILITOT 0.6   AST 28   ALT 20   ALKPHOS 79   CALCIUM 9.8   ALBUMIN 3.9   PROT 8.1   MG 2.0       Cardiac:   Recent Labs   Lab 07/20/24  1402   TROPONINI <0.006   BNP 17       Urinalysis:   Lab Results   Component Value Date    LABURIN No growth 02/11/2020    COLORU Yellow 07/20/2024    SPECGRAV 1.010 07/20/2024    NITRITE Negative 07/20/2024    KETONESU Negative 07/20/2024    UROBILINOGEN Negative 07/20/2024       Microbiology:  Microbiology Results (last 7 days)       Procedure Component Value Units Date/Time    Blood culture #1 **CANNOT BE ORDERED STAT** [[id number]] Collected: 07/20/24 1405    Order Status: Completed Specimen: Blood from Peripheral, Forearm, Right Updated: 07/21/24 0115     Blood Culture, Routine No Growth to date    Blood culture #2 **CANNOT BE ORDERED STAT** [[id number]] Collected: 07/20/24 1403    Order Status: Completed Specimen: Blood from Peripheral, Hand, Left Updated: 07/21/24 0115     Blood Culture, Routine No Growth to date    Influenza A & B by Molecular [[id number]] Collected: 07/20/24 1344    Order Status: Completed Specimen: Nasopharyngeal Swab Updated: 07/20/24 1416     Influenza A, Molecular Negative     Influenza B, Molecular Negative     Flu A & B Source Nasal swab            Radiology:  Addenda     Upon further review a 20%  to 30% left-sided pneumothorax is evident.        Electronically signed by:Timo Giang MD  Date:                                            07/20/2024  Time:                                           16:27  Signed by Timo Giang MD on 7/20/2024 16:29  Narrative & Impression  EXAMINATION:  XR CHEST 1 VIEW     CLINICAL HISTORY:  shortness of breath;     TECHNIQUE:  Single frontal view of the chest was performed.     COMPARISON:  August 28, 2023     FINDINGS:  Mild degenerative changes are seen in the thoracic spine.  The heart is not enlarged.  There is no evidence of acute cardiopulmonary disease.     Impression:     No evidence of acute cardiopulmonary disease        Electronically signed by:Timo Giang MD  Date:                                            07/20/2024  Time:                                           14:19    I have personally reviewed the above labs and imaging.    Current Medications:     Infusions:       Scheduled:   enoxparin  40 mg Subcutaneous Daily    senna-docusate 8.6-50 mg  1 tablet Oral BID        PRN:    Current Facility-Administered Medications:     acetaminophen, 650 mg, Oral, Q4H PRN    albuterol-ipratropium, 3 mL, Nebulization, Q4H PRN    aluminum-magnesium hydroxide-simethicone, 30 mL, Oral, QID PRN    dextrose 10%, 12.5 g, Intravenous, PRN    dextrose 10%, 25 g, Intravenous, PRN    glucagon (human recombinant), 1 mg, Intramuscular, PRN    glucose, 16 g, Oral, PRN    glucose, 24 g, Oral, PRN    HYDROcodone-acetaminophen, 1 tablet, Oral, Q6H PRN    insulin aspart U-100, 0-5 Units, Subcutaneous, QID (AC + HS) PRN    melatonin, 6 mg, Oral, Nightly PRN    naloxone, 0.02 mg, Intravenous, PRN    ondansetron, 4 mg, Intravenous, Q8H PRN    simethicone, 1 tablet, Oral, QID PRN    sodium chloride 0.9%, 10 mL, Intravenous, Q8H PRN     Assessment:     Francisco Coppola is a 74 y.o. 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 05/19/2020    Chronic pain of right knee 08/16/2019    Diabetic polyneuropathy associated with type 2 diabetes mellitus 05/11/2018    Calcified granuloma of lung 07/12/2017    Tobacco dependence 07/12/2017    Scrotal mass 09/08/2014    Hydrocele, right 09/08/2014    Condyloma acuminatum of scrotum 09/08/2014    Type 2 diabetes mellitus with stage 3a chronic kidney disease and hypertension 04/24/2013    HTN (hypertension), benign 04/24/2013    Hyperlipidemia 04/24/2013    Severe obesity (BMI 35.0-35.9 with comorbidity) 04/24/2013    Spinal stenosis 01/21/2013    Arthritis of facet joints at multiple vertebral levels 01/09/2013        Plan:     Left sided pneumothorax  - Thora-vent device placed in ED with resolution of pneumothorax  - Thora-vent device clamped and repeat CXR performed showing persistent resolution of pneumothorax  - We will remove Thora-Vent device today   - Pulmonary team will re evaluate patient in the AM     Tobacco Abuse  - Patient counseled on smoking cessation  - Patient agreeable to stopping smoking  - States nicotine patch does not work  - Willing to try Chantix       Thank you for allowing us to participate in the care of this patient. We will continue to follow along. Please contact me if you have any questions regarding this consult.    Sixto Colindres DO  [facility] Pulmonary & Critical Care Medicine Fellow

## 2024-07-22 ENCOUNTER — CLINICAL SUPPORT (OUTPATIENT)
Dept: SMOKING CESSATION | Facility: CLINIC | Age: 74
End: 2024-07-22

## 2024-07-22 VITALS
HEIGHT: 69 IN | WEIGHT: 307.56 LBS | SYSTOLIC BLOOD PRESSURE: 127 MMHG | TEMPERATURE: 98 F | HEART RATE: 95 BPM | OXYGEN SATURATION: 96 % | RESPIRATION RATE: 18 BRPM | DIASTOLIC BLOOD PRESSURE: 73 MMHG | BODY MASS INDEX: 45.55 KG/M2

## 2024-07-22 DIAGNOSIS — F17.210 CIGARETTE SMOKER: Primary | ICD-10-CM

## 2024-07-22 PROBLEM — J96.01 ACUTE HYPOXIC RESPIRATORY FAILURE: Status: RESOLVED | Noted: 2024-07-21 | Resolved: 2024-07-22

## 2024-07-22 PROBLEM — J93.9 PNEUMOTHORAX ON LEFT: Status: RESOLVED | Noted: 2024-07-21 | Resolved: 2024-07-22

## 2024-07-22 LAB — POCT GLUCOSE: 166 MG/DL (ref 70–110)

## 2024-07-22 PROCEDURE — 25000003 PHARM REV CODE 250: Mod: HCNC | Performed by: NURSE PRACTITIONER

## 2024-07-22 PROCEDURE — 94761 N-INVAS EAR/PLS OXIMETRY MLT: CPT | Mod: HCNC

## 2024-07-22 PROCEDURE — 25000003 PHARM REV CODE 250: Mod: HCNC

## 2024-07-22 PROCEDURE — 99999 PR PBB SHADOW E&M-EST. PATIENT-LVL I: CPT | Mod: PBBFAC,,,

## 2024-07-22 PROCEDURE — 99406 BEHAV CHNG SMOKING 3-10 MIN: CPT | Mod: S$GLB,,,

## 2024-07-22 RX ORDER — VARENICLINE TARTRATE 0.5 (11)-1
KIT ORAL
Qty: 53 EACH | Refills: 0 | Status: SHIPPED | OUTPATIENT
Start: 2024-07-22

## 2024-07-22 RX ORDER — INSULIN GLARGINE 100 [IU]/ML
15 INJECTION, SOLUTION SUBCUTANEOUS DAILY
Start: 2024-07-22

## 2024-07-22 RX ORDER — VARENICLINE TARTRATE 1 MG/1
1 TABLET, FILM COATED ORAL 2 TIMES DAILY
Qty: 180 TABLET | Refills: 1 | Status: SHIPPED | OUTPATIENT
Start: 2024-08-22 | End: 2025-02-06

## 2024-07-22 RX ADMIN — LISINOPRIL 20 MG: 20 TABLET ORAL at 09:07

## 2024-07-22 RX ADMIN — ACETAMINOPHEN 650 MG: 325 TABLET ORAL at 05:07

## 2024-07-22 RX ADMIN — FUROSEMIDE 20 MG: 20 TABLET ORAL at 09:07

## 2024-07-22 RX ADMIN — ASPIRIN 81 MG: 81 TABLET, COATED ORAL at 09:07

## 2024-07-22 NOTE — PROGRESS NOTES
Smoking cessation education note; Pt is a former 3 pk;day cigarette smoker x 62 yrs. He states that he cut down to 1 pk/day approximately 1 yr ago, and is ready to quit. Pt has prescription for Chantix. He declines referral to Ambulatory Smoking Cessation clinic at this time.

## 2024-07-22 NOTE — PLAN OF CARE
Pt is agreeable with discharge to home today. No 02 needed. Passed walk test. F/u appt info added for AVS. Pt will be transported home by his spouse Carlie Coppola [phone].  Future Appointments   Date Time Provider Department Center   8/29/2024 11:00 AM [facility] CT1 LIMIT 450 LBS [facility] CT SCAN [facility]   9/13/2024  8:00 AM APPOINTMENT LAB, [facility] LAB [facility]   9/20/2024 10:30 AM Laureano Hebert MD [facility] MED [facility] [facility]   9/23/2024  8:30 AM Tejas Oliver MD [facility] [place]      07/22/24 1008   Final Note   Assessment Type Final Discharge Note   Anticipated Discharge Disposition Home   What phone number can be called within the next 1-3 days to see how you are doing after discharge? [phone]   Hospital Resources/Appts/Education Provided Appointments scheduled and added to AVS   Post-Acute Status   Discharge Delays None known at this time

## 2024-07-22 NOTE — PLAN OF CARE
Problem: Adult Inpatient Plan of Care  Goal: Plan of Care Review  Outcome: Progressing  Goal: Absence of Hospital-Acquired Illness or Injury  Outcome: Progressing  Goal: Optimal Comfort and Wellbeing  Outcome: Progressing     Problem: Diabetes Comorbidity  Goal: Blood Glucose Level Within Targeted Range  Outcome: Progressing     Problem: Wound  Goal: Optimal Coping  Outcome: Progressing  Goal: Optimal Pain Control and Function  Outcome: Progressing  Goal: Skin Health and Integrity  Outcome: Progressing     POC reviewed with patient. All questions and concerns reviewed. Vital signs stable throughout shift. For full assessment please refer to flowsheet. Fall/ safety precautions implemented & maintained. Bed locked in lowest position, bed alarm activated & audible, & call light in reach. Will continue to monitor.

## 2024-07-22 NOTE — PLAN OF CARE
CM met with pt at bedside to discuss discharge planning. He lives at home with spouse and his daughter and grandchild now lives with them. Pt is independent with ADL's. Pt has a straight cane that he uses at times. No  services. Upon discharge, pts family member Carlie Coppola (spouse) [phone]  will provide transport home. CM added name and number on pt white board. Pt made aware to contact CM with any questions or concerns. Cm will continue to follow and assist with any discharge needs.  Future Appointments   Date Time Provider Department Center   8/29/2024 11:00 AM [facility] CT1 LIMIT 450 LBS [facility] CT SCAN [place] [facility]   9/13/2024  8:00 AM APPOINTMENT LAB, [facility] [place] [facility]   9/20/2024 10:30 AM Laureano Hebert MD [facility] MED [place] [facility]   9/23/2024  8:30 AM Tejas Oliver MD [facility] [place] - Telemetry  Initial Discharge Assessment       Primary Care Provider: Laureano Hebert MD    Admission Diagnosis: SOB (shortness of breath) [R06.02]  Chest pain [R07.9]  Pneumothorax, unspecified type [J93.9]    Admission Date: 7/20/2024  Expected Discharge Date: 7/22/2024    Transition of Care Barriers: (P) None    Payor: [employer] MANAGED MEDICARE / Plan: [employer] MEDICARE HMO / Product Type: Capitation /     Extended Emergency Contact Information  Primary Emergency Contact: Carlie Coppola  Address: [address] [place], [place] [address] [address] [facility]  Home Phone: [phone]  Work Phone: [phone]  Mobile Phone: [phone]  Relation: Spouse    Discharge Plan A: (P) Home, Home with family         [id number] - [place]  [address]  [address]  [place] [address]  Phone: [phone] Fax: [phone]    [id number] [place] - [address] [facility] [address]  [address] [place] [address]  Phone: [phone] Fax: [phone]      Initial Assessment (most recent)        Adult Discharge Assessment - 07/22/24 0905          Discharge Assessment    Assessment Type Discharge Planning Assessment     Confirmed/corrected address, phone number and insurance Yes     Confirmed Demographics Correct on Facesheet     Source of Information patient     When was your last doctors appointment? 06/14/24 (P)      Communicated JEANNE with patient/caregiver Yes (P)      Reason For Admission Acute Hypoxic resp Failure (P)      People in Home other relative(s);spouse (P)      Do you expect to return to your current living situation? Yes (P)      Do you have help at home or someone to help you manage your care at home? Yes (P)      Who are your caregiver(s) and their phone number(s)? Carlie Coppola (spouse) [phone] (P)      Prior to hospitilization cognitive status: Alert/Oriented (P)      Current cognitive status: Alert/Oriented (P)      Walking or Climbing Stairs Difficulty no (P)      Walking or Climbing Stairs ambulation difficulty, requires equipment (P)      Mobility Management Straight Cane (P)      Dressing/Bathing Difficulty no (P)      Equipment Currently Used at Home cane, straight (P)      Readmission within 30 days? No (P)      Patient currently being followed by outpatient case management? No (P)      Do you currently have service(s) that help you manage your care at home? No (P)      Do you take prescription medications? Yes (P)      Do you have prescription coverage? Yes (P)      Coverage [employer] (P)      Do you have any problems affording any of your prescribed medications? No (P)      Is the patient taking medications as prescribed? yes (P)      Who is going to help you get home at discharge? Carlie Coppola (spouse) [phone] (P)      How do you get to doctors appointments? car, drives self (P)      Are you on dialysis? No (P)      Do you take coumadin? No (P)      Discharge Plan A Home;Home with family (P)      DME Needed Upon Discharge  none (P)      Discharge Plan discussed  with: Patient (P)      Transition of Care Barriers None (P)         Physical Activity    On average, how many days per week do you engage in moderate to strenuous exercise (like a brisk walk)? 0 days (P)      On average, how many minutes do you engage in exercise at this level? 0 min (P)         Financial Resource Strain    How hard is it for you to pay for the very basics like food, housing, medical care, and heating? Not hard at all (P)         Housing Stability    In the last 12 months, was there a time when you were not able to pay the mortgage or rent on time? No (P)      At any time in the past 12 months, were you homeless or living in a shelter (including now)? No (P)         Transportation Needs    Has the lack of transportation kept you from medical appointments, meetings, work or from getting things needed for daily living? No (P)         Food Insecurity    Within the past 12 months, you worried that your food would run out before you got the money to buy more. Never true (P)      Within the past 12 months, the food you bought just didn't last and you didn't have money to get more. Never true (P)         Stress    Do you feel stress - tense, restless, nervous, or anxious, or unable to sleep at night because your mind is troubled all the time - these days? To some extent (P)         Social Isolation    How often do you feel lonely or isolated from those around you?  Never (P)         Alcohol Use    Q1: How often do you have a drink containing alcohol? Monthly or less (P)      Q2: How many drinks containing alcohol do you have on a typical day when you are drinking? 1 or 2 (P)      Q3: How often do you have six or more drinks on one occasion? Never (P)         [employer]    In the past 12 months has the electric, gas, oil, or water company threatened to shut off services in your home? No (P)         Health Literacy    How often do you need to have someone help you when you read instructions, pamphlets, or  other written material from your doctor or pharmacy? Never (P)         OTHER    Name(s) of People in Home Carlie Coppola (spouse) [phone], daughter and minor grandchild (P)

## 2024-07-22 NOTE — NURSING
Home Oxygen Evaluation    Date Performed: 2024    1) Patient's Home O2 Sat on room air, while at rest: 95%        If O2 sats on room air at rest are 88% or below, patient qualifies. No additional testing needed. Document N/A in steps 2 and 3. If 89% or above, complete steps 2.      2) Patient's O2 Sat on room air while exercisin%        If O2 sats on room air while exercising remain 89% or above patient does not qualify, no further testing needed Document N/A in step 3. If O2 sats on room air while exercising are 88% or below, continue to step 3.      3) Patient's O2 Sat while exercising on O2 N/A      (Must show improvement from #2 for patients to qualify)    If O2 sats improve on oxygen, patient qualifies for portable oxygen. If not, the patient does not qualify.

## 2024-07-23 ENCOUNTER — PATIENT OUTREACH (OUTPATIENT)
Dept: ADMINISTRATIVE | Facility: CLINIC | Age: 74
End: 2024-07-23
Payer: MEDICARE

## 2024-07-23 NOTE — PROGRESS NOTES
C3 nurse spoke with Francisco Coppola  for a TCC post hospital discharge follow up call. The patient does not have a scheduled HOSFU appointment with Laureano Hebert MD  within 5-7 days post hospital discharge date 07/22/2024. C3 nurse was unable to schedule HOSFU appointment in [facility]. Unable to schedule on PCP schedule, declined home NP scheduling.  Please contact patient and schedule follow up appointment using HOSFU visit type on or before 07/29/2024.    Message sent to PCP staff.

## 2024-07-24 ENCOUNTER — PATIENT MESSAGE (OUTPATIENT)
Dept: FAMILY MEDICINE | Facility: CLINIC | Age: 74
End: 2024-07-24
Payer: MEDICARE

## 2024-07-24 LAB
OHS QRS DURATION: 128 MS
OHS QTC CALCULATION: 513 MS

## 2024-07-25 LAB
BACTERIA BLD CULT: NORMAL
BACTERIA BLD CULT: NORMAL

## 2024-07-26 ENCOUNTER — OFFICE VISIT (OUTPATIENT)
Dept: FAMILY MEDICINE | Facility: CLINIC | Age: 74
End: 2024-07-26
Payer: MEDICARE

## 2024-07-26 VITALS
HEIGHT: 69 IN | OXYGEN SATURATION: 96 % | TEMPERATURE: 98 F | SYSTOLIC BLOOD PRESSURE: 136 MMHG | WEIGHT: 240.31 LBS | BODY MASS INDEX: 35.59 KG/M2 | DIASTOLIC BLOOD PRESSURE: 80 MMHG | HEART RATE: 112 BPM

## 2024-07-26 DIAGNOSIS — N18.32 STAGE 3B CHRONIC KIDNEY DISEASE: ICD-10-CM

## 2024-07-26 DIAGNOSIS — I10 HTN (HYPERTENSION), BENIGN: ICD-10-CM

## 2024-07-26 DIAGNOSIS — Z72.0 TOBACCO USE: ICD-10-CM

## 2024-07-26 DIAGNOSIS — E66.01 SEVERE OBESITY (BMI 35.0-39.9) WITH COMORBIDITY: ICD-10-CM

## 2024-07-26 DIAGNOSIS — E11.65 UNCONTROLLED TYPE 2 DIABETES MELLITUS WITH HYPERGLYCEMIA: ICD-10-CM

## 2024-07-26 DIAGNOSIS — J93.83 SPONTANEOUS PNEUMOTHORAX: Primary | ICD-10-CM

## 2024-07-26 PROCEDURE — 99999 PR PBB SHADOW E&M-EST. PATIENT-LVL V: CPT | Mod: PBBFAC,HCNC,, | Performed by: FAMILY MEDICINE

## 2024-07-26 NOTE — PROGRESS NOTES
(Portions of this note were dictated using voice recognition software and may contain dictation related errors in spelling/grammar/syntax not found on text review)    CC:   Chief Complaint   Patient presents with    Hospital Follow Up       HPI: 74 y.o. male     Transitional Care Note    Family and/or Caretaker present at visit?  No.  Diagnostic tests reviewed/disposition: I have reviewed all completed as well as pending diagnostic tests at the time of discharge.  Disease/illness education: yes  Home health/community services discussion/referrals: Patient does not have home health established from hospital visit.  They do not need home health.  If needed, we will set up home health for the patient.   Establishment or re-establishment of referral orders for community resources: No other necessary community resources.   Discussion with other health care providers: No discussion with other health care providers necessary.       Recent hospital admission on 07/20/2024 for shortness a breath for 1 day associated with cough and chest tightness, symptoms worse with lying down.  No fevers or chills abdominal pain nausea vomiting or diarrhea.  He was tachycardic, hypoxic, tachypneic on arrival.  Lactic acid 3.1, BUN 32, creatinine 1.8 (baseline 1.5.  CTA chest showed moderate left side left pneumothorax without evidence of mediastinal shift.  THORA-VENT placed per ED.  Pulmonology consulted, admitted to hospital medicine for management.  Was placed on supplemental O2 and weaned as tolerated.  THORA-VENT was clamped, repeat chest x-ray showed resolution of pneumothorax.  Device subsequently removed  Discharged on 07/22/2024    Overall feeling well, denies any chest pain or shortness and breath or any pleuritic symptoms.  No fevers or chills.  O2 96% room air.  He states that he is done with smoking.  Was not interested in smoking cessation referral.    Had advised on smoking cessation.  Per smoking cessation note he  declined referral to smoking cessation clinic.  Had received prescription for Chantix      Other medical history     Diabetes type 2, has stage 3 chronic kidney disease.   Metformin 2 g daily extended release,Mounjaro at 10 mg. Lantus 15 units daily., was on glimepiride 4 mg daily we discontinued with increasing Mounjaro,   . A1c  was Down to 6 but last time 7.9, now 7.8.        Hypertension on lisinopril HCT 20/25, furosemide 20 mg daily.  Blood pressure typically stable .  Does consistently have swelling bilateral lower legs.       Dyslipidemia on pravastatin 40 mg daily     Bilateral severe knee osteoarthritis.  Has knee x-ray from 2019 showing severe medial compartment narrowing and moderate lateral compartment narrowing.  .  Uses Voltaren gel on the knees which seems to help a bit.  Not able to get much exercise because walking hurts a lot.   Got a steroid shot in the past which helped significantly but then he had gotten COVID and knee started hurting again.  Follows up with Orthopedics.  Recent visit on 03/05/2024.  Was given ketorolac injection bilateral knees, discussed Iovera cryotherapy procedure, had done on 5/23/24     Prior lower back pain, had surgery, since then has had no problems with the back          Past Medical History:   Diagnosis Date    Allergy     DDD (degenerative disc disease), lumbar     Hyperlipidemia     Hypertension     Nicotine abuse     Obesity     Scrotal mass     Type II diabetes mellitus with renal manifestations, uncontrolled     microalbuminuria    Type II or unspecified type diabetes mellitus without mention of complication, not stated as uncontrolled     Ulcer        Past Surgical History:   Procedure Laterality Date    CARPAL TUNNEL RELEASE      CONDYLOMA EXCISION/FULGURATION Bilateral 2014    scrotal    KNEE SURGERY Left     removal of cartilage with no implant    LUMBAR LAMINECTOMY  2017    L4-5    ULNAR NERVE TRANSPOSITION         Family History   Problem Relation Name  Age of Onset    Cancer Mother          Liver    Cataracts Mother      Liver cancer Mother      Arthritis Mother      Dementia Mother      Cancer Father          Lung met Brain    Lung cancer Father      Brain cancer Father      Cancer Sister x3     Ovarian cancer Sister x3     No Known Problems Brother x2     No Known Problems Daughter x3     Alcohol abuse Paternal Uncle      Cirrhosis Paternal Uncle      Diabetes Paternal Grandmother      Cancer Paternal Grandfather      Amblyopia Neg Hx      Blindness Neg Hx      Glaucoma Neg Hx      Macular degeneration Neg Hx      Retinal detachment Neg Hx      Strabismus Neg Hx         Social History     Tobacco Use    Smoking status: Every Day     Current packs/day: 1.00     Average packs/day: 3.0 packs/day for 62.6 years (185.7 ttl pk-yrs)     Types: Cigarettes     Start date: 1962    Smokeless tobacco: Never    Tobacco comments:     Quit for 5 years; used to smoke 3 packs per day when younger, then 2 packs/day, now down to less than 1 pack/day      Declined smoking cessation; he is cutting down amount he smokes       Substance Use Topics    Alcohol use: Not Currently     Comment: rare    Drug use: Never       Lab Results   Component Value Date    WBC 10.79 07/20/2024    HGB 15.2 07/20/2024    HCT 45.5 07/20/2024    MCV 96 07/20/2024     07/20/2024    CHOL 147 06/07/2024    TRIG 198 (H) 06/07/2024    HDL 38 (L) 06/07/2024    ALT 20 07/20/2024    AST 28 07/20/2024    BILITOT 0.6 07/20/2024    ALKPHOS 79 07/20/2024     07/20/2024    K 4.7 07/20/2024     07/20/2024    CREATININE 1.8 (H) 07/20/2024    ESTGFRAFRICA 53 (A) 07/12/2022    EGFRNONAA 46 (A) 07/12/2022    EGFRNORACEVR 39 (A) 07/20/2024    CALCIUM 9.8 07/20/2024    ALBUMIN 3.9 07/20/2024    BUN 32 (H) 07/20/2024    CO2 22 (L) 07/20/2024    TSH 2.250 04/28/2023    PSA 4.0 08/16/2018    INR 1.0 07/12/2017    HGBA1C 7.8 (H) 06/07/2024    MICALBCREAT 1097.1 (H) 08/16/2023    LDLCALC 69.4 06/07/2024    GLU  "221 (H) 07/20/2024       eGFR (mL/min/1.73 m^2)   Date Value   07/20/2024 39 (A)   06/07/2024 45 (A)   08/16/2023 58 (A)   04/28/2023 45 (A)   01/25/2023 49 (A)   10/19/2022 49 (A)               Vital signs reviewed  Vitals:    07/26/24 1319   BP: 136/80   BP Location: Right arm   Patient Position: Sitting   BP Method: Medium (Manual)   Pulse: (!) 112   Temp: 98.4 °F (36.9 °C)   TempSrc: Oral   SpO2: 96%   Weight: 109 kg (240 lb 4.8 oz)   Height: 5' 9" (1.753 m)       Wt Readings from Last 4 Encounters:   07/26/24 109 kg (240 lb 4.8 oz)   07/20/24 (!) 139.5 kg (307 lb 8.7 oz)   06/14/24 107.7 kg (237 lb 7 oz)   05/23/24 107.6 kg (237 lb 3.4 oz)       PE:   APPEARANCE: Well nourished, well developed, in no acute distress.    HEAD: Normocephalic, atraumatic.  EYES:   Conjunctivae noninjected.  NECK: Supple with no cervical lymphadenopathy.    CHEST: Good inspiratory effort. Lungs clear to auscultation with no wheezes or crackles.  Normal aeration throughout  CARDIOVASCULAR: Normal S1, S2. No rubs, murmurs, or gallops.  ABDOMEN: Bowel sounds normal. Not distended. Soft. No tenderness or masses. No organomegaly.  EXTREMITIES:  Chronic trace pitting edema BLE      IMPRESSION  1. Spontaneous pneumothorax    2. Uncontrolled type 2 diabetes mellitus with hyperglycemia    3. HTN (hypertension), benign    4. Severe obesity (BMI 35.0-39.9) with comorbidity    5. Tobacco use            PLAN  Reviewed hospitalization data, imaging, labs    Pneumothorax has resolved.  Symptoms cleared    He states that he is finished with tobacco use, advised on maintaining cessation.  He declines referral to smoking cessation clinic    BP stable , continue current meds    Diabetes.  Offered increase Mounjaro to 12.5.  Currently continues on 10 mg Mounjaro weekly plus 2 g of metformin daily.  He declines on increase at this time and feels like dietary indiscretions recently may account for the elevated A1c.  He resolves to try to improve his " dietary intake over the next 3 months, can recheck labs at that time and will see back for follow up appointment in 3 months.      Has also due for LD CT in August but given his recent CT angiogram done in hospital will pushups back to next year

## 2024-07-30 ENCOUNTER — TELEPHONE (OUTPATIENT)
Dept: PULMONOLOGY | Facility: CLINIC | Age: 74
End: 2024-07-30
Payer: MEDICARE

## 2024-07-30 DIAGNOSIS — J44.9 CHRONIC OBSTRUCTIVE PULMONARY DISEASE, UNSPECIFIED COPD TYPE: Primary | ICD-10-CM

## 2024-07-31 NOTE — DISCHARGE SUMMARY
[facility] Medicine  Discharge Summary      Patient Name: Francisco Coppola  MRN: [id number]  LETHA: [id number]  Patient Class: IP- Inpatient  Admission Date: 7/20/2024  Hospital Length of Stay: 2 days  Discharge Date and Time: 7/22/2024 11:06 AM  Attending Physician: RACHEL SMITH    Discharging Provider: Yemi Jacobo PA-C  Primary Care Provider: Laureano Hebert MD    Primary Care Team: Networked reference to record PCT     HPI:   Francisco Coppola is a 73 yo male with PMH of HTN, CAD, T2DM, CKD 3, tobacco abuse, COPD, pulmonary nodule and DDD who presented to [facility] ED with CC of shortness of breath. Onset 1 day PTA associated with cough and chest tightness. Symptoms worse when lying down. Pt denies fever/chills. No abdominal pain, n/v or diarrhea. He denies use of home O2.     Patient tachycardic, hypoxic and tachypneic on arrival. Labs remarkable for BUN/Cr 32/1.8 (baseline Cr 1.5), lactic acid 3.1. CTA chest shows moderate size left pneumothorax without evidence of mediastinal shift.  This is primarily located anteriorly.  There is mild left right midline shift. Thora-vent placed per ED. Patient reports improved dyspnea on my exam. No distress noted. Pulmonology consulted. Patient admitted to  for management of acute hypoxic respiratory failure In setting of spontaneous pneumothorax.        * No surgery found *      Hospital Course:   The patient was admitted to the Hospital Medicine service with consult to Pulmonology for further evaluation and management of his acute hypoxic respiratory failure In setting of spontaneous pneumothorax. Blood cultures were collected with NGTD. The patient received low-flow O2 NC with excellent saturations. Thoravent placed in ED with subsequent lung reexpansion noted on CXR and resolution of his respiratory distress. Thoravent was clamped and removed, repeat CXR showed no pneumothorax. The patient received smoking cessation education and  agreed to initiation of Chantix at discharge. The patient's O2 was weaned to RA and did not require home O2 on walk test. The patient tolerated a diet and was discharged to home in stable condition w/ Chantix. Referrals were placed to Pulmonology and smoking cessation for outpatient follow up; PCP follow up within 1-2 weeks.     Goals of Care Treatment Preferences:  Code Status: Full Code      Consults:   Consults (From admission, onward)          Status Ordering Provider     Inpatient consult to Pulmonology  Once        Provider:  (Not yet assigned)    Completed ELKIN ESPOSITO            Pulmonary  * Acute hypoxic respiratory failure-resolved as of 7/22/2024  Patient with Hypoxic Respiratory failure which is Acute.  he is not on home oxygen. Supplemental oxygen was provided and noted-      .   Signs/symptoms of respiratory failure include- tachypnea, increased work of breathing, respiratory distress, use of accessory muscles, and lethargy. Contributing diagnoses includes - COPD and spontaneous pneumothorax  Labs and images were reviewed. Patient Has recent ABG, which has been reviewed. Will treat underlying causes and adjust management of respiratory failure as needed  -O2 weaned  -consult pulmonology, thoravent placed and lung reexpansion noted on CXR; clamped and removed w/ no PTX on CXR  -Passed home O2 walking test    Pneumothorax on left-resolved as of 7/22/2024  -patient presented with chest tightness, cough, SOB x 2 days exacerbated by lying joey. 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  This is primarily located anteriorly.  There is mild left right midline shift. No PE  - patient s/p thora vent placement with re expansion of left lung noted on f/u images; thoravent clamped and removed w/ no PTX on CXR   -supplemental O2 weaned to RA  -Passed home O2 walk test  - pain management   -NGTD on blood  cultures  -consult pulmonology, appreciate rec's  -Ambulatory referral to Pulmonology at discharge         Cardiac/Vascular  CAD (coronary artery disease)  Patient with known CAD which is controlled Will continue ASA and Statin and monitor for S/Sx of angina/ACS. Continue to monitor on telemetry.     HTN (hypertension), benign  Chronic, controlled. Latest blood pressure and vitals reviewed-      .   Home meds for hypertension were reviewed and noted below.   Hypertension Medications               furosemide (LASIX) 20 MG tablet TAKE 1 TABLET(20 MG) BY MOUTH EVERY DAY    lisinopriL-hydrochlorothiazide (PRINZIDE,ZESTORETIC) 20-25 mg Tab TAKE 1 TABLET BY MOUTH EVERY DAY            While in the hospital, will manage blood pressure as follows; Continue home antihypertensive regimen     Will utilize p.r.n. blood pressure medication only if patient's blood pressure greater than 160/100 and he develops symptoms such as worsening chest pain or shortness of breath.    Endocrine  Type 2 diabetes mellitus with stage 3a chronic kidney disease and hypertension  Creatine stable for now. BMP reviewed- noted CrCl cannot be calculated (Patient's most recent lab result is older than the maximum 7 days allowed.). according to latest data. Based on current GFR, CKD stage is stage 3 - GFR 30-59.  Monitor UOP and serial BMP and adjust therapy as needed. Renally dose meds. Avoid nephrotoxic medications and procedures.    Cr at baseline    Other  Tobacco dependence  Dangers of cigarette smoking were reviewed with patient in detail. Patient was Referred to Tobacco Cessation Program. Nicotine replacement options were discussed. Nicotine replacement was discussed-  Chantix prescribed at discharge , Ambulatory referral to smoking cessation      Final Active Diagnoses:    Diagnosis Date Noted POA    CAD (coronary artery disease) [I25.10] 07/21/2024 Yes    Tobacco dependence [F17.200] 07/12/2017 Yes    Type 2 diabetes mellitus with stage 3a  chronic kidney disease and hypertension [E11.22, I12.9, N18.31] 04/24/2013 Yes    HTN (hypertension), benign [I10] 04/24/2013 Yes      Problems Resolved During this Admission:    Diagnosis Date Noted Date Resolved POA    PRINCIPAL PROBLEM:  Acute hypoxic respiratory failure [J96.01] 07/21/2024 07/22/2024 Yes    Pneumothorax on left [J93.9] 07/21/2024 07/22/2024 Yes       Discharged Condition: stable    Disposition: Home or Self Care    Follow Up:   Follow-up Information       [facility] - Pulmonology Follow up.    Specialty: Pulmonology  Why: Clinic  will contact you with follow up visit  Contact information:  [address]  [address]  [facility] [id number] [phone]  Additional information:  Please park in [address] or D and enter building by using [address] entrance. Check in at central registration near [facility] in [facility]. Proceed to [address] waiting area once checked in.             Laureano Hebert MD. Schedule an appointment as soon as possible for a visit in 1 week(s).    Specialty: Family Medicine  Why: For hospital follow up visit  Contact information:  [address]  [address]  [facility] [address]  [phone]                           Patient Instructions:      Ambulatory referral/consult to Smoking Cessation Program   Standing Status: Future   Referral Priority: Routine Referral Type: Consultation   Referral Reason: Specialty Services Required   Requested Specialty: CTTS   Number of Visits Requested: 1     Ambulatory referral/consult to Pulmonology   Standing Status: Future   Referral Priority: Routine Referral Type: Consultation   Referral Reason: Specialty Services Required   Requested Specialty: Pulmonary Disease   Number of Visits Requested: 1     Diet diabetic     Diet Cardiac     Reason for not Prescribing Nicotine Replacement     Order Specific Question Answer Comments   Reason for not Prescribing: Not medically appropriate at this time Chantix prescribed     Activity as tolerated        Significant Diagnostic Studies: N/A    Pending Diagnostic Studies:       None           Medications:  Reconciled Home Medications:      Medication List        START taking these medications      * varenicline 0.5 mg (11)- 1 mg (42) tablet  Commonly known as: CHANTIX STARTING MONTH BOX  Take one 0.5mg tab by mouth once daily X3 days,then increase to one 0.5mg tab twice daily X4 days,then increase to one 1mg tab twice daily     * varenicline 1 mg Tab  Commonly known as: CHANTIX  Take 1 tablet (1 mg total) by mouth 2 (two) times daily. for 336 doses  Start taking on: August 22, 2024           * This list has 2 medication(s) that are the same as other medications prescribed for you. Read the directions carefully, and ask your doctor or other care provider to review them with you.                CONTINUE taking these medications      aspirin 81 MG EC tablet  Commonly known as: ECOTRIN  Take 81 mg by mouth once daily.     blood sugar diagnostic Strp  To check BG 3 times daily, to use with insurance preferred meter     blood-glucose meter kit  To check BG 3 times daily, to use with insurance preferred meter     clotrimazole-betamethasone 1-0.05% cream  Commonly known as: LOTRISONE  Apply topically 2 (two) times daily.     diclofenac sodium 1 % Gel  Commonly known as: VOLTAREN  Apply 2 g topically 3 (three) times daily as needed.     furosemide 20 MG tablet  Commonly known as: LASIX  TAKE 1 TABLET(20 MG) BY MOUTH EVERY DAY     gabapentin 300 MG capsule  Commonly known as: NEURONTIN  TAKE 1 CAPSULE(300 MG) BY MOUTH THREE TIMES DAILY     LANTUS SOLOSTAR U-100 INSULIN 100 unit/mL (3 mL) Inpn pen  Generic drug: insulin glargine U-100 (Lantus)  Inject 15 Units into the skin once daily.     lisinopriL-hydrochlorothiazide 20-25 mg Tab  Commonly known as: PRINZIDE,ZESTORETIC  TAKE 1 TABLET BY MOUTH EVERY DAY     metFORMIN 500 MG ER 24hr tablet  Commonly known as: GLUCOPHAGE-XR  TAKE 2 TABLETS(1000 MG) BY MOUTH TWICE DAILY  "WITH MEALS     multivitamin capsule  Take 1 capsule by mouth once daily.     mupirocin 2 % ointment  Commonly known as: BACTROBAN  Apply topically 3 (three) times daily.     pen needle, diabetic 31 gauge x 5/16" Ndle  Commonly known as: BD ULTRA-FINE SHORT PEN NEEDLE  USE WITH INSULIN PEN AS DIRECTED     pravastatin 40 MG tablet  Commonly known as: PRAVACHOL  TAKE 1 TABLET(40 MG) BY MOUTH EVERY EVENING     sildenafiL 100 MG tablet  Commonly known as: VIAGRA  Take 1 tablet (100 mg total) by mouth daily as needed for Erectile Dysfunction.     tirzepatide 10 mg/0.5 mL Pnij  Inject 10 mg into the skin every 7 days.     * ULTRA THIN LANCETS 30 gauge Misc  Generic drug: lancets  CHECK BLOOD GLUCOSE BID     * lancets Misc  To check BG 3 times daily, to use with insurance preferred meter           * This list has 2 medication(s) that are the same as other medications prescribed for you. Read the directions carefully, and ask your doctor or other care provider to review them with you.                  Indwelling Lines/Drains at time of discharge:   Lines/Drains/Airways       None                   Time spent on the discharge of patient: 15 minutes         Yemi Jacobo PA-C  Department of Hospital Medicine  [place] - Telemetry  "

## 2024-07-31 NOTE — ASSESSMENT & PLAN NOTE
Patient with Hypoxic Respiratory failure which is Acute.  he is not on home oxygen. Supplemental oxygen was provided and noted-      .   Signs/symptoms of respiratory failure include- tachypnea, increased work of breathing, respiratory distress, use of accessory muscles, and lethargy. Contributing diagnoses includes - COPD and spontaneous pneumothorax  Labs and images were reviewed. Patient Has recent ABG, which has been reviewed. Will treat underlying causes and adjust management of respiratory failure as needed  -O2 weaned  -consult pulmonology, thoravent placed and lung reexpansion noted on CXR; clamped and removed w/ no PTX on CXR  -Passed home O2 walking test

## 2024-07-31 NOTE — ASSESSMENT & PLAN NOTE
Dangers of cigarette smoking were reviewed with patient in detail. Patient was Referred to Tobacco Cessation Program. Nicotine replacement options were discussed. Nicotine replacement was discussed-  Chantix prescribed at discharge , Ambulatory referral to smoking cessation

## 2024-07-31 NOTE — ASSESSMENT & PLAN NOTE
Chronic, controlled. Latest blood pressure and vitals reviewed-      .   Home meds for hypertension were reviewed and noted below.   Hypertension Medications               furosemide (LASIX) 20 MG tablet TAKE 1 TABLET(20 MG) BY MOUTH EVERY DAY    lisinopriL-hydrochlorothiazide (PRINZIDE,ZESTORETIC) 20-25 mg Tab TAKE 1 TABLET BY MOUTH EVERY DAY            While in the hospital, will manage blood pressure as follows; Continue home antihypertensive regimen     Will utilize p.r.n. blood pressure medication only if patient's blood pressure greater than 160/100 and he develops symptoms such as worsening chest pain or shortness of breath.

## 2024-07-31 NOTE — HOSPITAL COURSE
The patient was admitted to the Hospital Medicine service with consult to Pulmonology for further evaluation and management of his acute hypoxic respiratory failure In setting of spontaneous pneumothorax. Blood cultures were collected with NGTD. The patient received low-flow O2 NC with excellent saturations. Thoravent placed in ED with subsequent lung reexpansion noted on CXR and resolution of his respiratory distress. Thoravent was clamped and removed, repeat CXR showed no pneumothorax. The patient received smoking cessation education and agreed to initiation of Chantix at discharge. The patient's O2 was weaned to RA and did not require home O2 on walk test. The patient tolerated a diet and was discharged to home in stable condition w/ Chantix. Referrals were placed to Pulmonology and smoking cessation for outpatient follow up; PCP follow up within 1-2 weeks.

## 2024-07-31 NOTE — ASSESSMENT & PLAN NOTE
Creatine stable for now. BMP reviewed- noted CrCl cannot be calculated (Patient's most recent lab result is older than the maximum 7 days allowed.). according to latest data. Based on current GFR, CKD stage is stage 3 - GFR 30-59.  Monitor UOP and serial BMP and adjust therapy as needed. Renally dose meds. Avoid nephrotoxic medications and procedures.    Cr at baseline

## 2024-07-31 NOTE — ASSESSMENT & PLAN NOTE
-patient presented with chest tightness, cough, SOB x 2 days exacerbated by lying joey. 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  This is primarily located anteriorly.  There is mild left right midline shift. No PE  - patient s/p thora vent placement with re expansion of left lung noted on f/u images; thoravent clamped and removed w/ no PTX on CXR   -supplemental O2 weaned to RA  -Passed home O2 walk test  - pain management   -NGTD on blood cultures  -consult pulmonology, appreciate rec's  -Ambulatory referral to Pulmonology at discharge

## 2024-08-17 RX ORDER — PRAVASTATIN SODIUM 40 MG/1
40 TABLET ORAL NIGHTLY
Qty: 90 TABLET | Refills: 3 | Status: SHIPPED | OUTPATIENT
Start: 2024-08-17

## 2024-08-17 NOTE — TELEPHONE ENCOUNTER
Refill Decision Note   Francisco Coppola  is requesting a refill authorization.  Brief Assessment and Rationale for Refill:  Approve     Medication Therapy Plan:         Comments:     Note composed:3:35 PM 08/17/2024

## 2024-08-17 NOTE — TELEPHONE ENCOUNTER
No care due was identified.  [facility] Embedded Care Due Messages. Reference number: [id number].   8/17/2024 5:38:45 AM CDT

## 2024-08-23 RX ORDER — GABAPENTIN 300 MG/1
300 CAPSULE ORAL 3 TIMES DAILY
Qty: 270 CAPSULE | Refills: 11 | Status: SHIPPED | OUTPATIENT
Start: 2024-08-23

## 2024-08-23 NOTE — TELEPHONE ENCOUNTER
No care due was identified.  [facility] Embedded Care Due Messages. Reference number: [id number].   8/23/2024 11:55:19 AM CDT

## 2024-08-29 PROBLEM — J93.11 PRIMARY SPONTANEOUS PNEUMOTHORAX: Status: ACTIVE | Noted: 2024-07-21

## 2024-08-29 NOTE — ASSESSMENT & PLAN NOTE
Active tobacco use but currently working on smoking cessation. Down to 1-2 cigarettes per day. Has chantix and nicotine replacement at home but not currently using.     - continue to encourage smoking cessation

## 2024-08-29 NOTE — PROGRESS NOTES
[facility]    SUBJECTIVE:     Chief Complaint: pneumothorax follow-up    History of Present Illness:  Francisco Coppola is a 74 y.o. male with a history of HTN, CAD, T2DM, CKDIIIa, and tobacco use who was admitted to [facility] 7/2024 with pneumothorax. He had a ThoraVent device placed with resolution of his pneumothorax which was removed on the subsequent day after placement. Pneumothorax felt to be primary as despite his extensive history of tobacco use there was no evidence of underlying lung disease on CT chest.     Denies any problems since he left the hospital. He denies any significant breathing problems. Mostly sedentary at baseline due to osteoarthritis of his knees but reports he is able to ambulate around his house and the grocery store including [employer] if needed without dyspnea. He has a chronic dry cough that has improved over the last month after he significantly cut back on his tobacco use. He reports some intermittent hay-fever/allergies that are self-limited. No significant post nasal drip. Has not used inhalers in the past.     Reports that prior to recent admission he had developed worsening dyspnea that continued to get worse and thus prompted presentation to the hospital. No history of pneumothorax.     Has problems with LE edema, worse on the left. Takes furosemide with some improvement.    Cough and sleep (was having trouble sleeping due to cough) have improved since he has cut back on smoking. Down to 1-2 cigarettes per day. Tried smoking cessation but didn't find it helpful. Not using any nicotine replacement or chantix. No fevers, chills, or weight loss.     Smoking: started smoking in 1965, smoking 2 packs per day, down to 1-2 cigarettes per day  Other substances: none  Inhalers: none  Travel: travelled to [place] in 1/2023  Incarceration/homelessness:   Occupational/environmental exposures: cook, ruelas and construction work, uses PPE  Pets/hobbies: dogs,  Physical Therapy Visit    Visit Type: Progress Note  Visit: 6  Referring Provider: Ruddy Damico MD  Medical Diagnosis (from order): Diagnosis Information    Diagnosis  719.41, 338.29 (ICD-9-CM) - M25.511, G89.29 (ICD-10-CM) - Chronic right shoulder pain  726.10 (ICD-9-CM) - M77.8 (ICD-10-CM) - Subscapularis tendinitis of right shoulder         SUBJECTIVE                                                                                                               Patient reports that she is a little better. Sleeping is a little better the last 2 nights but still sore and wakes at night. She has been very compliant with home exercise program. Patient reporting she did okay after her last visit in regards to pain. The taping of shoulder does seem to help.  Functional Change: Increased range of motion overhead  Current Functional Limitations: Pain with reaching behind back; pain with reaching to the side  Difficulty sleeping; waking with pain      OBJECTIVE                                                                                                                     Range of Motion (ROM)   (degrees unless noted; active unless noted; norms in ( ); negative=lacking to 0, positive=beyond 0)  Shoulder:    - Flexion (180):        • Right: 150    - Abduction (180):        • Right: 148 pain    - Internal Rotation:         • Right: pain        - Behind Back:           • Right: reach to 12 rib    Strength  (out of 5 unless noted, standard test position unless noted)   Shoulder:     - Flexion:         • Right: 4+     - Abduction:        • Right: 4, pain    - Internal Rotation:          • Right (at 0°): 5    - External Rotation:         • Right (at 0°) :4+          Palpation  Hypertonicity: right subscapularis, supraspinatus, levator scapulae  Myofascial restrictions to pectoralis minor                 Treatment     Therapeutic Exercise  UBE x 2' forward/2' retro - defer  Passive shoulder range of motion       Manual  Therapy   Soft tissue mobilization right upper trapezius, posterior cuff, lats, pectorals  Ischemic release to right subscapularis, levator scapulae, supraspinatus  Posterior glenohumeral glide Grade 3     Neuromuscular Re-Education  Deferred per time restraints of patient:    Arm elevation at 120 degrees, tactile cues with scap traction; isometrics into internal/external rotation x 5, 6 seconds -2 rounds  Rows 3 plates 1 x 15   Shoulder extension 2 plates x 15, 3 plates x 5 - defer  Shoulder external rotation 1 plate 1 x 15  Shoulder internal rotation 2 plates 1 x 15  Bilateral shoulder horizontal abduction green theraband x 15  Shoulder scaption yellow theraband (to 90 degrees) x 10    Next (as tolerated):  Lower trap lifts  Weight cones reaches      Skilled input: verbal instruction/cues, tactile instruction/cues, facilitation, posture correction, as detailed above, demonstration and inhibition    Writer verbally educated and received verbal consent for hand placement, positioning of patient, and techniques to be performed today from patient for clothing adjustments for techniques, therapist position for techniques and hand placement and palpation for techniques as described above and how they are pertinent to the patient's plan of care.    Home Exercise Program  Access Code: [email or web address]  URL: [email or web address]  Date: 03/22/2023  Prepared by: Zonia Reyes    Exercises  ? Standing Shoulder Row with Anchored Resistance - 1 x daily - 7 x weekly - 1-2 sets - 10 reps  ? Shoulder extension with resistance - Neutral - 1 x daily - 7 x weekly - 1-2 sets - 10 reps  ? Shoulder External Rotation with Anchored Resistance - 1 x daily - 7 x weekly - 1-2 sets - 10 reps  ? Standing Shoulder Internal Rotation with Anchored Resistance - 1 x daily - 7 x weekly - 1-2 sets - 10 reps  ? Shoulder External Rotation and Scapular Retraction with Resistance - 1 x daily - 7 x weekly - 1 sets - 10  cat  Recent illness: URI symptoms  B-Symptoms: none   Autoimmune symptoms/features: joint pain and swelling in knees  Intubation Hx: none  Hx childhood asthma/atopy: none  Family Hx: father with lung cancer      Review of patient's allergies indicates:   Allergen Reactions    Augmentin [amoxicillin-pot clavulanate] Hives and Rash    Tramadol Nausea Only       Past Medical History:   Diagnosis Date    Allergy     DDD (degenerative disc disease), lumbar     Hyperlipidemia     Hypertension     Nicotine abuse     Obesity     Scrotal mass     Type II diabetes mellitus with renal manifestations, uncontrolled     microalbuminuria    Type II or unspecified type diabetes mellitus without mention of complication, not stated as uncontrolled     Ulcer      Past Surgical History:   Procedure Laterality Date    CARPAL TUNNEL RELEASE      CONDYLOMA EXCISION/FULGURATION Bilateral 2014    scrotal    KNEE SURGERY Left     removal of cartilage with no implant    LUMBAR LAMINECTOMY  2017    L4-5    ULNAR NERVE TRANSPOSITION       Family History   Problem Relation Name Age of Onset    Cancer Mother          Liver    Cataracts Mother      Liver cancer Mother      Arthritis Mother      Dementia Mother      Cancer Father          Lung met Brain    Lung cancer Father      Brain cancer Father      Cancer Sister x3     Ovarian cancer Sister x3     No Known Problems Brother x2     No Known Problems Daughter x3     Alcohol abuse Paternal Uncle      Cirrhosis Paternal Uncle      Diabetes Paternal Grandmother      Cancer Paternal Grandfather      Amblyopia Neg Hx      Blindness Neg Hx      Glaucoma Neg Hx      Macular degeneration Neg Hx      Retinal detachment Neg Hx      Strabismus Neg Hx       Social History     Socioeconomic History    Marital status:    Tobacco Use    Smoking status: Every Day     Current packs/day: 1.00     Average packs/day: 3.0 packs/day for 62.7 years (185.8 ttl pk-yrs)     Types: Cigarettes     Start date: 1962  "   Smokeless tobacco: Never    Tobacco comments:     Quit for 5 years; used to smoke 3 packs per day when younger, then 2 packs/day, now down to less than 1 pack/day      Declined smoking cessation; he is cutting down amount he smokes       Substance and Sexual Activity    Alcohol use: Not Currently     Comment: rare    Drug use: Never    Sexual activity: Yes     Partners: Female     OBJECTIVE:     Vital Signs  Vitals:    08/30/24 1450   BP: 128/68   BP Location: Right arm   Patient Position: Sitting   Pulse: 108   SpO2: (!) 94%   Weight: 110.7 kg (244 lb)   Height: 5' 7" (1.702 m)     Body mass index is 38.22 kg/m².    Physical Exam:  Physical Exam  General: older man, sitting in clinic in NAD  HEENT: face symmetric, conjunctivae anicteric, MMM  Lungs:  normal respiratory effort, no wheezes, no crackles  Heart: distant heart sounds, regular rhythm, no audible murmurs  Abdomen: non-distended  Extremities: bilateral LE edema L>R, 1+ pitting edema of LLE  Skin: hyperpigmentation of left lower extremity  Neurologic: alert, oriented, thought content appropriate    Laboratory:  CBC  Lab Results   Component Value Date    WBC 10.79 07/20/2024    HGB 15.2 07/20/2024    HCT 45.5 07/20/2024     07/20/2024    MCV 96 07/20/2024    RDW 13.5 07/20/2024     BMP  Lab Results   Component Value Date     07/20/2024    K 4.7 07/20/2024     07/20/2024    CO2 22 (L) 07/20/2024    BUN 32 (H) 07/20/2024    CREATININE 1.8 (H) 07/20/2024     (H) 07/20/2024    CALCIUM 9.8 07/20/2024    MG 2.0 07/20/2024     LFTs  Lab Results   Component Value Date    PROT 8.1 07/20/2024    ALBUMIN 3.9 07/20/2024    BILITOT 0.6 07/20/2024    AST 28 07/20/2024    ALKPHOS 79 07/20/2024    ALT 20 07/20/2024       PFTs 8/30/2024  FEV1/FVC 66  FEV1 2 (73%)  FVC 3.04 (84%)  %  %  DLCO 63%    Chest Imaging   CT Chest 7/20/2024  FINDINGS:  Moderate lower cervical spondylotic changes are noted.  There is no evidence of mediastinal " reps  ? Standing Shoulder Horizontal Abduction with Resistance - 1 x daily - 7 x weekly - 1 sets - 10 reps  ? Single Arm Scaption with Resistance - 1 x daily - 7 x weekly - 1-2 sets - 10 reps           ASSESSMENT                                                                                                          To date the patient has made gains not as expected due to Patient had an exacerbation of pain and unable to progress therapy as much. Pain has calmed down and tolerating progressing shoulder range of motion/strength..  Patient continues to have impairments and functional deficits as noted.  Patient will continue to benefit from skilled care as outlined.    Patient has been seen for 6 visits for right shoulder pain. Today she demonstrates increased active motion of right shoulder and moderate improvement in pain however lifting in the frontal plane and combined planes still increase pain to anterior shoulder. Hypertonicity still noted to shoulder/shoulder girdle. Due to time restrictions by patient, deferred strengthening and stability exercises to home. Due to pain exacerbation 1 week ago and mild set back, patient would benefit from 1-2 more visits to progress strength and stability overhead.     PLAN                                                                                                                          Extend frequency and duration per below.  Frequency / Duration  1 times per week tapering as patient progresses for 2 weeks for an estimated total of 2 visits      Goals  Patient to achieve a minimum of 90% range of motion of non-affected arm at the glenohumeral joint in all planes.  Patient to reduce pain to 2/10 or less.  Patient to achieve symmetrical manual muscle test in all planes of the glenohumeral joint.   The above improvements in impairments to assist in obtaining goals listed below  Long Term Goals: to be met by end of plan of care  1. Patient will complete independent upper  body dressing and bathing with the affected limb at the same capacity as the non affected limb. Status: progressing/ongoing Improvement in tolerance for upper body dressing/grooming hair  2. Patient will reach above head with proper scapulohumeral rhythm and without reported difficulty for completion of household tasks such as cleaning/dusting or storing light items overhead such as dishes. Status: progressing/ongoingCuing still required for mechanics; patient would benefit from progressive lower trap strengthening to aid in upward rotation of scapulae  3. Patient will be able to lift 5 pounds from waist to eye level with proper body mechanics to assist with lifting household items overhead for home maintenance projects.   Status: Not assessed today      Therapy procedure time and total treatment time can be found documented on the Time Entry flowsheet     or hilar adenopathy.  There is suggestion of a small hiatal hernia.  A high density dependent gallstone is seen in the gallbladder.  The remainder the upper abdomen is unremarkable.  Calcified granuloma is noted in the right lung at the level the christi on series 2, image number 191 measuring 8 mm.  Vascular calcifications including coronary artery calcifications are present.  The images are somewhat degraded by motion.  There is no CT angiography evidence of acute pulmonary thromboembolic disease..     There is moderate size left pneumothorax without evidence of mediastinal shift.  This is primarily located anteriorly.  There is mild left right midline shift.    ASSESSMENT/PLAN:     Problem Noted   Pulmonary Nodule 8/30/2024   Primary Spontaneous Pneumothorax 7/21/2024   Tobacco Dependence 7/12/2017     Problem List Items Addressed This Visit          Pulmonary    Primary spontaneous pneumothorax    Current Assessment & Plan     Primary spontaneous pneumothorax in 7/2024, resolved with placement of ThoraVent device. History of tobacco use but no evidence of structural lung disease or emphysema on CT imaging. PFTs without clear obstructive lung disease and no chronic respiratory symptoms. Remains at high risk for recurrent pneumothorax.     - discussed return precautions in detail with patient and risk for recurrent pneumothorax  - no scuba diving   - continue to encourage tobacco cessation          Pulmonary nodule    Current Assessment & Plan     Currently enrolled in lung cancer screening by PCP. Monitoring stable pulmonary nodules, last CT chest done in 8/2023. Had a 3mm subsolid nodule at last CT chest, annual CT chest recommended.     - continue annual low dose CT chest             Other    Tobacco dependence - Primary    Current Assessment & Plan     Active tobacco use but currently working on smoking cessation. Down to 1-2 cigarettes per day. Has chantix and nicotine replacement at home but not currently  using.     - continue to encourage smoking cessation          Other Visit Diagnoses       Pneumothorax, unspecified type                Kasia Deleon MD  Pulmonary & Critical Care Fellow

## 2024-08-29 NOTE — ASSESSMENT & PLAN NOTE
Primary spontaneous pneumothorax in 7/2024, resolved with placement of ThoraVent device. History of tobacco use but no evidence of structural lung disease or emphysema on CT imaging. PFTs without clear obstructive lung disease and no chronic respiratory symptoms. Remains at high risk for recurrent pneumothorax.     - discussed return precautions in detail with patient and risk for recurrent pneumothorax  - no scuba diving   - continue to encourage tobacco cessation

## 2024-08-30 ENCOUNTER — HOSPITAL ENCOUNTER (OUTPATIENT)
Dept: PULMONOLOGY | Facility: CLINIC | Age: 74
Discharge: HOME | End: 2024-08-30
Payer: MEDICARE

## 2024-08-30 ENCOUNTER — OFFICE VISIT (OUTPATIENT)
Dept: PULMONOLOGY | Facility: CLINIC | Age: 74
End: 2024-08-30
Payer: MEDICARE

## 2024-08-30 VITALS
OXYGEN SATURATION: 94 % | BODY MASS INDEX: 38.3 KG/M2 | HEART RATE: 108 BPM | WEIGHT: 244 LBS | HEIGHT: 67 IN | SYSTOLIC BLOOD PRESSURE: 128 MMHG | DIASTOLIC BLOOD PRESSURE: 68 MMHG

## 2024-08-30 DIAGNOSIS — J93.9 PNEUMOTHORAX, UNSPECIFIED TYPE: ICD-10-CM

## 2024-08-30 DIAGNOSIS — J93.11 PRIMARY SPONTANEOUS PNEUMOTHORAX: ICD-10-CM

## 2024-08-30 DIAGNOSIS — J44.9 CHRONIC OBSTRUCTIVE PULMONARY DISEASE, UNSPECIFIED COPD TYPE: ICD-10-CM

## 2024-08-30 DIAGNOSIS — F17.200 TOBACCO DEPENDENCE: Primary | ICD-10-CM

## 2024-08-30 DIAGNOSIS — R91.1 PULMONARY NODULE: ICD-10-CM

## 2024-08-30 LAB
DLCO ADJ PRE: 14.6 ML/(MIN*MMHG) (ref 16.74–30.6)
DLCO SINGLE BREATH LLN: 16.74
DLCO SINGLE BREATH PRE REF: 62.7 %
DLCO SINGLE BREATH REF: 23.67
DLCOC SBVA LLN: 2.39
DLCOC SBVA PRE REF: 75.5 %
DLCOC SBVA REF: 3.63
DLCOC SINGLE BREATH LLN: 16.74
DLCOC SINGLE BREATH PRE REF: 61.7 %
DLCOC SINGLE BREATH REF: 23.67
DLCOCSBVAULN: 4.88
DLCOCSINGLEBREATHULN: 30.6
DLCOCSINGLEBREATHZSCORE: -2.15
DLCOSINGLEBREATHULN: 30.6
DLCOSINGLEBREATHZSCORE: -2.1
DLCOVA LLN: 2.39
DLCOVA PRE REF: 76.7 %
DLCOVA PRE: 2.79 ML/(MIN*MMHG*L) (ref 2.39–4.88)
DLCOVA REF: 3.63
DLCOVAULN: 4.88
DLVAADJ PRE: 2.74 ML/(MIN*MMHG*L) (ref 2.39–4.88)
ERVN2 LLN: -16449.08
ERVN2 PRE REF: 56.8 %
ERVN2 PRE: 0.52 L (ref -16449.08–16450.92)
ERVN2 REF: 0.92
ERVN2ULN: ABNORMAL
FEF 25 75 LLN: 1.07
FEF 25 75 PRE REF: 36.9 %
FEF 25 75 REF: 2.78
FET100 CHG: -4.9 %
FEV05 LLN: 1.14
FEV05 REF: 2.28
FEV1 CHG: 2.7 %
FEV1 FVC LLN: 61
FEV1 FVC PRE REF: 87 %
FEV1 FVC REF: 76
FEV1 LLN: 1.94
FEV1 PRE REF: 73 %
FEV1 REF: 2.74
FEV1 VOL CHG: 0.05
FEV1FVCZSCORE: -1.16
FEV1ZSCORE: -1.52
FRCN2 LLN: 2.58
FRCN2 PRE REF: 126.1 %
FRCN2 REF: 3.57
FRCN2ULN: 4.55
FVC CHG: 6.7 %
FVC LLN: 2.66
FVC PRE REF: 83.5 %
FVC REF: 3.64
FVC VOL CHG: 0.2
FVCZSCORE: -1
ICN2REF: 2.71
IVC PRE: 2.88 L (ref 2.66–4.63)
IVC SINGLE BREATH LLN: 2.66
IVC SINGLE BREATH PRE REF: 79.1 %
IVC SINGLE BREATH REF: 3.64
IVCSINGLEBREATHULN: 4.63
LLN IC N2: -9999997.29
PEF LLN: 5.05
PEF PRE REF: 67.4 %
PEF REF: 7.17
POST FEF 25 75: 1.11 L/S (ref 1.07–4.49)
POST FET 100: 7.83 SEC
POST FEV1 FVC: 63.4 % (ref 61.49–88.51)
POST FEV1: 2.06 L (ref 1.94–3.49)
POST FEV5: 1.56 L (ref 1.14–3.41)
POST FVC: 3.24 L (ref 2.66–4.63)
POST PEF: 4.65 L/S (ref 5.05–9.29)
PRE DLCO: 14.84 ML/(MIN*MMHG) (ref 16.74–30.6)
PRE FEF 25 75: 1.02 L/S (ref 1.07–4.49)
PRE FET 100: 8.23 SEC
PRE FEV05 REF: 65.2 %
PRE FEV1 FVC: 65.87 % (ref 61.49–88.51)
PRE FEV1: 2 L (ref 1.94–3.49)
PRE FEV5: 1.48 L (ref 1.14–3.41)
PRE FRC N2: 4.5 L (ref 2.58–4.55)
PRE FVC: 3.04 L (ref 2.66–4.63)
PRE IC N2: 2.52 L (ref -9999997.29–#######.####)
PRE PEF: 4.83 L/S (ref 5.05–9.29)
PRE REF IC N2: 92.8 %
RVN2 LLN: 1.97
RVN2 PRE REF: 150.1 %
RVN2 PRE: 3.98 L (ref 1.97–3.32)
RVN2 REF: 2.65
RVN2TLCN2 LLN: 34.23
RVN2TLCN2 PRE REF: 131.2 %
RVN2TLCN2 PRE: 56.68 % (ref 34.23–52.19)
RVN2TLCN2 REF: 43.21
RVN2TLCN2ULN: 52.19
RVN2ULN: 3.32
TLCN2 LLN: 5.37
TLCN2 PRE REF: 107.6 %
TLCN2 PRE: 7.02 L (ref 5.37–7.67)
TLCN2 REF: 6.52
TLCN2ULN: 7.67
TLCN2ZSCORE: 0.71
ULN IC N2: ABNORMAL
VA PRE: 5.33 L (ref 6.37–6.37)
VA SINGLE BREATH LLN: 6.37
VA SINGLE BREATH PRE REF: 83.6 %
VA SINGLE BREATH REF: 6.37
VASINGLEBREATHULN: 6.37
VCMAXN2 LLN: 2.66
VCMAXN2 PRE REF: 83.5 %
VCMAXN2 PRE: 3.04 L (ref 2.66–4.63)
VCMAXN2 REF: 3.64
VCMAXN2ULN: 4.63

## 2024-08-30 PROCEDURE — 99999 PR PBB SHADOW E&M-EST. PATIENT-LVL IV: CPT | Mod: PBBFAC,HCNC,GC, | Performed by: STUDENT IN AN ORGANIZED HEALTH CARE EDUCATION/TRAINING PROGRAM

## 2024-08-30 PROCEDURE — 3051F HG A1C>EQUAL 7.0%<8.0%: CPT | Mod: HCNC,CPTII,GC,S$GLB | Performed by: INTERNAL MEDICINE

## 2024-08-30 PROCEDURE — 3078F DIAST BP <80 MM HG: CPT | Mod: HCNC,CPTII,GC,S$GLB | Performed by: INTERNAL MEDICINE

## 2024-08-30 PROCEDURE — 99204 OFFICE O/P NEW MOD 45 MIN: CPT | Mod: 25,HCNC,GC,S$GLB | Performed by: INTERNAL MEDICINE

## 2024-08-30 PROCEDURE — 1101F PT FALLS ASSESS-DOCD LE1/YR: CPT | Mod: HCNC,CPTII,GC,S$GLB | Performed by: INTERNAL MEDICINE

## 2024-08-30 PROCEDURE — 3288F FALL RISK ASSESSMENT DOCD: CPT | Mod: HCNC,CPTII,GC,S$GLB | Performed by: INTERNAL MEDICINE

## 2024-08-30 PROCEDURE — 1159F MED LIST DOCD IN RCRD: CPT | Mod: HCNC,CPTII,GC,S$GLB | Performed by: INTERNAL MEDICINE

## 2024-08-30 PROCEDURE — 1126F AMNT PAIN NOTED NONE PRSNT: CPT | Mod: HCNC,CPTII,GC,S$GLB | Performed by: INTERNAL MEDICINE

## 2024-08-30 PROCEDURE — 3074F SYST BP LT 130 MM HG: CPT | Mod: HCNC,CPTII,GC,S$GLB | Performed by: INTERNAL MEDICINE

## 2024-08-30 PROCEDURE — 4010F ACE/ARB THERAPY RXD/TAKEN: CPT | Mod: HCNC,CPTII,GC,S$GLB | Performed by: INTERNAL MEDICINE

## 2024-08-30 RX ORDER — ALBUTEROL SULFATE 90 UG/1
2 INHALANT RESPIRATORY (INHALATION) EVERY 6 HOURS PRN
Qty: 18 G | Refills: 3 | Status: SHIPPED | OUTPATIENT
Start: 2024-08-30

## 2024-08-30 NOTE — ASSESSMENT & PLAN NOTE
Currently enrolled in lung cancer screening by PCP. Monitoring stable pulmonary nodules, last CT chest done in 8/2023. Had a 3mm subsolid nodule at last CT chest, annual CT chest recommended.     - continue annual low dose CT chest

## 2024-08-30 NOTE — PROGRESS NOTES
Pt is a 73 yo pmh Tobacco use disorder smoking 2 ppd, down to 1-2 cigarettes a day. Had PTX with placement of pleural vent with improvement. Currently asymptomatic. CTA chest without significant parenchymal or interstitial disease and no discernable pulmonary nodules.     PFTs without significant obstruction, restriction, with moderately decreased diffusion capacity.     ED return precautions given. Patient motivated to quit smoking.     - can offer albuterol inhaler for emergencies, but asymptomatic and does not meet criteria for obstruction at this time to deem the need for maintenance inhalers.     Tej José MD  [facility]

## 2024-09-14 DIAGNOSIS — E11.65 UNCONTROLLED TYPE 2 DIABETES MELLITUS WITH HYPERGLYCEMIA: ICD-10-CM

## 2024-09-14 NOTE — TELEPHONE ENCOUNTER
Care Due:                  Date            Visit Type   Department     Provider  --------------------------------------------------------------------------------                                [facility]  Last Visit: 07-      FOLLOW UP    MEDICINE       Laureano Hebert                               -                              [facility]  Next Visit: 10-      CARE ([facility])   MEDICINE       Laureano Hebert                                                            Last  Test          Frequency    Reason                     Performed    Due Date  --------------------------------------------------------------------------------    HBA1C.......  6 months...  metFORMIN, tirzepatide...  06- 12-    [facility] Embedded Care Due Messages. Reference number: [id number].   9/14/2024 12:44:28 PM CDT

## 2024-09-16 RX ORDER — PEN NEEDLE, DIABETIC 30 GX3/16"
NEEDLE, DISPOSABLE MISCELLANEOUS
Qty: 100 EACH | Refills: 11 | Status: SHIPPED | OUTPATIENT
Start: 2024-09-16

## 2024-09-23 ENCOUNTER — OFFICE VISIT (OUTPATIENT)
Dept: OPHTHALMOLOGY | Facility: CLINIC | Age: 74
End: 2024-09-23
Payer: MEDICARE

## 2024-09-23 DIAGNOSIS — N18.31 TYPE 2 DIABETES MELLITUS WITH STAGE 3A CHRONIC KIDNEY DISEASE AND HYPERTENSION: ICD-10-CM

## 2024-09-23 DIAGNOSIS — H25.13 NUCLEAR SCLEROSIS OF BOTH EYES: Primary | ICD-10-CM

## 2024-09-23 DIAGNOSIS — H26.9 CORTICAL CATARACT OF BOTH EYES: ICD-10-CM

## 2024-09-23 DIAGNOSIS — H52.7 REFRACTIVE ERROR: ICD-10-CM

## 2024-09-23 DIAGNOSIS — E11.22 TYPE 2 DIABETES MELLITUS WITH STAGE 3A CHRONIC KIDNEY DISEASE AND HYPERTENSION: ICD-10-CM

## 2024-09-23 DIAGNOSIS — I12.9 TYPE 2 DIABETES MELLITUS WITH STAGE 3A CHRONIC KIDNEY DISEASE AND HYPERTENSION: ICD-10-CM

## 2024-09-23 DIAGNOSIS — E11.9 DM TYPE 2 WITHOUT RETINOPATHY: ICD-10-CM

## 2024-09-23 PROCEDURE — 92136 OPHTHALMIC BIOMETRY: CPT | Mod: HCNC,LT,S$GLB, | Performed by: OPHTHALMOLOGY

## 2024-09-23 PROCEDURE — 92004 COMPRE OPH EXAM NEW PT 1/>: CPT | Mod: HCNC,S$GLB,, | Performed by: OPHTHALMOLOGY

## 2024-09-23 PROCEDURE — 4010F ACE/ARB THERAPY RXD/TAKEN: CPT | Mod: HCNC,CPTII,S$GLB, | Performed by: OPHTHALMOLOGY

## 2024-09-23 PROCEDURE — 1159F MED LIST DOCD IN RCRD: CPT | Mod: HCNC,CPTII,S$GLB, | Performed by: OPHTHALMOLOGY

## 2024-09-23 PROCEDURE — 1126F AMNT PAIN NOTED NONE PRSNT: CPT | Mod: HCNC,CPTII,S$GLB, | Performed by: OPHTHALMOLOGY

## 2024-09-23 PROCEDURE — 99999 PR PBB SHADOW E&M-EST. PATIENT-LVL III: CPT | Mod: PBBFAC,HCNC,, | Performed by: OPHTHALMOLOGY

## 2024-09-23 PROCEDURE — 3051F HG A1C>EQUAL 7.0%<8.0%: CPT | Mod: HCNC,CPTII,S$GLB, | Performed by: OPHTHALMOLOGY

## 2024-09-23 PROCEDURE — 1160F RVW MEDS BY RX/DR IN RCRD: CPT | Mod: HCNC,CPTII,S$GLB, | Performed by: OPHTHALMOLOGY

## 2024-09-23 PROCEDURE — 1101F PT FALLS ASSESS-DOCD LE1/YR: CPT | Mod: HCNC,CPTII,S$GLB, | Performed by: OPHTHALMOLOGY

## 2024-09-23 PROCEDURE — 3288F FALL RISK ASSESSMENT DOCD: CPT | Mod: HCNC,CPTII,S$GLB, | Performed by: OPHTHALMOLOGY

## 2024-09-23 NOTE — PROGRESS NOTES
Subjective:       Patient ID: Francisco Coppola is a 74 y.o. male.    Chief Complaint: Consult    HPI    Referred by Dr Powell    No eyedrops  No eye surgery     Pt here for cataract eval per Dr Powell. Pt states difficulty with glare   and difficulty with driving at night. Pt states stable floaters and   flashes OU. Pt denies eye pain OU.  Last edited by Sophia Kramer MA on 9/23/2024  9:04 AM.             Assessment:       1. Nuclear sclerosis of both eyes    2. Cortical cataract of both eyes    3. DM type 2 without retinopathy    4. Type 2 diabetes mellitus with stage 3a chronic kidney disease and hypertension    5. Refractive error        Plan:       Visually significant cataract OU -Pt. Wants Sx.    DM-No NPDR OD. Hazy view OS.  RE      Cataract Surgery Consent: Patient with a visually significant cataract with difficulties of ADLs, reading, driving, night vision, glare (any and all).  Discussed with Patient/Family/Caregiver: options, risks and benefits, expectations of cataract surgery, utilized an eye model with questions and answers to facilitate discussion.  Discussed lens options and patient understands that glasses may be required for optimal vision for distance and/or near vision after cataract surgery.  The Patient/Family/Caregiver  voice good understanding and patient wishes to proceed with surgery.  The patient will likely benefit from surgery and patient signed consent for Left Eye.  CE OS 1st CNAOTO 17.5,        OD 2nd CNAOTO 17.5.  Control DM & HTN.

## 2024-09-24 ENCOUNTER — TELEPHONE (OUTPATIENT)
Dept: FAMILY MEDICINE | Facility: CLINIC | Age: 74
End: 2024-09-24
Payer: MEDICARE

## 2024-09-25 ENCOUNTER — TELEPHONE (OUTPATIENT)
Dept: OPHTHALMOLOGY | Facility: CLINIC | Age: 74
End: 2024-09-25
Payer: MEDICARE

## 2024-09-25 DIAGNOSIS — H25.11 NS (NUCLEAR SCLEROSIS), RIGHT: Primary | ICD-10-CM

## 2024-09-25 DIAGNOSIS — H25.13 NUCLEAR SCLEROSIS OF BOTH EYES: Primary | ICD-10-CM

## 2024-09-25 DIAGNOSIS — H25.12 NS (NUCLEAR SCLEROSIS), LEFT: Primary | ICD-10-CM

## 2024-09-25 RX ORDER — PREDNISOLONE/MOXIFLOX/BROMFEN 1 %-0.5 %
1 SUSPENSION, DROPS(FINAL DOSAGE FORM)(ML) OPHTHALMIC (EYE) 3 TIMES DAILY
Qty: 8 ML | Refills: 2 | Status: SHIPPED | OUTPATIENT
Start: 2024-10-19

## 2024-09-25 RX ORDER — DICLOFENAC SODIUM 10 MG/G
2 GEL TOPICAL 3 TIMES DAILY PRN
Qty: 100 G | Refills: 11 | Status: SHIPPED | OUTPATIENT
Start: 2024-09-25

## 2024-09-25 NOTE — TELEPHONE ENCOUNTER
No care due was identified.  [facility] Embedded Care Due Messages. Reference number: [id number].   9/25/2024 9:54:00 AM CDT

## 2024-10-15 ENCOUNTER — PATIENT OUTREACH (OUTPATIENT)
Dept: ADMINISTRATIVE | Facility: HOSPITAL | Age: 74
End: 2024-10-15
Payer: MEDICARE

## 2024-10-17 ENCOUNTER — TELEPHONE (OUTPATIENT)
Dept: OPHTHALMOLOGY | Facility: CLINIC | Age: 74
End: 2024-10-17
Payer: MEDICARE

## 2024-10-18 ENCOUNTER — LAB VISIT (OUTPATIENT)
Dept: LAB | Facility: HOSPITAL | Age: 74
End: 2024-10-18
Attending: FAMILY MEDICINE
Payer: MEDICARE

## 2024-10-18 DIAGNOSIS — I10 HTN (HYPERTENSION), BENIGN: ICD-10-CM

## 2024-10-18 DIAGNOSIS — E11.22 TYPE 2 DIABETES MELLITUS WITH STAGE 3A CHRONIC KIDNEY DISEASE AND HYPERTENSION: ICD-10-CM

## 2024-10-18 DIAGNOSIS — I12.9 TYPE 2 DIABETES MELLITUS WITH STAGE 3A CHRONIC KIDNEY DISEASE AND HYPERTENSION: ICD-10-CM

## 2024-10-18 DIAGNOSIS — Z87.891 PERSONAL HISTORY OF NICOTINE DEPENDENCE: ICD-10-CM

## 2024-10-18 DIAGNOSIS — I70.0 AORTIC ATHEROSCLEROSIS: ICD-10-CM

## 2024-10-18 DIAGNOSIS — N18.31 TYPE 2 DIABETES MELLITUS WITH STAGE 3A CHRONIC KIDNEY DISEASE AND HYPERTENSION: ICD-10-CM

## 2024-10-18 DIAGNOSIS — Z79.4 LONG-TERM INSULIN USE: ICD-10-CM

## 2024-10-18 LAB
ALBUMIN SERPL BCP-MCNC: 3.7 G/DL (ref 3.5–5.2)
ALBUMIN/CREAT UR: 871.6 UG/MG (ref 0–30)
ALP SERPL-CCNC: 62 U/L (ref 40–150)
ALT SERPL W/O P-5'-P-CCNC: 20 U/L (ref 10–44)
ANION GAP SERPL CALC-SCNC: 12 MMOL/L (ref 8–16)
AST SERPL-CCNC: 27 U/L (ref 10–40)
BASOPHILS # BLD AUTO: 0.03 K/UL (ref 0–0.2)
BASOPHILS NFR BLD: 0.3 % (ref 0–1.9)
BILIRUB SERPL-MCNC: 0.4 MG/DL (ref 0.1–1)
BUN SERPL-MCNC: 52 MG/DL (ref 8–23)
CALCIUM SERPL-MCNC: 9.6 MG/DL (ref 8.7–10.5)
CHLORIDE SERPL-SCNC: 106 MMOL/L (ref 95–110)
CHOLEST SERPL-MCNC: 160 MG/DL (ref 120–199)
CHOLEST/HDLC SERPL: 4.2 {RATIO} (ref 2–5)
CO2 SERPL-SCNC: 24 MMOL/L (ref 23–29)
CREAT SERPL-MCNC: 2.4 MG/DL (ref 0.5–1.4)
CREAT UR-MCNC: 74 MG/DL (ref 23–375)
DIFFERENTIAL METHOD BLD: ABNORMAL
EOSINOPHIL # BLD AUTO: 0.2 K/UL (ref 0–0.5)
EOSINOPHIL NFR BLD: 1.6 % (ref 0–8)
ERYTHROCYTE [DISTWIDTH] IN BLOOD BY AUTOMATED COUNT: 13.5 % (ref 11.5–14.5)
EST. GFR  (NO RACE VARIABLE): 27 ML/MIN/1.73 M^2
ESTIMATED AVG GLUCOSE: 157 MG/DL (ref 68–131)
GLUCOSE SERPL-MCNC: 147 MG/DL (ref 70–110)
HBA1C MFR BLD: 7.1 % (ref 4–5.6)
HCT VFR BLD AUTO: 41.3 % (ref 40–54)
HDLC SERPL-MCNC: 38 MG/DL (ref 40–75)
HDLC SERPL: 23.8 % (ref 20–50)
HGB BLD-MCNC: 13.5 G/DL (ref 14–18)
IMM GRANULOCYTES # BLD AUTO: 0.03 K/UL (ref 0–0.04)
IMM GRANULOCYTES NFR BLD AUTO: 0.3 % (ref 0–0.5)
LDLC SERPL CALC-MCNC: 70.4 MG/DL (ref 63–159)
LYMPHOCYTES # BLD AUTO: 3.4 K/UL (ref 1–4.8)
LYMPHOCYTES NFR BLD: 33.3 % (ref 18–48)
MCH RBC QN AUTO: 32.1 PG (ref 27–31)
MCHC RBC AUTO-ENTMCNC: 32.7 G/DL (ref 32–36)
MCV RBC AUTO: 98 FL (ref 82–98)
MICROALBUMIN UR DL<=1MG/L-MCNC: 645 UG/ML
MONOCYTES # BLD AUTO: 1 K/UL (ref 0.3–1)
MONOCYTES NFR BLD: 9.5 % (ref 4–15)
NEUTROPHILS # BLD AUTO: 5.6 K/UL (ref 1.8–7.7)
NEUTROPHILS NFR BLD: 55 % (ref 38–73)
NONHDLC SERPL-MCNC: 122 MG/DL
NRBC BLD-RTO: 0 /100 WBC
PLATELET # BLD AUTO: 292 K/UL (ref 150–450)
PMV BLD AUTO: 10.8 FL (ref 9.2–12.9)
POTASSIUM SERPL-SCNC: 4.5 MMOL/L (ref 3.5–5.1)
PROT SERPL-MCNC: 7.5 G/DL (ref 6–8.4)
RBC # BLD AUTO: 4.2 M/UL (ref 4.6–6.2)
SODIUM SERPL-SCNC: 142 MMOL/L (ref 136–145)
TRIGL SERPL-MCNC: 258 MG/DL (ref 30–150)
WBC # BLD AUTO: 10.22 K/UL (ref 3.9–12.7)

## 2024-10-18 PROCEDURE — 80061 LIPID PANEL: CPT | Mod: HCNC | Performed by: FAMILY MEDICINE

## 2024-10-18 PROCEDURE — 82570 ASSAY OF URINE CREATININE: CPT | Mod: HCNC | Performed by: FAMILY MEDICINE

## 2024-10-18 PROCEDURE — 36415 COLL VENOUS BLD VENIPUNCTURE: CPT | Mod: HCNC | Performed by: FAMILY MEDICINE

## 2024-10-18 PROCEDURE — 83036 HEMOGLOBIN GLYCOSYLATED A1C: CPT | Mod: HCNC | Performed by: FAMILY MEDICINE

## 2024-10-18 PROCEDURE — 82043 UR ALBUMIN QUANTITATIVE: CPT | Mod: HCNC | Performed by: FAMILY MEDICINE

## 2024-10-18 PROCEDURE — 85025 COMPLETE CBC W/AUTO DIFF WBC: CPT | Mod: HCNC | Performed by: FAMILY MEDICINE

## 2024-10-18 PROCEDURE — 80053 COMPREHEN METABOLIC PANEL: CPT | Mod: HCNC | Performed by: FAMILY MEDICINE

## 2024-10-22 ENCOUNTER — HOSPITAL ENCOUNTER (OUTPATIENT)
Facility: HOSPITAL | Age: 74
Discharge: HOME | End: 2024-10-22
Attending: OPHTHALMOLOGY | Admitting: OPHTHALMOLOGY
Payer: MEDICARE

## 2024-10-22 VITALS
TEMPERATURE: 98 F | SYSTOLIC BLOOD PRESSURE: 114 MMHG | DIASTOLIC BLOOD PRESSURE: 58 MMHG | RESPIRATION RATE: 16 BRPM | HEART RATE: 97 BPM | OXYGEN SATURATION: 98 %

## 2024-10-22 DIAGNOSIS — H25.12 NUCLEAR SCLEROTIC CATARACT OF LEFT EYE: Primary | ICD-10-CM

## 2024-10-22 LAB — POCT GLUCOSE: 149 MG/DL (ref 70–110)

## 2024-10-22 PROCEDURE — 36000707: Performed by: OPHTHALMOLOGY

## 2024-10-22 PROCEDURE — 94799 UNLISTED PULMONARY SVC/PX: CPT

## 2024-10-22 PROCEDURE — 94761 N-INVAS EAR/PLS OXIMETRY MLT: CPT | Mod: XB

## 2024-10-22 PROCEDURE — 82962 GLUCOSE BLOOD TEST: CPT | Performed by: OPHTHALMOLOGY

## 2024-10-22 PROCEDURE — 63600175 PHARM REV CODE 636 W HCPCS: Performed by: OPHTHALMOLOGY

## 2024-10-22 PROCEDURE — 99153 MOD SED SAME PHYS/QHP EA: CPT | Performed by: OPHTHALMOLOGY

## 2024-10-22 PROCEDURE — 36000706: Performed by: OPHTHALMOLOGY

## 2024-10-22 PROCEDURE — 99900035 HC TECH TIME PER 15 MIN (STAT)

## 2024-10-22 PROCEDURE — 71000015 HC POSTOP RECOV 1ST HR: Performed by: OPHTHALMOLOGY

## 2024-10-22 PROCEDURE — 27201423 OPTIME MED/SURG SUP & DEVICES STERILE SUPPLY: Performed by: OPHTHALMOLOGY

## 2024-10-22 PROCEDURE — 25000003 PHARM REV CODE 250: Performed by: OPHTHALMOLOGY

## 2024-10-22 PROCEDURE — V2632 POST CHMBR INTRAOCULAR LENS: HCPCS | Performed by: OPHTHALMOLOGY

## 2024-10-22 PROCEDURE — 66982 XCAPSL CTRC RMVL CPLX WO ECP: CPT | Mod: HCNC,LT,, | Performed by: OPHTHALMOLOGY

## 2024-10-22 PROCEDURE — 99152 MOD SED SAME PHYS/QHP 5/>YRS: CPT | Performed by: OPHTHALMOLOGY

## 2024-10-22 DEVICE — CLAREON ASPHERIC HYDROPHOBIC ACRYLIC IOL WITH THE AUTONOMEAUTOMATED PRE-LOADED DELIVERY SYSTEM
Type: IMPLANTABLE DEVICE | Site: EYE | Status: FUNCTIONAL
Brand: CLAREON™

## 2024-10-22 RX ORDER — LIDOCAIN/PHENYLEPH/BSS NO.2/PF 1 %-1.5 %
SYRINGE (ML) INTRAOCULAR
Status: DISCONTINUED | OUTPATIENT
Start: 2024-10-22 | End: 2024-10-22 | Stop reason: HOSPADM

## 2024-10-22 RX ORDER — PROPARACAINE HYDROCHLORIDE 5 MG/ML
SOLUTION/ DROPS OPHTHALMIC
Status: DISCONTINUED | OUTPATIENT
Start: 2024-10-22 | End: 2024-10-22 | Stop reason: HOSPADM

## 2024-10-22 RX ORDER — MOXIFLOXACIN 5 MG/ML
1 SOLUTION/ DROPS OPHTHALMIC
Status: COMPLETED | OUTPATIENT
Start: 2024-10-22 | End: 2024-10-22

## 2024-10-22 RX ORDER — FENTANYL CITRATE 50 UG/ML
25 INJECTION, SOLUTION INTRAMUSCULAR; INTRAVENOUS EVERY 5 MIN PRN
Status: DISCONTINUED | OUTPATIENT
Start: 2024-10-22 | End: 2024-10-22 | Stop reason: HOSPADM

## 2024-10-22 RX ORDER — ACETAMINOPHEN 325 MG/1
650 TABLET ORAL EVERY 4 HOURS PRN
Status: DISCONTINUED | OUTPATIENT
Start: 2024-10-22 | End: 2024-10-22 | Stop reason: HOSPADM

## 2024-10-22 RX ORDER — MOXIFLOXACIN 5 MG/ML
SOLUTION/ DROPS OPHTHALMIC
Status: DISCONTINUED | OUTPATIENT
Start: 2024-10-22 | End: 2024-10-22 | Stop reason: HOSPADM

## 2024-10-22 RX ORDER — EPINEPHRINE 1 MG/ML
INJECTION, SOLUTION, CONCENTRATE INTRAVENOUS
Status: DISCONTINUED | OUTPATIENT
Start: 2024-10-22 | End: 2024-10-22 | Stop reason: HOSPADM

## 2024-10-22 RX ORDER — MIDAZOLAM HYDROCHLORIDE 1 MG/ML
1 INJECTION, SOLUTION INTRAMUSCULAR; INTRAVENOUS
Status: DISCONTINUED | OUTPATIENT
Start: 2024-10-22 | End: 2024-10-22 | Stop reason: HOSPADM

## 2024-10-22 RX ORDER — CYCLOP/TROP/PROPA/PHEN/KET/WAT 1-1-0.1%
1 DROPS (EA) OPHTHALMIC (EYE)
Status: COMPLETED | OUTPATIENT
Start: 2024-10-22 | End: 2024-10-22

## 2024-10-22 RX ORDER — HYDROCODONE BITARTRATE AND ACETAMINOPHEN 5; 325 MG/1; MG/1
1 TABLET ORAL EVERY 4 HOURS PRN
Status: DISCONTINUED | OUTPATIENT
Start: 2024-10-22 | End: 2024-10-22 | Stop reason: HOSPADM

## 2024-10-22 RX ORDER — LIDOCAINE HYDROCHLORIDE 40 MG/ML
INJECTION, SOLUTION RETROBULBAR
Status: DISCONTINUED | OUTPATIENT
Start: 2024-10-22 | End: 2024-10-22 | Stop reason: HOSPADM

## 2024-10-22 RX ORDER — SODIUM CHLORIDE 9 MG/ML
INJECTION, SOLUTION INTRAVENOUS CONTINUOUS
Status: DISCONTINUED | OUTPATIENT
Start: 2024-10-22 | End: 2024-10-22 | Stop reason: HOSPADM

## 2024-10-22 RX ORDER — PREDNISOLONE ACETATE 10 MG/ML
SUSPENSION/ DROPS OPHTHALMIC
Status: DISCONTINUED | OUTPATIENT
Start: 2024-10-22 | End: 2024-10-22 | Stop reason: HOSPADM

## 2024-10-22 RX ADMIN — MOXIFLOXACIN OPHTHALMIC 1 DROP: 5 SOLUTION/ DROPS OPHTHALMIC at 07:10

## 2024-10-22 RX ADMIN — MIDAZOLAM HYDROCHLORIDE 1 MG: 1 INJECTION, SOLUTION INTRAMUSCULAR; INTRAVENOUS at 09:10

## 2024-10-22 RX ADMIN — FENTANYL CITRATE 25 MCG: 50 INJECTION, SOLUTION INTRAMUSCULAR; INTRAVENOUS at 09:10

## 2024-10-22 RX ADMIN — Medication 1 DROP: at 07:10

## 2024-10-23 ENCOUNTER — OFFICE VISIT (OUTPATIENT)
Dept: OPHTHALMOLOGY | Facility: CLINIC | Age: 74
End: 2024-10-23
Payer: MEDICARE

## 2024-10-23 DIAGNOSIS — Z98.890 POST-OPERATIVE STATE: Primary | ICD-10-CM

## 2024-10-23 PROCEDURE — 4010F ACE/ARB THERAPY RXD/TAKEN: CPT | Mod: CPTII,S$GLB,, | Performed by: OPHTHALMOLOGY

## 2024-10-23 PROCEDURE — 1101F PT FALLS ASSESS-DOCD LE1/YR: CPT | Mod: CPTII,S$GLB,, | Performed by: OPHTHALMOLOGY

## 2024-10-23 PROCEDURE — 1160F RVW MEDS BY RX/DR IN RCRD: CPT | Mod: CPTII,S$GLB,, | Performed by: OPHTHALMOLOGY

## 2024-10-23 PROCEDURE — 99024 POSTOP FOLLOW-UP VISIT: CPT | Mod: S$GLB,,, | Performed by: OPHTHALMOLOGY

## 2024-10-23 PROCEDURE — 3066F NEPHROPATHY DOC TX: CPT | Mod: CPTII,S$GLB,, | Performed by: OPHTHALMOLOGY

## 2024-10-23 PROCEDURE — 99999 PR PBB SHADOW E&M-EST. PATIENT-LVL II: CPT | Mod: PBBFAC,,, | Performed by: OPHTHALMOLOGY

## 2024-10-23 PROCEDURE — 3288F FALL RISK ASSESSMENT DOCD: CPT | Mod: CPTII,S$GLB,, | Performed by: OPHTHALMOLOGY

## 2024-10-23 PROCEDURE — 1126F AMNT PAIN NOTED NONE PRSNT: CPT | Mod: CPTII,S$GLB,, | Performed by: OPHTHALMOLOGY

## 2024-10-23 PROCEDURE — 1159F MED LIST DOCD IN RCRD: CPT | Mod: CPTII,S$GLB,, | Performed by: OPHTHALMOLOGY

## 2024-10-23 PROCEDURE — 3051F HG A1C>EQUAL 7.0%<8.0%: CPT | Mod: CPTII,S$GLB,, | Performed by: OPHTHALMOLOGY

## 2024-10-23 PROCEDURE — 3062F POS MACROALBUMINURIA REV: CPT | Mod: CPTII,S$GLB,, | Performed by: OPHTHALMOLOGY

## 2024-10-23 NOTE — PROGRESS NOTES
Subjective:       Patient ID: Francisco Coppola is a 74 y.o. male.    Chief Complaint: Post-op Evaluation    HPI    DLS: 9/23/2024    Pt here for 1 day post phaco w/IOL OS- 10/22/2024  Pt states no eye pain but the eye drop does burn after he puts them in.     Meds;  PMB TID OS      Last edited by Lyssa Leblanc on 10/23/2024  1:52 PM.             Assessment:       1. Post-operative state        Plan:       S/p CE OS- Doing well.        CPM OS.  RTC 1 wk.

## 2024-10-30 ENCOUNTER — OFFICE VISIT (OUTPATIENT)
Dept: OPHTHALMOLOGY | Facility: CLINIC | Age: 74
End: 2024-10-30
Payer: MEDICARE

## 2024-10-30 ENCOUNTER — TELEPHONE (OUTPATIENT)
Dept: OPHTHALMOLOGY | Facility: CLINIC | Age: 74
End: 2024-10-30
Payer: MEDICARE

## 2024-10-30 DIAGNOSIS — H25.11 NS (NUCLEAR SCLEROSIS), RIGHT: ICD-10-CM

## 2024-10-30 DIAGNOSIS — Z98.890 POST-OPERATIVE STATE: Primary | ICD-10-CM

## 2024-10-30 PROCEDURE — 99999 PR PBB SHADOW E&M-EST. PATIENT-LVL III: CPT | Mod: PBBFAC,,, | Performed by: OPHTHALMOLOGY

## 2024-11-02 NOTE — H&P
[facility]  History & Physical    Subjective:      Chief Complaint/Reason for Admission:     Francisco Coppola is a 74 y.o. male.    Past Medical History:   Diagnosis Date    Allergy     DDD (degenerative disc disease), lumbar     Hyperlipidemia     Hypertension     Nicotine abuse     Obesity     Scrotal mass     Type II diabetes mellitus with renal manifestations, uncontrolled     microalbuminuria    Type II or unspecified type diabetes mellitus without mention of complication, not stated as uncontrolled     Ulcer      Past Surgical History:   Procedure Laterality Date    CARPAL TUNNEL RELEASE      CATARACT EXTRACTION W/  INTRAOCULAR LENS IMPLANT Left 10/22/2024    Procedure: EXTRACTION, CATARACT, WITH IOL INSERTION;  Surgeon: Tejas Oliver MD;  Location: [facility];  Service: Ophthalmology;  Laterality: Left;    CONDYLOMA EXCISION/FULGURATION Bilateral 2014    scrotal    KNEE SURGERY Left     removal of cartilage with no implant    LUMBAR LAMINECTOMY  2017    L4-5    ULNAR NERVE TRANSPOSITION       Social History     Tobacco Use    Smoking status: Every Day     Current packs/day: 1.00     Average packs/day: 3.0 packs/day for 62.8 years (185.9 ttl pk-yrs)     Types: Cigarettes     Start date: 1962    Smokeless tobacco: Never    Tobacco comments:     Quit for 5 years; used to smoke 3 packs per day when younger, then 2 packs/day, now down to less than 1 pack/day      Declined smoking cessation; he is cutting down amount he smokes       Substance Use Topics    Alcohol use: Not Currently     Comment: rare    Drug use: Never       No medications prior to admission.     Review of patient's allergies indicates:   Allergen Reactions    Augmentin [amoxicillin-pot clavulanate] Hives and Rash    Tramadol Nausea Only        Review of Systems   Eyes:  Positive for blurred vision.   All other systems reviewed and are negative.        Objective:      Vital Signs (Most Recent)        Vital Signs Range (Last 24H):  BP: ()/()   Arterial Line BP: ()/()     Physical Exam  Constitutional:       Appearance: He is well-developed.   HENT:      Head: Normocephalic.   Eyes:      Conjunctiva/sclera: Conjunctivae normal.      Pupils: Pupils are equal, round, and reactive to light.   Cardiovascular:      Rate and Rhythm: Normal rate.   Pulmonary:      Effort: Pulmonary effort is normal.      Breath sounds: Normal breath sounds.   Abdominal:      General: Bowel sounds are normal.      Palpations: Abdomen is soft.   Musculoskeletal:         General: Normal range of motion.      Cervical back: Normal range of motion and neck supple.   Skin:     General: Skin is warm.   Neurological:      Mental Status: He is alert and oriented to person, place, and time.         ASA Score: II    Mallampati Score: II    Plan for Sedation: Moderate    Patient or Family History of Anesthesia problems : No    Any changes affecting the anesthesia assessment which may have changed since the initial assessment and H and P:  No       Data Review:    ECG:     Assessment:      Cataract OD.    Plan:    CE OD.

## 2024-11-04 NOTE — PRE-PROCEDURE INSTRUCTIONS
Patient reviewed on 11/04/2024.  Okay to proceed at [facility]. The following pre-procedure instructions and arrival time have been reviewed with patient via phone and sent to patient portal for review.  Patient verbalized an understanding.  Pt to be accompanied by his wife day of procedure as responsible .      Dear Francisco     Below you will find basic pre-procedure instructions in preparation for your procedure on 11/05/24 with Dr. Oliver           You should receive your arrival time within 24-48 hours of your scheduled procedure date from the  at your surgeon's office.     -Nothing to eat or drink after midnight the night before your procedure until after your procedure, except AM meds with small sips of water.     - HOLD all oral Diabetic medications night before and morning of procedure  - HOLD all Insulin morning of procedure  - HOLD all Fluid pills morning of procedure  - HOLD all non-insulin shots until after surgery (Ozempic, Mounjaro, Trulicity, Victoza, Byetta, Wegovy and Adlyxin) (7 days prior)  - HOLD all vitamins, minerals and herbal supplements morning of procedure   - TAKE all B/P meds, EXCEPT those that contain a fluid pill (ex. Lasix, Hydroclorothiazide/HCTZ, Spirnolactone)  - USE inhalers as needed and bring AM of surgery  - USE EYE DROPS as directed  -TAKE blood thinner meds AM of surgery unless otherwise instructed by your provider to not take     - Shower and wash face with antibacterial soap (ex. Dial) for 3 mins PM prior and AM of surgery  - No powder, lotions, creams, oils, gels, ointments, makeup,  or jewelry    - Wear comfortable clothing (button up shirt)     (Patient is required to have a responsible ride to transport home, ride may not leave while patient is in surgery)     -- [facility], 2nd floor Surgery Center, located   @ [address], [place] [address]  2nd Floor Registration        If you have any questions or concerns please feel  free to contact your surgeon's office.           Please reply to this message as receipt of delivery.     [place], LPN [facility]  Pre-Admit - Anesthesia Dept

## 2024-11-05 ENCOUNTER — HOSPITAL ENCOUNTER (OUTPATIENT)
Facility: HOSPITAL | Age: 74
Discharge: HOME | End: 2024-11-05
Attending: OPHTHALMOLOGY | Admitting: OPHTHALMOLOGY
Payer: MEDICARE

## 2024-11-05 VITALS
HEIGHT: 68 IN | DIASTOLIC BLOOD PRESSURE: 65 MMHG | BODY MASS INDEX: 36.37 KG/M2 | WEIGHT: 240 LBS | TEMPERATURE: 97 F | RESPIRATION RATE: 20 BRPM | HEART RATE: 88 BPM | OXYGEN SATURATION: 100 % | SYSTOLIC BLOOD PRESSURE: 129 MMHG

## 2024-11-05 DIAGNOSIS — H25.11 NUCLEAR SCLEROTIC CATARACT OF RIGHT EYE: Primary | ICD-10-CM

## 2024-11-05 DIAGNOSIS — H25.12 NUCLEAR SCLEROTIC CATARACT OF LEFT EYE: ICD-10-CM

## 2024-11-05 LAB — POCT GLUCOSE: 99 MG/DL (ref 70–110)

## 2024-11-05 PROCEDURE — 99152 MOD SED SAME PHYS/QHP 5/>YRS: CPT | Performed by: OPHTHALMOLOGY

## 2024-11-05 PROCEDURE — 71000015 HC POSTOP RECOV 1ST HR: Performed by: OPHTHALMOLOGY

## 2024-11-05 PROCEDURE — 99900035 HC TECH TIME PER 15 MIN (STAT)

## 2024-11-05 PROCEDURE — 82962 GLUCOSE BLOOD TEST: CPT | Performed by: OPHTHALMOLOGY

## 2024-11-05 PROCEDURE — 27201423 OPTIME MED/SURG SUP & DEVICES STERILE SUPPLY: Performed by: OPHTHALMOLOGY

## 2024-11-05 PROCEDURE — 94761 N-INVAS EAR/PLS OXIMETRY MLT: CPT

## 2024-11-05 PROCEDURE — 63600175 PHARM REV CODE 636 W HCPCS: Performed by: OPHTHALMOLOGY

## 2024-11-05 PROCEDURE — 66984 XCAPSL CTRC RMVL W/O ECP: CPT | Mod: 79,HCNC,RT, | Performed by: OPHTHALMOLOGY

## 2024-11-05 PROCEDURE — 25000003 PHARM REV CODE 250: Performed by: OPHTHALMOLOGY

## 2024-11-05 PROCEDURE — 99153 MOD SED SAME PHYS/QHP EA: CPT | Performed by: OPHTHALMOLOGY

## 2024-11-05 PROCEDURE — V2632 POST CHMBR INTRAOCULAR LENS: HCPCS | Performed by: OPHTHALMOLOGY

## 2024-11-05 PROCEDURE — 36000707: Performed by: OPHTHALMOLOGY

## 2024-11-05 PROCEDURE — 36000706: Performed by: OPHTHALMOLOGY

## 2024-11-05 DEVICE — LENS CLAREON AUTONOME 17.5D: Type: IMPLANTABLE DEVICE | Site: EYE | Status: FUNCTIONAL

## 2024-11-05 RX ORDER — TETRACAINE HYDROCHLORIDE 5 MG/ML
SOLUTION OPHTHALMIC
Status: DISCONTINUED | OUTPATIENT
Start: 2024-11-05 | End: 2024-11-05 | Stop reason: HOSPADM

## 2024-11-05 RX ORDER — LIDOCAINE HYDROCHLORIDE 40 MG/ML
INJECTION, SOLUTION RETROBULBAR
Status: DISCONTINUED | OUTPATIENT
Start: 2024-11-05 | End: 2024-11-05 | Stop reason: HOSPADM

## 2024-11-05 RX ORDER — SODIUM CHLORIDE 9 MG/ML
INJECTION, SOLUTION INTRAVENOUS CONTINUOUS
Status: DISCONTINUED | OUTPATIENT
Start: 2024-11-05 | End: 2024-11-05 | Stop reason: HOSPADM

## 2024-11-05 RX ORDER — MIDAZOLAM HYDROCHLORIDE 1 MG/ML
1 INJECTION, SOLUTION INTRAMUSCULAR; INTRAVENOUS
Status: DISCONTINUED | OUTPATIENT
Start: 2024-11-05 | End: 2024-11-05 | Stop reason: HOSPADM

## 2024-11-05 RX ORDER — ACETAMINOPHEN 325 MG/1
650 TABLET ORAL EVERY 4 HOURS PRN
Status: CANCELLED | OUTPATIENT
Start: 2024-11-05

## 2024-11-05 RX ORDER — FENTANYL CITRATE 50 UG/ML
25 INJECTION, SOLUTION INTRAMUSCULAR; INTRAVENOUS EVERY 5 MIN PRN
Status: DISCONTINUED | OUTPATIENT
Start: 2024-11-05 | End: 2024-11-05 | Stop reason: HOSPADM

## 2024-11-05 RX ORDER — PREDNISOLONE ACETATE 10 MG/ML
SUSPENSION/ DROPS OPHTHALMIC
Status: DISCONTINUED | OUTPATIENT
Start: 2024-11-05 | End: 2024-11-05 | Stop reason: HOSPADM

## 2024-11-05 RX ORDER — MOXIFLOXACIN 5 MG/ML
SOLUTION/ DROPS OPHTHALMIC
Status: DISCONTINUED | OUTPATIENT
Start: 2024-11-05 | End: 2024-11-05 | Stop reason: HOSPADM

## 2024-11-05 RX ORDER — EPINEPHRINE 1 MG/ML
INJECTION, SOLUTION, CONCENTRATE INTRAVENOUS
Status: DISCONTINUED | OUTPATIENT
Start: 2024-11-05 | End: 2024-11-05 | Stop reason: HOSPADM

## 2024-11-05 RX ORDER — MOXIFLOXACIN 5 MG/ML
1 SOLUTION/ DROPS OPHTHALMIC
Status: DISPENSED | OUTPATIENT
Start: 2024-11-05 | End: 2024-11-05

## 2024-11-05 RX ORDER — CYCLOP/TROP/PROPA/PHEN/KET/WAT 1-1-0.1%
1 DROPS (EA) OPHTHALMIC (EYE)
Status: COMPLETED | OUTPATIENT
Start: 2024-11-05 | End: 2024-11-05

## 2024-11-05 RX ORDER — LIDOCAIN/PHENYLEPH/BSS NO.2/PF 1 %-1.5 %
SYRINGE (ML) INTRAOCULAR
Status: DISCONTINUED | OUTPATIENT
Start: 2024-11-05 | End: 2024-11-05 | Stop reason: HOSPADM

## 2024-11-05 RX ORDER — HYDROCODONE BITARTRATE AND ACETAMINOPHEN 5; 325 MG/1; MG/1
1 TABLET ORAL EVERY 4 HOURS PRN
Status: CANCELLED | OUTPATIENT
Start: 2024-11-05

## 2024-11-05 RX ADMIN — Medication 1 DROP: at 08:11

## 2024-11-05 RX ADMIN — MOXIFLOXACIN OPHTHALMIC 1 DROP: 5 SOLUTION/ DROPS OPHTHALMIC at 08:11

## 2024-11-05 RX ADMIN — MIDAZOLAM HYDROCHLORIDE 1 MG: 1 INJECTION, SOLUTION INTRAMUSCULAR; INTRAVENOUS at 09:11

## 2024-11-05 RX ADMIN — FENTANYL CITRATE 50 MCG: 50 INJECTION, SOLUTION INTRAMUSCULAR; INTRAVENOUS at 09:11

## 2024-11-05 NOTE — DISCHARGE INSTRUCTIONS
Cataract Post Surgery Instructions     START YOUR DROPS AS SOON AS YOU GET HOME FROM SURGERY      Instill the following drops 1 drop, three (3) times daily, every four (4) hours.      FIVE MINUTES APART FROM EACH OTHER      OFLOXACIN  ( Tan Top)    KETOROLAC  ( Gray Top)    PREDNISOLONE ACETATE  ( [place] or White Top)         RESTRICTIONS FOR SEVEN (7) DAYS FOLLOWING SURGERY     DO NOT lift anything over 10 pounds.     DO NOT bend at the waist, only at the knees.      DO NOT rub your eye.      DO NOT get any water into the eye.      DO NOT wear any makeup, lotions, or creams on/around the eye.     Wear the eye shield you were given after surgery anytime you go to sleep.  You may remove the shield while awake.           NOTE:   YOU MAY resume taking ALL your medications after surgery.     YOU MAY resume driving on your first post-operative appointment IF your vision is clear. DO NOT wear your eye shield while driving for your post operative appointments.      YOU MAY take an over-the counter pain medication such as Tylenol or Ibuprofen as needed for pain.     CALL US:  MILD DISCOMFORT IS NORMAL. HOWEVER, IF YOU EXPERIENCE SEVERE THROBBING PAIN, LOSS OF VISION, ONSET OF FLASHES AND/OR FLOATERS- CALL DR. RANDALL OFFICE: [phone]/ AFTER HOUR [phone] OR PROCEED TO THE EMERGENCY ROOM.    
room air

## 2024-11-05 NOTE — OP NOTE
Operative Date:  11/05/2024    Discharge Date:  11/05/2024    Discharge Patient Home    Report Title: Operative Note      SURGEON: Tejas Oliver MD     ASSISTANT:     PREOPERATIVE DIAGNOSIS: Visually significant NSC cataract,  Right Eye.    POSTOPERATIVE DIAGNOSIS: Visually-significant NSC cataract,  Right Eye.    PROCEDURE PERFORMED: Phacoemulsification of the cataract with posterior chamber intraocular lens Right Eye.    ANESTHESIA:  Moderate Sedation with Local    The team confirmed the patient ID and re-evaluated the patient and sedation plan confirming it is suitable for the patient's condition and procedure prior to administering sedation.    COMPLICATIONS: None    ESTIMATED BLOOD LOSS: Minimal    INDICATIONS FOR PROCEDURE:   The patient is a pleasant 74 year old gentleman with increasing difficulties with activities of daily living secondary to a dense visually significant cataract in the Right Eye.  Discussions have been carried out with this patient concerning the options to surgery, risks, benefits and expectations.  The patient voiced good understanding and wished to proceed with the above procedure.    PROCEDURE IN DETAIL: The patient was brought to the operating room and received topical anesthetic to the eye and then was prepped and draped in the usual sterile fashion.  Using the Esparza ring and the guarded lanie blade set at 0.37 mm, a partial thickness clear cornea incision was made temporally.  The paracentesis site was made at the twelve o'clock position.  Omidria was injected into the anterior chamber through the paracentesis.  Viscoat was then injected into the anterior chamber.  The eye was then reentered at the primary surgical site with a 2.4 mm keratome followed by continuous capsulotomy, hydrodissection, hydrodelineation and phacoemulsification of the cataract.  Residual cortical material was removed using automated irrigation-aspiration technique.  Healon was injected into the  posterior chamber and a CNAOTO 17.5 diopter lens was placed in the bag without difficulty. Residual viscoelastic was removed using automated irrigation-aspiration technique. The eye was re-pressurized using BSS solution and both the paracentesis site and the primary surgical site were demonstrated to be watertight at the end of the case with Weck--Donita manipulation.  One drop of Ofloxacin and one drop of Pred acetate 1% was applied to the Right Eye .The eye was closed, patched and a Cross shield placed.  The patient was taken to the recovery room in good and stable condition.  The patient tolerated the procedure well.  The patient was instructed to refrain from any heavy lifting, bending, stooping or straining activities, discharged home  and to follow-up in the morning for routine postoperative care with Tejas Oliver MD.

## 2024-11-05 NOTE — BRIEF OP NOTE
[facility]  Brief Operative Note    Surgery Date: 11/5/2024     Surgeons and Role:     * Tejas Oliver MD - Primary    Assisting Surgeon: None    Pre-op Diagnosis:  NS (nuclear sclerosis), right [H25.11]    Post-op Diagnosis:  Post-Op Diagnosis Codes:     * NS (nuclear sclerosis), right [H25.11]    Procedure(s) (LRB):  EXTRACTION, CATARACT, WITH IOL INSERTION (Right)    Anesthesia: RN IV Sedation    Operative Findings:     Estimated Blood Loss: * No values recorded between 11/5/2024  9:32 AM and 11/5/2024  9:53 AM *         Specimens:   Specimen (24h ago, onward)      None              Discharge Note    OUTCOME: Patient tolerated treatment/procedure well without complication and is now ready for discharge.    DISPOSITION: Home or Self Care    FINAL DIAGNOSIS:  Nuclear sclerotic cataract of right eye    FOLLOWUP: In clinic    DISCHARGE INSTRUCTIONS:    Discharge Procedure Orders   Other restrictions (specify):   Order Comments: No heavy lifting or bending for 1 week.

## 2024-11-05 NOTE — PLAN OF CARE
VSS. Discharge instructions reviewed with patient, patient verbalizes understanding. No complaints of pain or discomfort.  PIV removed without difficulty and catheter intact. Patient discharged home safely to family via wheelchair.

## 2024-11-06 ENCOUNTER — OFFICE VISIT (OUTPATIENT)
Dept: OPHTHALMOLOGY | Facility: CLINIC | Age: 74
End: 2024-11-06
Payer: MEDICARE

## 2024-11-06 DIAGNOSIS — Z98.890 POST-OPERATIVE STATE: Primary | ICD-10-CM

## 2024-11-06 PROCEDURE — 99999 PR PBB SHADOW E&M-EST. PATIENT-LVL IV: CPT | Mod: PBBFAC,,, | Performed by: OPHTHALMOLOGY

## 2024-11-06 NOTE — PROGRESS NOTES
Subjective:       Patient ID: Francisco Coppola is a 74 y.o. male.    Chief Complaint: Post-op Evaluation (Patient Francisco Coppola is a 74 year old male./S/p Phaco w/IOL OD: 11/05/2024/S/p Phaco w/IOL OS: 10/22/2024)    HPI     Post-op Evaluation     Additional comments: Patient Francisco Coppola is a 74 year old male.  S/p Phaco w/IOL OD: 11/05/2024  S/p Phaco w/IOL OS: 10/22/2024           Comments    Pt here for 1 day PCIOL OD post-op visit (2nd eye).    Meds:  PMB TID OD (until 12/03/2024)  PMB TID OS (until 11/19/2024)          Last edited by Ora Bazan on 11/6/2024  9:22 AM.             Assessment:       1. Post-operative state        Plan:       S/p CE OU- Doing well.      CPM OU.  RTC 1 wk.

## 2024-11-13 ENCOUNTER — OFFICE VISIT (OUTPATIENT)
Dept: OPTOMETRY | Facility: CLINIC | Age: 74
End: 2024-11-13
Payer: MEDICARE

## 2024-11-13 DIAGNOSIS — Z98.41 STATUS POST CATARACT EXTRACTION AND INSERTION OF INTRAOCULAR LENS OF RIGHT EYE: Primary | ICD-10-CM

## 2024-11-13 DIAGNOSIS — Z96.1 STATUS POST CATARACT EXTRACTION AND INSERTION OF INTRAOCULAR LENS OF RIGHT EYE: Primary | ICD-10-CM

## 2024-11-13 PROCEDURE — 3066F NEPHROPATHY DOC TX: CPT | Mod: CPTII,S$GLB,, | Performed by: OPTOMETRIST

## 2024-11-13 PROCEDURE — 3288F FALL RISK ASSESSMENT DOCD: CPT | Mod: CPTII,S$GLB,, | Performed by: OPTOMETRIST

## 2024-11-13 PROCEDURE — 1101F PT FALLS ASSESS-DOCD LE1/YR: CPT | Mod: CPTII,S$GLB,, | Performed by: OPTOMETRIST

## 2024-11-13 PROCEDURE — 99999 PR PBB SHADOW E&M-EST. PATIENT-LVL III: CPT | Mod: PBBFAC,,, | Performed by: OPTOMETRIST

## 2024-11-13 PROCEDURE — 99024 POSTOP FOLLOW-UP VISIT: CPT | Mod: S$GLB,,, | Performed by: OPTOMETRIST

## 2024-11-13 PROCEDURE — 3062F POS MACROALBUMINURIA REV: CPT | Mod: CPTII,S$GLB,, | Performed by: OPTOMETRIST

## 2024-11-13 PROCEDURE — 4010F ACE/ARB THERAPY RXD/TAKEN: CPT | Mod: CPTII,S$GLB,, | Performed by: OPTOMETRIST

## 2024-11-13 PROCEDURE — 1126F AMNT PAIN NOTED NONE PRSNT: CPT | Mod: CPTII,S$GLB,, | Performed by: OPTOMETRIST

## 2024-11-13 PROCEDURE — 1159F MED LIST DOCD IN RCRD: CPT | Mod: CPTII,S$GLB,, | Performed by: OPTOMETRIST

## 2024-11-13 PROCEDURE — 3051F HG A1C>EQUAL 7.0%<8.0%: CPT | Mod: CPTII,S$GLB,, | Performed by: OPTOMETRIST

## 2024-11-13 NOTE — PROGRESS NOTES
statRuby    Patient is here today for 1 week PO OD. Denies pain. No ocular concerns at   this time.  PMB TID OD   Last edited by Dianne Fisher, OD on 11/13/2024  1:54 PM.            Assessment /Plan     For exam results, see Encounter Report.    Status post cataract extraction and insertion of intraocular lens of right eye      S/p CE OU- Doing well.  Continue the following:   PMB TID OD (until 12/03/2024)  PMB TID OS (until 11/19/2024)    RTC 12/04/2024 Dr. Oliver

## 2024-11-17 NOTE — TELEPHONE ENCOUNTER
No care due was identified.  Health [facility] Embedded Care Due Messages. Reference number: [id number].   11/17/2024 7:23:12 AM CST

## 2024-11-17 NOTE — TELEPHONE ENCOUNTER
Refill Routing Note   Medication(s) are not appropriate for processing by [facility] for the following reason(s):        Required labs abnormal    [facility] action(s):  Defer               Appointments  past 12m or future 3m with PCP    Date Provider   Last Visit   7/26/2024 Laureano Hebert MD   Next Visit   12/5/2024 Laureano Hebert MD   ED visits in past 90 days: 0        Note composed:4:05 PM 11/17/2024

## 2024-11-18 RX ORDER — METFORMIN HYDROCHLORIDE 500 MG/1
1000 TABLET, EXTENDED RELEASE ORAL 2 TIMES DAILY WITH MEALS
Qty: 360 TABLET | Refills: 1 | Status: SHIPPED | OUTPATIENT
Start: 2024-11-18

## 2024-12-04 ENCOUNTER — OFFICE VISIT (OUTPATIENT)
Dept: OPHTHALMOLOGY | Facility: CLINIC | Age: 74
End: 2024-12-04
Payer: MEDICARE

## 2024-12-04 DIAGNOSIS — Z98.890 POST-OPERATIVE STATE: Primary | ICD-10-CM

## 2024-12-04 DIAGNOSIS — H52.7 REFRACTIVE ERROR: ICD-10-CM

## 2024-12-04 PROCEDURE — 99999 PR PBB SHADOW E&M-EST. PATIENT-LVL II: CPT | Mod: PBBFAC,,, | Performed by: OPHTHALMOLOGY

## 2024-12-04 NOTE — PROGRESS NOTES
Subjective:       Patient ID: Francisco Coppola is a 74 y.o. male.    Chief Complaint: Post-op Evaluation    HPI    Here for 3 week S/p Phaco w/IOL OS 10/22/2024                            S/p Phaco w/IOL OD 11/05/2024    Eye meds: None    74 year old male states vision is clear OU, but will need readers. Voices   no concerns   Last edited by Jayna Rocha on 12/4/2024  9:04 AM.             Assessment:       1. Post-operative state    2. Refractive error        Plan:       S/p CE OU- Doing well.  RE-Pt does not want MRx.      Readers.  RTC Dr Powell in 6 mos.

## 2024-12-05 ENCOUNTER — OFFICE VISIT (OUTPATIENT)
Dept: FAMILY MEDICINE | Facility: CLINIC | Age: 74
End: 2024-12-05
Payer: MEDICARE

## 2024-12-05 VITALS
OXYGEN SATURATION: 99 % | TEMPERATURE: 98 F | DIASTOLIC BLOOD PRESSURE: 60 MMHG | BODY MASS INDEX: 36.3 KG/M2 | HEART RATE: 76 BPM | WEIGHT: 238.75 LBS | SYSTOLIC BLOOD PRESSURE: 110 MMHG

## 2024-12-05 DIAGNOSIS — Z23 NEED FOR COVID-19 VACCINE: ICD-10-CM

## 2024-12-05 DIAGNOSIS — E11.42 DIABETIC POLYNEUROPATHY ASSOCIATED WITH TYPE 2 DIABETES MELLITUS: ICD-10-CM

## 2024-12-05 DIAGNOSIS — J98.4 CALCIFIED GRANULOMA OF LUNG: ICD-10-CM

## 2024-12-05 DIAGNOSIS — E66.01 SEVERE OBESITY (BMI 35.0-39.9) WITH COMORBIDITY: ICD-10-CM

## 2024-12-05 DIAGNOSIS — Z23 FLU VACCINE NEED: ICD-10-CM

## 2024-12-05 DIAGNOSIS — I70.0 AORTIC ATHEROSCLEROSIS: ICD-10-CM

## 2024-12-05 DIAGNOSIS — N18.4 STAGE 4 CHRONIC KIDNEY DISEASE: ICD-10-CM

## 2024-12-05 DIAGNOSIS — I10 HTN (HYPERTENSION), BENIGN: ICD-10-CM

## 2024-12-05 DIAGNOSIS — E11.65 UNCONTROLLED TYPE 2 DIABETES MELLITUS WITH HYPERGLYCEMIA: Primary | ICD-10-CM

## 2024-12-05 PROCEDURE — 99999 PR PBB SHADOW E&M-EST. PATIENT-LVL V: CPT | Mod: PBBFAC,HCNC,, | Performed by: FAMILY MEDICINE

## 2024-12-05 RX ORDER — LISINOPRIL 20 MG/1
20 TABLET ORAL DAILY
Qty: 90 TABLET | Refills: 3 | Status: SHIPPED | OUTPATIENT
Start: 2024-12-05 | End: 2025-12-05

## 2024-12-05 NOTE — PATIENT INSTRUCTIONS
Check with your pharmacy regarding getting the tetanus (Tdap) vaccine (once every 10 years)    Look into getting the Shingles vaccine (SHINGRIX) at your local pharmacy (2 part series, done once at/after age 50)    Flu shot today    Covid shot today    Stopping lisinopril hydrochlorothiazide. Start PLAIN lisinopril 20 mg daily    Continue furosemide    Check repeat kidney test in 2 weeks          Goal blood sugar when checked in the morning before meals: less than 130-140  Goal sugar when checked at least 2 hours after a meal: less than 180       CARBOHYDRATE GOALS: around 3-4 servings per meal (1 serving is 15 grams of total carbohydrates)
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
## 2024-12-05 NOTE — PROGRESS NOTES
Gave Flu vaccine to patient as ordered by provider.    Flu high dose for 65 and older   Given right  deltoid  Covid  Left deltoid   Patient tolerated well.      Chioma Osorio CMA .

## 2024-12-10 RX ORDER — METFORMIN HYDROCHLORIDE 500 MG/1
1000 TABLET, EXTENDED RELEASE ORAL 2 TIMES DAILY WITH MEALS
Qty: 360 TABLET | Refills: 1 | Status: SHIPPED | OUTPATIENT
Start: 2024-12-10

## 2024-12-10 NOTE — TELEPHONE ENCOUNTER
No care due was identified.  Health [facility] Embedded Care Due Messages. Reference number: [id number].   12/09/2024 6:03:31 PM CST

## 2024-12-16 ENCOUNTER — PATIENT OUTREACH (OUTPATIENT)
Dept: ADMINISTRATIVE | Facility: HOSPITAL | Age: 74
End: 2024-12-16
Payer: MEDICARE

## 2024-12-16 NOTE — PROGRESS NOTES
Care Coordination          Health Maintenance Topic(s) Outreach Outcomes & Actions Taken:    Colorectal Cancer Screening - Outreach Outcomes & Actions Taken  : Colonoscopy Case Request / Referral / Home Test Order Placed and FitKit was given to patient on 12/16/2024 11:28 AM          Additional Notes:  FitKit was given to patient on 12/16/2024 11:28 AM   Mailed out fitkit to patient.   Patient states he will complete next week.
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
## 2025-02-25 ENCOUNTER — PATIENT MESSAGE (OUTPATIENT)
Dept: ADMINISTRATIVE | Facility: HOSPITAL | Age: 75
End: 2025-02-25
Payer: MEDICARE

## 2025-02-25 DIAGNOSIS — R23.8 SKIN BREAKDOWN: ICD-10-CM

## 2025-02-25 RX ORDER — MUPIROCIN 20 MG/G
OINTMENT TOPICAL 3 TIMES DAILY
Qty: 30 G | Refills: 3 | Status: SHIPPED | OUTPATIENT
Start: 2025-02-25

## 2025-03-06 ENCOUNTER — OFFICE VISIT (OUTPATIENT)
Dept: FAMILY MEDICINE | Facility: CLINIC | Age: 75
End: 2025-03-06
Payer: MEDICARE

## 2025-03-06 VITALS
BODY MASS INDEX: 36.02 KG/M2 | WEIGHT: 237.63 LBS | HEIGHT: 68 IN | DIASTOLIC BLOOD PRESSURE: 70 MMHG | HEART RATE: 111 BPM | OXYGEN SATURATION: 96 % | TEMPERATURE: 98 F | SYSTOLIC BLOOD PRESSURE: 130 MMHG

## 2025-03-06 DIAGNOSIS — Z12.11 COLON CANCER SCREENING: ICD-10-CM

## 2025-03-06 DIAGNOSIS — E66.01 SEVERE OBESITY (BMI 35.0-39.9) WITH COMORBIDITY: ICD-10-CM

## 2025-03-06 DIAGNOSIS — R60.0 PERIPHERAL EDEMA: ICD-10-CM

## 2025-03-06 DIAGNOSIS — E11.65 UNCONTROLLED TYPE 2 DIABETES MELLITUS WITH HYPERGLYCEMIA: ICD-10-CM

## 2025-03-06 DIAGNOSIS — I10 HTN (HYPERTENSION), BENIGN: ICD-10-CM

## 2025-03-06 DIAGNOSIS — N18.4 STAGE 4 CHRONIC KIDNEY DISEASE: ICD-10-CM

## 2025-03-06 DIAGNOSIS — L97.512 SKIN ULCER OF RIGHT GREAT TOE WITH FAT LAYER EXPOSED: ICD-10-CM

## 2025-03-06 DIAGNOSIS — M17.0 PRIMARY OSTEOARTHRITIS OF BOTH KNEES: ICD-10-CM

## 2025-03-06 DIAGNOSIS — E11.65 UNCONTROLLED TYPE 2 DIABETES MELLITUS WITH HYPERGLYCEMIA: Primary | ICD-10-CM

## 2025-03-06 DIAGNOSIS — E11.42 DIABETIC POLYNEUROPATHY ASSOCIATED WITH TYPE 2 DIABETES MELLITUS: ICD-10-CM

## 2025-03-06 DIAGNOSIS — I70.0 AORTIC ATHEROSCLEROSIS: ICD-10-CM

## 2025-03-06 DIAGNOSIS — Z12.2 SCREENING FOR LUNG CANCER: ICD-10-CM

## 2025-03-06 DIAGNOSIS — Z87.891 PERSONAL HISTORY OF NICOTINE DEPENDENCE: ICD-10-CM

## 2025-03-06 DIAGNOSIS — R23.8 SKIN BREAKDOWN: ICD-10-CM

## 2025-03-06 PROCEDURE — 99999 PR PBB SHADOW E&M-EST. PATIENT-LVL V: CPT | Mod: PBBFAC,HCNC,, | Performed by: FAMILY MEDICINE

## 2025-03-06 RX ORDER — GABAPENTIN 300 MG/1
300 CAPSULE ORAL 3 TIMES DAILY
Qty: 270 CAPSULE | Refills: 11 | Status: SHIPPED | OUTPATIENT
Start: 2025-03-06

## 2025-03-06 RX ORDER — METFORMIN HYDROCHLORIDE 500 MG/1
1000 TABLET, EXTENDED RELEASE ORAL 2 TIMES DAILY WITH MEALS
Qty: 360 TABLET | Refills: 1 | Status: SHIPPED | OUTPATIENT
Start: 2025-03-06

## 2025-03-06 RX ORDER — CLINDAMYCIN HYDROCHLORIDE 300 MG/1
300 CAPSULE ORAL 4 TIMES DAILY
Qty: 28 CAPSULE | Refills: 0 | Status: SHIPPED | OUTPATIENT
Start: 2025-03-06 | End: 2025-03-13

## 2025-03-06 RX ORDER — INSULIN GLARGINE 100 [IU]/ML
15 INJECTION, SOLUTION SUBCUTANEOUS DAILY
Start: 2025-03-06 | End: 2025-03-06 | Stop reason: SDUPTHER

## 2025-03-06 RX ORDER — METFORMIN HYDROCHLORIDE 500 MG/1
1000 TABLET, EXTENDED RELEASE ORAL 2 TIMES DAILY WITH MEALS
Qty: 360 TABLET | Refills: 1 | Status: SHIPPED | OUTPATIENT
Start: 2025-03-06 | End: 2025-03-06 | Stop reason: SDUPTHER

## 2025-03-06 RX ORDER — MUPIROCIN 20 MG/G
OINTMENT TOPICAL 3 TIMES DAILY
Qty: 30 G | Refills: 3 | Status: SHIPPED | OUTPATIENT
Start: 2025-03-06

## 2025-03-06 RX ORDER — INSULIN GLARGINE 100 [IU]/ML
15 INJECTION, SOLUTION SUBCUTANEOUS DAILY
Qty: 15 EACH | Refills: 11 | Status: SHIPPED | OUTPATIENT
Start: 2025-03-06

## 2025-03-06 NOTE — TELEPHONE ENCOUNTER
Care Due:                  Date            Visit Type   Department     Provider  --------------------------------------------------------------------------------                                EP -                              PRIMARY      [facility]  Last Visit: 03-      CARE ([facility])   MEDICINE       Laureano Hebert  Next Visit: None Scheduled  None         None Found                                                            Last  Test          Frequency    Reason                     Performed    Due Date  --------------------------------------------------------------------------------    HBA1C.......  6 months...  insulin, metFORMIN,        10-   04-                             tirzepatide..............    [employer] Embedded Care Due Messages. Reference number: [id number].   3/06/2025 1:06:18 PM CST
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
## 2025-03-06 NOTE — PATIENT INSTRUCTIONS
Check with your pharmacy regarding getting the tetanus (Tdap) vaccine (once every 10 years)    Look into getting the Shingles vaccine (SHINGRIX) at your local pharmacy (2 part series, done once at/after age 50)

## 2025-03-12 ENCOUNTER — HOSPITAL ENCOUNTER (OUTPATIENT)
Dept: RADIOLOGY | Facility: HOSPITAL | Age: 75
Discharge: HOME | End: 2025-03-12
Attending: FAMILY MEDICINE
Payer: MEDICARE

## 2025-03-12 ENCOUNTER — PATIENT MESSAGE (OUTPATIENT)
Dept: FAMILY MEDICINE | Facility: CLINIC | Age: 75
End: 2025-03-12
Payer: MEDICARE

## 2025-03-12 ENCOUNTER — HOSPITAL ENCOUNTER (OUTPATIENT)
Dept: RADIOLOGY | Facility: HOSPITAL | Age: 75
Discharge: HOME | End: 2025-03-12
Attending: SURGERY
Payer: MEDICARE

## 2025-03-12 ENCOUNTER — HOSPITAL ENCOUNTER (OUTPATIENT)
Dept: WOUND CARE | Facility: HOSPITAL | Age: 75
Discharge: HOME | End: 2025-03-12
Attending: SURGERY
Payer: MEDICARE

## 2025-03-12 VITALS
SYSTOLIC BLOOD PRESSURE: 145 MMHG | WEIGHT: 237.63 LBS | HEART RATE: 116 BPM | TEMPERATURE: 98 F | HEIGHT: 68 IN | BODY MASS INDEX: 36.02 KG/M2 | DIASTOLIC BLOOD PRESSURE: 80 MMHG

## 2025-03-12 DIAGNOSIS — R89.9 ABNORMAL LABORATORY TEST: Primary | ICD-10-CM

## 2025-03-12 DIAGNOSIS — L97.512 SKIN ULCER OF RIGHT GREAT TOE WITH FAT LAYER EXPOSED: ICD-10-CM

## 2025-03-12 DIAGNOSIS — E11.621 DIABETIC ULCER OF TOE OF RIGHT FOOT ASSOCIATED WITH TYPE 2 DIABETES MELLITUS, WITH FAT LAYER EXPOSED: Primary | ICD-10-CM

## 2025-03-12 DIAGNOSIS — L97.512 DIABETIC ULCER OF TOE OF RIGHT FOOT ASSOCIATED WITH TYPE 2 DIABETES MELLITUS, WITH FAT LAYER EXPOSED: Primary | ICD-10-CM

## 2025-03-12 DIAGNOSIS — I73.9 PAD (PERIPHERAL ARTERY DISEASE): ICD-10-CM

## 2025-03-12 DIAGNOSIS — R89.9 ABNORMAL LABORATORY TEST: ICD-10-CM

## 2025-03-12 DIAGNOSIS — E11.65 UNCONTROLLED TYPE 2 DIABETES MELLITUS WITH HYPERGLYCEMIA: ICD-10-CM

## 2025-03-12 DIAGNOSIS — R60.0 PERIPHERAL EDEMA: ICD-10-CM

## 2025-03-12 DIAGNOSIS — E11.42 DIABETIC POLYNEUROPATHY ASSOCIATED WITH TYPE 2 DIABETES MELLITUS: ICD-10-CM

## 2025-03-12 PROCEDURE — 73660 X-RAY EXAM OF TOE(S): CPT | Mod: 26,HCNC,RT, | Performed by: STUDENT IN AN ORGANIZED HEALTH CARE EDUCATION/TRAINING PROGRAM

## 2025-03-12 PROCEDURE — 11042 DBRDMT SUBQ TIS 1ST 20SQCM/<: CPT | Mod: HCNC

## 2025-03-12 PROCEDURE — 76705 ECHO EXAM OF ABDOMEN: CPT | Mod: 26,HCNC,, | Performed by: STUDENT IN AN ORGANIZED HEALTH CARE EDUCATION/TRAINING PROGRAM

## 2025-03-12 PROCEDURE — 93970 EXTREMITY STUDY: CPT | Mod: 26,HCNC,, | Performed by: STUDENT IN AN ORGANIZED HEALTH CARE EDUCATION/TRAINING PROGRAM

## 2025-03-12 PROCEDURE — 73660 X-RAY EXAM OF TOE(S): CPT | Mod: TC,HCNC,FY,RT

## 2025-03-12 PROCEDURE — 93970 EXTREMITY STUDY: CPT | Mod: TC,HCNC

## 2025-03-12 PROCEDURE — 99205 OFFICE O/P NEW HI 60 MIN: CPT | Mod: HCNC,25

## 2025-03-12 PROCEDURE — 76705 ECHO EXAM OF ABDOMEN: CPT | Mod: TC,HCNC

## 2025-03-12 PROCEDURE — 87075 CULTR BACTERIA EXCEPT BLOOD: CPT | Mod: HCNC | Performed by: SURGERY

## 2025-03-12 PROCEDURE — 87186 SC STD MICRODIL/AGAR DIL: CPT | Performed by: SURGERY

## 2025-03-12 PROCEDURE — 93925 LOWER EXTREMITY STUDY: CPT | Mod: TC,HCNC

## 2025-03-12 PROCEDURE — 87070 CULTURE OTHR SPECIMN AEROBIC: CPT | Mod: HCNC | Performed by: SURGERY

## 2025-03-12 PROCEDURE — 93925 LOWER EXTREMITY STUDY: CPT | Mod: 26,HCNC,, | Performed by: STUDENT IN AN ORGANIZED HEALTH CARE EDUCATION/TRAINING PROGRAM

## 2025-03-12 RX ORDER — GENTAMICIN SULFATE 1 MG/G
OINTMENT TOPICAL 3 TIMES DAILY
Status: SHIPPED | OUTPATIENT
Start: 2025-03-12

## 2025-03-12 NOTE — PROGRESS NOTES
Wound Care & Hyperbaric Medicine    Subjective:       Patient ID: Francisco Coppola is a 75 y.o. male.    Chief Complaint: Non-healing Wound    Admit to wound care with BLE blisters and right great toe wound. Hx DM, HTN CKD. Reports leg blisters come and go he cannot get his compression stockings on and wound present to toe about 4 weeks- taking Clindamycin Po per PCP.. Denies pain or signs of infection, but complains of abdominal swelling and constant cough. Scheduled labs, x-ray right foot, venous and arterial ultrasounds ordered per PCP. Plan of care initiated and discussed with patient and spouse will order home health to assist with dressing changes. Reassess 1 week.     ROS: c/o pain and swelling of bothe legs , with blisters , on lasix , no fever or chills,         Objective:      Physical Exam  Constitutional:       Appearance: He is well-developed.   HENT:      Head: Normocephalic.   Eyes:      Conjunctiva/sclera: Conjunctivae normal.      Pupils: Pupils are equal, round, and reactive to light.   Cardiovascular:      Rate and Rhythm: Normal rate and regular rhythm.      Heart sounds: Normal heart sounds.   Pulmonary:      Effort: Pulmonary effort is normal.      Breath sounds: Wheezing present.   Abdominal:      General: Bowel sounds are normal.      Palpations: Abdomen is soft.   Musculoskeletal:         General: Swelling and tenderness present. Normal range of motion.      Cervical back: Normal range of motion and neck supple.      Right lower leg: Edema present.      Left lower leg: Edema present.   Skin:     General: Skin is warm and dry.      Findings: Erythema, lesion and rash present.   Neurological:      Mental Status: He is alert and oriented to person, place, and time.      Deep Tendon Reflexes: Reflexes are normal and symmetric.        Wound 03/12/25 0800 Blister(s) Right anterior;lower Leg (Active)   03/12/25 0800 Leg   Present on Original Admission: Y   Primary Wound  Type: Blister(s)   Side: Right   Orientation: anterior;lower   Wound Approximate Age at First Assessment (Weeks):    Wound Number:    Is this injury device related?:    Incision Type:    Closure Method:    Wound Description (Comments):    Type:    Additional Comments:    Ankle-Brachial Index: 1.15   Pulses: + doppler   Removal Indication and Assessment:    Wound Outcome:    Wound Image   03/12/25 0942   Dressing Appearance Intact;Moist drainage 03/12/25 0942   Drainage Amount Small 03/12/25 0942   Drainage Characteristics/Odor Serosanguineous 03/12/25 0942   Appearance Red;Moist 03/12/25 0942   Tissue loss description Partial thickness 03/12/25 0942   Red (%), Wound Tissue Color 100 % 03/12/25 0942   Periwound Area Intact;Dry;Edematous;Redness 03/12/25 0942   Wound Edges Defined 03/12/25 0942   Wound Length (cm) 0.7 cm 03/12/25 0942   Wound Width (cm) 0.5 cm 03/12/25 0942   Wound Depth (cm) 0.1 cm 03/12/25 0942   Wound Volume (cm^3) 0.018 cm^3 03/12/25 0942   Wound Surface Area (cm^2) 0.27 cm^2 03/12/25 0942   Care Cleansed with:;Soap and water;Antimicrobial agent 03/12/25 0942   Dressing Applied;Hydrofiber;Methylene blue/gentian violet;Rolled gauze;Tubular bandage 03/12/25 0942   Periwound Care Moisturizer applied 03/12/25 0942   Compression Tubular elasticized bandage 03/12/25 0942   Dressing Change Due 03/14/25 03/12/25 0942            Wound 03/12/25 0800 Blister(s) Left anterior;lower Leg (Active)   03/12/25 0800 Leg   Present on Original Admission: Y   Primary Wound Type: Blister(s)   Side: Left   Orientation: anterior;lower   Wound Approximate Age at First Assessment (Weeks):    Wound Number:    Is this injury device related?:    Incision Type:    Closure Method:    Wound Description (Comments):    Type:    Additional Comments:    Ankle-Brachial Index: 1.19   Pulses: +doppler   Removal Indication and Assessment:    Wound Outcome:    Wound Image   03/12/25 0942   Dressing Appearance Intact;Moist drainage  03/12/25 0942   Drainage Amount Moderate 03/12/25 0942   Drainage Characteristics/Odor Serosanguineous 03/12/25 0942   Appearance Red;Moist 03/12/25 0942   Tissue loss description Partial thickness 03/12/25 0942   Red (%), Wound Tissue Color 100 % 03/12/25 0942   Periwound Area Intact;Dry;Edematous;Redness 03/12/25 0942   Wound Edges Defined 03/12/25 0942   Wound Length (cm) 3.2 cm 03/12/25 0942   Wound Width (cm) 2 cm 03/12/25 0942   Wound Depth (cm) 0.1 cm 03/12/25 0942   Wound Volume (cm^3) 0.335 cm^3 03/12/25 0942   Wound Surface Area (cm^2) 5.03 cm^2 03/12/25 0942   Care Cleansed with:;Soap and water;Antimicrobial agent 03/12/25 0942   Dressing Applied;Foam;Methylene blue/gentian violet;Rolled gauze;Tubular bandage 03/12/25 0942   Periwound Care Moisturizer applied 03/12/25 0942   Compression Tubular elasticized bandage 03/12/25 0942   Dressing Change Due 03/14/25 03/12/25 0942            Wound 03/12/25 0800 Blister(s) Right posterior Calf (Active)   03/12/25 0800 Calf   Present on Original Admission: Y   Primary Wound Type: Blister(s)   Side: Right   Orientation: posterior   Wound Approximate Age at First Assessment (Weeks):    Wound Number:    Is this injury device related?:    Incision Type:    Closure Method:    Wound Description (Comments):    Type:    Additional Comments:    Ankle-Brachial Index:    Pulses:    Removal Indication and Assessment:    Wound Outcome:    Wound Image   03/12/25 0942   Dressing Appearance Intact;Moist drainage 03/12/25 0942   Drainage Amount Small 03/12/25 0942   Drainage Characteristics/Odor Serosanguineous 03/12/25 0942   Appearance Red;Moist 03/12/25 0942   Tissue loss description Partial thickness 03/12/25 0942   Red (%), Wound Tissue Color 100 % 03/12/25 0942   Periwound Area Intact;Dry;Edematous;Redness 03/12/25 0942   Wound Edges Defined 03/12/25 0942   Wound Length (cm) 3.1 cm 03/12/25 0942   Wound Width (cm) 2.2 cm 03/12/25 0942   Wound Depth (cm) 0.1 cm 03/12/25 0942    Wound Volume (cm^3) 0.357 cm^3 03/12/25 0942   Wound Surface Area (cm^2) 5.36 cm^2 03/12/25 0942   Care Cleansed with:;Soap and water;Antimicrobial agent 03/12/25 0942   Dressing Applied;Foam;Methylene blue/gentian violet;Rolled gauze;Tubular bandage 03/12/25 0942   Periwound Care Moisturizer applied 03/12/25 0942   Compression Tubular elasticized bandage 03/12/25 0942   Dressing Change Due 03/14/25 03/12/25 0942            Wound 03/12/25 0800 Diabetic Ulcer Right distal;plantar Toe, first (Active)   03/12/25 0800 Toe, first   Present on Original Admission: Y   Primary Wound Type: Diabetic ulc   Side: Right   Orientation: distal;plantar   Wound Approximate Age at First Assessment (Weeks):    Wound Number:    Is this injury device related?:    Incision Type:    Closure Method:    Wound Description (Comments):    Type:    Additional Comments:    Ankle-Brachial Index:    Pulses:    Removal Indication and Assessment:    Wound Outcome:    Wound Image   03/12/25 0942   Dressing Appearance Intact;Moist drainage 03/12/25 0942   Drainage Amount Moderate 03/12/25 0942   Drainage Characteristics/Odor Serosanguineous 03/12/25 0942   Appearance Fibrin 03/12/25 0942   Tissue loss description Full thickness 03/12/25 0942   Yellow (%), Wound Tissue Color 100 % 03/12/25 0942   Periwound Area Intact;Dry 03/12/25 0942   Wound Edges Callused;Undefined 03/12/25 0942   Verma Classification (diabetic foot ulcers only) Grade 2 03/12/25 0942   Wound Length (cm) 0.7 cm 03/12/25 0942   Wound Width (cm) 0.3 cm 03/12/25 0942   Wound Depth (cm) 1.2 cm 03/12/25 0942   Wound Volume (cm^3) 0.132 cm^3 03/12/25 0942   Wound Surface Area (cm^2) 0.16 cm^2 03/12/25 0942   Care Cleansed with:;Soap and water;Antimicrobial agent;Debrided 03/12/25 0942   Pre or Post Debridement Pre 03/12/25 0942   Dressing Applied;Foam;Methylene blue/gentian violet;[place]land/border;Rolled gauze;Tubular bandage 03/12/25 0993   Periwound Care Absorptive dressing applied;Dry  periwound area maintained 03/12/25 0942   Compression Tubular elasticized bandage 03/12/25 0942   Dressing Change Due 03/14/25 03/12/25 0942         Assessment/Plan:         ICD-10-CM ICD-9-CM   1. Abnormal laboratory test  R89.9 796.4   2. Uncontrolled type 2 diabetes mellitus with hyperglycemia  E11.65 250.02   3. Skin ulcer of right great toe with fat layer exposed  L97.512 707.15   4. Diabetic polyneuropathy associated with type 2 diabetes mellitus  E11.42 250.60     357.2         Tissue pathology and/or culture taken:   [x] Yes    [] No   Sharp debridement performed:    [x] Yes    [] No   Labs ordered this visit:    [] Yes    [x] No   Imaging ordered this visit:    [x] Yes    [] No     Is the wound improving?    [] Yes    [x] No   Any signs of infection?    [x] Yes    [] No             Orders Placed This Encounter   Procedures    Anaerobic culture     Send normal result to authorizing provider's In Basket if  patient is active on MyChart:->Yes     Send normal result to authorizing provider's In Basket if patient is active on MyChart::   Yes    Aerobic culture     Send normal result to authorizing provider's In Basket if  patient is active on MyChart:->Yes     Send normal result to authorizing provider's In Basket if patient is active on MyChart::   Yes    Anaerobic culture     RIGHT 1ST TOE  Send normal result to authorizing provider's In Basket if  patient is active on MyChart:->Yes     Send normal result to authorizing provider's In Basket if patient is active on MyChart::   Yes    Aerobic culture     RIGHT 1ST TOE  Send normal result to authorizing provider's In Basket if  patient is active on MyChart:->Yes     Send normal result to authorizing provider's In Basket if patient is active on MyChart::   Yes    Ambulatory referral/consult to Wound Clinic     Standing Status:   Standing     Number of Occurrences:   1     Referral Priority:   Routine     Referral Type:   Consultation     Referral Reason:    Specialty Services Required     Requested Specialty:   Wound Care     Number of Visits Requested:   1    Ambulatory referral/consult to Nephrology     Standing Status:   Future     Expected Date:   3/19/2025     Expiration Date:   4/12/2026     Referral Priority:   Routine     Referral Type:   Consultation     Referral Reason:   Specialty Services Required     Requested Specialty:   Nephrology     Number of Visits Requested:   1    Change dressing     Bilateral Lower Legs and Right Great Toe  Cleanse wound with:Vahse   Lidocaine:PRN  Periwound care:Lotion BLE  Primary dressing:Gentamicin Ointment and Hydrofera Ready to wounds  Secondary dressing:Kerlix BLE, Right Great Toe [place] border and conform  Edema control:Elevate BLE as much as possible   Frequency:Every Other Day and PRN    Follow-up:1 week   Home Health:Admit patient provide wound care and dressing changes as above.     Other Orders:Abdominal Ultrasound r/o ascites, wound culture: right toe and left leg taken, consult to Nephrology 3/12/25  Rx sent to pharmacy for Gentamicin Ointment 3/12/25- continue Clindamycin PO as ordered  Labs, Xray right foot, venous and arterial ultrasounds BLE scheduled for 3/12/25.        Follow up in about 1 week (around 3/19/2025) for .            This includes face to face time and non-face to face time preparing to see the patient (eg, review of tests), obtaining and/or reviewing separately obtained history, documenting clinical information in the electronic or other health record, independently interpreting results and communicating results to the patient/family/caregiver, or care coordinator.  Total time spent  > 30 minutes

## 2025-03-12 NOTE — PROCEDURES
"Debridement    Date/Time: 3/12/2025 8:00 AM    Performed by: Rene Beltran MD  Authorized by: Rene Beltran MD    Time out: Immediately prior to procedure a "time out" was called to verify the correct patient, procedure, equipment, support staff and site/side marked as required.    Consent Done?:  Yes (Verbal)    Preparation: Patient was prepped and draped in usual sterile fashion and Patient was prepped and draped with clean technique    Local anesthesia used?: Yes    Local anesthetic:  Topical anesthetic    Wound Details:    Location:  Right foot    Location:  Right 1st Toe    Type of Debridement:  Excisional       Length (cm):  1       Width (cm):  1       Depth (cm):  1.2       Area (sq cm):  0.79       Percent Debrided (%):  100       Total Area Debrided (sq cm):  0.79    Depth of debridement:  Subcutaneous tissue    Tissue debrided:  Subcutaneous    Devitalized tissue debrided:  Slough, Fibrin and Callus    Instruments:  Curette, Forceps and Scissors  Bleeding:  Minimal  Hemostasis Achieved: Yes  Method Used:  Pressure and Silver Nitrate  Patient tolerance:  Patient tolerated the procedure well with no immediate complications  "

## 2025-03-12 NOTE — TELEPHONE ENCOUNTER
Patient portal message sent, does have significant PA D on arterial ultrasound.  Currently being treated for diabetic toe ulcer, will need vascular cardiology consult.  Referral placed.      Orders Placed This Encounter   Procedures    Ambulatory referral/consult to Vascular Cardiology

## 2025-03-13 ENCOUNTER — TELEPHONE (OUTPATIENT)
Dept: FAMILY MEDICINE | Facility: CLINIC | Age: 75
End: 2025-03-13
Payer: MEDICARE

## 2025-03-14 ENCOUNTER — OFFICE VISIT (OUTPATIENT)
Dept: CARDIOLOGY | Facility: CLINIC | Age: 75
End: 2025-03-14
Payer: MEDICARE

## 2025-03-14 VITALS
DIASTOLIC BLOOD PRESSURE: 76 MMHG | WEIGHT: 235.31 LBS | BODY MASS INDEX: 35.66 KG/M2 | OXYGEN SATURATION: 96 % | SYSTOLIC BLOOD PRESSURE: 135 MMHG | HEART RATE: 118 BPM | HEIGHT: 68 IN

## 2025-03-14 DIAGNOSIS — E11.621 DIABETIC ULCER OF TOE OF RIGHT FOOT ASSOCIATED WITH TYPE 2 DIABETES MELLITUS, WITH FAT LAYER EXPOSED: ICD-10-CM

## 2025-03-14 DIAGNOSIS — I73.9 PAD (PERIPHERAL ARTERY DISEASE): ICD-10-CM

## 2025-03-14 DIAGNOSIS — I87.2 CHRONIC VENOUS INSUFFICIENCY: Primary | ICD-10-CM

## 2025-03-14 DIAGNOSIS — N18.9 CHRONIC KIDNEY DISEASE, UNSPECIFIED CKD STAGE: ICD-10-CM

## 2025-03-14 DIAGNOSIS — I10 HYPERTENSION, UNSPECIFIED TYPE: Primary | ICD-10-CM

## 2025-03-14 DIAGNOSIS — I25.10 CORONARY ARTERY DISEASE, UNSPECIFIED VESSEL OR LESION TYPE, UNSPECIFIED WHETHER ANGINA PRESENT, UNSPECIFIED WHETHER NATIVE OR TRANSPLANTED HEART: ICD-10-CM

## 2025-03-14 DIAGNOSIS — L97.512 DIABETIC ULCER OF TOE OF RIGHT FOOT ASSOCIATED WITH TYPE 2 DIABETES MELLITUS, WITH FAT LAYER EXPOSED: ICD-10-CM

## 2025-03-14 LAB
BACTERIA SPEC ANAEROBE CULT: NORMAL
BACTERIA SPEC ANAEROBE CULT: NORMAL

## 2025-03-14 PROCEDURE — 99999 PR PBB SHADOW E&M-EST. PATIENT-LVL III: CPT | Mod: PBBFAC,HCNC,, | Performed by: INTERNAL MEDICINE

## 2025-03-14 NOTE — PROGRESS NOTES
Subjective:   @Patient ID:  Francisco Coppola is a 75 y.o. male who presents for evaluation of No chief complaint on file.      HPI:         Patient is a 75-year-old male.  Here for evaluation of bilateral lower extremity wounds.  Active medical problems include      -bilateral shin wounds concerning for chronic venous insufficiency.  There is a separate wound on the right great toe concerning for critical limb ischemia.  -recent arterial ultrasound shows right anterior tibial artery occlusion left SFA disease.  No claudication symptoms.  -bilateral lower extremity and scrotal swelling for which he is on Lasix 20 mg daily.  -hypertension on lisinopril  -diabetes with A1c of 11.5 on insulin  -hyperlipidemia on pravastatin 40 mg LDL of 47  -no previous cardiac history documented.  EKG shows right bundle-branch block  -CKD last creatinine of 1.7.  Creatinine 1.3 one year ago.        Recently saw Dr. Beltran for bilateral lower extremity wounds. on clindamycin.  Has been reappointment with Dr. Carson within the next couple of weeks.  We discussed chronic venous insufficiency in critical limb ischemia today.  Creatinine 1.7 recently.  We discussed aggressive diuresis and need for getting echocardiogram and venous ultrasound with reflux study.      Please document below the medical necessity for continuous telemetry monitoring or discontinue the current order if appropriate.    Current rhythm from flowsheet:               Patient Active Problem List    Diagnosis Date Noted    Abnormal laboratory test 03/12/2025    Nuclear sclerotic cataract of right eye 10/22/2024    Pulmonary nodule 08/30/2024    Stage 3b chronic kidney disease 07/26/2024    Primary spontaneous pneumothorax 07/21/2024    CAD (coronary artery disease) 07/21/2024    Primary osteoarthritis of right knee 03/05/2024    Primary osteoarthritis of left knee 03/05/2024    Type 2 diabetes mellitus with hyperlipidemia 03/01/2024    Long-term insulin use  07/18/2023    Aortic atherosclerosis 01/31/2023    Neurogenic claudication 08/19/2022     Lumbar stenosis      Unspecified inflammatory spondylopathy, 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      Tobacco dependence 07/12/2017    Scrotal mass 09/08/2014    Hydrocele, right 09/08/2014    Condyloma acuminatum of scrotum 09/08/2014    Type 2 diabetes mellitus with stage 3a chronic kidney disease and hypertension 04/24/2013    HTN (hypertension), benign 04/24/2013    Hyperlipidemia 04/24/2013    Severe obesity (BMI 35.0-35.9 with comorbidity) 04/24/2013     BMI 35.78 kg/m2 as of 8/19/2022      Spinal stenosis 01/21/2013    Arthritis of facet joints at multiple vertebral levels 01/09/2013     Seen on lumbar MRI dated 01/09/13                      LAST HbA1c  Lab Results   Component Value Date    HGBA1C 11.5 (H) 03/12/2025       Lipid panel  Lab Results   Component Value Date    CHOL 126 03/12/2025    CHOL 160 10/18/2024    CHOL 147 06/07/2024     Lab Results   Component Value Date    HDL 43 03/12/2025    HDL 38 (L) 10/18/2024    HDL 38 (L) 06/07/2024     Lab Results   Component Value Date    LDLCALC 47.0 (L) 03/12/2025    LDLCALC 70.4 10/18/2024    LDLCALC 69.4 06/07/2024     Lab Results   Component Value Date    TRIG 180 (H) 03/12/2025    TRIG 258 (H) 10/18/2024    TRIG 198 (H) 06/07/2024     Lab Results   Component Value Date    CHOLHDL 34.1 03/12/2025    CHOLHDL 23.8 10/18/2024    CHOLHDL 25.9 06/07/2024            Review of Systems   Constitutional: Negative for chills and fever.   HENT:  Negative for hearing loss and nosebleeds.    Eyes:  Negative for blurred vision.   Cardiovascular:         As in HPI    Respiratory:  Negative for cough, hemoptysis and shortness of breath.    Endocrine: Negative for cold intolerance and  polyuria.   Hematologic/Lymphatic: Negative for bleeding problem.   Skin:  Negative for itching.   Musculoskeletal:  Negative for falls.   Gastrointestinal:  Negative for abdominal pain and hematochezia.   Genitourinary:  Negative for hematuria.   Neurological:  Negative for dizziness and loss of balance.   Psychiatric/Behavioral:  Negative for altered mental status and depression.        Objective:   Physical Exam  Constitutional:       Appearance: He is well-developed.   HENT:      Head: Normocephalic and atraumatic.   Eyes:      Conjunctiva/sclera: Conjunctivae normal.   Neck:      Vascular: No carotid bruit or JVD.   Cardiovascular:      Rate and Rhythm: Normal rate and regular rhythm.      Pulses:           Carotid pulses are 2+ on the right side and 2+ on the left side.       Radial pulses are 2+ on the right side and 2+ on the left side.      Heart sounds: Normal heart sounds. No murmur heard.     No friction rub. No gallop.      Comments: Healing wounds in bilateral shins.  Right great toe wound on the dorsum  Pulmonary:      Effort: Pulmonary effort is normal. No respiratory distress.      Breath sounds: Normal breath sounds. No stridor. No wheezing.   Abdominal:      General: Abdomen is flat.      Palpations: Abdomen is soft.   Musculoskeletal:      Cervical back: Neck supple.      Right lower leg: No edema.      Left lower leg: No edema.   Skin:     General: Skin is warm and dry.   Neurological:      Mental Status: He is alert and oriented to person, place, and time.   Psychiatric:         Behavior: Behavior normal.         Assessment:     1. Chronic venous insufficiency    2. Diabetic ulcer of toe of right foot associated with type 2 diabetes mellitus, with fat layer exposed    3. PAD (peripheral artery disease)    4. Chronic kidney disease, unspecified CKD stage        Plan:     -shin wounds bilaterally are concerning for chronic venous insufficiency versus severe chronic volume overload.  I recommend  increasing Lasix to 40 mg daily.  Potassium levels have been normal in the past.  Continue taking lisinopril.  -call my office if swelling is not going down with 40 mg Lasix daily for the next 4 days.  -creatinine at 1.7.  Right great toe wound concerning for critical limb ischemia.  I will assess the echocardiogram and venous ultrasound with reflux study prior to proceeding with peripheral angiogram.  -re-evaluate in 2-3 weeks after echocardiogram, vascular ultrasound and repeat BMP.    Pertinent cardiac images and EKG reviewed independently.    Continue with current medical plan and lifestyle changes.  Return sooner for concerns or questions. If symptoms persist go to the ED  I have reviewed all pertinent data including patient's medical history in detail and updated the computerized patient record.     Orders Placed This Encounter   Procedures    Basic metabolic panel     Standing Status:   Future     Expected Date:   3/14/2025     Expiration Date:   6/12/2026     Send normal result to authorizing provider's In Basket if patient is active on MyChart::   Yes    Ambulatory referral/consult to Nephrology     Standing Status:   Future     Expected Date:   3/21/2025     Expiration Date:   4/14/2026     Referral Priority:   Routine     Referral Type:   Consultation     Referral Reason:   Specialty Services Required     Requested Specialty:   Nephrology     Number of Visits Requested:   1    CV US Lower Extremity Veins Bilateral Insufficiency     Standing Status:   Future     Expiration Date:   3/14/2026     Release to patient:   Immediate    Echo     Standing Status:   Future     Expiration Date:   3/14/2026     Release to patient:   Immediate       Follow up as scheduled.     He expressed verbal understanding and agreed with the plan    Patient's Medications   New Prescriptions    No medications on file   Previous Medications    ALBUTEROL (PROVENTIL/VENTOLIN HFA) 90 MCG/ACTUATION INHALER    Inhale 2 puffs into the  "lungs every 6 (six) hours as needed for Wheezing. Rescue    ASPIRIN (ECOTRIN) 81 MG EC TABLET    Take 81 mg by mouth once daily.    BLOOD SUGAR DIAGNOSTIC STRP    To check BG 3 times daily, to use with insurance preferred meter    BLOOD-GLUCOSE METER KIT    To check BG 3 times daily, to use with insurance preferred meter    CLINDAMYCIN (CLEOCIN) 300 MG CAPSULE    Take 1 capsule (300 mg total) by mouth 4 (four) times daily. for 7 days    CLOTRIMAZOLE-BETAMETHASONE 1-0.05% (LOTRISONE) CREAM    Apply topically 2 (two) times daily.    DICLOFENAC SODIUM (VOLTAREN) 1 % GEL    Apply 2 g topically 3 (three) times daily as needed.    FUROSEMIDE (LASIX) 20 MG TABLET    TAKE 1 TABLET(20 MG) BY MOUTH EVERY DAY    GABAPENTIN (NEURONTIN) 300 MG CAPSULE    Take 1 capsule (300 mg total) by mouth 3 (three) times daily.    INSULIN GLARGINE U-100, LANTUS, (LANTUS SOLOSTAR U-100 INSULIN) 100 UNIT/ML (3 ML) INPN PEN    Inject 15 Units into the skin once daily.    LANCETS MISC    To check BG 3 times daily, to use with insurance preferred meter    LISINOPRIL (PRINIVIL,ZESTRIL) 20 MG TABLET    Take 1 tablet (20 mg total) by mouth once daily.    METFORMIN (GLUCOPHAGE-XR) 500 MG ER 24HR TABLET    Take 2 tablets (1,000 mg total) by mouth 2 (two) times daily with meals.    MULTIVITAMIN CAPSULE    Take 1 capsule by mouth once daily.    MUPIROCIN (BACTROBAN) 2 % OINTMENT    Apply topically 3 (three) times daily.    PEN NEEDLE, DIABETIC (BD ULTRA-FINE SHORT PEN NEEDLE) 31 GAUGE X 5/16" NDLE    USE WITH INSULIN PEN AS DIRECTED    PRAVASTATIN (PRAVACHOL) 40 MG TABLET    TAKE 1 TABLET(40 MG) BY MOUTH EVERY EVENING    PREDNISOLONE-MOXIFLOX-BROMFEN 1-0.5-0.075 % DRPS    Place 1 drop into the left eye 3 (three) times daily.    SILDENAFIL (VIAGRA) 100 MG TABLET    Take 1 tablet (100 mg total) by mouth daily as needed for Erectile Dysfunction.    TIRZEPATIDE 10 MG/0.5 ML PNIJ    Inject 10 mg into the skin every 7 days.    ULTRA THIN LANCETS 30 GAUGE MISC "    CHECK BLOOD GLUCOSE BID   Modified Medications    No medications on file   Discontinued Medications    No medications on file        Haresh Fallon M.D

## 2025-03-15 LAB
BACTERIA SPEC AEROBE CULT: ABNORMAL
BACTERIA SPEC AEROBE CULT: NORMAL

## 2025-03-17 ENCOUNTER — LAB VISIT (OUTPATIENT)
Dept: LAB | Facility: HOSPITAL | Age: 75
End: 2025-03-17
Attending: INTERNAL MEDICINE
Payer: MEDICARE

## 2025-03-17 DIAGNOSIS — I87.2 CHRONIC VENOUS INSUFFICIENCY: ICD-10-CM

## 2025-03-17 LAB
ANION GAP SERPL CALC-SCNC: 11 MMOL/L (ref 8–16)
BUN SERPL-MCNC: 26 MG/DL (ref 8–23)
CALCIUM SERPL-MCNC: 9.5 MG/DL (ref 8.7–10.5)
CHLORIDE SERPL-SCNC: 101 MMOL/L (ref 95–110)
CO2 SERPL-SCNC: 23 MMOL/L (ref 23–29)
CREAT SERPL-MCNC: 2 MG/DL (ref 0.5–1.4)
EST. GFR  (NO RACE VARIABLE): 34 ML/MIN/1.73 M^2
GLUCOSE SERPL-MCNC: 139 MG/DL (ref 70–110)
OHS QRS DURATION: 130 MS
OHS QTC CALCULATION: 439 MS
POTASSIUM SERPL-SCNC: 4.4 MMOL/L (ref 3.5–5.1)
SODIUM SERPL-SCNC: 135 MMOL/L (ref 136–145)

## 2025-03-17 PROCEDURE — 36415 COLL VENOUS BLD VENIPUNCTURE: CPT | Performed by: INTERNAL MEDICINE

## 2025-03-17 PROCEDURE — 80048 BASIC METABOLIC PNL TOTAL CA: CPT | Performed by: INTERNAL MEDICINE

## 2025-03-19 ENCOUNTER — HOSPITAL ENCOUNTER (OUTPATIENT)
Dept: WOUND CARE | Facility: HOSPITAL | Age: 75
Discharge: HOME | End: 2025-03-19
Attending: SURGERY
Payer: MEDICARE

## 2025-03-19 ENCOUNTER — RESULTS FOLLOW-UP (OUTPATIENT)
Dept: CARDIOLOGY | Facility: CLINIC | Age: 75
End: 2025-03-19

## 2025-03-19 VITALS
SYSTOLIC BLOOD PRESSURE: 119 MMHG | BODY MASS INDEX: 35.68 KG/M2 | DIASTOLIC BLOOD PRESSURE: 79 MMHG | HEART RATE: 116 BPM | TEMPERATURE: 98 F | WEIGHT: 235.44 LBS | HEIGHT: 68 IN

## 2025-03-19 DIAGNOSIS — E11.42 DIABETIC POLYNEUROPATHY ASSOCIATED WITH TYPE 2 DIABETES MELLITUS: ICD-10-CM

## 2025-03-19 DIAGNOSIS — L97.512 SKIN ULCER OF RIGHT GREAT TOE WITH FAT LAYER EXPOSED: Primary | ICD-10-CM

## 2025-03-19 DIAGNOSIS — E11.65 UNCONTROLLED TYPE 2 DIABETES MELLITUS WITH HYPERGLYCEMIA: ICD-10-CM

## 2025-03-19 PROCEDURE — 11042 DBRDMT SUBQ TIS 1ST 20SQCM/<: CPT | Mod: HCNC

## 2025-03-19 PROCEDURE — 17250 CHEM CAUT OF GRANLTJ TISSUE: CPT | Mod: HCNC

## 2025-03-19 RX ORDER — TIRZEPATIDE 10 MG/.5ML
10 INJECTION, SOLUTION SUBCUTANEOUS
Qty: 12 PEN | Refills: 1 | Status: SHIPPED | OUTPATIENT
Start: 2025-03-19

## 2025-03-19 NOTE — TELEPHONE ENCOUNTER
No care due was identified.  Health [facility] Embedded Care Due Messages. Reference number: [id number].   3/19/2025 1:32:26 PM CDT

## 2025-03-19 NOTE — PROCEDURES
"Debridement    Date/Time: 3/19/2025 1:00 PM    Performed by: Rene Beltran MD  Authorized by: Rene Beltran MD    Time out: Immediately prior to procedure a "time out" was called to verify the correct patient, procedure, equipment, support staff and site/side marked as required.    Consent Done?:  Yes (Verbal)    Preparation: Patient was prepped and draped in usual sterile fashion and Patient was prepped and draped with clean technique    Local anesthesia used?: Yes    Local anesthetic:  Topical anesthetic       Length (cm):  0.9       Width (cm):  0.9       Depth (cm):  0.3       Area (sq cm):  0.64       Percent Debrided (%):  100       Total Area Debrided (sq cm):  0.64    Depth of debridement:  Subcutaneous tissue    Tissue debrided:  Subcutaneous    Devitalized tissue debrided:  Callus, Fibrin, Necrotic/Eschar and Slough    Instruments:  Curette  Bleeding:  Minimal  Hemostasis Achieved: Yes  Method Used:  Pressure and Silver Nitrate  Patient tolerance:  Patient tolerated the procedure well with no immediate complications  1st Wound Pain Assessment: 2  "

## 2025-03-19 NOTE — PROGRESS NOTES
Wound Care & Hyperbaric Medicine    Subjective:       Patient ID: Francisco Coppola is a 75 y.o. male.    Chief Complaint: Non-healing Wound Follow Up    Follow up BLE ulcer and right great toe wound. Toe wound probes to bone- x-ray negative for osteo. BLE wounds duane, edema and redness improved. Patient reports he has blockages to BLE and he was seen by cardiology 3/14/25- ultrasounds discussed ordered BLE venous insuffiencey ultrasound and plans for procedure pending results. Home health pending. Plan of care updated. Reassess 1 week.       ROS: swelling better , no fever or chills no chest pain , no shortness of breath        Objective:      Physical Exam  Constitutional:       Appearance: He is well-developed.   HENT:      Head: Normocephalic.   Eyes:      Conjunctiva/sclera: Conjunctivae normal.      Pupils: Pupils are equal, round, and reactive to light.   Cardiovascular:      Rate and Rhythm: Normal rate and regular rhythm.      Heart sounds: Normal heart sounds.   Pulmonary:      Effort: Pulmonary effort is normal.      Breath sounds: Normal breath sounds.   Abdominal:      General: Bowel sounds are normal.      Palpations: Abdomen is soft.   Musculoskeletal:         General: Swelling present. Normal range of motion.      Cervical back: Normal range of motion and neck supple.      Right lower leg: Edema present.      Left lower leg: Edema present.   Skin:     General: Skin is warm and dry.      Findings: Erythema and lesion present.   Neurological:      Mental Status: He is alert and oriented to person, place, and time.      Deep Tendon Reflexes: Reflexes are normal and symmetric.        Wound 03/12/25 0800 Blister(s) Right anterior;lower Leg (Active)   03/12/25 0800 Leg   Present on Original Admission: Y   Primary Wound Type: Blister(s)   Side: Right   Orientation: anterior;lower   Wound Approximate Age at First Assessment (Weeks):    Wound Number:    Is this injury device  related?:    Incision Type:    Closure Method:    Wound Description (Comments):    Type:    Additional Comments:    Ankle-Brachial Index: 1.15   Pulses: + doppler   Removal Indication and Assessment:    Wound Outcome:    Wound Image   03/19/25 1327   Dressing Appearance Dry;Intact 03/19/25 1327   Drainage Amount None 03/19/25 1327   Appearance Eschar;Dry 03/19/25 1327   Tissue loss description Partial thickness 03/19/25 1327   Yellow (%), Wound Tissue Color 100 % 03/19/25 1327   Periwound Area Intact;Dry;Edematous 03/19/25 1327   Wound Edges Defined 03/19/25 1327   Wound Length (cm) 0.4 cm 03/19/25 1327   Wound Width (cm) 0.4 cm 03/19/25 1327   Wound Depth (cm) 0.1 cm 03/19/25 1327   Wound Volume (cm^3) 0.008 cm^3 03/19/25 1327   Wound Surface Area (cm^2) 0.13 cm^2 03/19/25 1327   Care Cleansed with:;Soap and water 03/19/25 1327   Dressing Applied;Foam;Methylene blue/gentian violet;Island/border;Tubular bandage 03/19/25 1327   Periwound Care Absorptive dressing applied;Dry periwound area maintained;Moisturizer applied 03/19/25 1327   Compression Tubular elasticized bandage 03/19/25 1327   Dressing Change Due 03/21/25 03/19/25 1327            Wound 03/12/25 0800 Blister(s) Left anterior;lower Leg (Active)   03/12/25 0800 Leg   Present on Original Admission: Y   Primary Wound Type: Blister(s)   Side: Left   Orientation: anterior;lower   Wound Approximate Age at First Assessment (Weeks):    Wound Number:    Is this injury device related?:    Incision Type:    Closure Method:    Wound Description (Comments):    Type:    Additional Comments:    Ankle-Brachial Index: 1.19   Pulses: +doppler   Removal Indication and Assessment:    Wound Outcome:    Wound Image   03/19/25 1327   Dressing Appearance Intact;Moist drainage 03/19/25 1327   Drainage Amount Moderate 03/19/25 1327   Drainage Characteristics/Odor Serosanguineous 03/19/25 1327   Appearance Red;Hypergranulation;Moist 03/19/25 1327   Tissue loss description Partial  thickness 03/19/25 1327   Red (%), Wound Tissue Color 100 % 03/19/25 1327   Periwound Area Intact;Dry;Edematous 03/19/25 1327   Wound Edges Defined 03/19/25 1327   Wound Length (cm) 2.4 cm 03/19/25 1327   Wound Width (cm) 2 cm 03/19/25 1327   Wound Depth (cm) 0.1 cm 03/19/25 1327   Wound Volume (cm^3) 0.251 cm^3 03/19/25 1327   Wound Surface Area (cm^2) 3.77 cm^2 03/19/25 1327   Care Cleansed with:;Sterile normal saline;Antimicrobial agent 03/19/25 1327   Dressing Applied;Silver;Foam;Methylene blue/gentian violet;Island/border;Tubular bandage 03/19/25 1327   Periwound Care Absorptive dressing applied;Dry periwound area maintained;Moisturizer applied 03/19/25 1327   Compression Tubular elasticized bandage 03/19/25 1327   Dressing Change Due 03/21/25 03/19/25 1327            Wound 03/12/25 0800 Blister(s) Right posterior Calf (Active)   03/12/25 0800 Calf   Present on Original Admission: Y   Primary Wound Type: Blister(s)   Side: Right   Orientation: posterior   Wound Approximate Age at First Assessment (Weeks):    Wound Number:    Is this injury device related?:    Incision Type:    Closure Method:    Wound Description (Comments):    Type:    Additional Comments:    Ankle-Brachial Index:    Pulses:    Removal Indication and Assessment:    Wound Outcome:    Wound Image   03/19/25 1327   Dressing Appearance Dry;Moist drainage 03/19/25 1327   Drainage Amount Moderate 03/19/25 1327   Drainage Characteristics/Odor Serosanguineous 03/19/25 1327   Appearance Pink;Yellow;Moist 03/19/25 1327   Tissue loss description Partial thickness 03/19/25 1327   Red (%), Wound Tissue Color 90 % 03/19/25 1327   Yellow (%), Wound Tissue Color 10 % 03/19/25 1327   Periwound Area Intact;Dry 03/19/25 1327   Wound Edges Defined 03/19/25 1327   Wound Length (cm) 2.5 cm 03/19/25 1327   Wound Width (cm) 1.1 cm 03/19/25 1327   Wound Depth (cm) 0.1 cm 03/19/25 1327   Wound Volume (cm^3) 0.144 cm^3 03/19/25 1327   Wound Surface Area (cm^2) 2.16 cm^2  03/19/25 1327   Care Cleansed with:;Soap and water;Antimicrobial agent 03/19/25 1327   Dressing Applied;Silver;Foam;Methylene blue/gentian violet;Tubular bandage 03/19/25 1327   Periwound Care Absorptive dressing applied;Moisturizer applied 03/19/25 1327   Compression Tubular elasticized bandage 03/19/25 1327   Dressing Change Due 03/21/25 03/19/25 1327            Wound 03/12/25 0800 Diabetic Ulcer Right distal;plantar Toe, first (Active)   03/12/25 0800 Toe, first   Present on Original Admission: Y   Primary Wound Type: Diabetic ulc   Side: Right   Orientation: distal;plantar   Wound Approximate Age at First Assessment (Weeks):    Wound Number:    Is this injury device related?:    Incision Type:    Closure Method:    Wound Description (Comments):    Type:    Additional Comments:    Ankle-Brachial Index:    Pulses:    Removal Indication and Assessment:    Wound Outcome:    Wound Image   03/19/25 1327   Dressing Appearance Intact;Moist drainage 03/19/25 1327   Drainage Amount Moderate 03/19/25 1327   Drainage Characteristics/Odor Serosanguineous 03/19/25 1327   Appearance Fibrin 03/19/25 1327   Tissue loss description Full thickness 03/19/25 1327   Periwound Area Intact;Dry 03/19/25 1327   Wound Edges Callused;Open 03/19/25 1327   Wound Length (cm) 0.5 cm 03/19/25 1327   Wound Width (cm) 0.3 cm 03/19/25 1327   Wound Depth (cm) 1.2 cm 03/19/25 1327   Wound Volume (cm^3) 0.094 cm^3 03/19/25 1327   Wound Surface Area (cm^2) 0.12 cm^2 03/19/25 1327   Care Cleansed with:;Soap and water;Antimicrobial agent;Debrided 03/19/25 1327   Pre or Post Debridement Pre 03/19/25 1327   Dressing Applied;Foam;Methylene blue/gentian violet;Island/border;Tubular bandage 03/19/25 1327   Periwound Care Absorptive dressing applied;Dry periwound area maintained 03/19/25 1327   Compression Tubular elasticized bandage 03/19/25 1327   Dressing Change Due 03/21/25 03/19/25 1327         Assessment/Plan:         ICD-10-CM ICD-9-CM   1. Skin ulcer  of right great toe with fat layer exposed  L97.512 707.15   2. Diabetic polyneuropathy associated with type 2 diabetes mellitus  E11.42 250.60     357.2   3. Uncontrolled type 2 diabetes mellitus with hyperglycemia  E11.65 250.02         Tissue pathology and/or culture taken:   [] Yes    [x] No   Sharp debridement performed:    [x] Yes    [] No   Labs ordered this visit:    [] Yes    [x] No   Imaging ordered this visit:    [] Yes    [x] No     Is the wound improving?    [] Yes    [x] No   Any signs of infection?    [] Yes [x]    No             Orders Placed This Encounter   Procedures    Change dressing     Bilateral Lower Legs and Right Great Toe   Cleanse wound with:Vahse   Lidocaine:PRN   Periwound care:Lotion BLE   Primary dressing:Gentamicin Ointment and Hydrofera Ready to wounds   Secondary dressing: Island border   Edema control: Tubi  F BLE. Elevate BLE as much as possible   Frequency:Every Other Day and PRN     Follow-up:1 week    [facility]:Admit patient provide wound care and dressing changes as above.     Other Orders: consult to Nephrology 3/12/25- pending   Rx given for Gentamicin Ointment and Levaquin PO. Discontinue Clindamycin Po 3/19/25  BLE venous reflux ultrasound ordered- procedure pending results.        Follow up in about 1 week (around 3/26/2025) for .            This includes face to face time and non-face to face time preparing to see the patient (eg, review of tests), obtaining and/or reviewing separately obtained history, documenting clinical information in the electronic or other health record, independently interpreting results and communicating results to the patient/family/caregiver, or care coordinator.  Total time spent  > 30 minutes

## 2025-03-19 NOTE — TELEPHONE ENCOUNTER
Refill Decision Note   Francisco Coppola  is requesting a refill authorization.  Brief Assessment and Rationale for Refill:  Approve     Medication Therapy Plan:         Comments:     Note composed:4:20 PM 03/19/2025

## 2025-03-25 ENCOUNTER — TELEPHONE (OUTPATIENT)
Dept: WOUND CARE | Facility: HOSPITAL | Age: 75
End: 2025-03-25
Payer: MEDICARE

## 2025-03-25 NOTE — TELEPHONE ENCOUNTER
Referrals sent to multiple home health agencies requesting home health skilled nursing for wound care. Unable to obtain an accepting home health agency at this time.

## 2025-03-26 ENCOUNTER — HOSPITAL ENCOUNTER (OUTPATIENT)
Dept: WOUND CARE | Facility: HOSPITAL | Age: 75
Discharge: HOME | End: 2025-03-26
Attending: SURGERY
Payer: MEDICARE

## 2025-03-26 VITALS
SYSTOLIC BLOOD PRESSURE: 110 MMHG | HEIGHT: 68 IN | DIASTOLIC BLOOD PRESSURE: 58 MMHG | TEMPERATURE: 99 F | HEART RATE: 116 BPM | WEIGHT: 235.44 LBS | BODY MASS INDEX: 35.68 KG/M2

## 2025-03-26 DIAGNOSIS — M48.061 SPINAL STENOSIS OF LUMBAR REGION, UNSPECIFIED WHETHER NEUROGENIC CLAUDICATION PRESENT: ICD-10-CM

## 2025-03-26 DIAGNOSIS — I12.9 TYPE 2 DIABETES MELLITUS WITH STAGE 3A CHRONIC KIDNEY DISEASE AND HYPERTENSION: ICD-10-CM

## 2025-03-26 DIAGNOSIS — E11.22 TYPE 2 DIABETES MELLITUS WITH STAGE 3A CHRONIC KIDNEY DISEASE AND HYPERTENSION: ICD-10-CM

## 2025-03-26 DIAGNOSIS — I10 HTN (HYPERTENSION), BENIGN: ICD-10-CM

## 2025-03-26 DIAGNOSIS — N18.31 TYPE 2 DIABETES MELLITUS WITH STAGE 3A CHRONIC KIDNEY DISEASE AND HYPERTENSION: ICD-10-CM

## 2025-03-26 DIAGNOSIS — L97.512 SKIN ULCER OF RIGHT GREAT TOE WITH FAT LAYER EXPOSED: Primary | ICD-10-CM

## 2025-03-26 DIAGNOSIS — E11.65 UNCONTROLLED TYPE 2 DIABETES MELLITUS WITH HYPERGLYCEMIA: ICD-10-CM

## 2025-03-26 DIAGNOSIS — E11.42 DIABETIC POLYNEUROPATHY ASSOCIATED WITH TYPE 2 DIABETES MELLITUS: ICD-10-CM

## 2025-03-26 DIAGNOSIS — E78.49 OTHER HYPERLIPIDEMIA: ICD-10-CM

## 2025-03-26 PROCEDURE — 11042 DBRDMT SUBQ TIS 1ST 20SQCM/<: CPT | Mod: HCNC

## 2025-03-26 NOTE — PROGRESS NOTES
Wound Care & Hyperbaric Medicine    Subjective:       Patient ID: Francisco Coppola is a 75 y.o. male.    Chief Complaint: Wound Care and Non-healing Wound Follow Up    3/26/2025  Follow up related to right leg wounds, left leg and right first toe wounds. No complaints. Not wearing compression and elevating legs regularly. Right anterior leg wound has resolved. Overall wound improvement noted. Plan of care updated. Supplies requested for at home dressing changes. Supply form and process reviewed with patient, patient verbalized understanding.wound debridement done      ROS: no fever or chills swelling getting better              Objective:      Physical Exam  Constitutional:       Appearance: He is well-developed.   HENT:      Head: Normocephalic.   Eyes:      Conjunctiva/sclera: Conjunctivae normal.      Pupils: Pupils are equal, round, and reactive to light.   Cardiovascular:      Rate and Rhythm: Normal rate and regular rhythm.      Heart sounds: Normal heart sounds.   Pulmonary:      Effort: Pulmonary effort is normal.      Breath sounds: Normal breath sounds.   Abdominal:      General: Bowel sounds are normal.      Palpations: Abdomen is soft.   Musculoskeletal:         General: Swelling and tenderness present. Normal range of motion.      Cervical back: Normal range of motion and neck supple.      Right lower leg: Edema present.      Left lower leg: Edema present.   Skin:     General: Skin is warm and dry.      Findings: Erythema and lesion present.   Neurological:      Mental Status: He is alert and oriented to person, place, and time.      Deep Tendon Reflexes: Reflexes are normal and symmetric.        Wound 03/12/25 0800 Blister(s) Right anterior;lower Leg (Active)   03/12/25 0800 Leg   Present on Original Admission: Y   Primary Wound Type: Blister(s)   Side: Right   Orientation: anterior;lower   Wound Approximate Age at First Assessment (Weeks):    Wound Number:    Is this injury  device related?:    Incision Type:    Closure Method:    Wound Description (Comments):    Type:    Additional Comments:    Ankle-Brachial Index: 1.15   Pulses: + doppler   Removal Indication and Assessment:    Wound Outcome:    Dressing Appearance Open to air 03/26/25 1500   Drainage Amount None 03/26/25 1500   Appearance Closed/resurfaced 03/26/25 1500   Tissue loss description Not applicable 03/26/25 1500   Periwound Area Scar tissue;Edematous;Dry;Intact 03/26/25 1500   Wound Edges Rolled/closed 03/26/25 1500   Wound Length (cm) 0 cm 03/26/25 1500   Wound Width (cm) 0 cm 03/26/25 1500   Wound Depth (cm) 0 cm 03/26/25 1500   Wound Volume (cm^3) 0 cm^3 03/26/25 1500   Wound Surface Area (cm^2) 0 cm^2 03/26/25 1500            Wound 03/12/25 0800 Blister(s) Left anterior;lower Leg (Active)   03/12/25 0800 Leg   Present on Original Admission: Y   Primary Wound Type: Blister(s)   Side: Left   Orientation: anterior;lower   Wound Approximate Age at First Assessment (Weeks):    Wound Number:    Is this injury device related?:    Incision Type:    Closure Method:    Wound Description (Comments):    Type:    Additional Comments:    Ankle-Brachial Index: 1.19   Pulses: +doppler   Removal Indication and Assessment:    Wound Outcome:    Wound Image   03/26/25 1500   Dressing Appearance Intact;Moist drainage 03/26/25 1500   Drainage Amount Moderate 03/26/25 1500   Drainage Characteristics/Odor Serosanguineous 03/26/25 1500   Appearance Red;Pink;Moist 03/26/25 1500   Tissue loss description Full thickness 03/26/25 1500   Red (%), Wound Tissue Color 100 % 03/26/25 1500   Periwound Area Edematous;Dry;Intact 03/26/25 1500   Wound Edges Defined 03/26/25 1500   Wound Length (cm) 2.5 cm 03/26/25 1500   Wound Width (cm) 1.6 cm 03/26/25 1500   Wound Depth (cm) 0.1 cm 03/26/25 1500   Wound Volume (cm^3) 0.209 cm^3 03/26/25 1500   Wound Surface Area (cm^2) 3.14 cm^2 03/26/25 1500   Care Cleansed with:;Antimicrobial agent 03/26/25 1500    Dressing Applied;Hydrofiber;Methylene blue/gentian violet;Island/border;Tubular bandage 03/26/25 1500   Periwound Care Absorptive dressing applied;Moisturizer applied 03/26/25 1500   Compression Tubular elasticized bandage 03/26/25 1500   Dressing Change Due 03/28/25 03/26/25 1500            Wound 03/12/25 0800 Blister(s) Right posterior Calf (Active)   03/12/25 0800 Calf   Present on Original Admission: Y   Primary Wound Type: Blister(s)   Side: Right   Orientation: posterior   Wound Approximate Age at First Assessment (Weeks):    Wound Number:    Is this injury device related?:    Incision Type:    Closure Method:    Wound Description (Comments):    Type:    Additional Comments:    Ankle-Brachial Index:    Pulses:    Removal Indication and Assessment:    Wound Outcome:    Dressing Appearance Intact;Moist drainage 03/26/25 1500   Drainage Amount Moderate 03/26/25 1500   Drainage Characteristics/Odor Serosanguineous 03/26/25 1500   Appearance Pink;Yellow 03/26/25 1500   Tissue loss description Full thickness 03/26/25 1500   Red (%), Wound Tissue Color 95 % 03/26/25 1500   Yellow (%), Wound Tissue Color 5 % 03/26/25 1500   Periwound Area Intact;Dry 03/26/25 1500   Wound Edges Defined 03/26/25 1500   Wound Length (cm) 1 cm 03/26/25 1500   Wound Width (cm) 0.9 cm 03/26/25 1500   Wound Depth (cm) 0.1 cm 03/26/25 1500   Wound Volume (cm^3) 0.047 cm^3 03/26/25 1500   Wound Surface Area (cm^2) 0.71 cm^2 03/26/25 1500   Care Cleansed with:;Antimicrobial agent 03/26/25 1500   Dressing Applied;Hydrofiber;Methylene blue/gentian violet;Island/border;Tubular bandage 03/26/25 1500   Periwound Care Absorptive dressing applied;Moisturizer applied 03/26/25 1500   Compression Tubular elasticized bandage 03/26/25 1500   Dressing Change Due 03/28/25 03/26/25 1500            Wound 03/12/25 0800 Diabetic Ulcer Right distal;plantar Toe, first (Active)   03/12/25 0800 Toe, first   Present on Original Admission: Y   Primary Wound Type:  Diabetic ulc   Side: Right   Orientation: distal;plantar   Wound Approximate Age at First Assessment (Weeks):    Wound Number:    Is this injury device related?:    Incision Type:    Closure Method:    Wound Description (Comments):    Type:    Additional Comments:    Ankle-Brachial Index:    Pulses:    Removal Indication and Assessment:    Wound Outcome:    Dressing Appearance Intact;Moist drainage 03/26/25 1500   Drainage Amount Moderate 03/26/25 1500   Drainage Characteristics/Odor Serosanguineous 03/26/25 1500   Appearance Red;Moist 03/26/25 1500   Tissue loss description Full thickness 03/26/25 1500   Red (%), Wound Tissue Color 100 % 03/26/25 1500   Periwound Area Intact;Dry 03/26/25 1500   Wound Edges Callused;Open 03/26/25 1500   Wound Length (cm) 0.2 cm 03/26/25 1500   Wound Width (cm) 0.4 cm 03/26/25 1500   Wound Depth (cm) 0.6 cm 03/26/25 1500   Wound Volume (cm^3) 0.025 cm^3 03/26/25 1500   Wound Surface Area (cm^2) 0.06 cm^2 03/26/25 1500   Undermining (depth (cm)/location) 0.4cm@12-12oclock 03/26/25 1500   Care Cleansed with:;Antimicrobial agent;Debrided 03/26/25 1500   Dressing Applied;Hydrofiber;Methylene blue/gentian violet;Island/border;Tubular bandage 03/26/25 1500   Periwound Care Absorptive dressing applied;Moisturizer applied 03/26/25 1500   Compression Tubular elasticized bandage 03/26/25 1500   Dressing Change Due 03/28/25 03/26/25 1500         Assessment/Plan:         ICD-10-CM ICD-9-CM   1. Skin ulcer of right great toe with fat layer exposed  L97.512 707.15   2. Diabetic polyneuropathy associated with type 2 diabetes mellitus  E11.42 250.60     357.2   3. Uncontrolled type 2 diabetes mellitus with hyperglycemia  E11.65 250.02         Tissue pathology and/or culture taken:   [] Yes    [x] No   Sharp debridement performed:    [x] Yes    [] No   Labs ordered this visit:    [] Yes    [x] No   Imaging ordered this visit:    [] Yes    [x] No     Is the wound improving?    [x] Yes    [] No   Any  signs of infection?    [] Yes    [x] No             Orders Placed This Encounter   Procedures    Change dressing     Bilateral Lower Legs and Right Great Toe   Cleanse wound with:Vashe   Lidocaine:PRN in clinic  Periwound care:Lotion BLE   Primary dressing:Gentamicin Ointment and Hydrofera Ready to wounds   Secondary dressing: Island border   Edema control: Tubi  F BLE. Elevate BLE as much as possible   Frequency :Every Other Day and PRN     Follow-up: 2 weeks      Other Orders: consult to Nephrology 3/12/25- pending, supplies ordered for at home dressing changes  Rx given for Gentamicin Ointment and Levaquin PO 3/19/2025  BLE venous reflux ultrasound ordered- procedure pending results.        Follow up in about 2 weeks (around 4/9/2025) for Dr Beltran.            This includes face to face time and non-face to face time preparing to see the patient (eg, review of tests), obtaining and/or reviewing separately obtained history, documenting clinical information in the electronic or other health record, independently interpreting results and communicating results to the patient/family/caregiver, or care coordinator.  Total time spent  > 50 minutes

## 2025-03-27 NOTE — PROCEDURES
"Debridement    Date/Time: 3/26/2025 3:00 PM    Performed by: Rene Beltran MD  Authorized by: Rene Beltran MD    Time out: Immediately prior to procedure a "time out" was called to verify the correct patient, procedure, equipment, support staff and site/side marked as required.    Consent Done?:  Yes (Verbal)    Preparation: Patient was prepped and draped in usual sterile fashion and Patient was prepped and draped with clean technique    Local anesthesia used?: Yes    Local anesthetic:  Topical anesthetic    Wound Details:    Location:  Right foot    Location:  Right 1st Toe    Type of Debridement:  Excisional       Length (cm):  2.5       Width (cm):  1.6       Depth (cm):  0.3       Area (sq cm):  3.14       Percent Debrided (%):  100       Total Area Debrided (sq cm):  3.14    Depth of debridement:  Subcutaneous tissue    Tissue debrided:  Subcutaneous    Devitalized tissue debrided:  Slough, Necrotic/Eschar and Exudate    Instruments:  Blade, Curette and Scissors  Bleeding:  Minimal  Hemostasis Achieved: Yes  Method Used:  Pressure and Silver Nitrate  Patient tolerance:  Patient tolerated the procedure well with no immediate complications  "

## 2025-03-27 NOTE — PROGRESS NOTES
[facility]

## 2025-04-03 ENCOUNTER — HOSPITAL ENCOUNTER (OUTPATIENT)
Dept: RADIOLOGY | Facility: HOSPITAL | Age: 75
Discharge: HOME | End: 2025-04-03
Attending: NURSE PRACTITIONER
Payer: MEDICARE

## 2025-04-03 DIAGNOSIS — N18.32 STAGE 3B CHRONIC KIDNEY DISEASE: ICD-10-CM

## 2025-04-03 PROCEDURE — 76770 US EXAM ABDO BACK WALL COMP: CPT | Mod: TC,HCNC

## 2025-04-03 PROCEDURE — 76770 US EXAM ABDO BACK WALL COMP: CPT | Mod: 26,HCNC,, | Performed by: RADIOLOGY

## 2025-04-08 ENCOUNTER — HOSPITAL ENCOUNTER (OUTPATIENT)
Dept: CARDIOLOGY | Facility: HOSPITAL | Age: 75
Discharge: HOME | End: 2025-04-08
Attending: INTERNAL MEDICINE
Payer: MEDICARE

## 2025-04-08 DIAGNOSIS — I87.2 CHRONIC VENOUS INSUFFICIENCY: ICD-10-CM

## 2025-04-08 LAB
LEFT GREAT SAPHENOUS DISTAL THIGH DIA: 0.39 CM
LEFT GREAT SAPHENOUS JUNCTION DIA: 0.47 CM
LEFT GREAT SAPHENOUS KNEE DIA: 0.35 CM
LEFT GREAT SAPHENOUS MIDDLE THIGH DIA: 0.43 CM
LEFT GREAT SAPHENOUS PROXIMAL CALF DIA: 0.3 CM
LEFT SMALL SAPHENOUS KNEE DIA: 0.46 CM
RIGHT GREAT SAPHENOUS DISTAL THIGH DIA: 0.49 CM
RIGHT GREAT SAPHENOUS DISTAL THIGH REFLUX: 7307 MS
RIGHT GREAT SAPHENOUS JUNCTION DIA: 0.51 CM
RIGHT GREAT SAPHENOUS KNEE DIA: 0.39 CM
RIGHT GREAT SAPHENOUS KNEE REFLUX: 5176 MS
RIGHT GREAT SAPHENOUS MIDDLE THIGH DIA: 0.44 CM
RIGHT GREAT SAPHENOUS MIDDLE THIGH REFLUX: 2666 MS
RIGHT GREAT SAPHENOUS PROXIMAL CALF DIA: 0.4 CM
RIGHT GREAT SAPHENOUS PROXIMAL CALF REFLUX: 2154 MS
RIGHT SMALL SAPHENOUS KNEE DIA: 0.4 CM
RIGHT SMALL SAPHENOUS KNEE REFLUX: 3570 MS

## 2025-04-08 PROCEDURE — 93970 EXTREMITY STUDY: CPT | Mod: TC,HCNC

## 2025-04-08 PROCEDURE — 93970 EXTREMITY STUDY: CPT | Mod: 26,HCNC,, | Performed by: INTERNAL MEDICINE

## 2025-04-09 ENCOUNTER — LAB VISIT (OUTPATIENT)
Dept: LAB | Facility: HOSPITAL | Age: 75
End: 2025-04-09
Attending: FAMILY MEDICINE
Payer: MEDICARE

## 2025-04-09 ENCOUNTER — HOSPITAL ENCOUNTER (OUTPATIENT)
Dept: WOUND CARE | Facility: HOSPITAL | Age: 75
Discharge: HOME | End: 2025-04-09
Attending: SURGERY
Payer: MEDICARE

## 2025-04-09 VITALS
SYSTOLIC BLOOD PRESSURE: 125 MMHG | BODY MASS INDEX: 35.15 KG/M2 | TEMPERATURE: 99 F | HEART RATE: 101 BPM | WEIGHT: 231.94 LBS | HEIGHT: 68 IN | DIASTOLIC BLOOD PRESSURE: 65 MMHG

## 2025-04-09 DIAGNOSIS — E11.65 TYPE 2 DIABETES MELLITUS WITH HYPERGLYCEMIA, WITH LONG-TERM CURRENT USE OF INSULIN: ICD-10-CM

## 2025-04-09 DIAGNOSIS — E11.22 TYPE 2 DIABETES MELLITUS WITH STAGE 3 CHRONIC KIDNEY DISEASE, WITH LONG-TERM CURRENT USE OF INSULIN, UNSPECIFIED WHETHER STAGE 3A OR 3B CKD: ICD-10-CM

## 2025-04-09 DIAGNOSIS — D63.1 ANEMIA IN STAGE 3 CHRONIC KIDNEY DISEASE, UNSPECIFIED WHETHER STAGE 3A OR 3B CKD: ICD-10-CM

## 2025-04-09 DIAGNOSIS — N18.30 ANEMIA IN STAGE 3 CHRONIC KIDNEY DISEASE, UNSPECIFIED WHETHER STAGE 3A OR 3B CKD: ICD-10-CM

## 2025-04-09 DIAGNOSIS — N18.30 TYPE 2 DIABETES MELLITUS WITH STAGE 3 CHRONIC KIDNEY DISEASE, WITH LONG-TERM CURRENT USE OF INSULIN, UNSPECIFIED WHETHER STAGE 3A OR 3B CKD: ICD-10-CM

## 2025-04-09 DIAGNOSIS — E11.42 DIABETIC POLYNEUROPATHY ASSOCIATED WITH TYPE 2 DIABETES MELLITUS: ICD-10-CM

## 2025-04-09 DIAGNOSIS — Z79.4 TYPE 2 DIABETES MELLITUS WITH HYPERGLYCEMIA, WITH LONG-TERM CURRENT USE OF INSULIN: ICD-10-CM

## 2025-04-09 DIAGNOSIS — I10 HTN (HYPERTENSION), BENIGN: ICD-10-CM

## 2025-04-09 DIAGNOSIS — Z79.4 TYPE 2 DIABETES MELLITUS WITH STAGE 3 CHRONIC KIDNEY DISEASE, WITH LONG-TERM CURRENT USE OF INSULIN, UNSPECIFIED WHETHER STAGE 3A OR 3B CKD: ICD-10-CM

## 2025-04-09 DIAGNOSIS — N18.32 STAGE 3B CHRONIC KIDNEY DISEASE: ICD-10-CM

## 2025-04-09 DIAGNOSIS — E11.65 UNCONTROLLED TYPE 2 DIABETES MELLITUS WITH HYPERGLYCEMIA: ICD-10-CM

## 2025-04-09 DIAGNOSIS — L97.512 SKIN ULCER OF RIGHT GREAT TOE WITH FAT LAYER EXPOSED: Primary | ICD-10-CM

## 2025-04-09 LAB
25(OH)D3+25(OH)D2 SERPL-MCNC: 14 NG/ML (ref 30–96)
ABSOLUTE EOSINOPHIL (OHS): 0.12 K/UL
ABSOLUTE MONOCYTE (OHS): 0.58 K/UL (ref 0.3–1)
ABSOLUTE NEUTROPHIL COUNT (OHS): 3.83 K/UL (ref 1.8–7.7)
ALBUMIN SERPL BCP-MCNC: 3.4 G/DL (ref 3.5–5.2)
ALBUMIN/CREAT UR: 2266.7 UG/MG
AMM URATE CRY UR QL COMP ASSIST: ABNORMAL
AMORPH CRY UR QL COMP ASSIST: ABNORMAL
ANION GAP (OHS): 16 MMOL/L (ref 8–16)
BACTERIA #/AREA URNS AUTO: ABNORMAL /HPF
BASOPHILS # BLD AUTO: 0.03 K/UL
BASOPHILS NFR BLD AUTO: 0.4 %
BILIRUB UR QL STRIP.AUTO: NEGATIVE
BUN SERPL-MCNC: 42 MG/DL (ref 8–23)
CA CARBONATE CRY UR QL COMP ASSIST: ABNORMAL
CA PHOS CRY UR QL COMP ASSIST: ABNORMAL
CALCIUM SERPL-MCNC: 9.3 MG/DL (ref 8.7–10.5)
CAOX CRY UR QL COMP ASSIST: ABNORMAL
CHLORIDE SERPL-SCNC: 103 MMOL/L (ref 95–110)
CLARITY UR: CLEAR
CO2 SERPL-SCNC: 20 MMOL/L (ref 23–29)
COLOR UR AUTO: YELLOW
CREAT SERPL-MCNC: 1.9 MG/DL (ref 0.5–1.4)
CREAT UR-MCNC: 63 MG/DL (ref 23–375)
CREAT UR-MCNC: 63.3 MG/DL (ref 23–375)
EAG (OHS): 212 MG/DL (ref 68–131)
EPITH CASTS #/AREA UR COMP ASSIST: 0 /LPF (ref ?–0)
ERYTHROCYTE [DISTWIDTH] IN BLOOD BY AUTOMATED COUNT: 14.2 % (ref 11.5–14.5)
FATTY CASTS UR QL COMP ASSIST: 0 /LPF (ref ?–0)
FERRITIN SERPL-MCNC: 69.4 NG/ML (ref 20–300)
GFR SERPLBLD CREATININE-BSD FMLA CKD-EPI: 36 ML/MIN/1.73/M2
GLUCOSE SERPL-MCNC: 64 MG/DL (ref 70–110)
GLUCOSE UR QL STRIP: NEGATIVE
GRAN CASTS UR QL COMP ASSIST: 0 /LPF (ref ?–0)
HBA1C MFR BLD: 9 % (ref 4–5.6)
HBV CORE AB SERPL QL IA: NORMAL
HBV SURFACE AB SER-ACNC: <3 MIU/ML
HBV SURFACE AB SERPL IA-ACNC: NORMAL M[IU]/ML
HBV SURFACE AG SERPL QL IA: NORMAL
HCT VFR BLD AUTO: 40.5 % (ref 40–54)
HCV AB SERPL QL IA: NORMAL
HGB BLD-MCNC: 13.2 GM/DL (ref 14–18)
HGB UR QL STRIP: ABNORMAL
HYALINE CASTS UR QL AUTO: 3 /LPF (ref 0–1)
IMM GRANULOCYTES # BLD AUTO: 0.02 K/UL (ref 0–0.04)
IMM GRANULOCYTES NFR BLD AUTO: 0.3 % (ref 0–0.5)
IRON SATN MFR SERPL: 29 % (ref 20–50)
IRON SERPL-MCNC: 86 UG/DL (ref 45–160)
KETONES UR QL STRIP: NEGATIVE
LEUKOCYTE ESTERASE UR QL STRIP: NEGATIVE
LYMPHOCYTES # BLD AUTO: 2.12 K/UL (ref 1–4.8)
MAGNESIUM SERPL-MCNC: 1.6 MG/DL (ref 1.6–2.6)
MCH RBC QN AUTO: 31.2 PG (ref 27–31)
MCHC RBC AUTO-ENTMCNC: 32.6 G/DL (ref 32–36)
MCV RBC AUTO: 96 FL (ref 82–98)
MICROALBUMIN UR-MCNC: 1428 UG/ML (ref ?–5000)
MICROSCOPIC COMMENT: ABNORMAL
NITRITE UR QL STRIP: NEGATIVE
NON-SQ EPI CELLS #/AREA URNS AUTO: 0 /HPF
NUCLEATED RBC (/100WBC) (OHS): 0 /100 WBC
OTHER ELEMENTS URNS MICRO: ABNORMAL
PH UR STRIP: 6 [PH]
PHOSPHATE SERPL-MCNC: 3.6 MG/DL (ref 2.7–4.5)
PLATELET # BLD AUTO: 237 K/UL (ref 150–450)
PMV BLD AUTO: 10.9 FL (ref 9.2–12.9)
POTASSIUM SERPL-SCNC: 4.8 MMOL/L (ref 3.5–5.1)
PROT UR QL STRIP: ABNORMAL
PROT UR-MCNC: 197 MG/DL
PROT/CREAT UR: 3.11 MG/G{CREAT}
PTH-INTACT SERPL-MCNC: 200.9 PG/ML (ref 9–77)
RBC # BLD AUTO: 4.23 M/UL (ref 4.6–6.2)
RBC #/AREA URNS AUTO: 1 /HPF (ref 0–4)
RBC CASTS UR QL COMP ASSIST: 0 /LPF (ref ?–0)
RELATIVE EOSINOPHIL (OHS): 1.8 %
RELATIVE LYMPHOCYTE (OHS): 31.6 % (ref 18–48)
RELATIVE MONOCYTE (OHS): 8.7 % (ref 4–15)
RELATIVE NEUTROPHIL (OHS): 57.2 % (ref 38–73)
SODIUM SERPL-SCNC: 139 MMOL/L (ref 136–145)
SP GR UR STRIP: 1.01
SQUAMOUS #/AREA URNS AUTO: 0 /HPF
TIBC SERPL-MCNC: 295 UG/DL (ref 250–450)
TRANSFERRIN SERPL-MCNC: 199 MG/DL (ref 200–375)
TRI-PHOS CRY UR QL COMP ASSIST: ABNORMAL
TRICHOMONAS UR QL COMP ASSIST: ABNORMAL /HPF
UNSPECIFIED CRY UR QL COMP ASSIST: ABNORMAL
URATE CRY UR QL COMP ASSIST: ABNORMAL
UROBILINOGEN UR STRIP-ACNC: NEGATIVE EU/DL
WAXY CASTS UR QL COMP ASSIST: 0 /LPF (ref ?–0)
WBC # BLD AUTO: 6.7 K/UL (ref 3.9–12.7)
WBC #/AREA URNS AUTO: 2 /HPF (ref 0–5)
WBC CASTS UR QL COMP ASSIST: 0 /LPF (ref ?–0)
WBC CLUMPS UR QL AUTO: ABNORMAL
YEAST UR QL AUTO: ABNORMAL /HPF

## 2025-04-09 PROCEDURE — 82040 ASSAY OF SERUM ALBUMIN: CPT | Mod: HCNC

## 2025-04-09 PROCEDURE — 86803 HEPATITIS C AB TEST: CPT | Mod: HCNC

## 2025-04-09 PROCEDURE — 84550 ASSAY OF BLOOD/URIC ACID: CPT | Mod: HCNC

## 2025-04-09 PROCEDURE — 11042 DBRDMT SUBQ TIS 1ST 20SQCM/<: CPT | Mod: HCNC

## 2025-04-09 PROCEDURE — 87340 HEPATITIS B SURFACE AG IA: CPT | Mod: HCNC

## 2025-04-09 PROCEDURE — 36415 COLL VENOUS BLD VENIPUNCTURE: CPT | Mod: HCNC

## 2025-04-09 PROCEDURE — 83735 ASSAY OF MAGNESIUM: CPT | Mod: HCNC

## 2025-04-09 PROCEDURE — 82306 VITAMIN D 25 HYDROXY: CPT | Mod: HCNC

## 2025-04-09 PROCEDURE — 85025 COMPLETE CBC W/AUTO DIFF WBC: CPT | Mod: HCNC

## 2025-04-09 PROCEDURE — 82728 ASSAY OF FERRITIN: CPT | Mod: HCNC

## 2025-04-09 PROCEDURE — 81001 URINALYSIS AUTO W/SCOPE: CPT | Mod: HCNC

## 2025-04-09 PROCEDURE — 83540 ASSAY OF IRON: CPT | Mod: HCNC

## 2025-04-09 PROCEDURE — 82570 ASSAY OF URINE CREATININE: CPT | Mod: 59,HCNC

## 2025-04-09 PROCEDURE — 86704 HEP B CORE ANTIBODY TOTAL: CPT | Mod: HCNC

## 2025-04-09 PROCEDURE — 83970 ASSAY OF PARATHORMONE: CPT | Mod: HCNC

## 2025-04-09 PROCEDURE — 84156 ASSAY OF PROTEIN URINE: CPT | Mod: HCNC

## 2025-04-09 PROCEDURE — 83036 HEMOGLOBIN GLYCOSYLATED A1C: CPT | Mod: HCNC

## 2025-04-09 PROCEDURE — 86706 HEP B SURFACE ANTIBODY: CPT | Mod: 59,HCNC

## 2025-04-09 NOTE — PROCEDURES
"Debridement    Date/Time: 4/9/2025 11:00 AM    Performed by: Rene Beltran MD  Authorized by: Rene Beltran MD    Time out: Immediately prior to procedure a "time out" was called to verify the correct patient, procedure, equipment, support staff and site/side marked as required.    Consent Done?:  Yes (Verbal)    Preparation: Patient was prepped and draped in usual sterile fashion and Patient was prepped and draped with clean technique    Local anesthesia used?: Yes    Local anesthetic:  Topical anesthetic    Wound Details:    Location:  Right foot    Location:  Right 1st Toe    Type of Debridement:  Excisional       Length (cm):  1       Width (cm):  1       Depth (cm):  0.2       Area (sq cm):  0.79       Percent Debrided (%):  100       Total Area Debrided (sq cm):  0.79    Depth of debridement:  Subcutaneous tissue    Tissue debrided:  Subcutaneous    Devitalized tissue debrided:  Slough, Necrotic/Eschar and Fibrin    Instruments:  Curette  Bleeding:  Minimal  Hemostasis Achieved: Yes  Method Used:  Pressure and Silver Nitrate  "

## 2025-04-09 NOTE — PROGRESS NOTES
Wound Care & Hyperbaric Medicine    Subjective:       Patient ID: Francisco Coppola is a 75 y.o. male.    Chief Complaint: Wound Care and Non-healing Wound Follow Up    4/9/2025  Follow up related to right first toe, right calf and left leg wounds. No complaints. Right calf and left leg wounds have resolved. Patient tolerated sharp debridement of right first toe wound well. Plan of care updated.    ROS: still redness and swelling both lower legs , continue present treatment .            Objective:      Physical Exam  Constitutional:       Appearance: He is well-developed.   HENT:      Head: Normocephalic.   Eyes:      Conjunctiva/sclera: Conjunctivae normal.      Pupils: Pupils are equal, round, and reactive to light.   Cardiovascular:      Rate and Rhythm: Normal rate and regular rhythm.      Heart sounds: Normal heart sounds.   Pulmonary:      Effort: Pulmonary effort is normal.      Breath sounds: Normal breath sounds.   Abdominal:      General: Bowel sounds are normal.      Palpations: Abdomen is soft.   Musculoskeletal:         General: Swelling, tenderness and deformity present. Normal range of motion.      Cervical back: Normal range of motion and neck supple.      Right lower leg: Edema present.      Left lower leg: Edema present.   Skin:     General: Skin is warm and dry.      Findings: Erythema and lesion present.   Neurological:      Mental Status: He is alert and oriented to person, place, and time.      Deep Tendon Reflexes: Reflexes are normal and symmetric.        Wound 03/12/25 0800 Blister(s) Left anterior;lower Leg (Active)   03/12/25 0800 Leg   Present on Original Admission: Y   Primary Wound Type: Blister(s)   Side: Left   Orientation: anterior;lower   Wound Approximate Age at First Assessment (Weeks):    Wound Number:    Is this injury device related?:    Incision Type:    Closure Method:    Wound Description (Comments):    Type:    Additional Comments:    Ankle-Brachial  Index: 1.19   Pulses: +doppler   Removal Indication and Assessment:    Wound Outcome:    Wound Image   04/09/25 1120   Dressing Appearance Clean;Dry;Intact 04/09/25 1120   Drainage Amount None 04/09/25 1120   Appearance Closed/resurfaced;Dry;Intact 04/09/25 1120   Tissue loss description Not applicable 04/09/25 1120   Periwound Area Edematous;Hemosiderin Staining;Dry;Scar tissue;Intact 04/09/25 1120   Wound Edges Rolled/closed 04/09/25 1120   Wound Length (cm) 0 cm 04/09/25 1120   Wound Width (cm) 0 cm 04/09/25 1120   Wound Depth (cm) 0 cm 04/09/25 1120   Wound Volume (cm^3) 0 cm^3 04/09/25 1120   Wound Surface Area (cm^2) 0 cm^2 04/09/25 1120   Care Cleansed with:;Sterile normal saline 04/09/25 1120   Dressing Applied;Tubular bandage 04/09/25 1120   Periwound Care Moisturizer applied 04/09/25 1120   Compression Tubular elasticized bandage 04/09/25 1120            Wound 03/12/25 0800 Blister(s) Right posterior Calf (Active)   03/12/25 0800 Calf   Present on Original Admission: Y   Primary Wound Type: Blister(s)   Side: Right   Orientation: posterior   Wound Approximate Age at First Assessment (Weeks):    Wound Number:    Is this injury device related?:    Incision Type:    Closure Method:    Wound Description (Comments):    Type:    Additional Comments:    Ankle-Brachial Index:    Pulses:    Removal Indication and Assessment:    Wound Outcome:    Wound Image   04/09/25 1120   Dressing Appearance Open to air 04/09/25 1120   Drainage Amount None 04/09/25 1120   Appearance Closed/resurfaced;Dry;Intact 04/09/25 1120   Tissue loss description Not applicable 04/09/25 1120   Periwound Area Edematous;Hemosiderin Staining;Dry;Intact 04/09/25 1120   Wound Edges Rolled/closed 04/09/25 1120   Wound Length (cm) 0 cm 04/09/25 1120   Wound Width (cm) 0 cm 04/09/25 1120   Wound Depth (cm) 0 cm 04/09/25 1120   Wound Volume (cm^3) 0 cm^3 04/09/25 1120   Wound Surface Area (cm^2) 0 cm^2 04/09/25 1120   Care Cleansed with:;Sterile  normal saline 04/09/25 1120   Dressing Applied;Tubular bandage 04/09/25 1120   Periwound Care Moisturizer applied 04/09/25 1120   Compression Tubular elasticized bandage 04/09/25 1120            Wound 03/12/25 0800 Diabetic Ulcer Right distal;plantar Toe, first (Active)   03/12/25 0800 Toe, first   Present on Original Admission: Y   Primary Wound Type: Diabetic ulc   Side: Right   Orientation: distal;plantar   Wound Approximate Age at First Assessment (Weeks):    Wound Number:    Is this injury device related?:    Incision Type:    Closure Method:    Wound Description (Comments):    Type:    Additional Comments:    Ankle-Brachial Index:    Pulses:    Removal Indication and Assessment:    Wound Outcome:    Wound Image   04/09/25 1120   Dressing Appearance Intact;Moist drainage 04/09/25 1120   Drainage Amount Scant 04/09/25 1120   Drainage Characteristics/Odor Serosanguineous 04/09/25 1120   Appearance Red;Moist 04/09/25 1120   Tissue loss description Full thickness 04/09/25 1120   Red (%), Wound Tissue Color 100 % 04/09/25 1120   Periwound Area Intact;Dry 04/09/25 1120   Wound Edges Callused;Open 04/09/25 1120   Wound Length (cm) 0.1 cm 04/09/25 1120   Wound Width (cm) 0.4 cm 04/09/25 1120   Wound Depth (cm) 0.6 cm 04/09/25 1120   Wound Volume (cm^3) 0.013 cm^3 04/09/25 1120   Wound Surface Area (cm^2) 0.03 cm^2 04/09/25 1120   Undermining (depth (cm)/location) 0.3cm@12-12oclock 04/09/25 1120   Care Cleansed with:;Sterile normal saline;Antimicrobial agent;Debrided 04/09/25 1120   Pre or Post Debridement Pre 04/09/25 1120   Dressing Applied;Methylene blue/gentian violet;Hydrofiber;Silicone;Tubular bandage 04/09/25 1120   Periwound Care Absorptive dressing applied;Moisturizer applied 04/09/25 1120   Compression Tubular elasticized bandage 04/09/25 1120   Dressing Change Due 04/11/25 04/09/25 1120         Assessment/Plan:         ICD-10-CM ICD-9-CM   1. Skin ulcer of right great toe with fat layer exposed  L97.512  707.15   2. Diabetic polyneuropathy associated with type 2 diabetes mellitus  E11.42 250.60     357.2   3. Uncontrolled type 2 diabetes mellitus with hyperglycemia  E11.65 250.02         Tissue pathology and/or culture taken:   [] Yes    [x] No   Sharp debridement performed:    [x] Yes    [] No   Labs ordered this visit:    [] Yes    [x] No   Imaging ordered this visit:    [] Yes    [x] No     Is the wound improving?    [x] Yes    [] No   Any signs of infection?    [] Yes    [x] No             Orders Placed This Encounter   Procedures    Change dressing     Right Great Toe wound  Cleanse wound with:Vashe  Lidocaine:PRN in clinic  Periwound care:Lotion BLE  Primary dressing:Gentamicin Ointment and Hydrofera Ready to wounds  Secondary dressing: Island border  Edema control: Tubi  F BLE. Elevate BLE as much as possible  Frequency :Every Other Day and PRN  Follow-up: 2 weeks     Rx given for Gentamicin Ointment and Levaquin PO 3/19/2025  BLE venous reflux ultrasound ordered- procedure pending results.        Follow up in 2 weeks (on 4/23/2025) for Dr Beltran.            This includes face to face time and non-face to face time preparing to see the patient (eg, review of tests), obtaining and/or reviewing separately obtained history, documenting clinical information in the electronic or other health record, independently interpreting results and communicating results to the patient/family/caregiver, or care coordinator.  Total time spent  >  minutes    With patient's verbal consent, nails were aggressively reduced and debrided x 5 to their soft tissue attachment mechanically and with electric , removing all offending nail and debris. Patient relates relief following the procedure. No anesthesia or hemostasis required. No blood loss.

## 2025-04-10 LAB — URATE SERPL-MCNC: 8.8 MG/DL (ref 3.4–7)

## 2025-04-23 ENCOUNTER — HOSPITAL ENCOUNTER (OUTPATIENT)
Dept: WOUND CARE | Facility: HOSPITAL | Age: 75
Discharge: HOME | End: 2025-04-23
Attending: SURGERY
Payer: MEDICARE

## 2025-04-23 DIAGNOSIS — I10 HTN (HYPERTENSION), BENIGN: ICD-10-CM

## 2025-04-23 DIAGNOSIS — I12.9 TYPE 2 DIABETES MELLITUS WITH STAGE 3A CHRONIC KIDNEY DISEASE AND HYPERTENSION: ICD-10-CM

## 2025-04-23 DIAGNOSIS — M48.00 SPINAL STENOSIS, UNSPECIFIED SPINAL REGION: ICD-10-CM

## 2025-04-23 DIAGNOSIS — L97.512 SKIN ULCER OF RIGHT GREAT TOE WITH FAT LAYER EXPOSED: Primary | ICD-10-CM

## 2025-04-23 DIAGNOSIS — N18.31 TYPE 2 DIABETES MELLITUS WITH STAGE 3A CHRONIC KIDNEY DISEASE AND HYPERTENSION: ICD-10-CM

## 2025-04-23 DIAGNOSIS — E11.22 TYPE 2 DIABETES MELLITUS WITH STAGE 3A CHRONIC KIDNEY DISEASE AND HYPERTENSION: ICD-10-CM

## 2025-04-23 DIAGNOSIS — E11.42 DIABETIC POLYNEUROPATHY ASSOCIATED WITH TYPE 2 DIABETES MELLITUS: ICD-10-CM

## 2025-04-23 PROCEDURE — 11042 DBRDMT SUBQ TIS 1ST 20SQCM/<: CPT | Mod: HCNC

## 2025-04-23 NOTE — PROGRESS NOTES
Wound Care & Hyperbaric Medicine    Subjective:       Patient ID: Francisco Coppola is a 75 y.o. male.    Chief Complaint: Non-healing Wound Follow Up    Follow up wound care visit for right great toe wound. Presented to clinic not wearing tubi  today- took shower prior to appointment, BLE remain edematous.  Wound debrided continued current plan of care.       ROS: no fever mild pain         Objective:      Physical Exam  Constitutional:       Appearance: He is well-developed.   HENT:      Head: Normocephalic.   Eyes:      Conjunctiva/sclera: Conjunctivae normal.      Pupils: Pupils are equal, round, and reactive to light.   Cardiovascular:      Rate and Rhythm: Normal rate and regular rhythm.      Heart sounds: Normal heart sounds.   Pulmonary:      Effort: Pulmonary effort is normal.      Breath sounds: Normal breath sounds.   Abdominal:      General: Bowel sounds are normal.      Palpations: Abdomen is soft.   Musculoskeletal:         General: Swelling present. Normal range of motion.      Cervical back: Normal range of motion and neck supple.      Right lower leg: Edema present.   Skin:     General: Skin is warm and dry.      Findings: Erythema and lesion present.   Neurological:      Mental Status: He is alert and oriented to person, place, and time.      Deep Tendon Reflexes: Reflexes are normal and symmetric.        Wound 03/12/25 0800 Diabetic Ulcer Right distal;plantar Toe, first (Active)   03/12/25 0800 Toe, first   Present on Original Admission: Y   Primary Wound Type: Diabetic ulc   Side: Right   Orientation: distal;plantar   Wound Approximate Age at First Assessment (Weeks):    Wound Number:    Is this injury device related?:    Incision Type:    Closure Method:    Wound Description (Comments):    Type:    Additional Comments:    Ankle-Brachial Index:    Pulses:    Removal Indication and Assessment:    Wound Outcome:    Wound Image   04/23/25 1022   Dressing Appearance  Intact;Moist drainage 04/23/25 1022   Drainage Amount None 04/23/25 1022   Appearance Unable to visualize wound bed 04/23/25 1022   Tissue loss description Full thickness 04/23/25 1022   Red (%), Wound Tissue Color 100 % 04/23/25 1022   Periwound Area Macerated 04/23/25 1022   Wound Edges Callused;Open 04/23/25 1022   Wound Length (cm) 0.1 cm 04/23/25 1022   Wound Width (cm) 0.3 cm 04/23/25 1022   Wound Depth (cm) 0.5 cm 04/23/25 1022   Wound Volume (cm^3) 0.008 cm^3 04/23/25 1022   Wound Surface Area (cm^2) 0.02 cm^2 04/23/25 1022   Care Cleansed with:;Soap and water;Antimicrobial agent;Debrided 04/23/25 1022   Pre or Post Debridement Pre 04/23/25 1022   Dressing Applied;Foam;Methylene blue/gentian violet;Island/border;Tubular bandage 04/23/25 1022   Periwound Care Absorptive dressing applied 04/23/25 1022   Compression Tubular elasticized bandage 04/23/25 1022   Dressing Change Due 04/25/25 04/23/25 1022         Assessment/Plan:         ICD-10-CM ICD-9-CM   1. Skin ulcer of right great toe with fat layer exposed  L97.512 707.15   2. Diabetic polyneuropathy associated with type 2 diabetes mellitus  E11.42 250.60     357.2         Tissue pathology and/or culture taken:   [] Yes    [x] No   Sharp debridement performed:    [x] Yes    [] No   Labs ordered this visit:    [] Yes    [x] No   Imaging ordered this visit:    [] Yes    [x] No     Is the wound improving?    [x] Yes    [] No   Any signs of infection?    [] Yes    [x] No             Orders Placed This Encounter   Procedures    Change dressing     Right Great Toe wound   Cleanse wound with:Vashe   Lidocaine:PRN in clinic   Periwound care:Lotion BLE   Primary dressing:Gentamicin Ointment and Hydrofera Ready to wounds   Secondary dressing: Island border   Edema control: Tubi  F BLE. Elevate BLE as much as possible   Frequency :Every Other Day and PRN   Follow-up: 3 weeks         Follow up in about 3 weeks (around 5/14/2025) for .             This includes face to face time and non-face to face time preparing to see the patient (eg, review of tests), obtaining and/or reviewing separately obtained history, documenting clinical information in the electronic or other health record, independently interpreting results and communicating results to the patient/family/caregiver, or care coordinator.  Total time spent  > 30 minutes

## 2025-04-23 NOTE — PROCEDURES
"Debridement    Date/Time: 4/23/2025 10:00 AM    Performed by: Rene Beltran MD  Authorized by: Rene Beltran MD    Time out: Immediately prior to procedure a "time out" was called to verify the correct patient, procedure, equipment, support staff and site/side marked as required.    Consent Done?:  Yes (Verbal)    Preparation: Patient was prepped and draped in usual sterile fashion and Patient was prepped and draped with clean technique    Local anesthesia used?: Yes    Local anesthetic:  Topical anesthetic    Wound Details:    Location:  Right foot    Location:  Right 1st Toe    Type of Debridement:  Excisional       Length (cm):  0.3       Width (cm):  0.1       Depth (cm):  0.2       Area (sq cm):  0.02       Percent Debrided (%):  100       Total Area Debrided (sq cm):  0.02    Depth of debridement:  Subcutaneous tissue    Tissue debrided:  Subcutaneous    Devitalized tissue debrided:  Callus, Necrotic/Eschar and Slough    Instruments:  Blade and Scissors  Bleeding:  Minimal  Hemostasis Achieved: Yes  Method Used:  Pressure  Patient tolerance:  Patient tolerated the procedure well with no immediate complications  1st Wound Pain Assessment: 2  "

## 2025-05-08 ENCOUNTER — HOSPITAL ENCOUNTER (OUTPATIENT)
Dept: CARDIOLOGY | Facility: HOSPITAL | Age: 75
Discharge: HOME | End: 2025-05-08
Attending: INTERNAL MEDICINE
Payer: MEDICARE

## 2025-05-08 VITALS — WEIGHT: 231 LBS | HEIGHT: 68 IN | BODY MASS INDEX: 35.01 KG/M2

## 2025-05-08 DIAGNOSIS — I87.2 CHRONIC VENOUS INSUFFICIENCY: ICD-10-CM

## 2025-05-08 LAB
APICAL FOUR CHAMBER EJECTION FRACTION: 59 %
APICAL TWO CHAMBER EJECTION FRACTION: 58 %
AV INDEX (PROSTH): 0.15
AV MEAN GRADIENT: 8 MMHG
AV PEAK GRADIENT: 13 MMHG
AV VALVE AREA BY VELOCITY RATIO: 0.7 CM²
AV VALVE AREA: 0.6 CM²
AV VELOCITY RATIO: 0.17
BSA FOR ECHO PROCEDURE: 2.24 M2
CV ECHO LV RWT: 0.74 CM
DOP CALC AO PEAK VEL: 1.8 M/S
DOP CALC AO VTI: 25.7 CM
DOP CALC LVOT AREA: 4.2 CM2
DOP CALC LVOT DIAMETER: 2.3 CM
DOP CALC LVOT PEAK VEL: 0.3 M/S
DOP CALC LVOT STROKE VOLUME: 16.2 CM3
DOP CALCLVOT PEAK VEL VTI: 3.9 CM
E/E' RATIO: 5 M/S
ECHO LV POSTERIOR WALL: 1 CM (ref 0.6–1.1)
FRACTIONAL SHORTENING: 18.5 % (ref 28–44)
INTERVENTRICULAR SEPTUM: 0.9 CM (ref 0.6–1.1)
IVRT: 110 MSEC
LEFT ATRIUM AREA SYSTOLIC (APICAL 2 CHAMBER): 14.08 CM2
LEFT ATRIUM AREA SYSTOLIC (APICAL 4 CHAMBER): 13.23 CM2
LEFT INTERNAL DIMENSION IN SYSTOLE: 2.2 CM (ref 2.1–4)
LEFT VENTRICLE DIASTOLIC VOLUME INDEX: 12.44 ML/M2
LEFT VENTRICLE DIASTOLIC VOLUME: 27 ML
LEFT VENTRICLE END DIASTOLIC VOLUME APICAL 2 CHAMBER: 32.27 ML
LEFT VENTRICLE END DIASTOLIC VOLUME APICAL 4 CHAMBER: 31.47 ML
LEFT VENTRICLE END SYSTOLIC VOLUME APICAL 2 CHAMBER: 31.33 ML
LEFT VENTRICLE END SYSTOLIC VOLUME APICAL 4 CHAMBER: 26.68 ML
LEFT VENTRICLE MASS INDEX: 30 G/M2
LEFT VENTRICLE SYSTOLIC VOLUME INDEX: 6.9 ML/M2
LEFT VENTRICLE SYSTOLIC VOLUME: 15 ML
LEFT VENTRICULAR INTERNAL DIMENSION IN DIASTOLE: 2.7 CM (ref 3.5–6)
LEFT VENTRICULAR MASS: 65.2 G
LV LATERAL E/E' RATIO: 4.6 M/S
LV SEPTAL E/E' RATIO: 5.3 M/S
LVED V (TEICH): 27.08 ML
LVES V (TEICH): 15.3 ML
LVOT MG: 0.26 MMHG
LVOT MV: 0.25 CM/S
MV PEAK E VEL: 1.16 M/S
OHS CV RV/LV RATIO: 1 CM
OHS LV EJECTION FRACTION SIMPSONS BIPLANE MOD: 59 %
RA PRESSURE ESTIMATED: 3 MMHG
RA VOL SYS: 15.02 ML
RIGHT VENTRICLE DIASTOLIC BASEL DIMENSION: 2.7 CM
RV TISSUE DOPPLER FREE WALL SYSTOLIC VELOCITY 1 (APICAL 4 CHAMBER VIEW): 16.81 CM/S
TDI LATERAL: 0.25 M/S
TDI SEPTAL: 0.22 M/S
TDI: 0.24 M/S
TRICUSPID ANNULAR PLANE SYSTOLIC EXCURSION: 1.7 CM
Z-SCORE OF LEFT VENTRICULAR DIMENSION IN END DIASTOLE: -9.78
Z-SCORE OF LEFT VENTRICULAR DIMENSION IN END SYSTOLE: -5.43

## 2025-05-08 PROCEDURE — 25500020 PHARM REV CODE 255: Mod: HCNC | Performed by: INTERNAL MEDICINE

## 2025-05-08 PROCEDURE — 93306 TTE W/DOPPLER COMPLETE: CPT | Mod: 26,HCNC,, | Performed by: INTERNAL MEDICINE

## 2025-05-08 PROCEDURE — C8929 TTE W OR WO FOL WCON,DOPPLER: HCPCS | Mod: HCNC

## 2025-05-08 RX ADMIN — HUMAN ALBUMIN MICROSPHERES AND PERFLUTREN 0.11 MG: 10; .22 INJECTION, SOLUTION INTRAVENOUS at 08:05

## 2025-05-14 ENCOUNTER — OFFICE VISIT (OUTPATIENT)
Dept: CARDIOLOGY | Facility: CLINIC | Age: 75
End: 2025-05-14
Payer: MEDICARE

## 2025-05-14 ENCOUNTER — HOSPITAL ENCOUNTER (OUTPATIENT)
Dept: WOUND CARE | Facility: HOSPITAL | Age: 75
Discharge: HOME | End: 2025-05-14
Attending: SURGERY
Payer: MEDICARE

## 2025-05-14 VITALS
TEMPERATURE: 98 F | HEIGHT: 68 IN | OXYGEN SATURATION: 94 % | WEIGHT: 231 LBS | HEART RATE: 94 BPM | DIASTOLIC BLOOD PRESSURE: 67 MMHG | BODY MASS INDEX: 35.01 KG/M2 | DIASTOLIC BLOOD PRESSURE: 75 MMHG | HEART RATE: 117 BPM | BODY MASS INDEX: 35.05 KG/M2 | SYSTOLIC BLOOD PRESSURE: 124 MMHG | WEIGHT: 231.25 LBS | HEIGHT: 68 IN | SYSTOLIC BLOOD PRESSURE: 149 MMHG

## 2025-05-14 DIAGNOSIS — N18.31 TYPE 2 DIABETES MELLITUS WITH STAGE 3A CHRONIC KIDNEY DISEASE AND HYPERTENSION: ICD-10-CM

## 2025-05-14 DIAGNOSIS — I25.10 CORONARY ARTERY DISEASE, UNSPECIFIED VESSEL OR LESION TYPE, UNSPECIFIED WHETHER ANGINA PRESENT, UNSPECIFIED WHETHER NATIVE OR TRANSPLANTED HEART: ICD-10-CM

## 2025-05-14 DIAGNOSIS — I73.9 PAD (PERIPHERAL ARTERY DISEASE): Primary | ICD-10-CM

## 2025-05-14 DIAGNOSIS — I10 HTN (HYPERTENSION), BENIGN: ICD-10-CM

## 2025-05-14 DIAGNOSIS — E11.65 UNCONTROLLED TYPE 2 DIABETES MELLITUS WITH HYPERGLYCEMIA: ICD-10-CM

## 2025-05-14 DIAGNOSIS — E11.22 TYPE 2 DIABETES MELLITUS WITH STAGE 3A CHRONIC KIDNEY DISEASE AND HYPERTENSION: ICD-10-CM

## 2025-05-14 DIAGNOSIS — L97.512 SKIN ULCER OF RIGHT GREAT TOE WITH FAT LAYER EXPOSED: Primary | ICD-10-CM

## 2025-05-14 DIAGNOSIS — M48.061 SPINAL STENOSIS OF LUMBAR REGION, UNSPECIFIED WHETHER NEUROGENIC CLAUDICATION PRESENT: ICD-10-CM

## 2025-05-14 DIAGNOSIS — I10 HYPERTENSION, UNSPECIFIED TYPE: ICD-10-CM

## 2025-05-14 DIAGNOSIS — E66.01 SEVERE OBESITY (BMI 35.0-35.9 WITH COMORBIDITY): ICD-10-CM

## 2025-05-14 DIAGNOSIS — I12.9 TYPE 2 DIABETES MELLITUS WITH STAGE 3A CHRONIC KIDNEY DISEASE AND HYPERTENSION: ICD-10-CM

## 2025-05-14 PROCEDURE — 99999 PR PBB SHADOW E&M-EST. PATIENT-LVL III: CPT | Mod: PBBFAC,HCNC,, | Performed by: INTERNAL MEDICINE

## 2025-05-14 PROCEDURE — 11042 DBRDMT SUBQ TIS 1ST 20SQCM/<: CPT | Mod: HCNC

## 2025-05-14 NOTE — PROGRESS NOTES
Wound Care & Hyperbaric Medicine    Subjective:       Patient ID: Francisco Coppola is a 75 y.o. male.    Chief Complaint: Diabetic Foot Ulcer    Wound care follow up for right great toe diabetic ulcer. Wound noted with thick callus to periwound, now probes to bone. Area debrided, silver nitrate applied. Will start offloading with felt aperture. Patient to contact interventional cards for follow up.       ROS: no fever or chills,         Objective:      Physical Exam  Constitutional:       Appearance: He is well-developed.   HENT:      Head: Normocephalic.   Eyes:      Conjunctiva/sclera: Conjunctivae normal.      Pupils: Pupils are equal, round, and reactive to light.   Cardiovascular:      Rate and Rhythm: Normal rate and regular rhythm.      Heart sounds: Normal heart sounds.   Pulmonary:      Effort: Pulmonary effort is normal.      Breath sounds: Normal breath sounds.   Abdominal:      General: Bowel sounds are normal.      Palpations: Abdomen is soft.   Musculoskeletal:         General: Swelling and deformity present. Normal range of motion.      Cervical back: Normal range of motion and neck supple.      Right lower leg: Edema present.   Skin:     General: Skin is warm and dry.      Findings: Erythema and lesion present.   Neurological:      Mental Status: He is alert and oriented to person, place, and time.      Deep Tendon Reflexes: Reflexes are normal and symmetric.        Wound 03/12/25 0800 Diabetic Ulcer Right distal;plantar Toe, first (Active)   03/12/25 0800 Toe, first   Present on Original Admission: Y   Primary Wound Type: Diabetic ulc   Side: Right   Orientation: distal;plantar   Wound Approximate Age at First Assessment (Weeks):    Wound Number:    Is this injury device related?:    Incision Type:    Closure Method:    Wound Description (Comments):    Type:    Additional Comments:    Ankle-Brachial Index:    Pulses:    Removal Indication and Assessment:    Wound Outcome:     Wound Image    05/14/25 1023   Dressing Appearance Intact;Moist drainage 05/14/25 1023   Drainage Amount Scant 05/14/25 1023   Drainage Characteristics/Odor Serosanguineous 05/14/25 1023   Appearance Red 05/14/25 1023   Tissue loss description Full thickness 05/14/25 1023   Red (%), Wound Tissue Color 100 % 05/14/25 1023   Periwound Area Dry 05/14/25 1023   Wound Edges Callused;Undefined 05/14/25 1023   Verma Classification (diabetic foot ulcers only) Grade 2 05/14/25 1023   Wound Length (cm) 0.3 cm 05/14/25 1023   Wound Width (cm) 0.3 cm 05/14/25 1023   Wound Depth (cm) 0.8 cm 05/14/25 1023   Wound Volume (cm^3) 0.038 cm^3 05/14/25 1023   Wound Surface Area (cm^2) 0.07 cm^2 05/14/25 1023   Care Cleansed with:;Sterile normal saline;Debrided;Other (see comments) 05/14/25 1023   Dressing Applied;Other (comment);Methylene blue/gentian violet;Foam;Island/border 05/14/25 1023   Compression Tubular elasticized bandage 05/14/25 1023   Off Loading Other (see comments) 05/14/25 1023   Dressing Change Due 05/16/25 05/14/25 1023         Assessment/Plan:         ICD-10-CM ICD-9-CM   1. Skin ulcer of right great toe with fat layer exposed  L97.512 707.15         Tissue pathology and/or culture taken:   [] Yes    [x] No   Sharp debridement performed:    [x] Yes    [] No   Labs ordered this visit:    [] Yes    [x] No   Imaging ordered this visit:    [] Yes    [x] No     Is the wound improving?    [] Yes    [x] No   Any signs of infection?    [] Yes    [x] No             Orders Placed This Encounter   Procedures    Change dressing     Right Great Toe wound  Cleanse wound with:saline  Lidocaine:PRN   Periwound care: u shaped aperture pad   Primary dressing:Gentamicin Ointment and Hydrofera Blue Ready   Secondary dressing: Island border  Edema control: Tubi  F BLE. Elevate BLE as much as possible  Frequency :Every Other Day   Follow-up: 2 weeks    Other: pt to contact Dr. Garcia for follow up        Follow up in  about 2 weeks (around 5/28/2025) for Dr. Beltran.            This includes face to face time and non-face to face time preparing to see the patient (eg, review of tests), obtaining and/or reviewing separately obtained history, documenting clinical information in the electronic or other health record, independently interpreting results and communicating results to the patient/family/caregiver, or care coordinator.  Total time spent  > 30 minutes

## 2025-05-14 NOTE — PROCEDURES
"Debridement    Date/Time: 5/14/2025 10:00 AM    Performed by: Rene Beltran MD  Authorized by: Rene Beltran MD    Time out: Immediately prior to procedure a "time out" was called to verify the correct patient, procedure, equipment, support staff and site/side marked as required.    Consent Done?:  Yes (Verbal)    Preparation: Patient was prepped and draped in usual sterile fashion and Patient was prepped and draped with clean technique    Local anesthesia used?: Yes    Local anesthetic:  Topical anesthetic    Wound Details:    Location:  Right foot    Location:  Right 1st Toe       Length (cm):  1       Width (cm):  1       Depth (cm):  0.2       Area (sq cm):  0.79       Percent Debrided (%):  100       Total Area Debrided (sq cm):  0.79    Depth of debridement:  Subcutaneous tissue    Tissue debrided:  Subcutaneous    Devitalized tissue debrided:  Necrotic/Eschar, Slough and Callus    Instruments:  Curette, Blade, Nippers and Forceps  Bleeding:  Minimal  Hemostasis Achieved: Yes  Method Used:  Pressure and Silver Nitrate  Patient tolerance:  Patient tolerated the procedure well with no immediate complications  1st Wound Pain Assessment: 2  "

## 2025-05-22 NOTE — PROGRESS NOTES
Subjective:   @Patient ID:  Francisco Coppola is a 75 y.o. male who presents for evaluation of No chief complaint on file.      HPI:         Patient is a 75-year-old male.  Here for evaluation of bilateral lower extremity wounds.  Active medical problems include      -bilateral shin wounds concerning for chronic venous insufficiency.  Has bilateral GSV reflux.  Symptoms controlled with Lasix and compression stockings.  Wants to continue conservative management now that wounds are improving.  We will defer EVLT for now.  -recent arterial ultrasound shows right anterior tibial artery occlusion and severe left SFA disease.  No claudication symptoms.  Healed right great toe wound.  -bilateral lower extremity and scrotal swelling for which he is on Lasix 20 mg daily.  Echocardiogram shows normal systolic function.  Normal intracardiac filling pressures.  -hypertension on lisinopril  -diabetes with A1c of 11.5 on insulin  -hyperlipidemia on pravastatin 40 mg LDL of 47  -no previous cardiac history documented.  EKG shows right bundle-branch block  -CKD last creatinine of 1.7.  Creatinine 1.3 one year ago.        Right great toe wound healed appropriately.  Has baseline CKD and now with heel wound I am deferring invasive angiogram.  Likely may need right anterior tibial artery intervention if has reoccurrence of right toe wound.  Geiger healed ulcers likely from bilateral venous insufficiency.      Please document below the medical necessity for continuous telemetry monitoring or discontinue the current order if appropriate.    Current rhythm from flowsheet:               Patient Active Problem List    Diagnosis Date Noted    Skin ulcer of right great toe with fat layer exposed 03/19/2025    Abnormal laboratory test 03/12/2025    Nuclear sclerotic cataract of right eye 10/22/2024    Pulmonary nodule 08/30/2024    Stage 3b chronic kidney disease 07/26/2024    Primary spontaneous pneumothorax 07/21/2024    CAD (coronary  artery disease) 07/21/2024    Primary osteoarthritis of right knee 03/05/2024    Primary osteoarthritis of left knee 03/05/2024    Type 2 diabetes mellitus with hyperlipidemia 03/01/2024    Long-term insulin use 07/18/2023    Aortic atherosclerosis 01/31/2023    Neurogenic claudication 08/19/2022     Lumbar stenosis      Unspecified inflammatory spondylopathy, 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      Tobacco dependence 07/12/2017    Scrotal mass 09/08/2014    Hydrocele, right 09/08/2014    Condyloma acuminatum of scrotum 09/08/2014    Type 2 diabetes mellitus with stage 3a chronic kidney disease and hypertension 04/24/2013    HTN (hypertension), benign 04/24/2013    Hyperlipidemia 04/24/2013    Severe obesity (BMI 35.0-35.9 with comorbidity) 04/24/2013     BMI 35.78 kg/m2 as of 8/19/2022      Spinal stenosis 01/21/2013    Arthritis of facet joints at multiple vertebral levels 01/09/2013     Seen on lumbar MRI dated 01/09/13                      LAST HbA1c  Lab Results   Component Value Date    HGBA1C 9.0 (H) 04/09/2025       Lipid panel  Lab Results   Component Value Date    CHOL 126 03/12/2025    CHOL 160 10/18/2024    CHOL 147 06/07/2024     Lab Results   Component Value Date    HDL 43 03/12/2025    HDL 38 (L) 10/18/2024    HDL 38 (L) 06/07/2024     Lab Results   Component Value Date    LDLCALC 47.0 (L) 03/12/2025    LDLCALC 70.4 10/18/2024    LDLCALC 69.4 06/07/2024     Lab Results   Component Value Date    TRIG 180 (H) 03/12/2025    TRIG 258 (H) 10/18/2024    TRIG 198 (H) 06/07/2024     Lab Results   Component Value Date    CHOLHDL 34.1 03/12/2025    CHOLHDL 23.8 10/18/2024    CHOLHDL 25.9 06/07/2024            Review of Systems   Constitutional: Negative for chills and fever.   HENT:  Negative for hearing loss and  nosebleeds.    Eyes:  Negative for blurred vision.   Cardiovascular:         As in HPI    Respiratory:  Negative for cough, hemoptysis and shortness of breath.    Endocrine: Negative for cold intolerance and polyuria.   Hematologic/Lymphatic: Negative for bleeding problem.   Skin:  Negative for itching.   Musculoskeletal:  Negative for falls.   Gastrointestinal:  Negative for abdominal pain and hematochezia.   Genitourinary:  Negative for hematuria.   Neurological:  Negative for dizziness and loss of balance.   Psychiatric/Behavioral:  Negative for altered mental status and depression.        Objective:   Physical Exam  Constitutional:       Appearance: He is well-developed.   HENT:      Head: Normocephalic and atraumatic.   Eyes:      Conjunctiva/sclera: Conjunctivae normal.   Neck:      Vascular: No carotid bruit or JVD.   Cardiovascular:      Rate and Rhythm: Normal rate and regular rhythm.      Pulses:           Carotid pulses are 2+ on the right side and 2+ on the left side.       Radial pulses are 2+ on the right side and 2+ on the left side.      Heart sounds: Normal heart sounds. No murmur heard.     No friction rub. No gallop.      Comments: Healing wounds in bilateral shins.  Right great toe wound on the dorsum  Pulmonary:      Effort: Pulmonary effort is normal. No respiratory distress.      Breath sounds: Normal breath sounds. No stridor. No wheezing.   Abdominal:      General: Abdomen is flat.      Palpations: Abdomen is soft.   Musculoskeletal:      Cervical back: Neck supple.      Right lower leg: No edema.      Left lower leg: No edema.   Skin:     General: Skin is warm and dry.   Neurological:      Mental Status: He is alert and oriented to person, place, and time.   Psychiatric:         Behavior: Behavior normal.         Assessment:     1. PAD (peripheral artery disease)    2. Uncontrolled type 2 diabetes mellitus with hyperglycemia    3. Hypertension, unspecified type    4. Coronary artery  disease, unspecified vessel or lesion type, unspecified whether angina present, unspecified whether native or transplanted heart        Plan:     -significant improvement in symptoms with taking Lasix.  Has baseline CKD.  Recommend following up with nephrologist.  Continue Lasix as needed.  Continue compression stockings.  -based on decreased GFR and healed right to ulcer I recommend conservative management for peripheral arterial disease at this point.  If reemergence of right great toe wound, we will plan for peripheral angiogram.  Otherwise no claudication symptoms.  Deferring cilostazol for now.  -continue aspirin and high-intensity statin therapy.  -recommend aggressive diabetes control.  A1c 9 LDL 47  -follow up within 3-6 months.    Pertinent cardiac images and EKG reviewed independently.    Continue with current medical plan and lifestyle changes.  Return sooner for concerns or questions. If symptoms persist go to the ED  I have reviewed all pertinent data including patient's medical history in detail and updated the computerized patient record.     No orders of the defined types were placed in this encounter.      Follow up as scheduled.     He expressed verbal understanding and agreed with the plan    Patient's Medications   New Prescriptions    No medications on file   Previous Medications    ALBUTEROL (PROVENTIL/VENTOLIN HFA) 90 MCG/ACTUATION INHALER    Inhale 2 puffs into the lungs every 6 (six) hours as needed for Wheezing. Rescue    ASPIRIN (ECOTRIN) 81 MG EC TABLET    Take 81 mg by mouth once daily.    BLOOD SUGAR DIAGNOSTIC STRP    To check BG 3 times daily, to use with insurance preferred meter    BLOOD-GLUCOSE METER KIT    To check BG 3 times daily, to use with insurance preferred meter    CLOTRIMAZOLE-BETAMETHASONE 1-0.05% (LOTRISONE) CREAM    Apply topically 2 (two) times daily.    DICLOFENAC SODIUM (VOLTAREN) 1 % GEL    Apply 2 g topically 3 (three) times daily as needed.    ERGOCALCIFEROL  "(ERGOCALCIFEROL) 50,000 UNIT CAP    Take 1 capsule (50,000 Units total) by mouth every 7 days.    FUROSEMIDE (LASIX) 40 MG TABLET    Take 1 tablet (40 mg total) by mouth 2 (two) times daily as needed (swelling).    GABAPENTIN (NEURONTIN) 300 MG CAPSULE    Take 1 capsule (300 mg total) by mouth 3 (three) times daily.    GENTAMICIN (GARAMYCIN) 0.1 % CREAM    Apply topically every other day    INSULIN GLARGINE U-100, LANTUS, (LANTUS SOLOSTAR U-100 INSULIN) 100 UNIT/ML (3 ML) INPN PEN    Inject 15 Units into the skin once daily.    LANCETS MISC    To check BG 3 times daily, to use with insurance preferred meter    LEVOFLOXACIN (LEVAQUIN) 500 MG TABLET    take 1 tablet by mouth daily    LISINOPRIL (PRINIVIL,ZESTRIL) 20 MG TABLET    Take 1 tablet (20 mg total) by mouth once daily.    METFORMIN (GLUCOPHAGE-XR) 500 MG ER 24HR TABLET    Take 2 tablets (1,000 mg total) by mouth 2 (two) times daily with meals.    MULTIVITAMIN CAPSULE    Take 1 capsule by mouth once daily.    PEN NEEDLE, DIABETIC (BD ULTRA-FINE SHORT PEN NEEDLE) 31 GAUGE X 5/16" NDLE    USE WITH INSULIN PEN AS DIRECTED    PRAVASTATIN (PRAVACHOL) 40 MG TABLET    TAKE 1 TABLET(40 MG) BY MOUTH EVERY EVENING    SILDENAFIL (VIAGRA) 100 MG TABLET    Take 1 tablet (100 mg total) by mouth daily as needed for Erectile Dysfunction.    TIRZEPATIDE (MOUNJARO) 12.5 MG/0.5 ML PNIJ    Inject 12.5 mg into the skin every 7 days.    ULTRA THIN LANCETS 30 GAUGE MISC    CHECK BLOOD GLUCOSE BID   Modified Medications    No medications on file   Discontinued Medications    No medications on file        Haresh Fallon M.D"

## 2025-05-28 ENCOUNTER — HOSPITAL ENCOUNTER (OUTPATIENT)
Dept: WOUND CARE | Facility: HOSPITAL | Age: 75
Discharge: HOME | End: 2025-05-28
Attending: SURGERY
Payer: MEDICARE

## 2025-05-28 VITALS
WEIGHT: 231.25 LBS | SYSTOLIC BLOOD PRESSURE: 120 MMHG | HEIGHT: 68 IN | HEART RATE: 118 BPM | TEMPERATURE: 98 F | DIASTOLIC BLOOD PRESSURE: 46 MMHG | BODY MASS INDEX: 35.05 KG/M2

## 2025-05-28 DIAGNOSIS — L97.512 SKIN ULCER OF RIGHT GREAT TOE WITH FAT LAYER EXPOSED: Primary | ICD-10-CM

## 2025-05-28 PROCEDURE — 11042 DBRDMT SUBQ TIS 1ST 20SQCM/<: CPT | Mod: HCNC

## 2025-05-28 PROCEDURE — 11721 DEBRIDE NAIL 6 OR MORE: CPT | Mod: HCNC

## 2025-05-28 PROCEDURE — 11719 TRIM NAIL(S) ANY NUMBER: CPT | Mod: HCNC

## 2025-05-28 NOTE — PROCEDURES
"Debridement    Date/Time: 5/28/2025 9:00 AM    Performed by: Rene Beltran MD  Authorized by: Rene Beltran MD    Time out: Immediately prior to procedure a "time out" was called to verify the correct patient, procedure, equipment, support staff and site/side marked as required.    Consent Done?:  Yes (Verbal)    Preparation: Patient was prepped and draped in usual sterile fashion and Patient was prepped and draped with clean technique    Local anesthesia used?: Yes    Local anesthetic:  Topical anesthetic    Wound Details:    Location:  Right foot    Location:  Right 1st Toe    Type of Debridement:  Excisional       Length (cm):  2       Width (cm):  2       Depth (cm):  0.2       Area (sq cm):  3.14       Percent Debrided (%):  100       Total Area Debrided (sq cm):  3.14    Depth of debridement:  Subcutaneous tissue    Tissue debrided:  Subcutaneous    Devitalized tissue debrided:  Necrotic/Eschar, Slough, Fibrin and Callus    Instruments:  Blade and Scissors  Bleeding:  None  Patient tolerance:  Patient tolerated the procedure well with no immediate complications  "

## 2025-05-28 NOTE — PROGRESS NOTES
Wound Care & Hyperbaric Medicine    Subjective:       Patient ID: Francisco Coppola is a 75 y.o. male.    Chief Complaint: No chief complaint on file.    Follow up right great toe wound. No complaints, edema BLE improved using stockings. Toe calloused- debrided measurement smaller. Toe nails x 10 trimmed. Continue plan of care.     ROS: no fever or chills doing better        Objective:      Physical Exam  Constitutional:       Appearance: He is well-developed.   HENT:      Head: Normocephalic.   Eyes:      Conjunctiva/sclera: Conjunctivae normal.      Pupils: Pupils are equal, round, and reactive to light.   Cardiovascular:      Rate and Rhythm: Normal rate and regular rhythm.      Heart sounds: Normal heart sounds.   Pulmonary:      Effort: Pulmonary effort is normal.      Breath sounds: Normal breath sounds.   Abdominal:      General: Bowel sounds are normal.      Palpations: Abdomen is soft.   Musculoskeletal:         General: Swelling and deformity present. Normal range of motion.      Cervical back: Normal range of motion and neck supple.      Right lower leg: Edema present.   Skin:     General: Skin is warm and dry.      Findings: Erythema and lesion present.   Neurological:      Mental Status: He is alert and oriented to person, place, and time.      Deep Tendon Reflexes: Reflexes are normal and symmetric.        Wound 03/12/25 0800 Diabetic Ulcer Right distal;plantar Toe, first (Active)   03/12/25 0800 Toe, first   Present on Original Admission: Y   Primary Wound Type: Diabetic ulc   Side: Right   Orientation: distal;plantar   Wound Approximate Age at First Assessment (Weeks):    Wound Number:    Is this injury device related?:    Incision Type:    Closure Method:    Wound Description (Comments):    Type:    Additional Comments:    Ankle-Brachial Index:    Pulses:    Removal Indication and Assessment:    Wound Outcome:    Wound Image   05/28/25 0952   Dressing Appearance  Intact;Dry;Open to air 05/28/25 0952   Drainage Amount None 05/28/25 0952   Appearance Closed/resurfaced 05/28/25 0952   Tissue loss description Full thickness 05/28/25 0952   Red (%), Wound Tissue Color 100 % 05/28/25 0952   Periwound Area Dry;Intact 05/28/25 0952   Wound Edges Rolled/closed;Callused 05/28/25 0952   Wound Length (cm) 0.1 cm 05/28/25 0952   Wound Width (cm) 0.1 cm 05/28/25 0952   Wound Depth (cm) 0 cm 05/28/25 0952   Wound Volume (cm^3) 0 cm^3 05/28/25 0952   Wound Surface Area (cm^2) 0.01 cm^2 05/28/25 0952   Care Cleansed with:;Soap and water;Debrided 05/28/25 0952   Pre or Post Debridement Pre 05/28/25 0952   Dressing Applied;Foam;Methylene blue/gentian violet;Island/border;Other (comment);Tubular bandage 05/28/25 0952   Periwound Care Absorptive dressing applied;Dry periwound area maintained 05/28/25 0952   Compression Tubular elasticized bandage 05/28/25 0952   Dressing Change Due 05/30/25 05/28/25 0952         Assessment/Plan:         ICD-10-CM ICD-9-CM   1. Skin ulcer of right great toe with fat layer exposed  L97.512 707.15         Tissue pathology and/or culture taken:   [] Yes    [x] No   Sharp debridement performed:    [x] Yes    [] No   Labs ordered this visit:    [] Yes    [x] No   Imaging ordered this visit:    [] Yes    [x] No     Is the wound improving?    [x] Yes    [] No   Any signs of infection?    [] Yes    [x] No             Orders Placed This Encounter   Procedures    Change dressing     Right Great Toe wound   Cleanse wound with:saline   Lidocaine:PRN   Periwound care: u shaped aperture pad   Primary dressing:Gentamicin Ointment and Hydrofera Blue Ready   Secondary dressing: Island border   Edema control: Tubi  F BLE. Elevate BLE as much as possible   Frequency :Every Other Day   Follow-up: 1 week    Nails trimmed x 10 5/28/25        Follow up in about 1 week (around 6/4/2025) for .            This includes face to face time and non-face to face time  preparing to see the patient (eg, review of tests), obtaining and/or reviewing separately obtained history, documenting clinical information in the electronic or other health record, independently interpreting results and communicating results to the patient/family/caregiver, or care coordinator.  Total time spent  > 30 minutes  With patient's verbal consent, nails were aggressively reduced and debrided x 10 to their soft tissue attachment mechanically and with electric , removing all offending nail and debris. Patient relates relief following the procedure. No anesthesia or hemostasis required. No blood loss.

## 2025-06-04 ENCOUNTER — HOSPITAL ENCOUNTER (OUTPATIENT)
Dept: WOUND CARE | Facility: HOSPITAL | Age: 75
Discharge: HOME | End: 2025-06-04
Attending: SURGERY
Payer: MEDICARE

## 2025-06-04 VITALS
HEIGHT: 68 IN | SYSTOLIC BLOOD PRESSURE: 110 MMHG | WEIGHT: 231.25 LBS | TEMPERATURE: 99 F | DIASTOLIC BLOOD PRESSURE: 56 MMHG | HEART RATE: 99 BPM | BODY MASS INDEX: 35.05 KG/M2

## 2025-06-04 DIAGNOSIS — N51 CONDYLOMA ACUMINATUM OF SCROTUM: ICD-10-CM

## 2025-06-04 DIAGNOSIS — N18.31 TYPE 2 DIABETES MELLITUS WITH STAGE 3A CHRONIC KIDNEY DISEASE AND HYPERTENSION: ICD-10-CM

## 2025-06-04 DIAGNOSIS — R89.9 ABNORMAL LABORATORY TEST: ICD-10-CM

## 2025-06-04 DIAGNOSIS — L97.512 SKIN ULCER OF RIGHT GREAT TOE WITH FAT LAYER EXPOSED: Primary | ICD-10-CM

## 2025-06-04 DIAGNOSIS — E11.22 TYPE 2 DIABETES MELLITUS WITH STAGE 3A CHRONIC KIDNEY DISEASE AND HYPERTENSION: ICD-10-CM

## 2025-06-04 DIAGNOSIS — A63.0 CONDYLOMA ACUMINATUM OF SCROTUM: ICD-10-CM

## 2025-06-04 DIAGNOSIS — M47.819 ARTHRITIS OF FACET JOINTS AT MULTIPLE VERTEBRAL LEVELS: ICD-10-CM

## 2025-06-04 DIAGNOSIS — I12.9 TYPE 2 DIABETES MELLITUS WITH STAGE 3A CHRONIC KIDNEY DISEASE AND HYPERTENSION: ICD-10-CM

## 2025-06-04 PROCEDURE — 11042 DBRDMT SUBQ TIS 1ST 20SQCM/<: CPT | Mod: HCNC

## 2025-06-09 ENCOUNTER — LAB VISIT (OUTPATIENT)
Dept: LAB | Facility: HOSPITAL | Age: 75
End: 2025-06-09
Attending: INTERNAL MEDICINE
Payer: MEDICARE

## 2025-06-11 ENCOUNTER — LAB VISIT (OUTPATIENT)
Dept: LAB | Facility: HOSPITAL | Age: 75
End: 2025-06-11
Attending: NURSE PRACTITIONER
Payer: MEDICARE

## 2025-06-11 DIAGNOSIS — N18.32 STAGE 3B CHRONIC KIDNEY DISEASE: ICD-10-CM

## 2025-06-11 DIAGNOSIS — N25.81 SECONDARY HYPERPARATHYROIDISM OF RENAL ORIGIN: ICD-10-CM

## 2025-06-11 LAB
ALBUMIN SERPL BCP-MCNC: 3.7 G/DL (ref 3.5–5.2)
ANION GAP (OHS): 11 MMOL/L (ref 8–16)
BUN SERPL-MCNC: 43 MG/DL (ref 8–23)
CALCIUM SERPL-MCNC: 9.7 MG/DL (ref 8.7–10.5)
CHLORIDE SERPL-SCNC: 101 MMOL/L (ref 95–110)
CO2 SERPL-SCNC: 27 MMOL/L (ref 23–29)
CREAT SERPL-MCNC: 2.4 MG/DL (ref 0.5–1.4)
GFR SERPLBLD CREATININE-BSD FMLA CKD-EPI: 27 ML/MIN/1.73/M2
GLUCOSE SERPL-MCNC: 99 MG/DL (ref 70–110)
PHOSPHATE SERPL-MCNC: 3.6 MG/DL (ref 2.7–4.5)
POTASSIUM SERPL-SCNC: 4.2 MMOL/L (ref 3.5–5.1)
PTH-INTACT SERPL-MCNC: 139.8 PG/ML (ref 9–77)
SODIUM SERPL-SCNC: 139 MMOL/L (ref 136–145)

## 2025-06-11 PROCEDURE — 84520 ASSAY OF UREA NITROGEN: CPT | Mod: HCNC

## 2025-06-11 PROCEDURE — 36415 COLL VENOUS BLD VENIPUNCTURE: CPT | Mod: HCNC

## 2025-06-11 PROCEDURE — 83970 ASSAY OF PARATHORMONE: CPT | Mod: HCNC

## 2025-06-17 RX ORDER — LISINOPRIL AND HYDROCHLOROTHIAZIDE 20; 25 MG/1; MG/1
1 TABLET ORAL
Qty: 90 TABLET | Refills: 3 | OUTPATIENT
Start: 2025-06-17

## 2025-06-17 NOTE — TELEPHONE ENCOUNTER
No care due was identified.  [facility] Embedded Care Due Messages. Reference number: [id number].   6/17/2025 5:31:46 AM CDT

## 2025-06-17 NOTE — TELEPHONE ENCOUNTER
Refill Decision Note   Francisco Coppola  is requesting a refill authorization.  Brief Assessment and Rationale for Refill:  Quick Discontinue     Medication Therapy Plan:  Med d/c on 12/05/24 by PCP; [facility]      Comments:     Note composed:6:49 AM 06/17/2025

## 2025-06-18 ENCOUNTER — HOSPITAL ENCOUNTER (OUTPATIENT)
Dept: WOUND CARE | Facility: HOSPITAL | Age: 75
Discharge: HOME | End: 2025-06-18
Attending: SURGERY
Payer: MEDICARE

## 2025-06-18 VITALS
WEIGHT: 216.06 LBS | HEART RATE: 104 BPM | TEMPERATURE: 98 F | BODY MASS INDEX: 32.74 KG/M2 | DIASTOLIC BLOOD PRESSURE: 50 MMHG | HEIGHT: 68 IN | SYSTOLIC BLOOD PRESSURE: 116 MMHG

## 2025-06-18 DIAGNOSIS — L97.512 SKIN ULCER OF RIGHT GREAT TOE WITH FAT LAYER EXPOSED: Primary | ICD-10-CM

## 2025-06-18 DIAGNOSIS — S81.811A NONINFECTED SKIN TEAR OF RIGHT LOWER EXTREMITY, INITIAL ENCOUNTER: ICD-10-CM

## 2025-06-18 PROCEDURE — 99213 OFFICE O/P EST LOW 20 MIN: CPT | Mod: HCNC

## 2025-06-18 NOTE — PROGRESS NOTES
Wound Care & Hyperbaric Medicine    Subjective:       Patient ID: Francisco Coppola is a 75 y.o. male.    Chief Complaint: Non-healing Wound Follow Up    Wound care follow up right great toe  wound. New skin tear wound to right posterior leg states he received it while showering. Patient applying Gentamycin and Hydrofera Ready to both wounds. Continue current treatment.       ROS: no complaints wound better        Objective:      Physical Exam  Constitutional:       Appearance: He is well-developed.   HENT:      Head: Normocephalic.   Eyes:      Conjunctiva/sclera: Conjunctivae normal.      Pupils: Pupils are equal, round, and reactive to light.   Cardiovascular:      Rate and Rhythm: Normal rate and regular rhythm.      Heart sounds: Normal heart sounds.   Pulmonary:      Effort: Pulmonary effort is normal.      Breath sounds: Normal breath sounds.   Abdominal:      General: Bowel sounds are normal.      Palpations: Abdomen is soft.   Musculoskeletal:         General: Swelling present. Normal range of motion.      Cervical back: Normal range of motion and neck supple.      Right lower leg: Edema present.      Left lower leg: Edema present.   Skin:     General: Skin is warm and dry.      Findings: Erythema and lesion present.   Neurological:      Mental Status: He is alert and oriented to person, place, and time.      Deep Tendon Reflexes: Reflexes are normal and symmetric.        Wound 03/12/25 0800 Diabetic Ulcer Right distal;plantar Toe, first (Active)   03/12/25 0800 Toe, first   Present on Original Admission: Y   Primary Wound Type: Diabetic ulc   Side: Right   Orientation: distal;plantar   Wound Approximate Age at First Assessment (Weeks):    Wound Number:    Is this injury device related?:    Incision Type:    Closure Method:    Wound Description (Comments):    Type:    Additional Comments:    Ankle-Brachial Index:    Pulses:    Removal Indication and Assessment:    Wound Outcome:     Wound Image   06/18/25 1329   Dressing Appearance Intact;Dry 06/18/25 1329   Drainage Amount None 06/18/25 1329   Appearance Intact;Dry;Fibrin 06/18/25 1329   Tissue loss description Not applicable 06/18/25 1329   Yellow (%), Wound Tissue Color 100 % 06/18/25 1329   Periwound Area Intact;Dry 06/18/25 1329   Wound Edges Rolled/closed;Callused 06/18/25 1329   Wound Length (cm) 0.1 cm 06/18/25 1329   Wound Width (cm) 0.1 cm 06/18/25 1329   Wound Depth (cm) 0 cm 06/18/25 1329   Wound Volume (cm^3) 0 cm^3 06/18/25 1329   Wound Surface Area (cm^2) 0.01 cm^2 06/18/25 1329   Care Cleansed with:;Soap and water 06/18/25 1329   Dressing Applied;Island/border;Tubular bandage 06/18/25 1329   Periwound Care Moisture barrier applied;Absorptive dressing applied 06/18/25 1329   Compression Tubular elasticized bandage 06/18/25 1329   Dressing Change Due 06/20/25 06/18/25 1329            Wound 06/18/25 1300 Skin Tear Right posterior Calf (Active)   06/18/25 1300 Calf   Present on Original Admission: Y   Primary Wound Type: Skin tear   Side: Right   Orientation: posterior   Wound Approximate Age at First Assessment (Weeks):    Wound Number:    Is this injury device related?:    Incision Type:    Closure Method:    Wound Description (Comments):    Type:    Additional Comments:    Ankle-Brachial Index:    Pulses:    Removal Indication and Assessment:    Wound Outcome:    Wound Image   06/18/25 1329   Dressing Appearance Intact;Moist drainage 06/18/25 1329   Drainage Amount Scant 06/18/25 1329   Drainage Characteristics/Odor Serous 06/18/25 1329   Appearance Red 06/18/25 1329   Tissue loss description Partial thickness 06/18/25 1329   Red (%), Wound Tissue Color 100 % 06/18/25 1329   Periwound Area Intact;Dry;Edematous 06/18/25 1329   Wound Edges Defined 06/18/25 1329   Wound Length (cm) 0.4 cm 06/18/25 1329   Wound Width (cm) 0.4 cm 06/18/25 1329   Wound Depth (cm) 0.1 cm 06/18/25 1329   Wound Volume (cm^3) 0.008 cm^3 06/18/25 1329    Wound Surface Area (cm^2) 0.13 cm^2 06/18/25 1329   Care Cleansed with:;Soap and water 06/18/25 1329   Dressing Applied;Island/border;Tubular bandage 06/18/25 1329   Periwound Care Absorptive dressing applied;Moisturizer applied 06/18/25 1329   Compression Tubular elasticized bandage 06/18/25 1329   Dressing Change Due 06/20/25 06/18/25 1329         Assessment/Plan:         ICD-10-CM ICD-9-CM   1. Skin ulcer of right great toe with fat layer exposed  L97.512 707.15   2. Noninfected skin tear of right lower extremity, initial encounter  S81.811A 891.0         Tissue pathology and/or culture taken:   [] Yes    [x] No   Sharp debridement performed:    [] Yes    [x] No   Labs ordered this visit:    [] Yes    [x] No   Imaging ordered this visit:    [] Yes    [] No     Is the wound improving?    [x] Yes    [] No   Any signs of infection?    [] Yes    [x] No             Orders Placed This Encounter   Procedures    Change dressing     Right Great Toe wound and right leg   Cleanse wound with: normal saline   Lidocaine: PRN   Periwound care: u shaped aperture pad x 2 to toe   Primary dressing: Gentamicin Ointment and Hydrofera Blue Ready  both wounds  Secondary dressing: Island border   Edema control: Tubi  F BLE. Elevate BLE as much as possible   Frequency: Every Other Day   Follow-up: 2 weeks    Nails trimmed x 10 5/28/25        Follow up in about 2 weeks (around 7/2/2025) for .            This includes face to face time and non-face to face time preparing to see the patient (eg, review of tests), obtaining and/or reviewing separately obtained history, documenting clinical information in the electronic or other health record, independently interpreting results and communicating results to the patient/family/caregiver, or care coordinator.  Total time spent  >30  minutes

## 2025-07-02 ENCOUNTER — HOSPITAL ENCOUNTER (OUTPATIENT)
Dept: WOUND CARE | Facility: HOSPITAL | Age: 75
Discharge: HOME | End: 2025-07-02
Attending: SURGERY
Payer: MEDICARE

## 2025-07-02 VITALS
SYSTOLIC BLOOD PRESSURE: 145 MMHG | HEIGHT: 68 IN | WEIGHT: 216.06 LBS | TEMPERATURE: 98 F | BODY MASS INDEX: 32.74 KG/M2 | DIASTOLIC BLOOD PRESSURE: 62 MMHG | HEART RATE: 98 BPM

## 2025-07-02 DIAGNOSIS — A63.0 CONDYLOMA ACUMINATUM OF SCROTUM: ICD-10-CM

## 2025-07-02 DIAGNOSIS — I12.9 TYPE 2 DIABETES MELLITUS WITH STAGE 3A CHRONIC KIDNEY DISEASE AND HYPERTENSION: ICD-10-CM

## 2025-07-02 DIAGNOSIS — L97.512 SKIN ULCER OF RIGHT GREAT TOE WITH FAT LAYER EXPOSED: Primary | ICD-10-CM

## 2025-07-02 DIAGNOSIS — M47.819 ARTHRITIS OF FACET JOINTS AT MULTIPLE VERTEBRAL LEVELS: ICD-10-CM

## 2025-07-02 DIAGNOSIS — E11.22 TYPE 2 DIABETES MELLITUS WITH STAGE 3A CHRONIC KIDNEY DISEASE AND HYPERTENSION: ICD-10-CM

## 2025-07-02 DIAGNOSIS — N18.31 TYPE 2 DIABETES MELLITUS WITH STAGE 3A CHRONIC KIDNEY DISEASE AND HYPERTENSION: ICD-10-CM

## 2025-07-02 DIAGNOSIS — N51 CONDYLOMA ACUMINATUM OF SCROTUM: ICD-10-CM

## 2025-07-02 PROCEDURE — 11042 DBRDMT SUBQ TIS 1ST 20SQCM/<: CPT | Mod: HCNC

## 2025-07-02 NOTE — PROCEDURES
"Debridement    Date/Time: 7/2/2025 1:00 PM    Performed by: Rene Beltran MD  Authorized by: Rene Beltran MD    Time out: Immediately prior to procedure a "time out" was called to verify the correct patient, procedure, equipment, support staff and site/side marked as required.    Consent Done?:  Yes (Verbal)    Preparation: Patient was prepped and draped in usual sterile fashion and Patient was prepped and draped with clean technique    Local anesthesia used?: Yes    Local anesthetic:  Topical anesthetic    Wound Details:    Location:  Right foot    Location:  Right 1st Toe    Type of Debridement:  Excisional       Length (cm):  0.2       Width (cm):  0.1       Depth (cm):  0.2       Area (sq cm):  0.02       Percent Debrided (%):  100       Total Area Debrided (sq cm):  0.02    Depth of debridement:  Subcutaneous tissue    Tissue debrided:  Subcutaneous    Devitalized tissue debrided:  Callus and Slough    Instruments:  Curette and Forceps  Bleeding:  Minimal  Hemostasis Achieved: Yes  Method Used:  Pressure and Silver Nitrate  Patient tolerance:  Patient tolerated the procedure well with no immediate complications  "

## 2025-07-02 NOTE — PROGRESS NOTES
Wound Care & Hyperbaric Medicine    Subjective:       Patient ID: Francisco Coppola is a 75 y.o. male.    Chief Complaint: Non-healing Wound    Pt presents to clinic for BLE wounds. Scant drainage noted to dressing, wound bed and sabrina-wound to toe unable to visualize/ calloused, wound bed to leg wounds red/granular, sabrina-wound dry/intact, pt tolerated debridement well, no new issues or complaints, continue plan of care.     ROS: no fever or chills wound better , still bilateral lower leg swelling        Objective:      Physical Exam  Constitutional:       Appearance: He is well-developed.   HENT:      Head: Normocephalic.   Eyes:      Conjunctiva/sclera: Conjunctivae normal.      Pupils: Pupils are equal, round, and reactive to light.   Cardiovascular:      Rate and Rhythm: Normal rate and regular rhythm.      Heart sounds: Normal heart sounds.   Pulmonary:      Effort: Pulmonary effort is normal.      Breath sounds: Normal breath sounds.   Abdominal:      General: Bowel sounds are normal.      Palpations: Abdomen is soft.   Musculoskeletal:         General: Swelling, tenderness and deformity present. Normal range of motion.      Cervical back: Normal range of motion and neck supple.      Right lower leg: Edema present.      Left lower leg: Edema present.   Skin:     General: Skin is warm and dry.      Findings: Erythema and lesion present.   Neurological:      Mental Status: He is alert and oriented to person, place, and time.      Deep Tendon Reflexes: Reflexes are normal and symmetric.        Wound 03/12/25 0800 Diabetic Ulcer Right distal;plantar Toe, first (Active)   03/12/25 0800 Toe, first   Present on Original Admission: Y   Primary Wound Type: Diabetic ulc   Side: Right   Orientation: distal;plantar   Wound Approximate Age at First Assessment (Weeks):    Wound Number:    Is this injury device related?:    Incision Type:    Closure Method:    Wound Description (Comments):    Type:     Additional Comments:    Ankle-Brachial Index:    Pulses:    Removal Indication and Assessment:    Wound Outcome:    Wound Image   07/02/25 1329   Dressing Appearance Intact;Dried drainage 07/02/25 1329   Drainage Amount None 07/02/25 1329   Appearance Intact;Dry 07/02/25 1329   Tissue loss description Not applicable 07/02/25 1329   Wound Edges Callused 07/02/25 1329   Wound Length (cm) 0.1 cm 07/02/25 1329   Wound Width (cm) 0.1 cm 07/02/25 1329   Wound Depth (cm) 0.1 cm 07/02/25 1329   Wound Volume (cm^3) 0.001 cm^3 07/02/25 1329   Wound Surface Area (cm^2) 0.01 cm^2 07/02/25 1329   Supply Surface Area (L x W) 0.01 sq cm 07/02/25 1329   Care Cleansed with:;Soap and water 07/02/25 1329   Dressing Applied;Methylene blue/gentian violet;Island/border 07/02/25 1329   Periwound Care Moisturizer applied 07/02/25 1329   Compression Tubular elasticized bandage 07/02/25 1329   Dressing Change Due 07/16/25 07/02/25 1329            Wound 06/18/25 1300 Skin Tear Right posterior Calf (Active)   06/18/25 1300 Calf   Present on Original Admission: Y   Primary Wound Type: Skin tear   Side: Right   Orientation: posterior   Wound Approximate Age at First Assessment (Weeks):    Wound Number:    Is this injury device related?:    Incision Type:    Closure Method:    Wound Description (Comments):    Type:    Additional Comments:    Ankle-Brachial Index:    Pulses:    Removal Indication and Assessment:    Wound Outcome:    Dressing Appearance Dry;Intact;Moist drainage 07/02/25 1329   Appearance Red 07/02/25 1329   Tissue loss description Not applicable 07/02/25 1329   Red (%), Wound Tissue Color 100 % 07/02/25 1329   Periwound Area Intact;Dry;Edematous 07/02/25 1329   Wound Edges Callused;Defined 07/02/25 1329   Wound Length (cm) 0 cm 07/02/25 1329   Wound Width (cm) 0 cm 07/02/25 1329   Wound Depth (cm) 0 cm 07/02/25 1329   Wound Volume (cm^3) 0 cm^3 07/02/25 1329   Wound Surface Area (cm^2) 0 cm^2 07/02/25 1329   Supply Surface Area  (L x W) 0 sq cm 07/02/25 1329   Care Cleansed with:;Soap and water 07/02/25 1329   Compression Tubular elasticized bandage 07/02/25 1329   Dressing Change Due 07/16/25 07/02/25 1329            Wound 07/02/25 1333 Venous Ulcer Left posterior Calf #3 (Active)   07/02/25 1333 Calf   Present on Original Admission: N   Primary Wound Type: Venous ulcer   Side: Left   Orientation: posterior   Wound Approximate Age at First Assessment (Weeks):    Wound Number: #3   Is this injury device related?:    Incision Type:    Closure Method:    Wound Description (Comments):    Type:    Additional Comments:    Ankle-Brachial Index:    Pulses:    Removal Indication and Assessment:    Wound Outcome:    Wound Image   07/02/25 1329   Dressing Appearance Moist drainage 07/02/25 1329   Drainage Amount Scant 07/02/25 1329   Drainage Characteristics/Odor Serosanguineous 07/02/25 1329   Appearance Red;Moist 07/02/25 1329   Tissue loss description Partial thickness 07/02/25 1329   Red (%), Wound Tissue Color 100 % 07/02/25 1329   Periwound Area Intact 07/02/25 1329   Wound Edges Defined 07/02/25 1329   Wound Length (cm) 2.5 cm 07/02/25 1329   Wound Width (cm) 1.5 cm 07/02/25 1329   Wound Depth (cm) 0.1 cm 07/02/25 1329   Wound Volume (cm^3) 0.196 cm^3 07/02/25 1329   Wound Surface Area (cm^2) 2.95 cm^2 07/02/25 1329   Supply Surface Area (L x W) 3.75 sq cm 07/02/25 1329   Care Cleansed with:;Soap and water 07/02/25 1329   Dressing Applied;Methylene blue/gentian violet;Island/border 07/02/25 1329   Periwound Care Moisture barrier applied 07/02/25 1329   Compression Tubular elasticized bandage 07/02/25 1329   Dressing Change Due 07/16/25 07/02/25 1329           Procedures   Assessment/Plan:         ICD-10-CM ICD-9-CM   1. Skin ulcer of right great toe with fat layer exposed  L97.512 707.15         Tissue pathology and/or culture taken:   [] Yes    [x] No   Sharp debridement performed:    [x] Yes    [] No   Labs ordered this visit:    [] Yes     [x] No   Imaging ordered this visit:    [] Yes    [x] No     Is the wound improving?    [x] Yes    [] No   Any signs of infection?    [] Yes    [x] No             Orders Placed This Encounter   Procedures    Change dressing     Right Great Toe wound, right leg, left lateral calf  Cleanse wound with: normal saline   Lidocaine: PRN   Periwound care: u shaped aperture pad x 2 to toe   Primary dressing: Gentamicin Ointment and Hydrofera Blue Ready to wounds   Secondary dressing: Island border   Edema control: Tubi  F BLE. Elevate BLE as much as possible   Frequency: Every Other Day   Follow-up: 2 weeks    Nails trimmed x 10 5/28/25        Follow up in about 2 weeks (around 7/16/2025) for Dr Beltran.            This includes face to face time and non-face to face time preparing to see the patient (eg, review of tests), obtaining and/or reviewing separately obtained history, documenting clinical information in the electronic or other health record, independently interpreting results and communicating results to the patient/family/caregiver, or care coordinator.  Total time spent  10  minutes

## 2025-07-07 ENCOUNTER — HOSPITAL ENCOUNTER (OUTPATIENT)
Dept: RADIOLOGY | Facility: HOSPITAL | Age: 75
Discharge: HOME | End: 2025-07-07
Attending: FAMILY MEDICINE
Payer: MEDICARE

## 2025-07-07 ENCOUNTER — RESULTS FOLLOW-UP (OUTPATIENT)
Dept: FAMILY MEDICINE | Facility: CLINIC | Age: 75
End: 2025-07-07

## 2025-07-07 DIAGNOSIS — Z12.2 SCREENING FOR LUNG CANCER: ICD-10-CM

## 2025-07-07 DIAGNOSIS — Z87.891 PERSONAL HISTORY OF NICOTINE DEPENDENCE: ICD-10-CM

## 2025-07-07 PROCEDURE — 71271 CT THORAX LUNG CANCER SCR C-: CPT | Mod: TC,HCNC

## 2025-07-07 PROCEDURE — 71271 CT THORAX LUNG CANCER SCR C-: CPT | Mod: 26,HCNC,, | Performed by: RADIOLOGY

## 2025-07-14 ENCOUNTER — TELEPHONE (OUTPATIENT)
Dept: FAMILY MEDICINE | Facility: CLINIC | Age: 75
End: 2025-07-14
Payer: MEDICARE

## 2025-07-16 ENCOUNTER — HOSPITAL ENCOUNTER (OUTPATIENT)
Dept: WOUND CARE | Facility: HOSPITAL | Age: 75
Discharge: HOME | End: 2025-07-16
Attending: SURGERY
Payer: MEDICARE

## 2025-07-16 VITALS
DIASTOLIC BLOOD PRESSURE: 55 MMHG | TEMPERATURE: 98 F | WEIGHT: 216.06 LBS | HEART RATE: 107 BPM | SYSTOLIC BLOOD PRESSURE: 117 MMHG | HEIGHT: 68 IN | BODY MASS INDEX: 32.74 KG/M2

## 2025-07-16 DIAGNOSIS — N18.31 TYPE 2 DIABETES MELLITUS WITH STAGE 3A CHRONIC KIDNEY DISEASE AND HYPERTENSION: ICD-10-CM

## 2025-07-16 DIAGNOSIS — E11.22 TYPE 2 DIABETES MELLITUS WITH STAGE 3A CHRONIC KIDNEY DISEASE AND HYPERTENSION: ICD-10-CM

## 2025-07-16 DIAGNOSIS — I12.9 TYPE 2 DIABETES MELLITUS WITH STAGE 3A CHRONIC KIDNEY DISEASE AND HYPERTENSION: ICD-10-CM

## 2025-07-16 DIAGNOSIS — L97.512 SKIN ULCER OF RIGHT GREAT TOE WITH FAT LAYER EXPOSED: Primary | ICD-10-CM

## 2025-07-16 DIAGNOSIS — M48.061 SPINAL STENOSIS OF LUMBAR REGION, UNSPECIFIED WHETHER NEUROGENIC CLAUDICATION PRESENT: ICD-10-CM

## 2025-07-16 PROCEDURE — 97597 DBRDMT OPN WND 1ST 20 CM/<: CPT | Mod: HCNC

## 2025-07-16 NOTE — PROGRESS NOTES
Wound Care & Hyperbaric Medicine    Subjective:       Patient ID: Francisco Coppola is a 75 y.o. male.    Chief Complaint: Non-healing Wound    Wound care follow up for non healing wound to right great toe, left leg. Right leg remains closed. Remains with edema, using tubigrip. Right great toe area debrided, no ulcer noted - will continue offloading with corn pad.       ROS: afebrile wound better        Objective:      Physical Exam  Constitutional:       Appearance: He is well-developed.   HENT:      Head: Normocephalic.   Eyes:      Conjunctiva/sclera: Conjunctivae normal.      Pupils: Pupils are equal, round, and reactive to light.   Cardiovascular:      Rate and Rhythm: Normal rate and regular rhythm.      Heart sounds: Normal heart sounds.   Pulmonary:      Effort: Pulmonary effort is normal.      Breath sounds: Normal breath sounds.   Abdominal:      General: Bowel sounds are normal.      Palpations: Abdomen is soft.   Musculoskeletal:         General: Swelling and deformity present. Normal range of motion.      Cervical back: Normal range of motion and neck supple.      Right lower leg: Edema present.      Left lower leg: Edema present.   Skin:     General: Skin is warm and dry.      Findings: Erythema and lesion present.   Neurological:      Mental Status: He is alert and oriented to person, place, and time.      Deep Tendon Reflexes: Reflexes are normal and symmetric.        Wound 03/12/25 0800 Diabetic Ulcer Right distal;plantar Toe, first (Active)   03/12/25 0800 Toe, first   Present on Original Admission: Y   Primary Wound Type: Diabetic ulc   Side: Right   Orientation: distal;plantar   Wound Approximate Age at First Assessment (Weeks):    Wound Number:    Is this injury device related?:    Incision Type:    Closure Method:    Wound Description (Comments):    Type:    Additional Comments:    Ankle-Brachial Index:    Pulses:    Removal Indication and Assessment:    Wound Outcome:     Wound Image    07/16/25 1254   Dressing Appearance Intact 07/16/25 1254   Drainage Amount None 07/16/25 1254   Appearance Dry;Fibrin 07/16/25 1254   Tissue loss description Partial thickness 07/16/25 1254   Periwound Area Dry 07/16/25 1254   Wound Edges Undefined;Callused 07/16/25 1254   Wound Length (cm) 0.3 cm 07/16/25 1254   Wound Width (cm) 0.3 cm 07/16/25 1254   Wound Depth (cm) 0.1 cm 07/16/25 1254   Wound Volume (cm^3) 0.005 cm^3 07/16/25 1254   Wound Surface Area (cm^2) 0.07 cm^2 07/16/25 1254   Supply Surface Area (L x W) 0.09 sq cm 07/16/25 1254   Care Cleansed with:;Sterile normal saline;Debrided 07/16/25 1254   Dressing Applied;Other (comment) 07/16/25 1254   Periwound Care Moisturizer applied 07/16/25 1254   Compression Tubular elasticized bandage 07/16/25 1254   Dressing Change Due 07/17/25 07/16/25 1254            Wound 07/02/25 1333 Venous Ulcer Left posterior Calf #3 (Active)   07/02/25 1333 Calf   Present on Original Admission: N   Primary Wound Type: Venous ulcer   Side: Left   Orientation: posterior   Wound Approximate Age at First Assessment (Weeks):    Wound Number: #3   Is this injury device related?:    Incision Type:    Closure Method:    Wound Description (Comments):    Type:    Additional Comments:    Ankle-Brachial Index:    Pulses:    Removal Indication and Assessment:    Wound Outcome:    Wound Image   07/16/25 1254   Dressing Appearance Moist drainage 07/16/25 1254   Drainage Amount Scant 07/16/25 1254   Drainage Characteristics/Odor Serous 07/16/25 1254   Appearance Red 07/16/25 1254   Tissue loss description Partial thickness 07/16/25 1254   Red (%), Wound Tissue Color 100 % 07/16/25 1254   Periwound Area Intact;Edematous 07/16/25 1254   Wound Edges Defined 07/16/25 1254   Wound Length (cm) 0.6 cm 07/16/25 1254   Wound Width (cm) 1 cm 07/16/25 1254   Wound Depth (cm) 0.1 cm 07/16/25 1254   Wound Volume (cm^3) 0.031 cm^3 07/16/25 1254   Wound Surface Area (cm^2) 0.47 cm^2 07/16/25  1254   Supply Surface Area (L x W) 0.6 sq cm 07/16/25 1254   Care Cleansed with:;Sterile normal saline 07/16/25 1254   Dressing Applied;Other (comment);Methylene blue/gentian violet;Foam;Island/border;Tubular bandage 07/16/25 1254   Periwound Care Moisturizer applied 07/16/25 1254   Compression Tubular elasticized bandage 07/16/25 1254   Dressing Change Due 07/18/25 07/16/25 1254       [REMOVED]      Wound 06/18/25 1300 Skin Tear Right posterior Calf (Removed)   06/18/25 1300 Calf   Present on Original Admission: Y   Primary Wound Type: Skin tear   Side: Right   Orientation: posterior   Wound Approximate Age at First Assessment (Weeks):    Wound Number:    Is this injury device related?:    Incision Type:    Closure Method:    Wound Description (Comments):    Type:    Additional Comments:    Ankle-Brachial Index:    Pulses:    Removal Indication and Assessment:    Wound Outcome: Healed   Removed 07/16/25 1255   Wound Image   07/16/25 1254   Wound Edges Rolled/closed 07/16/25 1254   Wound Length (cm) 0 cm 07/16/25 1254   Wound Width (cm) 0 cm 07/16/25 1254   Wound Depth (cm) 0 cm 07/16/25 1254   Wound Volume (cm^3) 0 cm^3 07/16/25 1254   Wound Surface Area (cm^2) 0 cm^2 07/16/25 1254   Supply Surface Area (L x W) 0 sq cm 07/16/25 1254           Procedures   Assessment/Plan:         ICD-10-CM ICD-9-CM   1. Skin ulcer of right great toe with fat layer exposed  L97.512 707.15         Tissue pathology and/or culture taken:   [] Yes    [x] No   Sharp debridement performed:    [x] Yes    [] No   Labs ordered this visit:    [] Yes    [x] No   Imaging ordered this visit:    [] Yes    [x] No     Is the wound improving?    [x] Yes    [] No   Any signs of infection?    [] Yes    [x] No             Orders Placed This Encounter   Procedures    Change dressing     Left lateral calf wound:  Cleanse wound with: normal saline  Lidocaine: PRN  Periwound care: lotion  Primary dressing: Gentamicin Ointment and Hydrofera Blue Ready to  leg wound  Secondary dressing: Island border  Edema control: Tubi  F BLE. Elevate BLE as much as possible  Frequency: Every Other Day      Right great toe:   Periwound care: u shaped aperture pad x 2 , monitor area daily    Follow-up: 2 weeks   Nails trimmed x 10 5/28/25        Follow up in about 2 weeks (around 7/30/2025) for Dr. Beltran.            This includes face to face time and non-face to face time preparing to see the patient (eg, review of tests), obtaining and/or reviewing separately obtained history, documenting clinical information in the electronic or other health record, independently interpreting results and communicating results to the patient/family/caregiver, or care coordinator.  Total time spent  > 30 minutes

## 2025-07-16 NOTE — PROCEDURES
"Debridement    Date/Time: 7/16/2025 1:00 PM    Performed by: Rene Beltran MD  Authorized by: Rene Beltran MD    Time out: Immediately prior to procedure a "time out" was called to verify the correct patient, procedure, equipment, support staff and site/side marked as required.    Consent Done?:  Yes (Verbal)    Preparation: Patient was prepped and draped in usual sterile fashion and Patient was prepped and draped with clean technique    Local anesthesia used?: Yes    Local anesthetic:  Topical anesthetic    Wound Details:    Location:  Right foot    Location:  Right 1st Toe    Type of Debridement:  Excisional       Length (cm):  1       Width (cm):  0.6       Depth (cm):  0.2       Area (sq cm):  0.47       Percent Debrided (%):  100       Total Area Debrided (sq cm):  0.47    Depth of debridement:  Subcutaneous tissue    Tissue debrided:  Subcutaneous    Devitalized tissue debrided:  Slough, Necrotic/Eschar and Callus    Instruments:  Blade, Forceps and Scissors  Bleeding:  Minimal  Hemostasis Achieved: Yes  Method Used:  Pressure and Silver Nitrate  Patient tolerance:  Patient tolerated the procedure well with no immediate complications  1st Wound Pain Assessment: 1  "

## 2025-07-17 NOTE — PROCEDURES
"Debridement    Date/Time: 7/16/2025 1:00 PM    Performed by: Rene Beltran MD  Authorized by: Rene Beltran MD    Time out: Immediately prior to procedure a "time out" was called to verify the correct patient, procedure, equipment, support staff and site/side marked as required.    Consent Done?:  Yes (Verbal)    Preparation: Patient was prepped and draped in usual sterile fashion and Patient was prepped and draped with clean technique    Local anesthesia used?: Yes    Local anesthetic:  Topical anesthetic    Wound Details:    Location:  Right foot    Location:  Right 1st Toe    Type of Debridement:  Excisional       Length (cm):  0.3       Width (cm):  0.3       Depth (cm):  0.1       Area (sq cm):  0.07       Percent Debrided (%):  100       Total Area Debrided (sq cm):  0.07    Depth of debridement:  Epidermis/Dermis    Tissue debrided:  Dermis and Epidermis    Devitalized tissue debrided:  Callus    Instruments:  Blade and Forceps  Bleeding:  Minimal  Hemostasis Achieved: Yes  Method Used:  Pressure and Silver Nitrate  Patient tolerance:  Patient tolerated the procedure well with no immediate complications  "

## 2025-07-30 ENCOUNTER — HOSPITAL ENCOUNTER (OUTPATIENT)
Dept: WOUND CARE | Facility: HOSPITAL | Age: 75
Discharge: HOME | End: 2025-07-30
Attending: SURGERY
Payer: MEDICARE

## 2025-07-30 VITALS
BODY MASS INDEX: 32.74 KG/M2 | HEART RATE: 104 BPM | HEIGHT: 68 IN | SYSTOLIC BLOOD PRESSURE: 108 MMHG | WEIGHT: 216.06 LBS | TEMPERATURE: 98 F | DIASTOLIC BLOOD PRESSURE: 55 MMHG

## 2025-07-30 DIAGNOSIS — I12.9 TYPE 2 DIABETES MELLITUS WITH STAGE 3A CHRONIC KIDNEY DISEASE AND HYPERTENSION: ICD-10-CM

## 2025-07-30 DIAGNOSIS — N18.31 TYPE 2 DIABETES MELLITUS WITH STAGE 3A CHRONIC KIDNEY DISEASE AND HYPERTENSION: ICD-10-CM

## 2025-07-30 DIAGNOSIS — L97.512 SKIN ULCER OF RIGHT GREAT TOE WITH FAT LAYER EXPOSED: Primary | ICD-10-CM

## 2025-07-30 DIAGNOSIS — N51 CONDYLOMA ACUMINATUM OF SCROTUM: ICD-10-CM

## 2025-07-30 DIAGNOSIS — A63.0 CONDYLOMA ACUMINATUM OF SCROTUM: ICD-10-CM

## 2025-07-30 DIAGNOSIS — E11.22 TYPE 2 DIABETES MELLITUS WITH STAGE 3A CHRONIC KIDNEY DISEASE AND HYPERTENSION: ICD-10-CM

## 2025-07-30 PROCEDURE — 11055 PARING/CUTG B9 HYPRKER LES 1: CPT | Mod: HCNC

## 2025-07-30 PROCEDURE — 97597 DBRDMT OPN WND 1ST 20 CM/<: CPT | Mod: HCNC

## 2025-07-30 NOTE — PROCEDURES
"Debridement    Date/Time: 7/30/2025 1:00 PM    Performed by: Rene Beltran MD  Authorized by: Rene Beltran MD    Time out: Immediately prior to procedure a "time out" was called to verify the correct patient, procedure, equipment, support staff and site/side marked as required.      Preparation: Patient was prepped and draped in usual sterile fashion and Patient was prepped and draped with aseptic technique    Local anesthesia used?: Yes    Local anesthetic:  Topical anesthetic    Wound Details:    Location:  Right foot    Location:  Right 1st Toe    Type of Debridement:  Excisional       Length (cm):  1       Width (cm):  1       Depth (cm):  0.1       Area (sq cm):  0.79       Percent Debrided (%):  100       Total Area Debrided (sq cm):  0.79    Depth of debridement:  Epidermis/Dermis    Tissue debrided:  Epidermis and Dermis    Devitalized tissue debrided:  Callus    Instruments:  Blade  Bleeding:  None  Patient tolerance:  Patient tolerated the procedure well with no immediate complications  1st Wound Pain Assessment: 0  "

## 2025-07-30 NOTE — PROGRESS NOTES
Wound Care & Hyperbaric Medicine    Subjective:       Patient ID: Francisco Coppola is a 75 y.o. male.    Chief Complaint: Non-healing Wound    Wound care follow up for left calf and right great toe ulcer.Left calf ulcer healed. Callus removed to right great toe - no ulcer noted, will continue offloading with aperture pad. Will need diabetic shoes and inserts to prevent future issues - prescription given. Return to clinic in 2 weeks.      ROS: no fever losing weight on monjero, cellulitis better        Objective:      Physical Exam  Constitutional:       Appearance: He is well-developed.   HENT:      Head: Normocephalic.   Eyes:      Conjunctiva/sclera: Conjunctivae normal.      Pupils: Pupils are equal, round, and reactive to light.   Cardiovascular:      Rate and Rhythm: Normal rate and regular rhythm.      Heart sounds: Normal heart sounds.   Pulmonary:      Effort: Pulmonary effort is normal.      Breath sounds: Normal breath sounds.   Abdominal:      General: Bowel sounds are normal.      Palpations: Abdomen is soft.   Musculoskeletal:         General: Swelling present. Normal range of motion.      Cervical back: Normal range of motion and neck supple.      Right lower leg: Edema present.      Left lower leg: Edema present.   Skin:     General: Skin is warm and dry.      Findings: Erythema and lesion present.   Neurological:      Mental Status: He is alert and oriented to person, place, and time.      Deep Tendon Reflexes: Reflexes are normal and symmetric.        Wound 03/12/25 0800 Diabetic Ulcer Right distal;plantar Toe, first (Active)   03/12/25 0800 Toe, first   Present on Original Admission: Y   Primary Wound Type: Diabetic ulc   Side: Right   Orientation: distal;plantar   Wound Approximate Age at First Assessment (Weeks):    Wound Number:    Is this injury device related?:    Incision Type:    Closure Method:    Wound Description (Comments):    Type:    Additional Comments:     Ankle-Brachial Index:    Pulses:    Removal Indication and Assessment:    Wound Outcome:    Wound Image    07/30/25 1302   Dressing Appearance Open to air 07/30/25 1302   Drainage Amount None 07/30/25 1302   Appearance Dry 07/30/25 1302   Wound Edges Callused 07/30/25 1302   Wound Length (cm) 0 cm 07/30/25 1302   Wound Width (cm) 0 cm 07/30/25 1302   Wound Depth (cm) 0 cm 07/30/25 1302   Wound Volume (cm^3) 0 cm^3 07/30/25 1302   Wound Surface Area (cm^2) 0 cm^2 07/30/25 1302   Supply Surface Area (L x W) 0 sq cm 07/30/25 1302   Care Cleansed with:;Sterile normal saline;Debrided 07/30/25 1302   Off Loading Other (see comments) 07/30/25 1302   Dressing Change Due 08/06/25 07/30/25 1302       [REMOVED]      Wound 07/02/25 1333 Venous Ulcer Left posterior Calf #3 (Removed)   07/02/25 1333 Calf   Present on Original Admission: N   Primary Wound Type: Venous ulcer   Side: Left   Orientation: posterior   Wound Approximate Age at First Assessment (Weeks):    Wound Number: #3   Is this injury device related?:    Incision Type:    Closure Method:    Wound Description (Comments):    Type:    Additional Comments:    Ankle-Brachial Index:    Pulses:    Removal Indication and Assessment:    Wound Outcome: Healed   Removed 07/30/25 1353   Wound Image   07/30/25 1302   Dressing Appearance Open to air 07/30/25 1302   Drainage Amount None 07/30/25 1302   Appearance Epithelialization 07/30/25 1302   Wound Edges Rolled/closed 07/30/25 1302   Wound Length (cm) 0 cm 07/30/25 1302   Wound Width (cm) 0 cm 07/30/25 1302   Wound Depth (cm) 0 cm 07/30/25 1302   Wound Volume (cm^3) 0 cm^3 07/30/25 1302   Wound Surface Area (cm^2) 0 cm^2 07/30/25 1302   Supply Surface Area (L x W) 0 sq cm 07/30/25 1302   Dressing Applied;Tubular bandage 07/30/25 1302   Compression Tubular elasticized bandage 07/30/25 1302           Procedures   Assessment/Plan:         ICD-10-CM ICD-9-CM   1. Skin ulcer of right great toe with fat layer exposed  L97.512  707.15         Tissue pathology and/or culture taken:   [] Yes    [x] No   Sharp debridement performed:    [x] Yes    [] No   Labs ordered this visit:    [] Yes    [x] No   Imaging ordered this visit:    [] Yes    [x] No     Is the wound improving?    [x] Yes    [] No   Any signs of infection?    [] Yes    [x] No             Orders Placed This Encounter   Procedures    Change dressing     Bilateral lower legs  Edema control: Tubi  F BLE. Elevate BLE as much as possible  Frequency: daily    Right great toe:  Periwound care: u shaped aperture pad x 2 , monitor area daily    Follow-up: 2 weeks   Other: RX diabetic shoes        Follow up in about 2 weeks (around 8/13/2025) for .            This includes face to face time and non-face to face time preparing to see the patient (eg, review of tests), obtaining and/or reviewing separately obtained history, documenting clinical information in the electronic or other health record, independently interpreting results and communicating results to the patient/family/caregiver, or care coordinator.  Total time spent  > 30 minutes

## 2025-08-04 ENCOUNTER — LAB VISIT (OUTPATIENT)
Dept: LAB | Facility: HOSPITAL | Age: 75
End: 2025-08-04
Attending: NURSE PRACTITIONER
Payer: MEDICARE

## 2025-08-04 DIAGNOSIS — D63.1 ANEMIA IN STAGE 3 CHRONIC KIDNEY DISEASE, UNSPECIFIED WHETHER STAGE 3A OR 3B CKD: ICD-10-CM

## 2025-08-04 DIAGNOSIS — N18.30 TYPE 2 DIABETES MELLITUS WITH STAGE 3 CHRONIC KIDNEY DISEASE, WITH LONG-TERM CURRENT USE OF INSULIN, UNSPECIFIED WHETHER STAGE 3A OR 3B CKD: ICD-10-CM

## 2025-08-04 DIAGNOSIS — E11.22 TYPE 2 DIABETES MELLITUS WITH STAGE 3 CHRONIC KIDNEY DISEASE, WITH LONG-TERM CURRENT USE OF INSULIN, UNSPECIFIED WHETHER STAGE 3A OR 3B CKD: ICD-10-CM

## 2025-08-04 DIAGNOSIS — Z79.4 TYPE 2 DIABETES MELLITUS WITH STAGE 3 CHRONIC KIDNEY DISEASE, WITH LONG-TERM CURRENT USE OF INSULIN, UNSPECIFIED WHETHER STAGE 3A OR 3B CKD: ICD-10-CM

## 2025-08-04 DIAGNOSIS — N18.32 STAGE 3B CHRONIC KIDNEY DISEASE: ICD-10-CM

## 2025-08-04 DIAGNOSIS — E11.65 UNCONTROLLED TYPE 2 DIABETES MELLITUS WITH HYPERGLYCEMIA: ICD-10-CM

## 2025-08-04 DIAGNOSIS — E55.9 VITAMIN D DEFICIENCY: ICD-10-CM

## 2025-08-04 DIAGNOSIS — N25.81 SECONDARY HYPERPARATHYROIDISM OF RENAL ORIGIN: ICD-10-CM

## 2025-08-04 DIAGNOSIS — N18.30 ANEMIA IN STAGE 3 CHRONIC KIDNEY DISEASE, UNSPECIFIED WHETHER STAGE 3A OR 3B CKD: ICD-10-CM

## 2025-08-04 LAB
25(OH)D3+25(OH)D2 SERPL-MCNC: 27 NG/ML (ref 30–96)
ABSOLUTE EOSINOPHIL (OHS): 0.16 K/UL
ABSOLUTE MONOCYTE (OHS): 0.84 K/UL (ref 0.3–1)
ABSOLUTE NEUTROPHIL COUNT (OHS): 5.1 K/UL (ref 1.8–7.7)
ALBUMIN SERPL BCP-MCNC: 3.8 G/DL (ref 3.5–5.2)
ALBUMIN/CREAT UR: 1144.9 UG/MG
ANION GAP (OHS): 15 MMOL/L (ref 8–16)
BACTERIA #/AREA URNS AUTO: ABNORMAL /HPF
BASOPHILS # BLD AUTO: 0.04 K/UL
BASOPHILS NFR BLD AUTO: 0.5 %
BILIRUB UR QL STRIP.AUTO: NEGATIVE
BUN SERPL-MCNC: 53 MG/DL (ref 8–23)
CALCIUM SERPL-MCNC: 9.4 MG/DL (ref 8.7–10.5)
CHLORIDE SERPL-SCNC: 102 MMOL/L (ref 95–110)
CLARITY UR: CLEAR
CO2 SERPL-SCNC: 25 MMOL/L (ref 23–29)
COLOR UR AUTO: YELLOW
CREAT SERPL-MCNC: 2.1 MG/DL (ref 0.5–1.4)
CREAT UR-MCNC: 78 MG/DL (ref 23–375)
CREAT UR-MCNC: 79.4 MG/DL (ref 23–375)
EAG (OHS): 128 MG/DL (ref 68–131)
ERYTHROCYTE [DISTWIDTH] IN BLOOD BY AUTOMATED COUNT: 13.6 % (ref 11.5–14.5)
FERRITIN SERPL-MCNC: 63.1 NG/ML (ref 20–300)
GFR SERPLBLD CREATININE-BSD FMLA CKD-EPI: 32 ML/MIN/1.73/M2
GLUCOSE SERPL-MCNC: 120 MG/DL (ref 70–110)
GLUCOSE UR QL STRIP: NEGATIVE
HBA1C MFR BLD: 6.1 % (ref 4–5.6)
HCT VFR BLD AUTO: 44.2 % (ref 40–54)
HGB BLD-MCNC: 14.1 GM/DL (ref 14–18)
HGB UR QL STRIP: ABNORMAL
HYALINE CASTS UR QL AUTO: 16 /LPF (ref 0–1)
IMM GRANULOCYTES # BLD AUTO: 0.02 K/UL (ref 0–0.04)
IMM GRANULOCYTES NFR BLD AUTO: 0.2 % (ref 0–0.5)
IRON SATN MFR SERPL: 23 % (ref 20–50)
IRON SERPL-MCNC: 78 UG/DL (ref 45–160)
KETONES UR QL STRIP: NEGATIVE
LEUKOCYTE ESTERASE UR QL STRIP: ABNORMAL
LYMPHOCYTES # BLD AUTO: 2.69 K/UL (ref 1–4.8)
MAGNESIUM SERPL-MCNC: 1.9 MG/DL (ref 1.6–2.6)
MCH RBC QN AUTO: 31.1 PG (ref 27–31)
MCHC RBC AUTO-ENTMCNC: 31.9 G/DL (ref 32–36)
MCV RBC AUTO: 97 FL (ref 82–98)
MICROALBUMIN UR-MCNC: 893 UG/ML (ref ?–5000)
MICROSCOPIC COMMENT: ABNORMAL
NITRITE UR QL STRIP: NEGATIVE
NUCLEATED RBC (/100WBC) (OHS): 0 /100 WBC
PH UR STRIP: 6 [PH]
PHOSPHATE SERPL-MCNC: 3.8 MG/DL (ref 2.7–4.5)
PLATELET # BLD AUTO: 292 K/UL (ref 150–450)
PMV BLD AUTO: 11 FL (ref 9.2–12.9)
POTASSIUM SERPL-SCNC: 4.5 MMOL/L (ref 3.5–5.1)
PROT UR QL STRIP: ABNORMAL
PROT UR-MCNC: 138 MG/DL
PROT/CREAT UR: 1.74 MG/G{CREAT}
PTH-INTACT SERPL-MCNC: 277.7 PG/ML (ref 9–77)
RBC # BLD AUTO: 4.54 M/UL (ref 4.6–6.2)
RBC #/AREA URNS AUTO: 7 /HPF (ref 0–4)
RELATIVE EOSINOPHIL (OHS): 1.8 %
RELATIVE LYMPHOCYTE (OHS): 30.4 % (ref 18–48)
RELATIVE MONOCYTE (OHS): 9.5 % (ref 4–15)
RELATIVE NEUTROPHIL (OHS): 57.6 % (ref 38–73)
SODIUM SERPL-SCNC: 142 MMOL/L (ref 136–145)
SP GR UR STRIP: 1.01
SQUAMOUS #/AREA URNS AUTO: <1 /HPF
TIBC SERPL-MCNC: 332 UG/DL (ref 250–450)
TRANSFERRIN SERPL-MCNC: 224 MG/DL (ref 200–375)
URATE SERPL-MCNC: 10.8 MG/DL (ref 3.4–7)
UROBILINOGEN UR STRIP-ACNC: NEGATIVE EU/DL
WBC # BLD AUTO: 8.85 K/UL (ref 3.9–12.7)
WBC #/AREA URNS AUTO: 5 /HPF (ref 0–5)

## 2025-08-04 PROCEDURE — 84156 ASSAY OF PROTEIN URINE: CPT | Mod: HCNC

## 2025-08-04 PROCEDURE — 81001 URINALYSIS AUTO W/SCOPE: CPT | Mod: HCNC

## 2025-08-04 PROCEDURE — 83036 HEMOGLOBIN GLYCOSYLATED A1C: CPT | Mod: HCNC

## 2025-08-04 PROCEDURE — 82306 VITAMIN D 25 HYDROXY: CPT | Mod: HCNC

## 2025-08-04 PROCEDURE — 36415 COLL VENOUS BLD VENIPUNCTURE: CPT | Mod: HCNC

## 2025-08-04 PROCEDURE — 80069 RENAL FUNCTION PANEL: CPT | Mod: HCNC

## 2025-08-04 PROCEDURE — 83735 ASSAY OF MAGNESIUM: CPT | Mod: HCNC

## 2025-08-04 PROCEDURE — 84550 ASSAY OF BLOOD/URIC ACID: CPT | Mod: HCNC

## 2025-08-04 PROCEDURE — 82570 ASSAY OF URINE CREATININE: CPT | Mod: 59,HCNC

## 2025-08-04 PROCEDURE — 82728 ASSAY OF FERRITIN: CPT | Mod: HCNC

## 2025-08-04 PROCEDURE — 83540 ASSAY OF IRON: CPT | Mod: HCNC

## 2025-08-04 PROCEDURE — 85025 COMPLETE CBC W/AUTO DIFF WBC: CPT | Mod: HCNC

## 2025-08-04 PROCEDURE — 83970 ASSAY OF PARATHORMONE: CPT | Mod: HCNC

## 2025-08-04 NOTE — TELEPHONE ENCOUNTER
No care due was identified.  [facility] Embedded Care Due Messages. Reference number: [id number].   8/04/2025 12:29:20 PM CDT

## 2025-08-05 RX ORDER — PEN NEEDLE, DIABETIC 30 GX3/16"
NEEDLE, DISPOSABLE MISCELLANEOUS
Qty: 100 EACH | Refills: 3 | Status: SHIPPED | OUTPATIENT
Start: 2025-08-05

## 2025-08-05 NOTE — TELEPHONE ENCOUNTER
Refill Decision Note   Francisco Coppola  is requesting a refill authorization.  Brief Assessment and Rationale for Refill:  Approve     Medication Therapy Plan:        Comments:     Note composed:12:02 PM 08/05/2025

## 2025-08-12 ENCOUNTER — TELEPHONE (OUTPATIENT)
Dept: WOUND CARE | Facility: HOSPITAL | Age: 75
End: 2025-08-12
Payer: MEDICARE

## 2025-08-13 ENCOUNTER — DOCUMENTATION ONLY (OUTPATIENT)
Dept: HEMATOLOGY/ONCOLOGY | Facility: CLINIC | Age: 75
End: 2025-08-13
Payer: MEDICARE

## 2025-08-14 RX ORDER — PRAVASTATIN SODIUM 40 MG/1
40 TABLET ORAL NIGHTLY
Qty: 90 TABLET | Refills: 1 | Status: SHIPPED | OUTPATIENT
Start: 2025-08-14

## 2025-08-28 ENCOUNTER — HOSPITAL ENCOUNTER (OUTPATIENT)
Dept: WOUND CARE | Facility: HOSPITAL | Age: 75
Discharge: HOME | End: 2025-08-28
Attending: SURGERY
Payer: MEDICARE

## 2025-08-28 VITALS
BODY MASS INDEX: 32.74 KG/M2 | WEIGHT: 216.06 LBS | HEIGHT: 68 IN | SYSTOLIC BLOOD PRESSURE: 111 MMHG | HEART RATE: 113 BPM | TEMPERATURE: 98 F | DIASTOLIC BLOOD PRESSURE: 58 MMHG

## 2025-08-28 DIAGNOSIS — E66.01 SEVERE OBESITY (BMI 35.0-35.9 WITH COMORBIDITY): ICD-10-CM

## 2025-08-28 DIAGNOSIS — L97.512 SKIN ULCER OF RIGHT GREAT TOE WITH FAT LAYER EXPOSED: Primary | ICD-10-CM

## 2025-08-28 DIAGNOSIS — M46.99 UNSPECIFIED INFLAMMATORY SPONDYLOPATHY, MULTIPLE SITES IN SPINE: ICD-10-CM

## 2025-08-28 DIAGNOSIS — N50.89 SCROTAL MASS: ICD-10-CM

## 2025-08-28 PROCEDURE — 11042 DBRDMT SUBQ TIS 1ST 20SQCM/<: CPT | Mod: HCNC

## (undated) DEVICE — COVER OVERHEAD SURG LT BLUE

## (undated) DEVICE — Device

## (undated) DEVICE — SEE MEDLINE ITEM 157150

## (undated) DEVICE — SEE MEDLINE ITEM 157125

## (undated) DEVICE — GLOVE SENSICARE PI MICRO 7.5

## (undated) DEVICE — SYR SLIP TIP 1CC

## (undated) DEVICE — SOL IRR STRL WATER 500ML

## (undated) DEVICE — SEE MEDLINE ITEM 157148

## (undated) DEVICE — DUOVISC

## (undated) DEVICE — DRESSING TEGADERM 2 3/8 X 2.75

## (undated) DEVICE — DRAPE C-ARM/MOBILE XRAY 44X80

## (undated) DEVICE — SOL BETADINE 5%

## (undated) DEVICE — DRESSING TELFA N ADH 3X8

## (undated) DEVICE — ELECTRODE REM PLYHSV RETURN 9

## (undated) DEVICE — SHEILD & GARTERS FOX METAL EYE

## (undated) DEVICE — SKINMARKER W/RULER DEVON

## (undated) DEVICE — BLADE SURG BVL ANG COAX 2.4MM

## (undated) DEVICE — SEE MEDLINE ITEM 157117

## (undated) DEVICE — TOWEL OR NONABSORB ADH 17X26

## (undated) DEVICE — SEE MEDLINE ITEM 156905

## (undated) DEVICE — ADHESIVE MASTISOL VIAL 48/BX

## (undated) DEVICE — BLADE ELECTRO EDGE INSULATED

## (undated) DEVICE — SEE MEDLINE ITEM 146292

## (undated) DEVICE — GLOVE SURGICAL LATEX SZ 7

## (undated) DEVICE — SYR LUER LOCK 1CC

## (undated) DEVICE — ALCOHOL 70% ISOP W/GREEN 16OZ

## (undated) DEVICE — CORD BIPOLAR 12 FOOT

## (undated) DEVICE — GAUZE SPONGE 4X4 12PLY

## (undated) DEVICE — CLOSURE SKIN STERI STRIP 1/2X4

## (undated) DEVICE — DRESSING AQUACEL FOAM 5 X 5

## (undated) DEVICE — SUT 0 VICRYL / UR6 (J603)

## (undated) DEVICE — DRESSING MEPILEX BORDER 4 X 4

## (undated) DEVICE — DRAPE STERI-DRAPE 1000 17X11IN

## (undated) DEVICE — APPLICATOR CHLORAPREP ORN 26ML

## (undated) DEVICE — NDL SPINAL SPINOCAN 22GX3.5

## (undated) DEVICE — GLOVE SURGICAL LATEX SZ 6

## (undated) DEVICE — DRAPE LEICA MICROSCOPE 48X120

## (undated) DEVICE — DRESSING SURGICAL 1/2X1/2

## (undated) DEVICE — UNDERGLOVES BIOGEL PI SZ 6 LF

## (undated) DEVICE — UNDERGLOVES BIOGEL PI SIZE 7.5

## (undated) DEVICE — SUT MONOCRYL 4-0 PS-2